# Patient Record
Sex: MALE | Race: WHITE | NOT HISPANIC OR LATINO | Employment: OTHER | URBAN - METROPOLITAN AREA
[De-identification: names, ages, dates, MRNs, and addresses within clinical notes are randomized per-mention and may not be internally consistent; named-entity substitution may affect disease eponyms.]

---

## 2017-01-02 DIAGNOSIS — E78.5 HYPERLIPIDEMIA: ICD-10-CM

## 2017-01-17 ENCOUNTER — ALLSCRIPTS OFFICE VISIT (OUTPATIENT)
Dept: OTHER | Facility: OTHER | Age: 57
End: 2017-01-17

## 2017-01-27 ENCOUNTER — TRANSCRIBE ORDERS (OUTPATIENT)
Dept: LAB | Age: 57
End: 2017-01-27

## 2017-01-27 ENCOUNTER — APPOINTMENT (OUTPATIENT)
Dept: LAB | Age: 57
End: 2017-01-27
Payer: MEDICARE

## 2017-01-27 DIAGNOSIS — I42.9 SECONDARY CARDIOMYOPATHY, UNSPECIFIED: ICD-10-CM

## 2017-01-27 DIAGNOSIS — E78.5 HYPERLIPIDEMIA, UNSPECIFIED HYPERLIPIDEMIA TYPE: ICD-10-CM

## 2017-01-27 DIAGNOSIS — E78.5 HYPERLIPIDEMIA: ICD-10-CM

## 2017-01-27 DIAGNOSIS — I48.91 ATRIAL FIBRILLATION, UNSPECIFIED TYPE (HCC): Primary | ICD-10-CM

## 2017-01-27 DIAGNOSIS — D86.9 SARCOIDOSIS: ICD-10-CM

## 2017-01-27 DIAGNOSIS — E11.9 DIABETES MELLITUS WITH NO COMPLICATION (HCC): ICD-10-CM

## 2017-01-27 DIAGNOSIS — I10 ESSENTIAL HYPERTENSION, MALIGNANT: ICD-10-CM

## 2017-01-27 LAB
ALBUMIN SERPL BCP-MCNC: 4.1 G/DL (ref 3.5–5)
ALP SERPL-CCNC: 98 U/L (ref 46–116)
ALT SERPL W P-5'-P-CCNC: 30 U/L (ref 12–78)
ANION GAP SERPL CALCULATED.3IONS-SCNC: 7 MMOL/L (ref 4–13)
AST SERPL W P-5'-P-CCNC: 11 U/L (ref 5–45)
BASOPHILS # BLD AUTO: 0.02 THOUSANDS/ΜL (ref 0–0.1)
BASOPHILS NFR BLD AUTO: 0 % (ref 0–1)
BILIRUB DIRECT SERPL-MCNC: 0.21 MG/DL (ref 0–0.2)
BILIRUB SERPL-MCNC: 0.9 MG/DL (ref 0.2–1)
BUN SERPL-MCNC: 17 MG/DL (ref 5–25)
CALCIUM SERPL-MCNC: 9 MG/DL (ref 8.3–10.1)
CHLORIDE SERPL-SCNC: 102 MMOL/L (ref 100–108)
CHOLEST SERPL-MCNC: 179 MG/DL (ref 50–200)
CK SERPL-CCNC: 65 U/L (ref 39–308)
CO2 SERPL-SCNC: 31 MMOL/L (ref 21–32)
CREAT SERPL-MCNC: 1.24 MG/DL (ref 0.6–1.3)
CREAT UR-MCNC: 251 MG/DL
EOSINOPHIL # BLD AUTO: 0.1 THOUSAND/ΜL (ref 0–0.61)
EOSINOPHIL NFR BLD AUTO: 2 % (ref 0–6)
ERYTHROCYTE [DISTWIDTH] IN BLOOD BY AUTOMATED COUNT: 13.3 % (ref 11.6–15.1)
ERYTHROCYTE [SEDIMENTATION RATE] IN BLOOD: 7 MM/HOUR (ref 0–10)
EST. AVERAGE GLUCOSE BLD GHB EST-MCNC: 163 MG/DL
GFR SERPL CREATININE-BSD FRML MDRD: 59.7 ML/MIN/1.73SQ M
GLUCOSE SERPL-MCNC: 200 MG/DL (ref 65–140)
HBA1C MFR BLD: 7.3 % (ref 4.2–6.3)
HCT VFR BLD AUTO: 45.9 % (ref 36.5–49.3)
HDLC SERPL-MCNC: 54 MG/DL (ref 40–60)
HGB BLD-MCNC: 16.5 G/DL (ref 12–17)
LDLC SERPL CALC-MCNC: 88 MG/DL (ref 0–100)
LYMPHOCYTES # BLD AUTO: 1.08 THOUSANDS/ΜL (ref 0.6–4.47)
LYMPHOCYTES NFR BLD AUTO: 21 % (ref 14–44)
MCH RBC QN AUTO: 31.8 PG (ref 26.8–34.3)
MCHC RBC AUTO-ENTMCNC: 35.9 G/DL (ref 31.4–37.4)
MCV RBC AUTO: 88 FL (ref 82–98)
MICROALBUMIN UR-MCNC: 10.9 MG/L (ref 0–20)
MICROALBUMIN/CREAT 24H UR: 4 MG/G CREATININE (ref 0–30)
MONOCYTES # BLD AUTO: 0.49 THOUSAND/ΜL (ref 0.17–1.22)
MONOCYTES NFR BLD AUTO: 10 % (ref 4–12)
NEUTROPHILS # BLD AUTO: 3.44 THOUSANDS/ΜL (ref 1.85–7.62)
NEUTS SEG NFR BLD AUTO: 67 % (ref 43–75)
NRBC BLD AUTO-RTO: 0 /100 WBCS
PLATELET # BLD AUTO: 151 THOUSANDS/UL (ref 149–390)
PMV BLD AUTO: 10.6 FL (ref 8.9–12.7)
POTASSIUM SERPL-SCNC: 4.2 MMOL/L (ref 3.5–5.3)
PROT SERPL-MCNC: 7.4 G/DL (ref 6.4–8.2)
RBC # BLD AUTO: 5.19 MILLION/UL (ref 3.88–5.62)
SODIUM SERPL-SCNC: 140 MMOL/L (ref 136–145)
TRIGL SERPL-MCNC: 187 MG/DL
TSH SERPL DL<=0.05 MIU/L-ACNC: 0.92 UIU/ML (ref 0.36–3.74)
WBC # BLD AUTO: 5.14 THOUSAND/UL (ref 4.31–10.16)

## 2017-01-27 PROCEDURE — 82248 BILIRUBIN DIRECT: CPT

## 2017-01-27 PROCEDURE — 80061 LIPID PANEL: CPT

## 2017-01-27 PROCEDURE — 85025 COMPLETE CBC W/AUTO DIFF WBC: CPT

## 2017-01-27 PROCEDURE — 80053 COMPREHEN METABOLIC PANEL: CPT

## 2017-01-27 PROCEDURE — 85652 RBC SED RATE AUTOMATED: CPT

## 2017-01-27 PROCEDURE — 82570 ASSAY OF URINE CREATININE: CPT

## 2017-01-27 PROCEDURE — 82043 UR ALBUMIN QUANTITATIVE: CPT

## 2017-01-27 PROCEDURE — 84443 ASSAY THYROID STIM HORMONE: CPT

## 2017-01-27 PROCEDURE — 83036 HEMOGLOBIN GLYCOSYLATED A1C: CPT

## 2017-01-27 PROCEDURE — 36415 COLL VENOUS BLD VENIPUNCTURE: CPT

## 2017-01-27 PROCEDURE — 82550 ASSAY OF CK (CPK): CPT

## 2017-01-28 ENCOUNTER — GENERIC CONVERSION - ENCOUNTER (OUTPATIENT)
Dept: OTHER | Facility: OTHER | Age: 57
End: 2017-01-28

## 2017-02-24 ENCOUNTER — ALLSCRIPTS OFFICE VISIT (OUTPATIENT)
Dept: OTHER | Facility: OTHER | Age: 57
End: 2017-02-24

## 2017-02-24 DIAGNOSIS — E11.9 TYPE 2 DIABETES MELLITUS WITHOUT COMPLICATIONS (HCC): ICD-10-CM

## 2017-03-16 ENCOUNTER — ALLSCRIPTS OFFICE VISIT (OUTPATIENT)
Dept: OTHER | Facility: OTHER | Age: 57
End: 2017-03-16

## 2017-05-02 ENCOUNTER — ALLSCRIPTS OFFICE VISIT (OUTPATIENT)
Dept: OTHER | Facility: OTHER | Age: 57
End: 2017-05-02

## 2017-05-02 DIAGNOSIS — D86.9 SARCOIDOSIS: ICD-10-CM

## 2017-05-04 ENCOUNTER — TRANSCRIBE ORDERS (OUTPATIENT)
Dept: ADMINISTRATIVE | Age: 57
End: 2017-05-04

## 2017-05-04 ENCOUNTER — HOSPITAL ENCOUNTER (OUTPATIENT)
Dept: RADIOLOGY | Age: 57
Discharge: HOME | End: 2017-05-04
Payer: MEDICARE

## 2017-05-04 DIAGNOSIS — D86.9 SARCOIDOSIS: ICD-10-CM

## 2017-05-04 PROCEDURE — 71020 HB CHEST X-RAY 2VW FRONTAL&LATL: CPT

## 2017-05-05 ENCOUNTER — ALLSCRIPTS OFFICE VISIT (OUTPATIENT)
Dept: OTHER | Facility: OTHER | Age: 57
End: 2017-05-05

## 2017-05-09 ENCOUNTER — GENERIC CONVERSION - ENCOUNTER (OUTPATIENT)
Dept: OTHER | Facility: OTHER | Age: 57
End: 2017-05-09

## 2017-05-16 ENCOUNTER — GENERIC CONVERSION - ENCOUNTER (OUTPATIENT)
Dept: OTHER | Facility: OTHER | Age: 57
End: 2017-05-16

## 2017-06-20 ENCOUNTER — ALLSCRIPTS OFFICE VISIT (OUTPATIENT)
Dept: OTHER | Facility: OTHER | Age: 57
End: 2017-06-20

## 2017-07-12 ENCOUNTER — APPOINTMENT (OUTPATIENT)
Dept: RADIOLOGY | Age: 57
End: 2017-07-12
Payer: MEDICARE

## 2017-07-12 ENCOUNTER — TRANSCRIBE ORDERS (OUTPATIENT)
Dept: ADMINISTRATIVE | Age: 57
End: 2017-07-12

## 2017-07-12 ENCOUNTER — GENERIC CONVERSION - ENCOUNTER (OUTPATIENT)
Dept: OTHER | Facility: OTHER | Age: 57
End: 2017-07-12

## 2017-07-12 ENCOUNTER — APPOINTMENT (OUTPATIENT)
Dept: LAB | Age: 57
End: 2017-07-12
Payer: MEDICARE

## 2017-07-12 DIAGNOSIS — R07.81 PLEURODYNIA: ICD-10-CM

## 2017-07-12 DIAGNOSIS — R07.81 PLEURITIC CHEST PAIN: ICD-10-CM

## 2017-07-12 DIAGNOSIS — R07.81 PLEURITIC CHEST PAIN: Primary | ICD-10-CM

## 2017-07-12 DIAGNOSIS — E11.9 TYPE 2 DIABETES MELLITUS WITHOUT COMPLICATIONS (HCC): ICD-10-CM

## 2017-07-12 DIAGNOSIS — R52 PAIN: ICD-10-CM

## 2017-07-12 DIAGNOSIS — I48.91 ATRIAL FIBRILLATION (HCC): ICD-10-CM

## 2017-07-12 LAB
ANION GAP SERPL CALCULATED.3IONS-SCNC: 4 MMOL/L (ref 4–13)
BUN SERPL-MCNC: 16 MG/DL (ref 5–25)
CALCIUM SERPL-MCNC: 8.7 MG/DL (ref 8.3–10.1)
CHLORIDE SERPL-SCNC: 102 MMOL/L (ref 100–108)
CO2 SERPL-SCNC: 32 MMOL/L (ref 21–32)
CREAT SERPL-MCNC: 1.24 MG/DL (ref 0.6–1.3)
EST. AVERAGE GLUCOSE BLD GHB EST-MCNC: 171 MG/DL
GFR SERPL CREATININE-BSD FRML MDRD: 59.7 ML/MIN/1.73SQ M
GLUCOSE SERPL-MCNC: 294 MG/DL (ref 65–140)
HBA1C MFR BLD: 7.6 % (ref 4.2–6.3)
POTASSIUM SERPL-SCNC: 4 MMOL/L (ref 3.5–5.3)
SODIUM SERPL-SCNC: 138 MMOL/L (ref 136–145)

## 2017-07-12 PROCEDURE — 83036 HEMOGLOBIN GLYCOSYLATED A1C: CPT

## 2017-07-12 PROCEDURE — 36415 COLL VENOUS BLD VENIPUNCTURE: CPT

## 2017-07-12 PROCEDURE — 80048 BASIC METABOLIC PNL TOTAL CA: CPT

## 2017-07-12 PROCEDURE — 71020 HB CHEST X-RAY 2VW FRONTAL&LATL: CPT

## 2017-07-20 ENCOUNTER — ALLSCRIPTS OFFICE VISIT (OUTPATIENT)
Dept: OTHER | Facility: OTHER | Age: 57
End: 2017-07-20

## 2017-07-20 ENCOUNTER — APPOINTMENT (OUTPATIENT)
Dept: LAB | Facility: CLINIC | Age: 57
End: 2017-07-20
Payer: MEDICARE

## 2017-07-20 DIAGNOSIS — I48.91 ATRIAL FIBRILLATION (HCC): ICD-10-CM

## 2017-07-20 LAB
BASOPHILS # BLD AUTO: 0.05 THOUSANDS/ΜL (ref 0–0.1)
BASOPHILS NFR BLD AUTO: 1 % (ref 0–1)
EOSINOPHIL # BLD AUTO: 0.18 THOUSAND/ΜL (ref 0–0.61)
EOSINOPHIL NFR BLD AUTO: 3 % (ref 0–6)
ERYTHROCYTE [DISTWIDTH] IN BLOOD BY AUTOMATED COUNT: 13.4 % (ref 11.6–15.1)
HCT VFR BLD AUTO: 42.9 % (ref 36.5–49.3)
HGB BLD-MCNC: 15.4 G/DL (ref 12–17)
LYMPHOCYTES # BLD AUTO: 1.19 THOUSANDS/ΜL (ref 0.6–4.47)
LYMPHOCYTES NFR BLD AUTO: 19 % (ref 14–44)
MCH RBC QN AUTO: 31.8 PG (ref 26.8–34.3)
MCHC RBC AUTO-ENTMCNC: 35.9 G/DL (ref 31.4–37.4)
MCV RBC AUTO: 89 FL (ref 82–98)
MONOCYTES # BLD AUTO: 0.58 THOUSAND/ΜL (ref 0.17–1.22)
MONOCYTES NFR BLD AUTO: 9 % (ref 4–12)
NEUTROPHILS # BLD AUTO: 4.29 THOUSANDS/ΜL (ref 1.85–7.62)
NEUTS SEG NFR BLD AUTO: 68 % (ref 43–75)
NRBC BLD AUTO-RTO: 0 /100 WBCS
PLATELET # BLD AUTO: 167 THOUSANDS/UL (ref 149–390)
PMV BLD AUTO: 10.6 FL (ref 8.9–12.7)
RBC # BLD AUTO: 4.84 MILLION/UL (ref 3.88–5.62)
WBC # BLD AUTO: 6.3 THOUSAND/UL (ref 4.31–10.16)

## 2017-07-20 PROCEDURE — 85025 COMPLETE CBC W/AUTO DIFF WBC: CPT

## 2017-07-20 PROCEDURE — 36415 COLL VENOUS BLD VENIPUNCTURE: CPT

## 2017-10-12 ENCOUNTER — ALLSCRIPTS OFFICE VISIT (OUTPATIENT)
Dept: OTHER | Facility: OTHER | Age: 57
End: 2017-10-12

## 2017-10-19 ENCOUNTER — ALLSCRIPTS OFFICE VISIT (OUTPATIENT)
Dept: OTHER | Facility: OTHER | Age: 57
End: 2017-10-19

## 2017-10-20 NOTE — PROGRESS NOTES
Assessment  Assessed    1  Atrial fibrillation (427 31) (I48 91)   2  Cardiomyopathy (425 4) (I42 9)   3  Hyperlipidemia (272 4) (E78 5)   4  Hypertension (401 9) (I10)   5  Obstructive sleep apnea (327 23) (G47 33)   6  Pacemaker (V45 01) (Z95 0)   7  Sinus bradycardia (427 89) (R00 1)   8  Type 2 diabetes mellitus (250 00) (E11 9)    Plan  Atrial fibrillation    · DilTIAZem HCl ER Coated Beads 360 MG Oral Capsule Extended Release 24  Hour   Rx By: Bart Awad; Dispense: 0 Days ; #:30 Capsule; Refill: 0;For: Atrial fibrillation; BISI = N; Sent To: [address] [place] [address]; Msg to Pharmacy: Patient needs follow up appointmenrt to get further medications; Last Updated By: Chuy Sepulveda; 9/11/2017 10:12:35 AM   · DilTIAZem HCl ER Beads 360 MG Oral Capsule Extended Release 24 Hour; TAKE  1 CAPSULE DAILY IN THE MORNING   Rx By: Chuy Sepulveda; Dispense: 90 Days ; #:90 Capsule Extended Release 24 Hour; Refill: 3;For: Atrial fibrillation; BISI = N; Sent To: [facility]   · Matzim  MG Oral Tablet Extended Release 24 Hour; TAKE 1 TABLET DAILY   Rx By: Chuy Sepulveda; Dispense: 90 Days ; #:90 Tablet Extended Release 24 Hour; Refill: 3;For: Atrial fibrillation; BISI = N; Print Rx   · Xarelto 20 MG Oral Tablet; Take 1 tablet daily   Rx By: Chuy Sepulveda; Dispense: 90 Days ; #:90 Tablet; Refill: 3;For: Atrial fibrillation; BISI = N; Sent To: [facility]   · EKG/ECG- POC; Status:Complete;   Done: 73KGS3358   Perform: In Office; Due:19Oct2018; Last Updated By:Bigg Webb; 10/19/2017 8:52:32 AM;Ordered; For:Atrial fibrillation; Ordered By:Sarah Levy;  Atrial fibrillation, Hypertension    · Metoprolol Tartrate 50 MG Oral Tablet; TAKE 1 TABLET TWICE DAILY   Rx By: Chuy Sepulveda; Dispense: 90 Days ; #:180 Tablet; Refill: 3;For: Atrial fibrillation, Hypertension; BISI = N; Sent To: [facility]  Cardiomyopathy    · Furosemide 40 MG Oral Tablet; Take 1 tablet daily   Rx By: Chuy Sepulveda; Dispense: 90 Days ; #:90 Tablet;  Refill: 3;For: Cardiomyopathy; BISI = N; Sent To: [address]; Msg to Pharmacy: needs follow up appointment  Hyperlipidemia    · Atorvastatin Calcium 40 MG Oral Tablet; take 1 tablet by mouth once daily at  bedtime   Rx By: Frank Rehman; Dispense: 90 Days ; #:90 Tablet; Refill: 3;For: Hyperlipidemia; BISI = N; Sent To: [facility]   · (1) CK (CPK); Status:Active; Requested SKE:[id number];    Perform:[facility]; MKC:[id number]; Ordered;For:Hyperlipidemia; Ordered By:Sarah Levy;   · (1) HEPATIC FUNCTION PANEL; Status:Active; Requested POT:[id number];    Perform:[facility]; RPF:60ABR7474; Ordered;For:Hyperlipidemia; Ordered By:Sarah Levy;   · (1) LIPID PANEL FASTING W DIRECT LDL REFLEX; Status:Active; Requested  QHU:[id number];    Perform:[facility]; RFN:[id number]; Ordered;For:Hyperlipidemia; Ordered By:Sarah Levy;  Unlinked    · Follow-up visit in 6 months Evaluation and Treatment  Follow-up  Status: Hold For -  Scheduling  Requested for: 97XSJ1248   Ordered;Ordered By: Frank Rehman Performed:  Due: 11SGU1112    Discussion/Summary  Discussion Summary:   1  Permanent atrial fibrillation  He is on Metoprolol and Diltiazem as well as Xarelto for stroke prophylaxis  He is now 100% in afib based on PM interrogation  Creatinine 1 24 7/17  His fatigue improved after decreasing Metoprolol from 100 bid to 50 bid  Hypertension  Blood pressure is controlled on Metoprolol and Diltiazem and Lasix 40  Lisinopril was discontinued when he had orthostatic hypotension  Dyslipidemia  Lipid panel in 11/14 showed total cholesterol 183, HDL 50, LDL 93, and   Repeat lipid panel in 2/15 showed total cholesterol 166, , HDL 48, and LDL 95  He is on Atorvastatin 40  Lipid panel 1/17 showed total cholesterol 179, LDL 88, , HDL 54  Nonischemic cardiomyopathy  He is on beta blocker  He is not having any symptoms currently  Most recent echo showed normalization of LV function   Lisinopril was stopped to avoid hypotension  He still requires the Lasix to avoid lower extremity edema  I asked him to cut down Lasix to 20 mg to see if this would be enough to treat the edema, but he tried this and did not help control his LE edema, currently on 40 mg daily  s/p PM  Device interrogation 6/17 showed battery voltage adequate 3 5 years, V paced 40%  Sleep apnea  He is not using CPAP at night, and is not using oxygen at night either  DM  Hgb A1c 1/17 was 7 3% Hgb A1c 7 6% 7/17  [facility] Chief Complaint  Chief Complaint Free Text Note Form: Patient here for office visit  History of Present Illness  HPI: 59year old man with a history of previously paroxysmal, now permanent atrial fibrillation, TOVA intolerant to CPAP, history of NICM which resolved with rate control, 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 in 4/13 morbid obesity, HTN, HL, and DM, who is here for routine follow up    was admitted to the hospital for several episodes of syncope at home in 3/14  He was found to be orthostatic, and repeat echo in 3/14 showed normalization of LV function with EF of 60%, so Lisinopril was discontinued  RV size and function were normal, with PASP of 25-30 mm Hg  Pacemaker interrogation in the hospital did show episodes of atrial fibrillation with rapid ventricular response, so Metoprolol and Diltiazem were titrated up for rate control  denies any dizziness or lightheadedness or further syncope  He denies any worsening of his baseline dyspnea, reports it is stable, uses Asmanex so does not need to use albuterol as much as previously  He states he currently does not feel palpitations from the atrial fibrillation  No chest pain, no lower extremity edema  He did try taking 20 mg of Lasix instead of 40, but his ankle edema was not controlled on lower dose, now on 40  Edema got worse when he was at [employer] in heat and from standing all day, but it resolved on its own   He does not use his CPAP machine for his sleep apnea, was previously using oxygen at night, but stopped using it because he had to pay for it, and it also dried out his nose  He has reduced vision due to prior episode of ocular shingles with reduced vision in left eye  He is not doing formal exercise  Review of Systems  Cardiology Male ROS:     Cardiac: rhythm problems, but-- no chest pain,-- no fainting/blackouts,-- no heart murmur present,-- no signs of swelling-- and-- no palpitations present  Skin: No complaints of nonhealing sores or skin rash  Genitourinary: no recurrent urinary tract infections,-- no frequent urination at night,-- no difficult urination,-- no blood in urine-- and-- no kidney stones   Psychological: No complaints of feeling depressed, anxiety, panic attacks, or difficulty concentrating  General: No complaints of trouble sleeping, lack of energy, fatigue, appetite changes, weight changes, fever, frequent infections, or night sweats  Respiratory: shortness of breath, but-- No complaints of shortness of breath, cough with sputum, or wheezing  HEENT: No complaints of serious problems, hearing problems, nose problems, throat problems, or snoring  Gastrointestinal: No complaints of liver problems, nausea, vomiting, heartburn, constipation, bloody stools, diarrhea, problems swallowing, adbominal pain, or rectal bleeding  Hematologic: excessive bruising, but-- no bleeding disorders,-- no anemia-- and-- no blood clots   Neurological: No complaints of numbness, tingling, dizziness, weakness, seizures, headaches, syncope or fainting, AM fatigue, daytime sleepiness, no witnessed apnea episodes  Musculoskeletal: No complaints of arthritis, back pain, or painfull swelling  ROS Reviewed:   ROS reviewed  Active Problems  Problems    1  Anxiety (300 00) (F41 9)   2  Arthralgia of right shoulder region (719 41) (M25 511)   3  Atrial fibrillation (427 31) (I48 91)   4  Cardiomyopathy (425 4) (I42 9)   5   Colonoscopy (Fiberoptic) Screening   6  Cough (786 2) (R05)   7  Dry eye syndrome (375 15) (H04 129)   8  Encounter for screening for malignant neoplasm of prostate (V76 44) (Z12 5)   9  Hyperlipidemia (272 4) (E78 5)   10  Hypertension (401 9) (I10)   11  Morbid obesity (278 01) (E66 01)   12  Obstructive sleep apnea (327 23) (G47 33)   13  Pacemaker (V45 01) (Z95 0)   14  Pain (780 96) (R52)   15  Recurrent major depressive disorder, in partial remission (296 35) (F33 41)   16  Rib pain on left side (786 50) (R07 81)   17  Sarcoidosis (135) (D86 9)   18  Sinus bradycardia (427 89) (R00 1)   19  Type 2 diabetes mellitus (250 00) (E11 9)    Past Medical History  Problems    1  History of Cardiac Cath Procedures Performed   2  History of Chronic systolic congestive heart failure (428 22,428 0) (I50 22)   3  Pacemaker (V45 01) (Z95 0)  Active Problems And Past Medical History Reviewed: The active problems and past medical history were reviewed and updated today  Surgical History  Problems    1  History of Elective Cardioversion   2  History of Rectal Surgery Repair Of Perirectal Fistula   3  History of Tonsillectomy  Surgical History Reviewed: The surgical history was reviewed and updated today  Family History  Mother    1  Family history of Atrial Fibrillation   2  Family history of Cancer  Father    3  Family history of Heart Disease (V17 49)   4  Family history of Hypertension (V17 49)   5  Family history of Skin Cancer (V16 8)  Family History    6  Family history of Cancer   7  Family history of Transient Ischemic Attack Without Residual Deficits  Family History Reviewed: The family history was reviewed and updated today         Social History  Problems    · Always uses seat belt   · Being A Social Drinker   · Daily Coffee Consumption (___ Cups/Day)   · Denied: History of Daily Cola Consumption (___ Cans/Day)   · Denied: History of Daily Tea Consumption (___ Cups/Day)   · Dental care, regularly   · Denied: History of Drug use   · Denied: History of Exercise habits   · Former smoker (V15 82) (L21 203)   · Pets / animals   · Retired   · Denied: History of Tobacco Use   · Uses Safety Equipment - Seatbelts  Social History Reviewed: The social history was reviewed and updated today  Current Meds   1  Asmanex 120 Metered Doses AEPB; INHALE 1 PUFF TWICE DAILY; Therapy: (Recorded:19Oct2017) to Recorded   2  Atorvastatin Calcium 40 MG Oral Tablet; take 1 tablet by mouth once daily at bedtime; Therapy: 03WZX3806 to (Last Rx:54Afr0907)  Requested for: 62Dzd3998 Ordered   3  Citalopram Hydrobromide 20 MG Oral Tablet; Take 1 tablet daily; Therapy: 09HIW5615 to (Last Rx:08Sep2017)  Requested for: 32Cnp7821 Ordered   4  DilTIAZem HCl ER Coated Beads 360 MG Oral Capsule Extended Release 24 Hour;   take 1 capsule daily; Therapy: 17MRA3595 to (Last Rx:08Sep2017)  Requested for: 89Zxv9198 Ordered   5  Erythromycin 5 MG/GM Ophthalmic Ointment; APPLY 1 STRIP Bedtime; Therapy: (Recorded:75Tlu4787) to Recorded   6  Furosemide 40 MG Oral Tablet; Take 1 tablet daily; Therapy: 80WPU3201 to (Last Rx:08Sep2017)  Requested for: 11Sep2017 Ordered   7  Metoprolol Tartrate 50 MG Oral Tablet; TAKE 1 TABLET TWICE DAILY  Requested for:   71MUM8026; Last Rx:16Mar2017 Ordered   8  Restasis 0 05 % Ophthalmic Emulsion; INSTILL 1 DROP IN EACH EYE TWICE DAILY   Recorded   9  Ventolin  (90 Base) MCG/ACT Inhalation Aerosol Solution; INHALE 2 PUFFS   EVERY 4-6 HOURS AS NEEDED; Therapy: 16EON3203 to (Evaluate:31Ypv5847); Last Rx:13Syn4328 Ordered   10  Xarelto 20 MG Oral Tablet; Take 1 tablet daily; Therapy: 74ZYS8387 to (Last Rx:49Bxf2644)  Requested for: 22Igt8838 Ordered  Medication List Reviewed: The medication list was reviewed and updated today  Allergies  Medication    1  No Known Drug Allergies  Non-Medication    2  No Known Environmental Allergies   3   No Known Food Allergies    Vitals  Vital Signs    Recorded: 96VML3402 08:52AM   Heart Rate 91, Apical   Pulse Quality Regular, Apical   Systolic 983, RUE, Sitting   Diastolic 72, RUE, Sitting   Height 5 ft 10 in   Weight 291 lb    BMI Calculated 41 75   BSA Calculated 2 45     Physical Exam    Constitutional   General appearance: No acute distress, well appearing and well nourished  -- Morbidly obese  Eyes   Conjunctiva and Sclera examination: Conjunctiva pink, sclera anicteric  Ears, Nose, Mouth, and Throat - Oropharynx: Clear, nares are clear, mucous membranes are moist    Neck   Neck and thyroid: Normal, supple, trachea midline, no thyromegaly  Pulmonary   Respiratory effort: No increased work of breathing or signs of respiratory distress  Cardiovascular   Palpation of heart: Normal PMI, no thrills  Auscultation of heart: Normal rate and rhythm, normal S1 and S2, no murmurs  Carotid pulses: Normal, 2+ bilaterally  Examination of extremities for edema and/or varicosities: Normal     Chest - Chest: Normal    Abdomen   Abdomen: Abnormal   The abdomen was obese  Bowel sounds were normal  The abdomen was soft and nontender  Musculoskeletal Gait and station: Normal gait  Skin - Skin and subcutaneous tissue: Normal without rashes or lesions  Skin is warm and well perfused, normal turgor  Psychiatric - Orientation to person, place, and time: Normal -- Mood and affect: Normal       Results/Data  ECG Report:   Rhythm and rate: atrial fibrillation-- and-- PVCs  Axis: left axis deviation  Future Appointments    Date/Time Provider Specialty Site   03/21/2018 10:00 AM Cardiology, [address] Anitha [facility]   11/29/2017 01:00 PM Cardiology, Device Remote  [address]   03/07/2018 08:45 AM QUENTIN Boyd  [facility]     Signatures   Electronically signed by :  Lauren Jones MD; Oct 19 2017  9:22AM EST                       (Author)

## 2017-10-23 DIAGNOSIS — I42.9 CARDIOMYOPATHY (HCC): ICD-10-CM

## 2017-10-23 DIAGNOSIS — E66.01 MORBID (SEVERE) OBESITY DUE TO EXCESS CALORIES (HCC): ICD-10-CM

## 2017-10-23 DIAGNOSIS — E78.5 HYPERLIPIDEMIA: ICD-10-CM

## 2017-10-23 DIAGNOSIS — F32.9 MAJOR DEPRESSIVE DISORDER, SINGLE EPISODE: ICD-10-CM

## 2017-10-23 DIAGNOSIS — I48.91 ATRIAL FIBRILLATION (HCC): ICD-10-CM

## 2017-10-23 DIAGNOSIS — I10 ESSENTIAL (PRIMARY) HYPERTENSION: ICD-10-CM

## 2017-10-23 DIAGNOSIS — E11.9 TYPE 2 DIABETES MELLITUS WITHOUT COMPLICATIONS (HCC): ICD-10-CM

## 2017-10-29 NOTE — PROGRESS NOTES
Assessment    1  Sarcoidosis (135) (D86 9)   2  Cough (786 2) (R05)    Plan  Cough, Sarcoidosis    · Doxycycline Hyclate 100 MG Oral Capsule; 1 tab PO BID    Discussion/Summary    #1 cough - ? bronchitissarcoidosisI reviewed with patient  Lungs are slightly increased expiratory phase but not appreciably rales  Probable viral etiology, however, patient does have reduced lung function due to his sarcoidosis  RECOMMEND: Increase Asmanex one inhalation twice a day  Increase rescue and he'll use 2 every 4-6 hours until cough improves  Continue promethazine  Start doxycycline In 2-3 days if not improving, earlier if worse  (Printed prescription given) follow-up Monday if not improved?go to ER or urgent care if worse over the weekend  Patient to call for problems or concerns in the interim  The patient, patient's family was counseled regarding diagnostic results,-- instructions for management,-- risk factor reductions,-- prognosis,-- patient and family education,-- impressions,-- risks and benefits of treatment options,-- importance of compliance with treatment  Possible side effects of new medications were reviewed with the patient/guardian today  The treatment plan was reviewed with the patient/guardian  The patient/guardian understands and agrees with the treatment plan      Chief Complaint    1  Cold Symptoms    History of Present Illness  HPI: as above2-3 day hx of wheezing tightness in chest with fatigue but no fever/chills  No increased edema but has some nasal congestion  No sinus pain  Cough is a little better today  Compliant with inhalers but only uses ProAir once or twice a day  Pt with hx of sarcoidosis and has some restriction on last spirometry      Review of Systems   Constitutional: as noted in HPI   ENT: as noted in HPI  Cardiovascular: no complaints of slow or fast heart rate, no chest pain, no palpitations, no leg claudication or lower extremity edema  Respiratory: as noted in HPI    Musculoskeletal: no complaints of arthralgia, no myalgia, no joint swelling or stiffness, no limb pain or swelling  Integumentary: no complaints of skin rash or lesion, no itching or dry skin, no skin wounds  Active Problems    1  Anxiety (300 00) (F41 9)   2  Arthralgia of right shoulder region (719 41) (M25 511)   3  Atrial fibrillation (427 31) (I48 91)   4  Cardiomyopathy (425 4) (I42 9)   5  Colonoscopy (Fiberoptic) Screening   6  Dry eye syndrome (375 15) (H04 129)   7  Encounter for screening for malignant neoplasm of prostate (V76 44) (Z12 5)   8  Hyperglycemia (790 29) (R73 9)   9  Hyperlipidemia (272 4) (E78 5)   10  Hypertension (401 9) (I10)   11  Morbid obesity (278 01) (E66 01)   12  Obstructive sleep apnea (327 23) (G47 33)   13  Pacemaker (V45 01) (Z95 0)   14  Pain (780 96) (R52)   15  Recurrent major depressive disorder, in partial remission (296 35) (F33 41)   16  Rib pain on left side (786 50) (R07 81)   17  Sarcoidosis (135) (D86 9)   18  Sinus bradycardia (427 89) (R00 1)   19  Type 2 diabetes mellitus (250 00) (E11 9)    Past Medical History  1  History of Cardiac Cath Procedures Performed   2  History of Chronic systolic congestive heart failure (428 22,428 0) (I50 22)   3  Pacemaker (V45 01) (Z95 0)  Active Problems And Past Medical History Reviewed: The active problems and past medical history were reviewed and updated today  Family History  Mother    1  Family history of Atrial Fibrillation   2  Family history of Cancer  Father    3  Family history of Heart Disease (V17 49)   4  Family history of Hypertension (V17 49)   5  Family history of Skin Cancer (V16 8)  Family History    6  Family history of Cancer   7   Family history of Transient Ischemic Attack Without Residual Deficits    Social History   · Always uses seat belt   · Being A Social Drinker   · Daily Coffee Consumption (___ Cups/Day)   · Denied: History of Daily Cola Consumption (___ Cans/Day)   · Denied: History of Daily Tea Consumption (___ Cups/Day)   · Dental care, regularly   · Denied: History of Drug use   · Denied: History of Exercise habits   · Former smoker (V15 82) (I92 169)   · [address], 2ppd x 5 years   · Pets / animals   · Retired   · Denied: History of Tobacco Use   · Uses Safety Equipment - Seatbelts  The social history was reviewed and updated today  Surgical History    1  History of Elective Cardioversion   2  History of Rectal Surgery Repair Of Perirectal Fistula   3  History of Tonsillectomy    Current Meds   1  Asmanex 120 Metered Doses AEPB; Therapy: (Recorded:66Fxj5368) to Recorded   2  Atorvastatin Calcium 40 MG Oral Tablet; take 1 tablet by mouth once daily at bedtime; Therapy: 11PCW1067 to (Last Rx:63Npr3926)  Requested for: 49Uvk5222 Ordered   3  Citalopram Hydrobromide 20 MG Oral Tablet; Take 1 tablet daily; Therapy: 21INO9208 to (Last Rx:00Gth2296)  Requested for: 70Wmt8900 Ordered   4  DilTIAZem HCl ER Beads 360 MG Oral Capsule Extended Release 24 Hour; TAKE 1 CAPSULE DAILY; Therapy: 80QHR0773 to (Evaluate:16Kjy2250)  Requested for: 22MYG6108; Last Rx:69Uiv5184 Ordered   5  DilTIAZem HCl ER Coated Beads 360 MG Oral Capsule Extended Release 24 Hour; take 1 capsule daily; Therapy: 68KNV1861 to (Last Rx:51Pda3968)  Requested for: 74Bcs6538 Ordered   6  Erythromycin 5 MG/GM Ophthalmic Ointment; APPLY 1 STRIP Bedtime; Therapy: (Recorded:29Lvh6401) to Recorded   7  Furosemide 40 MG Oral Tablet; Take 1 tablet daily; Therapy: 23NJF9057 to (Last Rx:27Bbw0176)  Requested for: 74Neo6827 Ordered   8  Metoprolol Tartrate 100 MG Oral Tablet; TAKE 1 TABLET TWICE DAILY; Therapy: 48HZP0323 to [phone] [phone])  Requested for: 66Wcp0996; Last Rx:29Ouk1340 Ordered   9  Metoprolol Tartrate 50 MG Oral Tablet; TAKE 1 TABLET TWICE DAILY  Requested for: 11KMO9912; Last Rx:16Mar2017 Ordered   10  Restasis 0 05 % Ophthalmic Emulsion; INSTILL 1 DROP IN EACH EYE TWICE DAILY  Recorded   11   Ventolin  (90 Base) MCG/ACT Inhalation Aerosol Solution; INHALE 2 PUFFS  EVERY 4-6 HOURS AS NEEDED; Therapy: 02DBR1906 to (Evaluate:74Jli6048); Last Rx:33Dvz0859 Ordered   12  Xarelto 20 MG Oral Tablet; Take 1 tablet daily; Therapy: 86POQ4056 to (Last Rx:70Oza4027)  Requested for: 12Opn5333 Ordered    The medication list was reviewed and updated today  Allergies  1  No Known Drug Allergies  2  No Known Environmental Allergies   3  No Known Food Allergies    Vitals   Recorded: 67CKF3429 01:47PM Recorded: 73SOH8762 01:43PM   Temperature  97 8 F   Heart Rate  72   Systolic  632   Diastolic  72   Height  5 ft 10 5 in   Weight  288 lb    BMI Calculated  40 74   BSA Calculated  2 45   O2 Saturation 97        Physical Exam   Constitutional  General appearance: Abnormal  -- mildly ill appearing male with sl deep cough in NAD  Eyes  Conjunctiva and lids: No swelling, erythema, or discharge  Ears, Nose, Mouth, and Throat  External inspection of ears and nose: Normal    Otoscopic examination: Tympanic membrance translucent with normal light reflex  Canals patent without erythema  Nasal mucosa, septum, and turbinates: Abnormal  -- sl congestion  Oropharynx: Normal with no erythema, edema, exudate or lesions  Pulmonary  Respiratory effort: No increased work of breathing or signs of respiratory distress  Auscultation of lungs: Abnormal  -- deep cough with sl increased exp phase but no rales  Cardiovascular  Auscultation of heart: Abnormal  -- sl irreg S1, S2   Lymphatic  Palpation of lymph nodes in neck: No lymphadenopathy  Future Appointments    Date/Time Provider Specialty Site   03/21/2018 10:00 AM Cardiology, [address] Ida Welsh [facility]   10/19/2017 08:40 AM Tanesha Perea MD Cardiology [facility]   03/07/2018 08:45 AM QUENTIN Solis   [address]       Signatures   Electronically signed by : QUENTIN Iniguez ; Oct 13 2017 7:10AM EST                       (Author)

## 2017-11-07 ENCOUNTER — ALLSCRIPTS OFFICE VISIT (OUTPATIENT)
Dept: OTHER | Facility: OTHER | Age: 57
End: 2017-11-07

## 2018-01-12 NOTE — RESULT NOTES
Message  The patient came to the office today for spirometry  FEV1 to FVC ratio was 72  FEV1 2 89 L (74% predicted)  FVC 4 01 L (78% predicted)    This consistent with mild restrictive lung disease      Plan  Sarcoidosis    · SPIROMETRY W/O BRONCHO- POC; Status:Complete - Retrospective By Protocol  Authorization;   Done: 20EXB6129    Signatures   Electronically signed by : Claudio Paredes DO; May  5 2017 11:34AM EST                       (Author)

## 2018-01-13 VITALS
HEIGHT: 70 IN | SYSTOLIC BLOOD PRESSURE: 102 MMHG | DIASTOLIC BLOOD PRESSURE: 78 MMHG | BODY MASS INDEX: 43.31 KG/M2 | WEIGHT: 302.5 LBS | HEART RATE: 72 BPM | TEMPERATURE: 98.4 F

## 2018-01-13 VITALS
HEART RATE: 72 BPM | HEIGHT: 71 IN | WEIGHT: 288 LBS | BODY MASS INDEX: 40.32 KG/M2 | TEMPERATURE: 97.8 F | SYSTOLIC BLOOD PRESSURE: 102 MMHG | OXYGEN SATURATION: 97 % | DIASTOLIC BLOOD PRESSURE: 72 MMHG

## 2018-01-13 VITALS
SYSTOLIC BLOOD PRESSURE: 108 MMHG | WEIGHT: 295.5 LBS | DIASTOLIC BLOOD PRESSURE: 60 MMHG | BODY MASS INDEX: 42.31 KG/M2 | HEIGHT: 70 IN | HEART RATE: 62 BPM

## 2018-01-13 NOTE — RESULT NOTES
Verified Results  (1) BASIC METABOLIC PROFILE 23RHT3409 11:53AM [employer] Order Number: [id number]     Test Name Result Flag Reference   GLUCOSE,RANDM 294 mg/dL H    If the patient is fasting, the ADA then defines impaired fasting glucose as > 100 mg/dL and diabetes as > or equal to 123 mg/dL  SODIUM 138 mmol/L  136-145   POTASSIUM 4 0 mmol/L  3 5-5 3   CHLORIDE 102 mmol/L  100-108   CARBON DIOXIDE 32 mmol/L  21-32   ANION GAP (CALC) 4 mmol/L  4-13   BLOOD UREA NITROGEN 16 mg/dL  5-25   CREATININE 1 24 mg/dL  0 60-1 30   Standardized to IDMS reference method   CALCIUM 8 7 mg/dL  8 3-10 1   eGFR Non-African American 59 7 ml/min/1 73sq [facility] Disease Education Program recommendations are as follows:  GFR calculation is accurate only with a steady state creatinine  Chronic Kidney disease less than 60 ml/min/1 73 sq  meters  Kidney failure less than 15 ml/min/1 73 sq  meters  (1) HEMOGLOBIN A1C 81Oqj3365 11:53AM [employer] Order Number: [id number]     Test Name Result Flag Reference   HEMOGLOBIN A1C 7 6 % H 4 2-6 3   EST  AVG  GLUCOSE 171 mg/dl       * XR CHEST PA & LATERAL 80IKJ0001 11:51AM Keerthi Palacios     Test Name Result Flag Reference   XR CHEST PA & LATERAL (Report)     CHEST - DUAL ENERGY     INDICATION: Status post fall injury to left side  COMPARISON: May 4, 2017     VIEWS: PA (including soft tissue/bone algorithms) and lateral projections     IMAGES: 4     FINDINGS:        The heart mediastinum are stable  Dual-lead pacing device unchanged in position  Leads appear intact  Linear scarring again seen within the right apex  Lungs are otherwise clear  No evidence of lung contusion pneumothorax or pleural fluid  No evidence of rib fracture on this examination  IMPRESSION:     No active pulmonary disease         Workstation performed: [id number]     Signed by:   Danita Bower MD   7/12/17

## 2018-01-14 VITALS
OXYGEN SATURATION: 97 % | SYSTOLIC BLOOD PRESSURE: 118 MMHG | WEIGHT: 294 LBS | DIASTOLIC BLOOD PRESSURE: 80 MMHG | HEIGHT: 70 IN | RESPIRATION RATE: 16 BRPM | HEART RATE: 72 BPM | BODY MASS INDEX: 42.09 KG/M2 | TEMPERATURE: 98 F

## 2018-01-14 VITALS
HEIGHT: 70 IN | DIASTOLIC BLOOD PRESSURE: 72 MMHG | BODY MASS INDEX: 41.66 KG/M2 | WEIGHT: 291 LBS | HEART RATE: 91 BPM | SYSTOLIC BLOOD PRESSURE: 112 MMHG

## 2018-01-14 VITALS
WEIGHT: 302 LBS | BODY MASS INDEX: 43.23 KG/M2 | TEMPERATURE: 97.7 F | SYSTOLIC BLOOD PRESSURE: 124 MMHG | DIASTOLIC BLOOD PRESSURE: 82 MMHG | HEART RATE: 80 BPM | HEIGHT: 70 IN | RESPIRATION RATE: 16 BRPM

## 2018-01-17 NOTE — RESULT NOTES
Verified Results  (1) CBC/PLT/DIFF 27Jan2017 09:27AM Emmanuel Palacios     Test Name Result Flag Reference   WBC COUNT 5 14 Thousand/uL  4 31-10 16   RBC COUNT 5 19 Million/uL  3 88-5 62   HEMOGLOBIN 16 5 g/dL  12 0-17 0   HEMATOCRIT 45 9 %  36 5-49 3   MCV 88 fL  82-98   MCH 31 8 pg  26 8-34 3   MCHC 35 9 g/dL  31 4-37 4   RDW 13 3 %  11 6-15 1   MPV 10 6 fL  8 9-12 7   PLATELET COUNT 917 Thousands/uL  149-390   nRBC AUTOMATED 0 /100 WBCs     NEUTROPHILS RELATIVE PERCENT 67 %  43-75   LYMPHOCYTES RELATIVE PERCENT 21 %  14-44   MONOCYTES RELATIVE PERCENT 10 %  4-12   EOSINOPHILS RELATIVE PERCENT 2 %  0-6   BASOPHILS RELATIVE PERCENT 0 %  0-1   NEUTROPHILS ABSOLUTE COUNT 3 44 Thousands/?L  1 85-7 62   LYMPHOCYTES ABSOLUTE COUNT 1 08 Thousands/?L  0 60-4 47   MONOCYTES ABSOLUTE COUNT 0 49 Thousand/?L  0 17-1 22   EOSINOPHILS ABSOLUTE COUNT 0 10 Thousand/?L  0 00-0 61   BASOPHILS ABSOLUTE COUNT 0 02 Thousands/?L  0 00-0 10     (1) HEMOGLOBIN A1C 27Jan2017 09:27AM Emmanuel Palacios     Test Name Result Flag Reference   HEMOGLOBIN A1C 7 3 % H 4 2-6 3   EST  AVG  GLUCOSE 163 mg/dl       (1) COMPREHENSIVE METABOLIC PANEL [id number] 36:15HC Emmanuel Palacios     Test Name Result Flag Reference   GLUCOSE,RANDM 200 mg/dL H    If the patient is fasting, the ADA then defines impaired fasting glucose as > 100 mg/dL and diabetes as > or equal to 123 mg/dL     SODIUM 140 mmol/L  136-145   POTASSIUM 4 2 mmol/L  3 5-5 3   CHLORIDE 102 mmol/L  100-108   CARBON DIOXIDE 31 mmol/L  21-32   ANION GAP (CALC) 7 mmol/L  4-13   BLOOD UREA NITROGEN 17 mg/dL  5-25   CREATININE 1 24 mg/dL  0 60-1 30   Standardized to IDMS reference method   CALCIUM 9 0 mg/dL  8 3-10 1   BILI, TOTAL 0 90 mg/dL  0 20-1 00   ALK PHOSPHATAS 98 U/L     ALT (SGPT) 30 U/L  12-78   AST(SGOT) 11 U/L  5-45   ALBUMIN 4 1 g/dL  3 5-5 0   TOTAL PROTEIN 7 4 g/dL  6 4-8 2   eGFR Non-African American 59 7 ml/min/1 73sq m     [employer] Disease Education Program recommendations are as follows:  GFR calculation is accurate only with a steady state creatinine  Chronic Kidney disease less than 60 ml/min/1 73 sq  meters  Kidney failure less than 15 ml/min/1 73 sq  meters  (1) TSH 27Jan2017 09:27AM Kishore Palacios     Test Name Result Flag Reference   TSH 0 923 uIU/mL  0 358-3 740   Patients undergoing fluorescein dye angiography may retain small amounts of fluorescein in the body for 48-72 hours post procedure  Samples containing fluorescein can produce falsely depressed TSH values  If the patient had this procedure,a specimen should be resubmitted post fluorescein clearance       (1) SED RATE [id number] 09:27AM Kishore Palacios     Test Name Result Flag Reference   SED RATE 7 mm/hour  0-10     (1) MICROALBUMIN CREATININE RATIO, RANDOM URINE [id number] 09:27AM Kishore Palacios     Test Name Result Flag Reference   MICROALBUMIN/ CREAT R 4 mg/g creatinine  0-30   MICROALBUMIN,URINE 10 9 mg/L  0 0-20 0   CREATININE URINE 251 0 mg/dL

## 2018-03-01 ENCOUNTER — APPOINTMENT (OUTPATIENT)
Dept: LAB | Age: 58
End: 2018-03-01
Payer: MEDICARE

## 2018-03-01 ENCOUNTER — TRANSCRIBE ORDERS (OUTPATIENT)
Dept: LAB | Age: 58
End: 2018-03-01

## 2018-03-01 DIAGNOSIS — E11.9 TYPE 2 DIABETES MELLITUS WITHOUT COMPLICATIONS (HCC): ICD-10-CM

## 2018-03-01 DIAGNOSIS — F32.9 MAJOR DEPRESSIVE DISORDER, SINGLE EPISODE: ICD-10-CM

## 2018-03-01 DIAGNOSIS — E66.01 MORBID (SEVERE) OBESITY DUE TO EXCESS CALORIES (HCC): ICD-10-CM

## 2018-03-01 DIAGNOSIS — E78.2 MIXED HYPERLIPIDEMIA: Primary | ICD-10-CM

## 2018-03-01 DIAGNOSIS — I48.91 ATRIAL FIBRILLATION (HCC): ICD-10-CM

## 2018-03-01 DIAGNOSIS — I10 ESSENTIAL (PRIMARY) HYPERTENSION: ICD-10-CM

## 2018-03-01 DIAGNOSIS — I42.9 CARDIOMYOPATHY (HCC): ICD-10-CM

## 2018-03-01 DIAGNOSIS — E78.5 HYPERLIPIDEMIA: ICD-10-CM

## 2018-03-01 LAB
ALBUMIN SERPL BCP-MCNC: 4 G/DL (ref 3.5–5)
ALP SERPL-CCNC: 114 U/L (ref 46–116)
ALT SERPL W P-5'-P-CCNC: 30 U/L (ref 12–78)
ANION GAP SERPL CALCULATED.3IONS-SCNC: 8 MMOL/L (ref 4–13)
AST SERPL W P-5'-P-CCNC: 14 U/L (ref 5–45)
BILIRUB DIRECT SERPL-MCNC: 0.2 MG/DL (ref 0–0.2)
BILIRUB SERPL-MCNC: 0.96 MG/DL (ref 0.2–1)
BUN SERPL-MCNC: 19 MG/DL (ref 5–25)
CALCIUM SERPL-MCNC: 9.1 MG/DL (ref 8.3–10.1)
CHLORIDE SERPL-SCNC: 103 MMOL/L (ref 100–108)
CHOLEST SERPL-MCNC: 149 MG/DL (ref 50–200)
CK SERPL-CCNC: 74 U/L (ref 39–308)
CO2 SERPL-SCNC: 27 MMOL/L (ref 21–32)
CREAT SERPL-MCNC: 1.1 MG/DL (ref 0.6–1.3)
CREAT UR-MCNC: 64.7 MG/DL
EST. AVERAGE GLUCOSE BLD GHB EST-MCNC: 171 MG/DL
GFR SERPL CREATININE-BSD FRML MDRD: 73 ML/MIN/1.73SQ M
GLUCOSE P FAST SERPL-MCNC: 191 MG/DL (ref 65–99)
HBA1C MFR BLD: 7.6 % (ref 4.2–6.3)
HDLC SERPL-MCNC: 49 MG/DL (ref 40–60)
LDLC SERPL CALC-MCNC: 73 MG/DL (ref 0–100)
MICROALBUMIN UR-MCNC: 5.2 MG/L (ref 0–20)
MICROALBUMIN/CREAT 24H UR: 8 MG/G CREATININE (ref 0–30)
POTASSIUM SERPL-SCNC: 3.8 MMOL/L (ref 3.5–5.3)
PROT SERPL-MCNC: 7.4 G/DL (ref 6.4–8.2)
SODIUM SERPL-SCNC: 138 MMOL/L (ref 136–145)
TRIGL SERPL-MCNC: 133 MG/DL

## 2018-03-01 PROCEDURE — 82043 UR ALBUMIN QUANTITATIVE: CPT

## 2018-03-01 PROCEDURE — 82570 ASSAY OF URINE CREATININE: CPT

## 2018-03-01 PROCEDURE — 82550 ASSAY OF CK (CPK): CPT

## 2018-03-01 PROCEDURE — 83036 HEMOGLOBIN GLYCOSYLATED A1C: CPT

## 2018-03-01 PROCEDURE — 80061 LIPID PANEL: CPT

## 2018-03-01 PROCEDURE — 82248 BILIRUBIN DIRECT: CPT

## 2018-03-01 PROCEDURE — 80053 COMPREHEN METABOLIC PANEL: CPT

## 2018-03-01 PROCEDURE — 36415 COLL VENOUS BLD VENIPUNCTURE: CPT

## 2018-03-07 NOTE — PROGRESS NOTES
"  Discussion/Summary  Normal device function      Results/Data  Cardiac Device In Clinic [id number] 05:04PM Inga Martínez     Test Name Result Flag Reference   MISCELLANEOUS COMMENT (Report)     DEVICE INTERROGATED IN THE [facility] OFFICE  BATTERY VOLTAGE ADEQUATE (4 YRS)  AP<0 1%, -45% (>40% VVIR @ 60 PPM)  NO NEW SIGNIFICANT HIGH RATE EPISODES  PT IN % OF TIME >1 YR  PROGRAMMED MODE TO VVIR FROM DDDR  PT ON XARELTO, DILTIAZEM & METOPROLOL  ALL LEAD PARAMETERS WITHIN NORMAL LIMITS  ALL OTHER TESTING WITHIN NORMAL LIMITS  INCREASE MADE TO RV AMPLITUDE TO PROVIDE ADEQUATE SAFETY MARGIN  NORMAL DEVICE FUNCTION  PT TO SEE DR SANCHEZ TODAY  GV   Cardiac Electrophysiology Report      [id number] [id number] [id number]  pdf   DEVICE TYPE Pacemaker       Cardiac Electrophysiology Report [id number] 05:04PM Inga Martínez     Test Name Result Flag Reference   Cardiac Electrophysiology Report      [id number]  pdf     Signatures   Electronically signed by : Osito Villa RN; Mar 16 2017  1:34PM EST                       (Author)    Electronically signed by : Ron Connor DO;  Apr 1 2017  1:37PM EST                       (Author)    "

## 2018-03-07 NOTE — PROGRESS NOTES
"  Discussion/Summary  Normal device function      Results/Data  Cardiac Device Remote 20Sep2016 09:26PM Florence Maguire     Test Name Result Flag Reference   MISCELLANEOUS COMMENT (Report)     CARELINK TRANSMISSION: BATTERY VOLTAGE ADEQUATE (4 5 YRS)  AP<0 1%, -45%  1 FAST A&V EPISODE OF RVR LASTING 5 SEC @ 167 BPM  PT IN AF SINCE JAN 2016 & ON XARELTO, DILTIAZEM & METOPROLOL  ALL AVAILABLE LEAD PARAMETERS WITHIN NORMAL LIMITS  NORMAL DEVICE FUNCTION  GV   Cardiac Electrophysiology Report      [id number]  pdf   DEVICE TYPE Pacemaker       Cardiac Electrophysiology Report [id number] 09:26PM Florence Maguire     Test Name Result Flag Reference   Cardiac Electrophysiology Report      [id number]  pdf     Signatures   Electronically signed by : Amina Hargrove RN; Sep 21 2016  3:32PM EST                       (Author)    Electronically signed by : Dayan Campuzano DO; Sep 21 2016  7:05PM EST                       (Author)    "

## 2018-03-07 NOTE — PROGRESS NOTES
"  Discussion/Summary  Normal device function     100% afib burden  Results/Data  Cardiac Device Remote [id number] 12:29AM Eri Arevalo     Test Name Result Flag Reference   MISCELLANEOUS COMMENT      CARELINK TRANSMISSION: BATTERY STATUS "OK"  AP 0%  45 7%  NO SIGNIFICANT HIGH RATE EPISODES  ALL AVAILABLE LEAD PARAMETERS WITHIN NORMAL LIMITS  NORMAL DEVICE FUNCTION  NC   Cardiac Electrophysiology Report      [id number]  pdf   DEVICE TYPE Pacemaker       Cardiac Electrophysiology Report [id number] 12:29AM Eri Arevalo     Test Name Result Flag Reference   Cardiac Electrophysiology Report      [id number]  pdf     Signatures   Electronically signed by : Dinora Obando, ; Jan 25 2017  1:31PM EST                       (Author)    Electronically signed by : Bouchra Quiles DO; Jan 25 2017  2:50PM EST                       (Author)    "

## 2018-03-07 NOTE — PROGRESS NOTES
"  Plan  Atrial fibrillation, Cardiomyopathy, Hypertension    · 1 Marah Henry MD, Alexandra Kim  (Cardiology) Physician Referral  Consult  Status: Active  Requested  for: [id number]   Ordered; For: Atrial fibrillation, Cardiomyopathy, Hypertension; Ordered By: Gia Aldrich Performed:  Due: [id number]; Last Updated By: Adalberto Link; 6/15/2016 12:12:06 PM  Care Summary provided  : Yes    Discussion/Summary  Normal device function      Results/Data  Results   Cardiac Device Remote [id number] 08:15PM Darrol Frances     Test Name Result Flag Reference   MISCELLANEOUS COMMENT      CARELINK TRANSMISSION: BATTERY VOLTAGE ADEQUATE (4 5 YRS)  AP<0 1%, -45%  PT IN AF SINCE JAN 2016 & ON Dietra Albanian, DILTIAZEM & METOPROLOL  0 VHR EPISODES  ALL AVAILABLE LEAD PARAMETERS WITHIN NORMAL LIMITS  NORMAL DEVICE FUNCTION  GV   Cardiac Electrophysiology Report      [id number]}  pdf   DEVICE TYPE Pacemaker       Cardiac Electrophysiology Report [id number] 08:15PM Darrol Frances     Test Name Result Flag Reference   Cardiac Electrophysiology Report      [id number]  pdf     Signatures   Electronically signed by : Osito Villa RN; Jun 17 2016  4:14PM EST                       (Author)    Electronically signed by : QUENTIN Jernigan ; Jun 17 2016 11:29PM EST                       (Author)    "

## 2018-03-07 NOTE — PROGRESS NOTES
"  Discussion/Summary  Normal device function     Afib burden 100%  Results/Data  Cardiac Device Remote [id number] 12:34PM Nader Rands     Test Name Result Flag Reference   MISCELLANEOUS COMMENT (Report)     CARELINK TRANSMISSION: BATTERY VOLTAGE ADEQUATE (3 YRS)  -39%  ALL AVAILABLE LEAD PARAMETERS WITHIN NORMAL LIMITS  8 FAST A&V EPISODES CLUSTERED ON 2 SEPARATE DAYS LONGEST 6 MINS @ 167 BPM- AF W/ RVR ON EGM'S  PT IN CHRONIC AF & ON XARELTO, DILTIAZEM, & METOPROLOL  NORMAL DEVICE FUNCTION  GV   Cardiac Electrophysiology Report      [id number]  pdf   DEVICE TYPE Pacemaker       Cardiac Electrophysiology Report [id number] 12:34PM Naderfermín Matthew     Test Name Result Flag Reference   Cardiac Electrophysiology Report      [id number]  pdf     Signatures   Electronically signed by : Octavio Lee RN; Nov 7 2017  9:19AM EST                       (Author)    Electronically signed by : QUENTIN Rosado ; Nov 7 2017  5:17PM EST                       (Author)    "

## 2018-03-07 NOTE — PROGRESS NOTES
"  Discussion/Summary  Normal device function      Results/Data  Cardiac Device Remote 20Jun2017 11:50AM Brie Qoture     Test Name Result Flag Reference   MISCELLANEOUS COMMENT (Report)     CARELINK TRANSMISSION: BATTERY VOLTAGE ADEQUATE (3 5 YRS)   40% (VVIR/60)  ALL AVAILABLE LEAD PARAMETERS WITHIN NORMAL LIMITS  NO SIGNIFICANT HIGH RATE EPISODES  PT IN AFIB AND TAKES XARELTO/METOPROLOL TART  PACEMAKER FUNCTIONING APPROPRIATELY  CP   Cardiac Electrophysiology Report      [id number]  pdf   DEVICE TYPE Pacemaker       Cardiac Electrophysiology Report 20Jun2017 11:50AM [facility]     Test Name Result Flag Reference   Cardiac Electrophysiology Report      [id number] pdf     Signatures   Electronically signed by : Afshan Santos, ; Jun 20 2017 10:40AM EST                       (Author)    Electronically signed by : QUENTIN Meléndez ; Jun 20 2017  5:33PM EST                       (Author)    "

## 2018-03-07 NOTE — PROGRESS NOTES
"  Discussion/Summary  Normal device function      Results/Data  Results   Cardiac Device In Clinic [id number] 12:46PM Darcie Nicole     Test Name Result Flag Reference   MISCELLANEOUS COMMENT (Report)     DEVICE INTERROGATED IN THE [facility] OFFICE  V3DFRQMOLC VOLTAGE ADEQUATE  (5 YRS)  PATIENT HAS BEEN IN AFIB SINCE 01/2016 AND FEELS TIRED  23 ATP ATTEMPTS SINCE 01/2016  PATIENT IS ON Loran Riding  MADE DR Elise Carrera AWARE OF SYMPTOMS  ALL LEAD PARAMETERS WITHIN NORMAL LIMITS  AP 45%  18%  NORMAL DEVICE FUNCTION  ---ESTRADA   Cardiac Electrophysiology Report      [id number] [id number] [id number]  pdf   DEVICE TYPE Pacemaker       Cardiac Electrophysiology Report [id number] 12:46PM Darcie Nicole     Test Name Result Flag Reference   Cardiac Electrophysiology Report      [id number]  pdf     Signatures   Electronically signed by : Elier Mesa, ; Mar 17 2016  9:29AM EST                       (Author)    Electronically signed by : QUENTIN Hernandez ; Mar 17 2016  2:09PM EST                       (Author)    "

## 2018-04-03 ENCOUNTER — OFFICE VISIT (OUTPATIENT)
Dept: FAMILY MEDICINE CLINIC | Facility: CLINIC | Age: 58
End: 2018-04-03
Payer: MEDICARE

## 2018-04-03 ENCOUNTER — TELEPHONE (OUTPATIENT)
Dept: FAMILY MEDICINE CLINIC | Facility: CLINIC | Age: 58
End: 2018-04-03

## 2018-04-03 VITALS
HEART RATE: 74 BPM | WEIGHT: 297.2 LBS | DIASTOLIC BLOOD PRESSURE: 80 MMHG | TEMPERATURE: 97.4 F | HEIGHT: 70 IN | BODY MASS INDEX: 42.55 KG/M2 | SYSTOLIC BLOOD PRESSURE: 122 MMHG

## 2018-04-03 DIAGNOSIS — E78.2 MIXED HYPERLIPIDEMIA: ICD-10-CM

## 2018-04-03 DIAGNOSIS — I48.20 CHRONIC ATRIAL FIBRILLATION (HCC): ICD-10-CM

## 2018-04-03 DIAGNOSIS — Z12.5 PROSTATE CANCER SCREENING: ICD-10-CM

## 2018-04-03 DIAGNOSIS — E11.9 TYPE 2 DIABETES MELLITUS WITHOUT COMPLICATION, WITHOUT LONG-TERM CURRENT USE OF INSULIN (HCC): Primary | ICD-10-CM

## 2018-04-03 DIAGNOSIS — F41.9 ANXIETY: ICD-10-CM

## 2018-04-03 DIAGNOSIS — G47.33 OBSTRUCTIVE SLEEP APNEA: ICD-10-CM

## 2018-04-03 DIAGNOSIS — Z00.00 HEALTH CARE MAINTENANCE: ICD-10-CM

## 2018-04-03 DIAGNOSIS — I10 ESSENTIAL HYPERTENSION: ICD-10-CM

## 2018-04-03 DIAGNOSIS — I42.9 CARDIOMYOPATHY, UNSPECIFIED TYPE (HCC): ICD-10-CM

## 2018-04-03 PROCEDURE — 99214 OFFICE O/P EST MOD 30 MIN: CPT | Performed by: FAMILY MEDICINE

## 2018-04-03 RX ORDER — METOPROLOL TARTRATE 50 MG/1
1 TABLET, FILM COATED ORAL 2 TIMES DAILY
COMMUNITY
End: 2018-10-11 | Stop reason: SDUPTHER

## 2018-04-03 RX ORDER — ATORVASTATIN CALCIUM 40 MG/1
1 TABLET, FILM COATED ORAL
COMMUNITY
Start: 2013-11-18 | End: 2019-01-08 | Stop reason: SDUPTHER

## 2018-04-03 RX ORDER — ERYTHROMYCIN 5 MG/G
OINTMENT OPHTHALMIC
COMMUNITY

## 2018-04-03 RX ORDER — FUROSEMIDE 40 MG/1
1 TABLET ORAL DAILY
COMMUNITY
Start: 2017-09-08 | End: 2019-02-04 | Stop reason: SDUPTHER

## 2018-04-03 RX ORDER — ALBUTEROL SULFATE 90 UG/1
2 AEROSOL, METERED RESPIRATORY (INHALATION)
COMMUNITY
Start: 2012-12-14 | End: 2020-03-12 | Stop reason: SDUPTHER

## 2018-04-03 RX ORDER — DILTIAZEM HYDROCHLORIDE 360 MG/1
1 CAPSULE, EXTENDED RELEASE ORAL DAILY
COMMUNITY
Start: 2017-10-19 | End: 2019-01-08 | Stop reason: SDUPTHER

## 2018-04-03 RX ORDER — CITALOPRAM 20 MG/1
1 TABLET ORAL DAILY
COMMUNITY
Start: 2014-02-26 | End: 2018-07-12 | Stop reason: SDUPTHER

## 2018-04-03 RX ORDER — CYCLOSPORINE 0.5 MG/ML
1 EMULSION OPHTHALMIC 2 TIMES DAILY
COMMUNITY

## 2018-04-03 NOTE — PROGRESS NOTES
Assessment/Plan:    Type 2 diabetes mellitus ([place] [address] )  Remains poorly controlled due to noncompliance  Counselled pt  Start Metformin 500mg bid  I reviewed side effects  Recheck labs in 3m    Obstructive sleep apnea  Cont present care    Atrial fibrillation (HCC)  Chronic  Cont present meds  Recheck 6m  Cont f/u with Cardio    Cardiomyopathy ([place] [address] )  No CHF on exam  Cont f/u with Cardio    Hypertension  Cont present meds  Recheck 6m    Anxiety  Stable  Cont present care    Hyperlipidemia  Stable  Cont present meds  Recheck 6m    Health care maintenance  Refer for repeat colonoscopy  Recheck 6m or prn  Pt to call for problems or concerns in the interim       Diagnoses and all orders for this visit:    Type 2 diabetes mellitus without complication, without long-term current use of insulin (HCC)  -     metFORMIN (GLUCOPHAGE) 500 mg tablet; Take 1 tablet (500 mg total) by mouth 2 (two) times a day with meals  -     Basic metabolic panel; Future  -     HEMOGLOBIN A1C W/ EAG ESTIMATION; Future    Obstructive sleep apnea    Chronic atrial fibrillation (HCC)    Cardiomyopathy, unspecified type ([place] [address] )    Essential hypertension    Mixed hyperlipidemia    Anxiety    Prostate cancer screening  -     PSA, Total Screen; Future    Health care maintenance    Other orders  -     mometasone (ASMANEX 120 METERED DOSES) 220 MCG/INH inhaler; Inhale 1 puff 2 (two) times a day  -     atorvastatin (LIPITOR) 40 mg tablet; Take 1 tablet by mouth  -     citalopram (CeleXA) 20 mg tablet; Take 1 tablet by mouth daily  -     diltiazem (TIAZAC) 360 MG 24 hr capsule; Take 1 capsule by mouth Daily  -     erythromycin (ILOTYCIN) ophthalmic ointment; Apply to eye  -     furosemide (LASIX) 40 mg tablet; Take 1 tablet by mouth daily  -     metoprolol tartrate (LOPRESSOR) 50 mg tablet; Take 1 tablet by mouth 2 (two) times a day  -     cycloSPORINE (RESTASIS) 0 05 % ophthalmic emulsion;  Apply 1 drop to eye 2 (two) times a day  -     rivaroxaban (XARELTO) 20 mg tablet; Take 1 tablet by mouth daily  -     albuterol (VENTOLIN HFA) 90 mcg/act inhaler; Inhale 2 puffs          Subjective:      Patient ID: Polly Barron is a 59 y o  male  F/u multiple med issues  - pt has not been watching diet  Recent A1C remained 7 6  Pt does check his bgs but not regularly  Up to date with ophth  - up to date with Cardio  No cp, palp, lightheadedness, or other CV symptoms  - breathing stable  Uses Ventolin 1-2x a day  Asmanex has helped  Due for pulm f/u  - no GI issues  Up to date with colonoscopy - due this year  - mood stable  PHQ-2 done        The following portions of the patient's history were reviewed and updated as appropriate:   He  has a past medical history of Chronic systolic congestive heart failure ([place] [address] ); History of cardiac catheterization (10/2012); and Pacemaker  He   Patient Active Problem List    Diagnosis Date Noted    Health care maintenance 04/03/2018    Type 2 diabetes mellitus ([place] [address] ) 11/06/2015    Morbid obesity ([place] [address] ) 02/20/2015    Dry eye syndrome 07/17/2014    Cardiomyopathy ([place] [address] ) 08/30/2013    Anxiety 11/05/2012    Obstructive sleep apnea 09/20/2012    Hyperlipidemia 09/12/2012    Atrial fibrillation ([place] [address] ) 09/05/2012    Hypertension 09/05/2012     He  has a past surgical history that includes Cardioversion; FISTULA REPAIR; and Tonsillectomy  He  reports that he quit smoking about 38 years ago  He smoked 2 00 packs per day  He does not have any smokeless tobacco history on file  His alcohol and drug histories are not on file    Current Outpatient Prescriptions   Medication Sig Dispense Refill    albuterol (VENTOLIN HFA) 90 mcg/act inhaler Inhale 2 puffs      atorvastatin (LIPITOR) 40 mg tablet Take 1 tablet by mouth      citalopram (CeleXA) 20 mg tablet Take 1 tablet by mouth daily      cycloSPORINE (RESTASIS) 0 05 % ophthalmic emulsion Apply 1 drop to eye 2 (two) times a day      diltiazem (TIAZAC) 360 MG 24 hr capsule Take 1 capsule by mouth Daily      erythromycin (ILOTYCIN) ophthalmic ointment Apply to eye      furosemide (LASIX) 40 mg tablet Take 1 tablet by mouth daily      metoprolol tartrate (LOPRESSOR) 50 mg tablet Take 1 tablet by mouth 2 (two) times a day      mometasone (ASMANEX 120 METERED DOSES) 220 MCG/INH inhaler Inhale 1 puff 2 (two) times a day      rivaroxaban (XARELTO) 20 mg tablet Take 1 tablet by mouth daily      metFORMIN (GLUCOPHAGE) 500 mg tablet Take 1 tablet (500 mg total) by mouth 2 (two) times a day with meals 60 tablet 5     No current facility-administered medications for this visit  He has No Known Allergies       Review of Systems   Constitutional: Negative  HENT: Negative  Eyes: Negative  Respiratory: Negative  Cardiovascular: Negative  Gastrointestinal: Negative  Endocrine: Negative  Genitourinary: Negative  Musculoskeletal: Negative  Skin: Negative  Allergic/Immunologic: Negative  Neurological: Negative  Hematological: Negative  Psychiatric/Behavioral: Negative  Objective:      /80   Pulse 74   Temp (!) 97 4 °F (36 3 °C)   Ht 5' 10" (1 778 m)   Wt 135 kg (297 lb 3 2 oz)   BMI 42 64 kg/m²          Physical Exam   Constitutional: He is oriented to person, place, and time  He appears well-developed and well-nourished  HENT:   Head: Normocephalic and atraumatic  Right Ear: External ear normal    Left Ear: External ear normal    Nose: Nose normal    Mouth/Throat: Oropharynx is clear and moist    Eyes: Conjunctivae and EOM are normal  Pupils are equal, round, and reactive to light  Neck: Normal range of motion  Neck supple  No thyromegaly present  Cardiovascular: Normal heart sounds and intact distal pulses  An irregularly irregular rhythm present  Exam reveals no S3, no S4 and no distant heart sounds  Pulses are no weak pulses  No murmur heard  Pulses:       Posterior tibial pulses are 1+ on the right side, and 1+ on the left side  Pulmonary/Chest: Effort normal and breath sounds normal    Abdominal: Soft  Bowel sounds are normal  He exhibits no distension and no mass  There is no tenderness  Musculoskeletal: Normal range of motion  He exhibits no edema or tenderness  Feet:   Right Foot:   Skin Integrity: Negative for ulcer, skin breakdown, erythema, warmth, callus or dry skin  Left Foot:   Skin Integrity: Negative for ulcer, skin breakdown, erythema, warmth, callus or dry skin  Lymphadenopathy:     He has no cervical adenopathy  Neurological: He is alert and oriented to person, place, and time  No cranial nerve deficit  Coordination normal    Skin: Skin is warm and dry  Psychiatric: He has a normal mood and affect  His behavior is normal  Judgment and thought content normal    PHQ-2 = 0   Vitals reviewed  Patient's shoes and socks removed  Right Foot/Ankle   Right Foot Inspection  Skin Exam: skin normal and skin intact no dry skin, no warmth, no callus, no erythema, no maceration, no abnormal color, no pre-ulcer, no ulcer and no callus                          Toe Exam: ROM and strength within normal limits  Sensory     Proprioception: intact   Monofilament testing: intact  Vascular  Capillary refills: < 3 seconds  The right PT pulse is 1+  Left Foot/Ankle  Left Foot Inspection  Skin Exam: skin normal and skin intactno dry skin, no warmth, no erythema, no maceration, normal color, no pre-ulcer, no ulcer and no callus                         Toe Exam: ROM and strength within normal limits                   Sensory     Proprioception: intact  Monofilament: intact  Vascular  Capillary refills: < 3 seconds  The left PT pulse is 1+  Assign Risk Category:  No deformity present; No loss of protective sensation;  No weak pulses       Risk: 0

## 2018-04-03 NOTE — ASSESSMENT & PLAN NOTE
Refer for repeat colonoscopy  Recheck 6m or prn   Pt to call for problems or concerns in the interim

## 2018-04-05 ENCOUNTER — TELEPHONE (OUTPATIENT)
Dept: FAMILY MEDICINE CLINIC | Facility: CLINIC | Age: 58
End: 2018-04-05

## 2018-04-05 NOTE — TELEPHONE ENCOUNTER
Went to  [facility] yesterday, they told her he can get an approved Glucose meter, please send a Medicare approved Glucose Meter Script to [facility] [employer] and testing strips

## 2018-04-08 PROBLEM — Z95.0 PRESENCE OF CARDIAC PACEMAKER: Status: ACTIVE | Noted: 2018-04-08

## 2018-04-09 ENCOUNTER — CLINICAL SUPPORT (OUTPATIENT)
Dept: CARDIOLOGY CLINIC | Facility: CLINIC | Age: 58
End: 2018-04-09
Payer: MEDICARE

## 2018-04-09 ENCOUNTER — OFFICE VISIT (OUTPATIENT)
Dept: CARDIOLOGY CLINIC | Facility: CLINIC | Age: 58
End: 2018-04-09
Payer: MEDICARE

## 2018-04-09 VITALS
HEART RATE: 80 BPM | DIASTOLIC BLOOD PRESSURE: 62 MMHG | SYSTOLIC BLOOD PRESSURE: 100 MMHG | HEIGHT: 70 IN | WEIGHT: 288.4 LBS | BODY MASS INDEX: 41.29 KG/M2

## 2018-04-09 DIAGNOSIS — E78.5 DYSLIPIDEMIA: ICD-10-CM

## 2018-04-09 DIAGNOSIS — I10 ESSENTIAL HYPERTENSION: ICD-10-CM

## 2018-04-09 DIAGNOSIS — E11.9 TYPE 2 DIABETES MELLITUS WITHOUT COMPLICATION, WITHOUT LONG-TERM CURRENT USE OF INSULIN (HCC): Primary | ICD-10-CM

## 2018-04-09 DIAGNOSIS — I48.21 PERMANENT ATRIAL FIBRILLATION (HCC): ICD-10-CM

## 2018-04-09 DIAGNOSIS — G47.33 OBSTRUCTIVE SLEEP APNEA: ICD-10-CM

## 2018-04-09 DIAGNOSIS — I49.5 SICK SINUS SYNDROME (HCC): Primary | ICD-10-CM

## 2018-04-09 DIAGNOSIS — Z45.018 ADJUSTMENT AND MANAGEMENT OF CARDIAC PACEMAKER: ICD-10-CM

## 2018-04-09 DIAGNOSIS — Z95.0 CARDIAC PACEMAKER IN SITU: ICD-10-CM

## 2018-04-09 DIAGNOSIS — I42.0 DILATED CARDIOMYOPATHY (HCC): ICD-10-CM

## 2018-04-09 DIAGNOSIS — Z95.0 PRESENCE OF CARDIAC PACEMAKER: ICD-10-CM

## 2018-04-09 DIAGNOSIS — I48.20 CHRONIC ATRIAL FIBRILLATION (HCC): ICD-10-CM

## 2018-04-09 PROCEDURE — 93279 PRGRMG DEV EVAL PM/LDLS PM: CPT

## 2018-04-09 PROCEDURE — 99214 OFFICE O/P EST MOD 30 MIN: CPT | Performed by: INTERNAL MEDICINE

## 2018-04-09 RX ORDER — BLOOD-GLUCOSE CONTROL, LOW
EACH MISCELLANEOUS
Qty: 50 EACH | Refills: 2 | Status: SHIPPED | OUTPATIENT
Start: 2018-04-09 | End: 2019-01-18

## 2018-04-09 RX ORDER — BLOOD-GLUCOSE METER
EACH MISCELLANEOUS
Qty: 1 KIT | Refills: 0 | Status: SHIPPED | OUTPATIENT
Start: 2018-04-09 | End: 2019-01-18

## 2018-04-09 NOTE — PROGRESS NOTES
PM DEVICE INTERROGATED IN THE [place] OFFICE  BATTERY VOLTAGE ADEQUATE (2 5 YRS)  -37%  ALL LEAD PARAMETERS WITHIN NORMAL LIMITS  ALL OTHER TESTING WITHIN NORMAL LIMITS  NO SIGNIFICANT HIGH RATE EPISODES  PT IN CHRONIC AF & ON XARELTO & METOPROLOL  NORMAL DEVICE FUNCTION  APPT W/ DR SANCHEZ TODAY   GV

## 2018-04-09 NOTE — PROGRESS NOTES
Cardiology Outpatient Follow up Note    Marcello Cummins 59 y o  male MRN: [id number]    04/09/18          Assessment/Plan:    1  Permanent atrial fibrillation  He is on Metoprolol and Diltiazem as well as Xarelto for stroke prophylaxis  He is now 100% in afib based on PM interrogation  Creatinine 1 1 3/18  His fatigue improved after decreasing Metoprolol from 100 bid to 50 bid       2  Hypertension  Blood pressure is controlled on Metoprolol and Diltiazem and Lasix 40  Lisinopril was discontinued when he had orthostatic hypotension       3  Dyslipidemia  He is on Atorvastatin 40   Lipid panel 3/18 showed total cholesterol 149, HDL 49, LDL 73,       4  Nonischemic cardiomyopathy  He is on beta blocker  He is not having any symptoms currently  Most recent echo showed normalization of LV function  Lisinopril was stopped to avoid hypotension  He still requires the Lasix to avoid lower extremity edema  I asked him to cut down Lasix to 20 mg to see if this would be enough to treat the edema, but he tried this and did not help control his LE edema, currently on 40 mg daily      4  SSS s/p Medtronic dual chamber PM  Device interrogation 11/17 showed battery voltage 3 years, V paced 39%       5  Sleep apnea  He is not using CPAP at night, and is not using oxygen at night either      6  DM  Hgb A1c 7 6% 3/18  Started on Metformin 500 bid 3/18  1  Type 2 diabetes mellitus without complication, without long-term current use of insulin ([address] )     2  Obstructive sleep apnea     3  Permanent atrial fibrillation ([address] )     4  Essential hypertension     5  Dyslipidemia     6  Dilated cardiomyopathy ([address] )     7   Presence of cardiac pacemaker          HPI: 59year old man with a history of previously paroxysmal, now permanent atrial fibrillation, TOVA intolerant to CPAP, history of NICM which resolved with rate control, 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 in 4/13,  morbid obesity, HTN, HL, and DM, who is here for routine follow up       He denies any dizziness or lightheadedness or further syncope  He denies any worsening of his baseline dyspnea, reports it is stable  He states he currently does not feel palpitations from the atrial fibrillation  No chest pain, no lower extremity edema  He does not use his CPAP machine for his sleep apnea, was previously using oxygen at night, but stopped using it because he had to pay for it, and it also dried out his nose  He is using a Sleep Number bed to keep his head elevated  He has reduced vision due to prior episode of ocular shingles with reduced vision in left eye  He is not doing formal exercise  He has issues with maintaining his weight, goes up and down  He was started on metformin 3/18         Patient Active Problem List   Diagnosis    Anxiety    Permanent atrial fibrillation ([facility] )    Dilated cardiomyopathy (HCC)    Dry eye syndrome    Dyslipidemia    Essential hypertension    Obstructive sleep apnea    Morbid obesity ([facility] )    Type 2 diabetes mellitus without complication, without long-term current use of insulin ([place] [address] )    Health care maintenance    Presence of cardiac pacemaker       No Known Allergies      Current Outpatient Prescriptions:     albuterol (VENTOLIN HFA) 90 mcg/act inhaler, Inhale 2 puffs, Disp: , Rfl:     atorvastatin (LIPITOR) 40 mg tablet, Take 1 tablet by mouth, Disp: , Rfl:     citalopram (CeleXA) 20 mg tablet, Take 1 tablet by mouth daily, Disp: , Rfl:     cycloSPORINE (RESTASIS) 0 05 % ophthalmic emulsion, Apply 1 drop to eye 2 (two) times a day, Disp: , Rfl:     diltiazem (TIAZAC) 360 MG 24 hr capsule, Take 1 capsule by mouth Daily, Disp: , Rfl:     erythromycin (ILOTYCIN) ophthalmic ointment, Apply to eye, Disp: , Rfl:     furosemide (LASIX) 40 mg tablet, Take 1 tablet by mouth daily, Disp: , Rfl:     glucose blood (VITO CONTOUR TEST) test strip, Use as instructed, Disp: 100 each, Rfl: 5    metFORMIN (GLUCOPHAGE) 500 mg tablet, Take 1 tablet (500 mg total) by mouth 2 (two) times a day with meals, Disp: 60 tablet, Rfl: 5    metoprolol tartrate (LOPRESSOR) 50 mg tablet, Take 1 tablet by mouth 2 (two) times a day, Disp: , Rfl:     mometasone (ASMANEX 120 METERED DOSES) 220 MCG/INH inhaler, Inhale 1 puff 2 (two) times a day, Disp: , Rfl:     rivaroxaban (XARELTO) 20 mg tablet, Take 1 tablet by mouth daily, Disp: , Rfl:     Past Medical History:   Diagnosis Date    Chronic systolic congestive heart failure (HCC)     last assessed - [id number]    History of cardiac catheterization 10/2012    normal coronaries w/o significant obstructive coronary disease    Pacemaker     last assessed - 19Oct2017       Family History   Problem Relation Age of Onset    Atrial fibrillation Mother     Cancer Mother     Heart disease Father     Hypertension Father     Skin cancer Father     Cancer Family     Transient ischemic attack Family      without residual deficits       Past Surgical History:   Procedure Laterality Date    CARDIOVERSION      Elective    FISTULA REPAIR      perirectal    TONSILLECTOMY      1960's       Social History     Social History    Marital status: /Civil Union     Spouse name: N/A    Number of children: N/A    Years of education: N/A     Occupational History    Retired      Social History Main Topics    Smoking status: Former Smoker     Packs/day: 2 00     Quit date: 1980    Smokeless tobacco: Never Used      Comment: 2 ppd x 5 years    Alcohol use Not on file    Drug use: Unknown    Sexual activity: Not on file     Other Topics Concern    Not on file     Social History Narrative    Always uses seat belt    Daily coffee consumption (___Cups/Day)    Dental care regularly    Pets/Animals    Uses safety Equipment - seatbelts           Review of Systems   Constitution: Negative for chills, decreased appetite, diaphoresis, fever, weakness, malaise/fatigue, night sweats, weight gain and weight loss  HENT: Negative for ear pain, hearing loss, hoarse voice, nosebleeds, sore throat and tinnitus  Eyes: Positive for vision loss in left eye  Negative for blurred vision and pain  Cardiovascular: Negative  Negative for chest pain, claudication, cyanosis, dyspnea on exertion, irregular heartbeat, leg swelling, near-syncope, orthopnea, palpitations, paroxysmal nocturnal dyspnea and syncope  Respiratory: Positive for cough, snoring and wheezing  Negative for hemoptysis, shortness of breath, sleep disturbances due to breathing and sputum production  Hematologic/Lymphatic: Negative for adenopathy and bleeding problem  Does not bruise/bleed easily  Skin: Negative for color change, dry skin, flushing, itching, poor wound healing and rash  Musculoskeletal: Positive for arthritis  Negative for back pain, falls, joint pain, muscle cramps, muscle weakness, myalgias and neck pain  Gastrointestinal: Positive for diarrhea  Negative for abdominal pain, constipation, dysphagia, heartburn, hematemesis, hematochezia, melena, nausea and vomiting  Genitourinary: Negative for dysuria, frequency, hematuria, hesitancy, non-menstrual bleeding and urgency  Neurological: Negative for excessive daytime sleepiness, dizziness, focal weakness, headaches, light-headedness, loss of balance, numbness, paresthesias, tremors and vertigo  Psychiatric/Behavioral: Negative for altered mental status, depression and memory loss  The patient does not have insomnia and is not nervous/anxious  Allergic/Immunologic: Negative for environmental allergies and persistent infections  Vitals: /62 (BP Location: Right arm, Patient Position: Sitting, Cuff Size: Standard)   Pulse 80   Ht 5' 10" (1 778 m)   Wt 131 kg (288 lb 6 4 oz)   BMI 41 38 kg/m²       Physical Exam:     GEN: Alert and oriented x 3, in no acute distress  Well appearing and well nourished     HEENT: Sclera anicteric, conjunctivae pink, mucous membranes moist  Oropharynx clear  NECK: Supple, no carotid bruits, no significant JVD  Trachea midline, no thyromegaly  HEART: Regular rhythm, normal S1 and S2, no murmurs, clicks, gallops or rubs  PMI nondisplaced, no thrills  LUNGS: Clear to auscultation bilaterally; no wheezes, rales, or rhonchi  No increased work of breathing or signs of respiratory distress  ABDOMEN: Obese, soft, nontender, nondistended, normoactive bowel sounds  EXTREMITIES: Skin warm and well perfused, no clubbing, cyanosis, or edema  NEURO: No focal findings  Normal gait  Normal speech  Mood and affect normal    SKIN: Normal without suspicious lesions on exposed skin        Lab Results:       Lab Results   Component Value Date    HGBA1C 7 6 (H) 03/01/2018    HGBA1C 7 6 (H) 07/12/2017    HGBA1C 7 3 (H) 01/27/2017     Lab Results   Component Value Date    CHOL 149 03/01/2018    CHOL 179 01/27/2017    CHOL 156 09/30/2015     Lab Results   Component Value Date    HDL 49 03/01/2018    HDL 54 01/27/2017    HDL 43 09/30/2015     Lab Results   Component Value Date    LDLCALC 73 03/01/2018    LDLCALC 88 01/27/2017    LDLCALC 64 09/30/2015     Lab Results   Component Value Date    TRIG 133 03/01/2018    TRIG 187 (H) 01/27/2017    TRIG 244 09/30/2015     No components found for: CHOLHDL

## 2018-05-14 LAB
LEFT EYE DIABETIC RETINOPATHY: NORMAL
RIGHT EYE DIABETIC RETINOPATHY: NORMAL

## 2018-05-22 ENCOUNTER — OFFICE VISIT (OUTPATIENT)
Dept: PULMONOLOGY | Facility: CLINIC | Age: 58
End: 2018-05-22
Payer: MEDICARE

## 2018-05-22 VITALS
BODY MASS INDEX: 40.46 KG/M2 | SYSTOLIC BLOOD PRESSURE: 110 MMHG | DIASTOLIC BLOOD PRESSURE: 64 MMHG | HEART RATE: 72 BPM | TEMPERATURE: 96.7 F | OXYGEN SATURATION: 94 % | WEIGHT: 282 LBS

## 2018-05-22 DIAGNOSIS — G47.33 OBSTRUCTIVE SLEEP APNEA: ICD-10-CM

## 2018-05-22 DIAGNOSIS — D86.9 SARCOIDOSIS: Primary | ICD-10-CM

## 2018-05-22 PROCEDURE — 99214 OFFICE O/P EST MOD 30 MIN: CPT | Performed by: INTERNAL MEDICINE

## 2018-05-22 NOTE — ASSESSMENT & PLAN NOTE
· Declines CPAP and not using supplemental oxygen nocturnally  · Items improved more recently with weight loss  · He does elevate the head of his bed on a adjustable mattress which has also improved snoring and apnea per his wife

## 2018-05-22 NOTE — PROGRESS NOTES
Progress note - Pulmonary Medicine   Hillary Magallanes 59 y o  male MRN: [id number]       Impression & Plan:     Sarcoidosis  · Stable shortness of breath symptoms, actually slightly improved with recent intentional weight loss  · Continue Asmanex and as needed albuterol  · Recommended increasing activity and continued efforts at weight loss  · Will have CT scan of the chest performed to follow sarcoidosis  It will have been 4 years since the last study, recent chest x-ray in 2017 shows the continued mild apical changes but no new or worsening opacifications    Obstructive sleep apnea  · Declines CPAP and not using supplemental oxygen nocturnally  · Items improved more recently with weight loss  · He does elevate the head of his bed on a adjustable mattress which has also improved snoring and apnea per his wife    I will see Shane Beltran back in 1 year and we will obtain a CT scan of the chest prior to the next visit  He will call me if he has any new or worsening symptoms in the interim    ______________________________________________________________________    HPI:    Hillary Magallanes presents today for follow-up of sarcoidosis and obstructive sleep apnea  He is not on sleep apnea treatment due to noncompliance and intolerance of CPA P  He has had significant weight concerns  More recently he was started on metformin for diabetes and has made a conscious effort at weight loss  He reports approximately a 20 lb weight loss due to intentional dieting  He has not increased his activity but he does feel less short of breath with activities and overall feels considerably less fatigued with the weight loss  He occasionally has wheezing and chest tightness  This has also improved with the weight loss  He rarely requires use of rescue albuterol and has been using the Asmanex once daily in the morning  He denies chronic cough or phlegm production  He does not have chest pain    His atrial fibrillation is currently controlled on medication  He is followed by Dr Lian Mast    He does not report any new arthralgias or myalgias  He has not had skin rash  He denies any headache or neurologic symptoms  He has had significant ophthalmological concerns  He has previously had shingles in the I and has been followed closely for glaucoma  Review of Systems:  Review of Systems   Constitutional:        As per HPI   HENT: Negative for congestion, postnasal drip and sore throat  Eyes: Negative  As per HPI   Respiratory:        As per HPI   Cardiovascular: Negative for palpitations and leg swelling  AS PER HPI   Gastrointestinal: Negative for abdominal pain  No GERD symptoms   Endocrine: Negative  Genitourinary: Negative  Musculoskeletal: Negative  Skin: Negative  Allergic/Immunologic: Negative for environmental allergies  Neurological: Negative  Hematological: Negative  Psychiatric/Behavioral: Negative  All other systems reviewed and are negative  Past medical history, surgical history, and family history were reviewed and updated as appropriate    Social history updates:  History   Smoking Status    Former Smoker    Packs/day: 2 00    Years: 5 00    Quit date: [facility] [address] Never Used     Comment: 2 ppd x 5 years       PhysicalExamination:  Vitals:   /64   Pulse 72   Temp (!) 96 7 °F (35 9 °C)   Wt 128 kg (282 lb)   SpO2 94%   BMI 40 46 kg/m²     Gen:  Obese, comfortable on room air, has noticeably lost some weight  HEENT: PERRL  Oropharynx is crowded with a low-lying palate  Neck: Stout  I do not appreciate any JVD or lymphadenopathy  Trachea is midline  No thyromegaly  Chest: Breath sounds are clear to auscultation  There are no wheezes, rales or rhonchi  Cardiac: Regular rate and rhythm  There are no murmurs, rubs or gallops  Abdomen: Obese  Soft and nontender  Extremities: No clubbing, cyanosis or edema    Neurologic: No focal deficits  Skin: No appreciable rashes  Diagnostic Data:  Labs: I personally reviewed the most recent laboratory data pertinent to today's visit    Lab Results   Component Value Date    WBC 6 30 07/20/2017    HGB 15 4 07/20/2017    HCT 42 9 07/20/2017    MCV 89 07/20/2017     07/20/2017     Lab Results   Component Value Date    GLUCOSE 294 (H) 07/12/2017    CALCIUM 9 1 03/01/2018     03/01/2018    K 3 8 03/01/2018    CO2 27 03/01/2018     03/01/2018    BUN 19 03/01/2018    CREATININE 1 10 03/01/2018         Spirometry:  Last spirometry was performed in May 2017  At that time demonstrated mild restriction with a forced vital capacity 78% predicted and FEV1 74% predicted  Ratio 72%    Imaging:  I personally reviewed the images on the [facility] system pertinent to today's visit  Chest x-ray was last performed in July of 2017  At that time had mild apical scarring  This was done for a traumatic fall but did not demonstrate any rib injury    Lung fields were otherwise clear      Cristiane Enriquez MD

## 2018-05-22 NOTE — LETTER
May 22, 2018     Shaheed Mcdowell MD  [address]    Patient: Tomás Pacheco   YOB: 1960   Date of Visit: 5/22/2018       Dear Dr Jackelyn Hernandez:    Thank you for referring Kemar Anguiano to me for evaluation  Below are my notes for this consultation  If you have questions, please do not hesitate to call me  I look forward to following your patient along with you  Sincerely,        Tere Montemayor MD        CC: No Recipients  Tere Montemayor MD  5/22/2018 10:23 AM  Sign at close encounter    Progress note - Pulmonary Medicine   Tomás Pacheco 59 y o  male MRN: [id number]       Impression & Plan:     Sarcoidosis  · Stable shortness of breath symptoms, actually slightly improved with recent intentional weight loss  · Continue Asmanex and as needed albuterol  · Recommended increasing activity and continued efforts at weight loss  · Will have CT scan of the chest performed to follow sarcoidosis  It will have been 4 years since the last study, recent chest x-ray in 2017 shows the continued mild apical changes but no new or worsening opacifications    Obstructive sleep apnea  · Declines CPAP and not using supplemental oxygen nocturnally  · Items improved more recently with weight loss  · He does elevate the head of his bed on a adjustable mattress which has also improved snoring and apnea per his wife    I will see Jose Prater back in 1 year and we will obtain a CT scan of the chest prior to the next visit  He will call me if he has any new or worsening symptoms in the interim    ______________________________________________________________________    HPI:    Tomás Pacheco presents today for follow-up of sarcoidosis and obstructive sleep apnea  He is not on sleep apnea treatment due to noncompliance and intolerance of CPA P  He has had significant weight concerns    More recently he was started on metformin for diabetes and has made a conscious effort at weight loss  He reports approximately a 20 lb weight loss due to intentional dieting  He has not increased his activity but he does feel less short of breath with activities and overall feels considerably less fatigued with the weight loss  He occasionally has wheezing and chest tightness  This has also improved with the weight loss  He rarely requires use of rescue albuterol and has been using the Asmanex once daily in the morning  He denies chronic cough or phlegm production  He does not have chest pain  His atrial fibrillation is currently controlled on medication  He is followed by Dr Soriano Honor    He does not report any new arthralgias or myalgias  He has not had skin rash  He denies any headache or neurologic symptoms  He has had significant ophthalmological concerns  He has previously had shingles in the I and has been followed closely for glaucoma  Review of Systems:  Review of Systems   Constitutional:        As per HPI   HENT: Negative for congestion, postnasal drip and sore throat  Eyes: Negative  As per HPI   Respiratory:        As per HPI   Cardiovascular: Negative for palpitations and leg swelling  AS PER HPI   Gastrointestinal: Negative for abdominal pain  No GERD symptoms   Endocrine: Negative  Genitourinary: Negative  Musculoskeletal: Negative  Skin: Negative  Allergic/Immunologic: Negative for environmental allergies  Neurological: Negative  Hematological: Negative  Psychiatric/Behavioral: Negative  All other systems reviewed and are negative          Past medical history, surgical history, and family history were reviewed and updated as appropriate    Social history updates:  History   Smoking Status    Former Smoker    Packs/day: 2 00    Years: 5 00    Quit date: 1980   Smokeless Tobacco    Never Used     Comment: 2 ppd x 5 years       PhysicalExamination:  Vitals:   /64   Pulse 72   Temp (!) 96 7 °F (35 9 °C)   Wt 128 kg (282 lb)   SpO2 94%   BMI 40 46 kg/m²      Gen:  Obese, comfortable on room air, has noticeably lost some weight  HEENT: PERRL  Oropharynx is crowded with a low-lying palate  Neck: Stout  I do not appreciate any JVD or lymphadenopathy  Trachea is midline  No thyromegaly  Chest: Breath sounds are clear to auscultation  There are no wheezes, rales or rhonchi  Cardiac: Regular rate and rhythm  There are no murmurs, rubs or gallops  Abdomen: Obese  Soft and nontender  Extremities: No clubbing, cyanosis or edema  Neurologic: No focal deficits  Skin: No appreciable rashes  Diagnostic Data:  Labs: I personally reviewed the most recent laboratory data pertinent to today's visit    Lab Results   Component Value Date    WBC 6 30 07/20/2017    HGB 15 4 07/20/2017    HCT 42 9 07/20/2017    MCV 89 07/20/2017     07/20/2017     Lab Results   Component Value Date    GLUCOSE 294 (H) 07/12/2017    CALCIUM 9 1 03/01/2018     03/01/2018    K 3 8 03/01/2018    CO2 27 03/01/2018     03/01/2018    BUN 19 03/01/2018    CREATININE 1 10 03/01/2018         Spirometry:  Last spirometry was performed in May 2017  At that time demonstrated mild restriction with a forced vital capacity 78% predicted and FEV1 74% predicted  Ratio 72%    Imaging:  I personally reviewed the images on the [facility] system pertinent to today's visit  Chest x-ray was last performed in July of 2017  At that time had mild apical scarring  This was done for a traumatic fall but did not demonstrate any rib injury    Lung fields were otherwise clear      Jada Oliver MD

## 2018-05-22 NOTE — ASSESSMENT & PLAN NOTE
· Stable shortness of breath symptoms, actually slightly improved with recent intentional weight loss  · Continue Asmanex and as needed albuterol  · Recommended increasing activity and continued efforts at weight loss  · Will have CT scan of the chest performed to follow sarcoidosis    It will have been 4 years since the last study, recent chest x-ray in 2017 shows the continued mild apical changes but no new or worsening opacifications

## 2018-06-21 ENCOUNTER — TRANSCRIBE ORDERS (OUTPATIENT)
Dept: LAB | Facility: OTHER | Age: 58
End: 2018-06-21

## 2018-06-21 ENCOUNTER — APPOINTMENT (OUTPATIENT)
Dept: LAB | Facility: OTHER | Age: 58
End: 2018-06-21
Payer: MEDICARE

## 2018-06-21 DIAGNOSIS — E11.9 TYPE 2 DIABETES MELLITUS WITHOUT COMPLICATION, WITHOUT LONG-TERM CURRENT USE OF INSULIN (HCC): ICD-10-CM

## 2018-06-21 DIAGNOSIS — Z12.5 PROSTATE CANCER SCREENING: ICD-10-CM

## 2018-06-21 LAB
ANION GAP SERPL CALCULATED.3IONS-SCNC: 5 MMOL/L (ref 4–13)
BUN SERPL-MCNC: 14 MG/DL (ref 5–25)
CALCIUM SERPL-MCNC: 8.9 MG/DL (ref 8.3–10.1)
CHLORIDE SERPL-SCNC: 106 MMOL/L (ref 100–108)
CO2 SERPL-SCNC: 30 MMOL/L (ref 21–32)
CREAT SERPL-MCNC: 1.03 MG/DL (ref 0.6–1.3)
EST. AVERAGE GLUCOSE BLD GHB EST-MCNC: 131 MG/DL
GFR SERPL CREATININE-BSD FRML MDRD: 79 ML/MIN/1.73SQ M
GLUCOSE P FAST SERPL-MCNC: 127 MG/DL (ref 65–99)
HBA1C MFR BLD: 6.2 % (ref 4.2–6.3)
POTASSIUM SERPL-SCNC: 4.7 MMOL/L (ref 3.5–5.3)
PSA SERPL-MCNC: 0.2 NG/ML (ref 0–4)
SODIUM SERPL-SCNC: 141 MMOL/L (ref 136–145)

## 2018-06-21 PROCEDURE — G0103 PSA SCREENING: HCPCS

## 2018-06-21 PROCEDURE — 36415 COLL VENOUS BLD VENIPUNCTURE: CPT

## 2018-06-21 PROCEDURE — 80048 BASIC METABOLIC PNL TOTAL CA: CPT

## 2018-06-21 PROCEDURE — 83036 HEMOGLOBIN GLYCOSYLATED A1C: CPT

## 2018-06-25 ENCOUNTER — OFFICE VISIT (OUTPATIENT)
Dept: FAMILY MEDICINE CLINIC | Facility: CLINIC | Age: 58
End: 2018-06-25
Payer: MEDICARE

## 2018-06-25 VITALS
TEMPERATURE: 98.1 F | HEIGHT: 70 IN | DIASTOLIC BLOOD PRESSURE: 80 MMHG | BODY MASS INDEX: 40.94 KG/M2 | WEIGHT: 286 LBS | HEART RATE: 60 BPM | SYSTOLIC BLOOD PRESSURE: 124 MMHG

## 2018-06-25 DIAGNOSIS — E11.9 TYPE 2 DIABETES MELLITUS WITHOUT COMPLICATION, WITHOUT LONG-TERM CURRENT USE OF INSULIN (HCC): Primary | ICD-10-CM

## 2018-06-25 DIAGNOSIS — I48.21 PERMANENT ATRIAL FIBRILLATION (HCC): ICD-10-CM

## 2018-06-25 DIAGNOSIS — E66.01 MORBID OBESITY (HCC): ICD-10-CM

## 2018-06-25 DIAGNOSIS — G47.33 OBSTRUCTIVE SLEEP APNEA: ICD-10-CM

## 2018-06-25 DIAGNOSIS — I10 ESSENTIAL HYPERTENSION: ICD-10-CM

## 2018-06-25 DIAGNOSIS — D86.9 SARCOIDOSIS: ICD-10-CM

## 2018-06-25 PROCEDURE — 99214 OFFICE O/P EST MOD 30 MIN: CPT | Performed by: FAMILY MEDICINE

## 2018-06-25 NOTE — PROGRESS NOTES
Assessment/Plan:    Type 2 diabetes mellitus without complication, without long-term current use of insulin (HCC)  Lab Results   Component Value Date    HGBA1C 6 2 06/21/2018       Much better control since starting metformin  Cont present care  Urged weight loss  Recheck 6m    Obstructive sleep apnea  Cannot tolerate CPAP or BiPAP  Breathing better with elevating head of bed  Cont to monitor  Encourage weight loss    Essential hypertension  At goal  Cont present meds    Permanent atrial fibrillation ([address] )  Rate controlled  Cont present meds    Morbid obesity ([address] )  Cont weight loss efforts  Sarcoidosis  For repeat CT later this year  Cont to monitor  Recheck 6m  Cont f/u with pulm in the interim       Diagnoses and all orders for this visit:    Type 2 diabetes mellitus without complication, without long-term current use of insulin (HCC)    Essential hypertension    Permanent atrial fibrillation ([address] )    Sarcoidosis    Morbid obesity ([address] )    Obstructive sleep apnea          Subjective:      Patient ID: Chana Samuel is a 59 y o  male  f/u multiple med issues  - sl increased stress  Lost father 2 days ago  Mood stable  - pt doing much better with diet  Keeping away from carbs  Recent A1C improved 7 6 -> 6 2  Not exercising regularly but is doing more since he lost some weight  Still gets fatigued with exertion but no worsening SOB  - pt up to date Pulm and Cardio  Cannot tolerate CPAP for apnea but breathing may be better with raising head of bed  - up to date with colonoscopy  Due for eye exam          The following portions of the patient's history were reviewed and updated as appropriate:   He  has a past medical history of Chronic systolic congestive heart failure ([address] ); History of cardiac catheterization (10/2012); and Pacemaker    He   Patient Active Problem List    Diagnosis Date Noted    Sarcoidosis 05/22/2018    Presence of cardiac pacemaker 04/08/2018   [facility] 04/03/2018    Type 2 diabetes mellitus without complication, without long-term current use of insulin ([place] [address] ) 11/06/2015    Morbid obesity ([place] [address] ) 02/20/2015    Dry eye syndrome 07/17/2014    Dilated cardiomyopathy ([place] [address] ) 08/30/2013    Anxiety 11/05/2012    Obstructive sleep apnea 09/20/2012    Dyslipidemia 09/12/2012    Permanent atrial fibrillation ([place] [address] ) 09/05/2012    Essential hypertension 09/05/2012     He  has a past surgical history that includes Cardioversion; FISTULA REPAIR; and Tonsillectomy  He  reports that he quit smoking about 38 years ago  He has a 10 00 pack-year smoking history  He has never used smokeless tobacco  His alcohol and drug histories are not on file  Current Outpatient Prescriptions   Medication Sig Dispense Refill    albuterol (VENTOLIN HFA) 90 mcg/act inhaler Inhale 2 puffs      atorvastatin (LIPITOR) 40 mg tablet Take 1 tablet by mouth      Blood Glucose Monitoring Suppl (PRODIGY POCKET BLOOD GLUCOSE) w/Device KIT Test once a day 1 kit 0    citalopram (CeleXA) 20 mg tablet Take 1 tablet by mouth daily      cycloSPORINE (RESTASIS) 0 05 % ophthalmic emulsion Apply 1 drop to eye 2 (two) times a day      diltiazem (TIAZAC) 360 MG 24 hr capsule Take 1 capsule by mouth Daily      erythromycin (ILOTYCIN) ophthalmic ointment Apply to eye      furosemide (LASIX) 40 mg tablet Take 1 tablet by mouth daily      glucose blood (PRODIGY NO CODING BLOOD GLUC) test strip Test qd 100 each 2    metFORMIN (GLUCOPHAGE) 500 mg tablet Take 1 tablet (500 mg total) by mouth 2 (two) times a day with meals 60 tablet 5    metoprolol tartrate (LOPRESSOR) 50 mg tablet Take 1 tablet by mouth 2 (two) times a day      mometasone (ASMANEX 120 METERED DOSES) 220 MCG/INH inhaler Inhale 1 puff 2 (two) times a day      PRODIGY LANCETS 28G MISC Testing once a day 50 each 2    rivaroxaban (XARELTO) 20 mg tablet Take 1 tablet by mouth daily       No current facility-administered medications for this visit  He has No Known Allergies       Review of Systems   Constitutional: Positive for fatigue (with exertion  )  HENT: Negative  Eyes: Negative  Respiratory: Positive for shortness of breath (mild with exertion)  Cardiovascular: Negative  Gastrointestinal: Negative  Endocrine: Negative  Genitourinary: Negative  Musculoskeletal: Negative  Skin: Negative  Allergic/Immunologic: Negative  Neurological: Negative  Hematological: Negative  Psychiatric/Behavioral: Negative  Objective:      /80   Pulse 60   Temp 98 1 °F (36 7 °C)   Ht 5' 10" (1 778 m)   Wt 130 kg (286 lb)   BMI 41 04 kg/m²          Physical Exam   Constitutional: He is oriented to person, place, and time  He appears well-developed and well-nourished  HENT:   Head: Normocephalic and atraumatic  Right Ear: External ear normal    Left Ear: External ear normal    Nose: Nose normal    Mouth/Throat: Oropharynx is clear and moist    Eyes: Conjunctivae and EOM are normal  Pupils are equal, round, and reactive to light  Neck: Normal range of motion  Neck supple  No JVD present  Cardiovascular: Normal rate, regular rhythm, normal heart sounds and intact distal pulses  Pulses are no weak pulses  No murmur heard  Pulses:       Dorsalis pedis pulses are 1+ on the right side, and 1+ on the left side  Pulmonary/Chest: Effort normal and breath sounds normal    Abdominal: Soft  Bowel sounds are normal  He exhibits no distension and no mass  There is no tenderness  Musculoskeletal: Normal range of motion  He exhibits no edema or tenderness  Feet:   Right Foot:   Skin Integrity: Negative for ulcer, skin breakdown, erythema, warmth, callus or dry skin  Left Foot:   Skin Integrity: Negative for ulcer, skin breakdown, erythema, warmth, callus or dry skin  Lymphadenopathy:     He has no cervical adenopathy  Neurological: He is alert and oriented to person, place, and time  No cranial nerve deficit  Coordination normal    Skin: Skin is warm  Psychiatric: He has a normal mood and affect  Vitals reviewed  Patient's shoes and socks removed  Right Foot/Ankle   Right Foot Inspection  Skin Exam: skin normal and skin intact no dry skin, no warmth, no callus, no erythema, no maceration, no abnormal color, no pre-ulcer, no ulcer and no callus                          Toe Exam: ROM and strength within normal limits  Sensory     Proprioception: intact   Monofilament testing: intact  Vascular  Capillary refills: < 3 seconds  The right DP pulse is 1+  Left Foot/Ankle  Left Foot Inspection  Skin Exam: skin normal and skin intactno dry skin, no warmth, no erythema, no maceration, normal color, no pre-ulcer, no ulcer and no callus                         Toe Exam: ROM and strength within normal limits                   Sensory     Proprioception: intact  Monofilament: intact  Vascular  Capillary refills: < 3 seconds  The left DP pulse is 1+  Assign Risk Category:  No deformity present; No loss of protective sensation;  No weak pulses       Risk: 0

## 2018-06-25 NOTE — LETTER
Please contact Dr Rjaeev Sosa office (ophth in [place])  Pt had eye exam within the last few months    Thanks

## 2018-06-26 NOTE — ASSESSMENT & PLAN NOTE
Cannot tolerate CPAP or BiPAP  Breathing better with elevating head of bed  Cont to monitor   Encourage weight loss

## 2018-06-26 NOTE — ASSESSMENT & PLAN NOTE
Lab Results   Component Value Date    HGBA1C 6 2 06/21/2018       Much better control since starting metformin  Cont present care  Urged weight loss   Recheck 6m

## 2018-07-11 ENCOUNTER — REMOTE DEVICE CLINIC VISIT (OUTPATIENT)
Dept: CARDIOLOGY CLINIC | Facility: CLINIC | Age: 58
End: 2018-07-11
Payer: MEDICARE

## 2018-07-11 DIAGNOSIS — I48.20 CHRONIC ATRIAL FIBRILLATION (HCC): ICD-10-CM

## 2018-07-11 DIAGNOSIS — Z95.0 PRESENCE OF PERMANENT CARDIAC PACEMAKER: ICD-10-CM

## 2018-07-11 DIAGNOSIS — I49.5 SICK SINUS SYNDROME (HCC): Primary | ICD-10-CM

## 2018-07-11 PROCEDURE — 93294 REM INTERROG EVL PM/LDLS PM: CPT | Performed by: INTERNAL MEDICINE

## 2018-07-11 PROCEDURE — 93296 REM INTERROG EVL PM/IDS: CPT | Performed by: INTERNAL MEDICINE

## 2018-07-11 NOTE — PROGRESS NOTES
CARELINK TRANSMISSION: BATTERY VOLTAGE ADEQUATE  (2 5 YRS)   40%  ALL AVAILABLE LEAD PARAMETERS WITHIN NORMAL LIMITS  NO SIGNIFICANT HIGH RATE EPISODES  PATIENT IS % AF AND ON XARELTO  NORMAL DEVICE FUNCTION  ---ESTRADA

## 2018-07-12 DIAGNOSIS — F32.A DEPRESSION, UNSPECIFIED DEPRESSION TYPE: Primary | ICD-10-CM

## 2018-07-12 RX ORDER — CITALOPRAM 20 MG/1
TABLET ORAL
Qty: 90 TABLET | Refills: 3 | Status: SHIPPED | OUTPATIENT
Start: 2018-07-12 | End: 2018-10-11 | Stop reason: SDUPTHER

## 2018-08-27 DIAGNOSIS — I48.91 ATRIAL FIBRILLATION, UNSPECIFIED TYPE (HCC): Primary | ICD-10-CM

## 2018-10-08 DIAGNOSIS — E11.9 TYPE 2 DIABETES MELLITUS WITHOUT COMPLICATION, WITHOUT LONG-TERM CURRENT USE OF INSULIN (HCC): ICD-10-CM

## 2018-10-08 RX ORDER — DILTIAZEM HYDROCHLORIDE 360 MG/1
CAPSULE, EXTENDED RELEASE ORAL
COMMUNITY
Start: 2018-07-12 | End: 2018-10-11 | Stop reason: SDUPTHER

## 2018-10-10 NOTE — PROGRESS NOTES
Cardiology Outpatient Follow up Note    Shreyas Gil 59 y o  male MRN: [id number]    10/11/18          Assessment/Plan:    1  Permanent atrial fibrillation  He is on Metoprolol and Diltiazem as well as Xarelto for stroke prophylaxis  He is now 100% in afib based on PM interrogation  Creatinine 1 1 3/18  His fatigue improved after decreasing Metoprolol from 100 bid to 50 bid, although still present       2  Hypertension  Blood pressure is controlled on Metoprolol and Diltiazem and Lasix 40  Lisinopril was discontinued when he had orthostatic hypotension       3  Dyslipidemia  He is on Atorvastatin 40   Lipid panel 3/18 showed total cholesterol 149, HDL 49, LDL 73,       4  Nonischemic cardiomyopathy  He is on beta blocker  He is not having any symptoms currently  Most recent echo showed normalization of LV function  Lisinopril was stopped to avoid hypotension  He still requires the Lasix to avoid lower extremity edema  I asked him to cut down Lasix to 20 mg to see if this would be enough to treat the edema, but he tried this and did not help control his LE edema, currently on 40 mg daily      4  SSS s/p [employer] dual chamber PM  Device interrogation 7/18 showed adequate battery voltage, V paced 40%, no significant high rate episodes  100% AF       5  Sleep apnea  He is not using CPAP at night, and is not using oxygen at night either      6  DM  Hgb A1c 7 6% 3/18  Started on Metformin 500 bid 3/18  Hgb A1c 6 2% in 6/18  1  Type 2 diabetes mellitus without complication, without long-term current use of insulin ([address] )     2  Obstructive sleep apnea     3  Permanent atrial fibrillation (HCC)  POCT ECG   4  Essential hypertension     5  Dilated cardiomyopathy ([address] )     6  Presence of cardiac pacemaker     7   Dyslipidemia          HPI: 59 y o  man with a history of previously paroxysmal, now permanent atrial fibrillation, TOVA intolerant to CPAP, history of NICM which resolved with rate control, 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 in 4/13,  morbid obesity, HTN, HL, and DM, who is here for routine follow up       He is having some wheezing, may be related to cutting grass, has not yet tried inhalers  He denies any dizziness or lightheadedness or further syncope  He denies any worsening of his baseline dyspnea, reports it is stable  He states he currently does not feel palpitations from the atrial fibrillation, only rarely when he concentrates on it  No chest pain, no lower extremity edema  He does not use his CPAP machine for his sleep apnea, was previously using oxygen at night, but stopped using it because he had to pay for it, and it also dried out his nose, no longer has any of it  He is using a Sleep Number bed to keep his head elevated, wife reports he doesn't snore with his head up  He has reduced vision due to prior episode of ocular shingles with reduced vision in left eye  He is not doing formal exercise  He has issues with maintaining his weight, recently has been gaining  He was started on metformin 3/18       Patient Active Problem List   Diagnosis    Anxiety    Permanent atrial fibrillation (HCC)    Dilated cardiomyopathy (HCC)    Dry eye syndrome    Dyslipidemia    Essential hypertension    Obstructive sleep apnea    Morbid obesity ([facility] )    Type 2 diabetes mellitus without complication, without long-term current use of insulin ([facility] )    Health care maintenance    Medtronic dual chamber PM    Sarcoidosis       No Known Allergies      Current Outpatient Prescriptions:     albuterol (VENTOLIN HFA) 90 mcg/act inhaler, Inhale 2 puffs, Disp: , Rfl:     atorvastatin (LIPITOR) 40 mg tablet, Take 1 tablet by mouth, Disp: , Rfl:     Blood Glucose Monitoring Suppl (PRODIGY POCKET BLOOD GLUCOSE) w/Device KIT, Test once a day, Disp: 1 kit, Rfl: 0    citalopram (CeleXA) 20 mg tablet, Take 1 tablet daily, Disp: 90 tablet, Rfl: 3    cycloSPORINE (RESTASIS) 0 05 % ophthalmic emulsion, Apply 1 drop to eye 2 (two) times a day, Disp: , Rfl:     diltiazem (TIAZAC) 360 MG 24 hr capsule, Take 1 capsule by mouth Daily, Disp: , Rfl:     erythromycin (ILOTYCIN) ophthalmic ointment, Apply to eye, Disp: , Rfl:     furosemide (LASIX) 40 mg tablet, Take 1 tablet by mouth daily, Disp: , Rfl:     glucose blood (PRODIGY NO CODING BLOOD GLUC) test strip, Test qd, Disp: 100 each, Rfl: 2    metFORMIN (GLUCOPHAGE) 500 mg tablet, Take 1 tablet (500 mg total) by mouth 2 (two) times a day with meals, Disp: 180 tablet, Rfl: 1    metoprolol tartrate (LOPRESSOR) 50 mg tablet, Take 1 tablet by mouth 2 (two) times a day, Disp: , Rfl:     mometasone (ASMANEX 120 METERED DOSES) 220 MCG/INH inhaler, Inhale 1 puff 2 (two) times a day, Disp: , Rfl:     PRODIGY LANCETS 28G MISC, Testing once a day, Disp: 50 each, Rfl: 2    rivaroxaban (XARELTO) 20 mg tablet, Take 1 tablet (20 mg total) by mouth daily, Disp: 28 tablet, Rfl: 0    Past Medical History:   Diagnosis Date    Chronic systolic congestive heart failure (HCC)     last assessed - [id number]    History of cardiac catheterization 10/2012    normal coronaries w/o significant obstructive coronary disease    Pacemaker     last assessed - 19Oct2017       Family History   Problem Relation Age of Onset    Atrial fibrillation Mother     Cancer Mother     Heart disease Father     Hypertension Father     Skin cancer Father     Cancer Family     Transient ischemic attack Family         without residual deficits       Past Surgical History:   Procedure Laterality Date    CARDIOVERSION      Elective    FISTULA REPAIR      perirectal    TONSILLECTOMY      1960's       Social History     Social History    Marital status: /Civil Union     Spouse name: N/A    Number of children: N/A    Years of education: N/A     Occupational History    Retired      Social History Main Topics    Smoking status: Former Smoker     Packs/day: 2 00     Years: 5 00     Quit date: 1980    Smokeless tobacco: Never Used      Comment: 2 ppd x 5 years    Alcohol use 1 2 - 1 8 oz/week     2 - 3 Cans of beer per week      Comment: a week    Drug use: No    Sexual activity: Not on file     Other Topics Concern    Not on file     Social History Narrative    Always uses seat belt    Daily coffee consumption (___Cups/Day)    Dental care regularly    Pets/Animals    Uses safety Equipment - seatbelts           Review of Systems   Constitution: Negative for chills, decreased appetite, diaphoresis, fever, weakness, malaise/fatigue, night sweats, weight gain and weight loss  HENT: Negative for ear pain, hearing loss, hoarse voice, nosebleeds, sore throat and tinnitus  Eyes: Positive for vision loss in left eye  Negative for blurred vision and pain  Cardiovascular: Negative  Negative for chest pain, claudication, cyanosis, dyspnea on exertion, irregular heartbeat, leg swelling, near-syncope, orthopnea, palpitations, paroxysmal nocturnal dyspnea and syncope  Respiratory: Positive for cough, snoring and wheezing  Negative for hemoptysis, shortness of breath, sleep disturbances due to breathing and sputum production  Hematologic/Lymphatic: Negative for adenopathy and bleeding problem  Does not bruise/bleed easily  Skin: Negative for color change, dry skin, flushing, itching, poor wound healing and rash  Musculoskeletal: Positive for arthritis  Negative for back pain, falls, joint pain, muscle cramps, muscle weakness, myalgias and neck pain  Gastrointestinal: Positive for diarrhea  Negative for abdominal pain, constipation, dysphagia, heartburn, hematemesis, hematochezia, melena, nausea and vomiting  Genitourinary: Negative for dysuria, frequency, hematuria, hesitancy, non-menstrual bleeding and urgency  Neurological: Negative for excessive daytime sleepiness, dizziness, focal weakness, headaches, light-headedness, loss of balance, numbness, paresthesias, tremors and vertigo  Psychiatric/Behavioral: Negative for altered mental status, depression and memory loss  The patient does not have insomnia and is not nervous/anxious  Allergic/Immunologic: Positive for environmental allergies  Negative for persistent infections  Vitals: /78 (BP Location: Right arm, Patient Position: Sitting, Cuff Size: Large)   Pulse 66   Ht 5' 11" (1 803 m)   Wt 132 kg (292 lb)   BMI 40 73 kg/m²       Physical Exam:     GEN: Alert and oriented x 3, in no acute distress  Well appearing and well nourished  HEENT: Sclera anicteric, conjunctivae pink, mucous membranes moist  Oropharynx clear  NECK: Supple, no carotid bruits, no significant JVD  Trachea midline, no thyromegaly  HEART: Regular rhythm, normal S1 and S2, no murmurs, clicks, gallops or rubs  PMI nondisplaced, no thrills  LUNGS: Clear to auscultation bilaterally; no wheezes, rales, or rhonchi  No increased work of breathing or signs of respiratory distress  ABDOMEN: Obese, soft, nontender, nondistended, normoactive bowel sounds  EXTREMITIES: Skin warm and well perfused, no clubbing, cyanosis, or edema  NEURO: No focal findings  Normal gait  Normal speech  Mood and affect normal    SKIN: Normal without suspicious lesions on exposed skin  Multiple circular areas of discoloration over both legs         Lab Results:       Lab Results   Component Value Date    HGBA1C 6 2 06/21/2018    HGBA1C 7 6 (H) 03/01/2018    HGBA1C 7 6 (H) 07/12/2017     Lab Results   Component Value Date    CHOL 156 09/30/2015    CHOL 166 02/11/2015    CHOL 183 11/25/2014     Lab Results   Component Value Date    HDL 49 03/01/2018    HDL 54 01/27/2017    HDL 43 09/30/2015     Lab Results   Component Value Date    LDLCALC 73 03/01/2018    LDLCALC 88 01/27/2017    LDLCALC 64 09/30/2015     Lab Results   Component Value Date    TRIG 133 03/01/2018    TRIG 187 (H) 01/27/2017    TRIG 244 09/30/2015     No components found for: CHOLHDL

## 2018-10-11 ENCOUNTER — OFFICE VISIT (OUTPATIENT)
Dept: CARDIOLOGY CLINIC | Facility: CLINIC | Age: 58
End: 2018-10-11
Payer: MEDICARE

## 2018-10-11 VITALS
SYSTOLIC BLOOD PRESSURE: 118 MMHG | BODY MASS INDEX: 40.88 KG/M2 | HEIGHT: 71 IN | HEART RATE: 66 BPM | WEIGHT: 292 LBS | DIASTOLIC BLOOD PRESSURE: 78 MMHG

## 2018-10-11 DIAGNOSIS — Z95.0 PRESENCE OF CARDIAC PACEMAKER: ICD-10-CM

## 2018-10-11 DIAGNOSIS — I42.0 DILATED CARDIOMYOPATHY (HCC): ICD-10-CM

## 2018-10-11 DIAGNOSIS — G47.33 OBSTRUCTIVE SLEEP APNEA: ICD-10-CM

## 2018-10-11 DIAGNOSIS — I48.21 PERMANENT ATRIAL FIBRILLATION (HCC): ICD-10-CM

## 2018-10-11 DIAGNOSIS — F32.A DEPRESSION, UNSPECIFIED DEPRESSION TYPE: ICD-10-CM

## 2018-10-11 DIAGNOSIS — E78.5 DYSLIPIDEMIA: ICD-10-CM

## 2018-10-11 DIAGNOSIS — I10 ESSENTIAL HYPERTENSION: ICD-10-CM

## 2018-10-11 DIAGNOSIS — E11.9 TYPE 2 DIABETES MELLITUS WITHOUT COMPLICATION, WITHOUT LONG-TERM CURRENT USE OF INSULIN (HCC): Primary | ICD-10-CM

## 2018-10-11 PROCEDURE — 93000 ELECTROCARDIOGRAM COMPLETE: CPT | Performed by: INTERNAL MEDICINE

## 2018-10-11 PROCEDURE — 99214 OFFICE O/P EST MOD 30 MIN: CPT | Performed by: INTERNAL MEDICINE

## 2018-10-11 RX ORDER — METOPROLOL TARTRATE 50 MG/1
50 TABLET, FILM COATED ORAL 2 TIMES DAILY
Qty: 180 TABLET | Refills: 3 | Status: SHIPPED | OUTPATIENT
Start: 2018-10-11 | End: 2020-02-10 | Stop reason: SDUPTHER

## 2018-10-11 RX ORDER — CITALOPRAM 20 MG/1
20 TABLET ORAL DAILY
Qty: 90 TABLET | Refills: 3 | Status: SHIPPED | OUTPATIENT
Start: 2018-10-11 | End: 2019-01-25 | Stop reason: ALTCHOICE

## 2018-10-15 DIAGNOSIS — I48.91 ATRIAL FIBRILLATION, UNSPECIFIED TYPE (HCC): ICD-10-CM

## 2018-10-16 ENCOUNTER — REMOTE DEVICE CLINIC VISIT (OUTPATIENT)
Dept: CARDIOLOGY CLINIC | Facility: CLINIC | Age: 58
End: 2018-10-16
Payer: MEDICARE

## 2018-10-16 DIAGNOSIS — Z95.0 PRESENCE OF PERMANENT CARDIAC PACEMAKER: ICD-10-CM

## 2018-10-16 DIAGNOSIS — I48.21 PERMANENT ATRIAL FIBRILLATION (HCC): Primary | ICD-10-CM

## 2018-10-16 DIAGNOSIS — I49.5 SSS (SICK SINUS SYNDROME) (HCC): ICD-10-CM

## 2018-10-16 PROCEDURE — 93296 REM INTERROG EVL PM/IDS: CPT | Performed by: INTERNAL MEDICINE

## 2018-10-16 PROCEDURE — 93294 REM INTERROG EVL PM/LDLS PM: CPT | Performed by: INTERNAL MEDICINE

## 2018-10-16 NOTE — PROGRESS NOTES
MDT DUAL PM ([address])  CARELINK TRANSMISSION:  BATTERY VOLTAGE ADEQUATE (2 YR)    41 2% ([address] PPM)    ALL AVAILABLE LEAD PARAMETERS WITHIN NORMAL LIMITS   NO NEW HIGH RATE EPISODES   IN % OF THE TIME AND ON [employer]   NORMAL DEVICE FUNCTION  Kacie Ross

## 2018-12-03 ENCOUNTER — TELEPHONE (OUTPATIENT)
Dept: FAMILY MEDICINE CLINIC | Facility: CLINIC | Age: 58
End: 2018-12-03

## 2018-12-04 DIAGNOSIS — E11.9 TYPE 2 DIABETES MELLITUS WITHOUT COMPLICATION, WITHOUT LONG-TERM CURRENT USE OF INSULIN (HCC): Primary | ICD-10-CM

## 2018-12-04 DIAGNOSIS — I10 ESSENTIAL HYPERTENSION: ICD-10-CM

## 2018-12-27 ENCOUNTER — OFFICE VISIT (OUTPATIENT)
Dept: FAMILY MEDICINE CLINIC | Facility: CLINIC | Age: 58
End: 2018-12-27
Payer: MEDICARE

## 2018-12-27 ENCOUNTER — APPOINTMENT (OUTPATIENT)
Dept: RADIOLOGY | Age: 58
End: 2018-12-27
Payer: MEDICARE

## 2018-12-27 ENCOUNTER — TRANSCRIBE ORDERS (OUTPATIENT)
Dept: ADMINISTRATIVE | Age: 58
End: 2018-12-27

## 2018-12-27 VITALS
BODY MASS INDEX: 41.3 KG/M2 | OXYGEN SATURATION: 91 % | HEART RATE: 80 BPM | TEMPERATURE: 102.5 F | HEIGHT: 71 IN | DIASTOLIC BLOOD PRESSURE: 70 MMHG | WEIGHT: 295 LBS | SYSTOLIC BLOOD PRESSURE: 112 MMHG

## 2018-12-27 DIAGNOSIS — J11.1 URI DUE TO INFLUENZA: ICD-10-CM

## 2018-12-27 DIAGNOSIS — J11.1 URI DUE TO INFLUENZA: Primary | ICD-10-CM

## 2018-12-27 PROCEDURE — 99213 OFFICE O/P EST LOW 20 MIN: CPT | Performed by: NURSE PRACTITIONER

## 2018-12-27 PROCEDURE — 71046 X-RAY EXAM CHEST 2 VIEWS: CPT

## 2018-12-27 RX ORDER — OSELTAMIVIR PHOSPHATE 75 MG/1
75 CAPSULE ORAL 2 TIMES DAILY
Qty: 10 CAPSULE | Refills: 0 | Status: SHIPPED | OUTPATIENT
Start: 2018-12-27 | End: 2019-01-01

## 2018-12-27 RX ORDER — OSELTAMIVIR PHOSPHATE 75 MG/1
75 CAPSULE ORAL 2 TIMES DAILY
Qty: 10 CAPSULE | Refills: 0 | Status: SHIPPED | OUTPATIENT
Start: 2018-12-27 | End: 2018-12-27 | Stop reason: SDUPTHER

## 2018-12-27 NOTE — PROGRESS NOTES
Assessment/Plan:     Diagnoses and all orders for this visit:    Influenza  -     oseltamivir (TAMIFLU) 75 mg capsule; Take 1 capsule (75 mg total) by mouth 2 (two) times a day for 5 days    URI due to influenza  -     XR chest pa & lateral; Future    Discussed with patient and his wife plan to shorten viral course with oseltamir 75 mg twice a day for 5 days  Discussed with patient and his wife use of conservative measures: rest, stay hydrated and use NSAIDs for fever and pain control  Discussed with patient and his wife due to his history of sarcoidosis plan to obtain chest xray to evaluate lung status  Patient given 2 samples of Asmanex for long acting bronchodilation  Patient to call if not improving in 72 hours or symptoms worsen  Patient and his wife instructed to go to the emergency room if shortness of breath worsens    Subjective:      Patient ID: Samara Ho is a 59 y o  male  59year old male presenting with headache, cough, fever and increasing SOB for the past 24 hours  The fever and body aches started last night and he took some ibuprofen with the fever improving until this morning  He complains of increased shortness of breath but denies chest pains or dyspnea with exertion  His wife reports that over night she could hear him wheezing and his chest rattling  His cough is slightly productive for thick white phlegm         Family History   Problem Relation Age of Onset    Atrial fibrillation Mother     Cancer Mother     Heart disease Father     Hypertension Father     Skin cancer Father     Cancer Family     Transient ischemic attack Family         without residual deficits     Social History     Social History    Marital status: /Civil Union     Spouse name: N/A    Number of children: N/A    Years of education: N/A     Occupational History    Retired      Social History Main Topics    Smoking status: Former Smoker     Packs/day: 2 00     Years: 5 00     Quit date: 1980    Smokeless tobacco: Never Used      Comment: 2 ppd x 5 years    Alcohol use 1 2 - 1 8 oz/week     2 - 3 Cans of beer per week      Comment: a week    Drug use: No    Sexual activity: Not on file     Other Topics Concern    Not on file     Social History Narrative    Always uses seat belt    Daily coffee consumption (___Cups/Day)    Dental care regularly    Pets/Animals    Uses safety Equipment - seatbelts         Past Medical History:   Diagnosis Date    Chronic systolic congestive heart failure (HCC)     last assessed - [id number]    History of cardiac catheterization 10/2012    normal coronaries w/o significant obstructive coronary disease    Pacemaker     last assessed - 19Oct2017     Past Surgical History:   Procedure Laterality Date    CARDIOVERSION      Elective    FISTULA REPAIR      perirectal    TONSILLECTOMY      1960's     No Known Allergies    Current Outpatient Prescriptions:     albuterol (VENTOLIN HFA) 90 mcg/act inhaler, Inhale 2 puffs, Disp: , Rfl:     atorvastatin (LIPITOR) 40 mg tablet, Take 1 tablet by mouth, Disp: , Rfl:     Blood Glucose Monitoring Suppl (PRODIGY POCKET BLOOD GLUCOSE) w/Device KIT, Test once a day, Disp: 1 kit, Rfl: 0    citalopram (CeleXA) 20 mg tablet, Take 1 tablet (20 mg total) by mouth daily, Disp: 90 tablet, Rfl: 3    cycloSPORINE (RESTASIS) 0 05 % ophthalmic emulsion, Apply 1 drop to eye 2 (two) times a day, Disp: , Rfl:     diltiazem (TIAZAC) 360 MG 24 hr capsule, Take 1 capsule by mouth Daily, Disp: , Rfl:     erythromycin (ILOTYCIN) ophthalmic ointment, Apply to eye, Disp: , Rfl:     furosemide (LASIX) 40 mg tablet, Take 1 tablet by mouth daily, Disp: , Rfl:     glucose blood (PRODIGY NO CODING BLOOD GLUC) test strip, Test qd, Disp: 100 each, Rfl: 2    metFORMIN (GLUCOPHAGE) 500 mg tablet, Take 1 tablet (500 mg total) by mouth 2 (two) times a day with meals, Disp: 180 tablet, Rfl: 1    metoprolol tartrate (LOPRESSOR) 50 mg tablet, Take 1 tablet (50 mg total) by mouth 2 (two) times a day, Disp: 180 tablet, Rfl: 3    mometasone (ASMANEX 120 METERED DOSES) 220 MCG/INH inhaler, Inhale 1 puff 2 (two) times a day, Disp: , Rfl:     PRODIGY LANCETS 28G MISC, Testing once a day, Disp: 50 each, Rfl: 2    rivaroxaban (XARELTO) 20 mg tablet, Take 1 tablet (20 mg total) by mouth daily, Disp: 21 tablet, Rfl: 0    oseltamivir (TAMIFLU) 75 mg capsule, Take 1 capsule (75 mg total) by mouth 2 (two) times a day for 5 days, Disp: 10 capsule, Rfl: 0      Review of Systems   Constitutional: Positive for chills, fatigue and fever  HENT: Positive for congestion, postnasal drip, rhinorrhea, sinus pressure and sore throat  Eyes: Negative  Respiratory: Positive for shortness of breath and wheezing  Cardiovascular: Negative  Gastrointestinal: Negative  Genitourinary: Negative  Musculoskeletal: Positive for myalgias  Neurological: Negative  Hematological: Negative  Psychiatric/Behavioral: Negative  Objective:    /70   Pulse 80   Temp (!) 102 5 °F (39 2 °C)   Ht 5' 11" (1 803 m)   Wt 134 kg (295 lb)   SpO2 91%   BMI 41 14 kg/m² (Reviewed)     Physical Exam   Constitutional: He is oriented to person, place, and time  He appears well-developed and well-nourished  HENT:   Head: Normocephalic and atraumatic  Right Ear: Tympanic membrane, external ear and ear canal normal    Left Ear: Tympanic membrane, external ear and ear canal normal    Nose: Mucosal edema and rhinorrhea present  Mouth/Throat: Uvula is midline and mucous membranes are normal  Oropharyngeal exudate and posterior oropharyngeal erythema present  Eyes: Pupils are equal, round, and reactive to light  Conjunctivae, EOM and lids are normal    Neck: Trachea normal and normal range of motion  Neck supple  Cardiovascular: Normal rate, regular rhythm and normal heart sounds      Pulmonary/Chest: Effort normal  He has decreased breath sounds in the right lower field and the left lower field  He has wheezes in the right upper field, the right middle field, the left upper field and the left middle field  He has rhonchi in the right upper field and the left upper field  He has no rales  Lymphadenopathy:     He has no cervical adenopathy  Neurological: He is alert and oriented to person, place, and time  Skin: Skin is warm and dry  Psychiatric: He has a normal mood and affect   His behavior is normal

## 2018-12-28 ENCOUNTER — TELEPHONE (OUTPATIENT)
Dept: FAMILY MEDICINE CLINIC | Facility: CLINIC | Age: 58
End: 2018-12-28

## 2018-12-28 DIAGNOSIS — J11.00 PNEUMONIA OF BOTH LOWER LOBES DUE TO INFLUENZA A VIRUS: Primary | ICD-10-CM

## 2018-12-28 RX ORDER — LEVOFLOXACIN 750 MG/1
750 TABLET ORAL EVERY 24 HOURS
Qty: 7 TABLET | Refills: 0 | Status: SHIPPED | OUTPATIENT
Start: 2018-12-28 | End: 2019-01-04

## 2018-12-31 ENCOUNTER — TELEPHONE (OUTPATIENT)
Dept: PULMONOLOGY | Facility: CLINIC | Age: 58
End: 2018-12-31

## 2018-12-31 DIAGNOSIS — J18.9 COMMUNITY ACQUIRED PNEUMONIA, UNSPECIFIED LATERALITY: Primary | ICD-10-CM

## 2018-12-31 NOTE — TELEPHONE ENCOUNTER
Physical Therapy Daily Treatment    Visit Count: 9     Plan of Care: 10/11/2018 Through: 12/20/2018  Insurance Information: physical and speech therapy combined cap of $2010 per calendar year  Referred by: Chuyita Hernandez MD; Next provider visit (if known/scheduled): 10/24/2018  Medical Diagnosis (from order):  729.89 (ICD-9-CM) - R29.898 (ICD-10-CM) - Weakness of both lower extremities  Treatment Diagnosis: Balance and LE weakness symptoms with impaired strength, impaired gait, impaired mobility, impaired activity tolerance, impaired balance, increased risk for falls, impaired motor function/performance/coordination     Date of onset/injury: around 2.5 years ago after he had an episode of sepsis and was in a coma for 4 weeks  Diagnosis Precautions: none  Chart reviewed at time of initial evaluation (relevant co-morbidities, allergies, tests and medications listed): history of sepsis 2.5 years ago- spend 2 weeks in a rehab facility to build strength up, hyperlipidemia, diabetes, mellitus, anemia, history of back surgery, recent back MRI:     IMPRESSION:       1.  Postoperative changes L3-L5.  Mild degrees of epidural fibrosis but no  evidence of arachnoiditis or significant recurrent spinal stenosis at the  postoperative levels.  2.  Moderately severe degenerative adjacent level disease at this L2-3  level with retrolisthesis and disc bulge contributing to severe bilateral  neuroforaminal compromise with exiting L2 nerve root impingement.   3.   L1-2 symmetric disc bulge contributing to mild central canal stenosis  and moderate bilateral foraminal stenosis.     SUBJECTIVE   The patient reports he's feeling better walking for short distances, but longer distances he still gets very fatigued.     Current Pain (0-10 scale): NA   Functional Change: Improved endurance for walking in home and out to mail box, going up and down stairs     OBJECTIVE     Timed Up and Go test- 11 Seconds , improved from 14 seconds at  Was dx with pneum  He has been on antib  X 4 days,  He wants to know if he is contagious  Pl adv    Call Cecilio William [phone] initial evaluation    Treatment   Therapeutic Exercise:  Recumbent bike x 5 minutes, 1.3 level resistance- subjective completed while on bike   TRX squat with chair behind x 10   TRX lunge with right in front of left x 10, left in front of right x 10 with chair behind, complains of fatigue in quadricep  Side steps with yellow band around knees with full upper extremity support x 5 in parallel bars   Monster walk with yellow band around knees - patient reports too easy, monster walk with yellow band around ankles forward x 5  High marches with 3 lb weight with 1 finger support to increase challenge x 10 each side -fatigued towards last couple repetitions  Hamstring curls on red stability ball 10 x 2  Straight leg raise x 5 each side with cueing for quadriceps activation  Sit to stand x 10 with 4 lbs -cueing for slow eccentric lowering of quads    Neuromuscular re-education:  Feet together on airex with vertical and horizontal head turns with no upper extremity support - mild instability  Feet together on airex with eyes closed 30 seconds x 2 -mild -moderate instability  Tandem stance on airex x 30 seconds - mild instability  Tandem stance off airex with eyes closed x 15 seconds each side- moderate to severe instability    Skilled input: form, position, tempo, visual demonstration     Home Program:   Sit to stand   Modified tandem stance  Heel raises   Standing hip abduction with yellow band around ankle   Standing hip extension with yellow band around ankle  Leg press with yellow band   standing march   step ups on 6 inch step with full UE support  New- SLR x 5 each side     Writer verbally educated the patient and received verbal consent from the patient on hand placement, positioning of patient, and techniques to be performed today and how they are pertinent to the patient's plan of care.      Suggestions for next session as indicated: progress per plan of care, balance and LE strength, 1x a week for 4 weeks      ASSESSMENT   Patient slowly but surely building endurance and strength translating into improved function at home and in the community. Able to now tolerate a full 40-45 minute session.  Progress note at 10th visit next session -extend plan of care if appropriate based on progress.    Pain after treatment (patient reported, 0-10 scale): NA  Result of above outlined education: Verbalizes understanding and Demonstrates understanding    THERAPY DAILY BILLING   Insurance: MEDICARE 2. [employer]    Evaluation Procedures:  No evaluation codes were used on this date of service    Timed Procedures:  Neuromuscular Re-Education, 15 minutes  Therapeutic Exercise, 25 minutes     Untimed Procedures:  No untimed codes were used on this date of service    Total Treatment Time: 40 minutes

## 2018-12-31 NOTE — TELEPHONE ENCOUNTER
Pt's wife called because chest xray shows positive for pneumonia  He is on antibiotics as of 12/28  She wanted to know if he is contagious at this point but Jimi Casas stated he is not since he has been on antibiotics for over 24 hrs  Pt's wife Avi Justin is aware

## 2018-12-31 NOTE — TELEPHONE ENCOUNTER
Patient wife called to discuss patient's Chest xray done at [facility] on 12/27/18   Breanna Reddy can be reached at [phone]

## 2018-12-31 NOTE — TELEPHONE ENCOUNTER
Telephone note- Pulmonary Medicine   Ronal aMr 59 y o  male MRN: [id number]    Reason for call:  CXR review      Pertinent details:  Discussed sandra Montano's wife  Slowly improving and had symptoms c/w pneumonia  Fever, shortness of breatjh and productive cough  Seems to be responding to antibiotics  Instructed to call if symptoms are not improving and to obtain followup CXR in earlyy to mid February to ensure improvement/resolution      He has a CT scan and office followup in May    Levi Johnson MD

## 2019-01-04 ENCOUNTER — APPOINTMENT (OUTPATIENT)
Dept: LAB | Age: 59
End: 2019-01-04
Payer: MEDICARE

## 2019-01-04 DIAGNOSIS — I10 ESSENTIAL HYPERTENSION: ICD-10-CM

## 2019-01-04 DIAGNOSIS — E11.9 TYPE 2 DIABETES MELLITUS WITHOUT COMPLICATION, WITHOUT LONG-TERM CURRENT USE OF INSULIN (HCC): ICD-10-CM

## 2019-01-04 LAB
ALBUMIN SERPL BCP-MCNC: 3.6 G/DL (ref 3.5–5)
ALP SERPL-CCNC: 85 U/L (ref 46–116)
ALT SERPL W P-5'-P-CCNC: 35 U/L (ref 12–78)
ANION GAP SERPL CALCULATED.3IONS-SCNC: 3 MMOL/L (ref 4–13)
AST SERPL W P-5'-P-CCNC: 17 U/L (ref 5–45)
BASOPHILS # BLD AUTO: 0.04 THOUSANDS/ΜL (ref 0–0.1)
BASOPHILS NFR BLD AUTO: 1 % (ref 0–1)
BILIRUB SERPL-MCNC: 0.54 MG/DL (ref 0.2–1)
BUN SERPL-MCNC: 16 MG/DL (ref 5–25)
CALCIUM SERPL-MCNC: 9 MG/DL (ref 8.3–10.1)
CHLORIDE SERPL-SCNC: 103 MMOL/L (ref 100–108)
CHOLEST SERPL-MCNC: 136 MG/DL (ref 50–200)
CO2 SERPL-SCNC: 29 MMOL/L (ref 21–32)
CREAT SERPL-MCNC: 1.12 MG/DL (ref 0.6–1.3)
EOSINOPHIL # BLD AUTO: 0.14 THOUSAND/ΜL (ref 0–0.61)
EOSINOPHIL NFR BLD AUTO: 2 % (ref 0–6)
ERYTHROCYTE [DISTWIDTH] IN BLOOD BY AUTOMATED COUNT: 13.1 % (ref 11.6–15.1)
EST. AVERAGE GLUCOSE BLD GHB EST-MCNC: 151 MG/DL
GFR SERPL CREATININE-BSD FRML MDRD: 71 ML/MIN/1.73SQ M
GLUCOSE P FAST SERPL-MCNC: 118 MG/DL (ref 65–99)
HBA1C MFR BLD: 6.9 % (ref 4.2–6.3)
HCT VFR BLD AUTO: 47.1 % (ref 36.5–49.3)
HDLC SERPL-MCNC: 40 MG/DL (ref 40–60)
HGB BLD-MCNC: 15.9 G/DL (ref 12–17)
IMM GRANULOCYTES # BLD AUTO: 0.03 THOUSAND/UL (ref 0–0.2)
IMM GRANULOCYTES NFR BLD AUTO: 1 % (ref 0–2)
LDLC SERPL CALC-MCNC: 67 MG/DL (ref 0–100)
LYMPHOCYTES # BLD AUTO: 1.05 THOUSANDS/ΜL (ref 0.6–4.47)
LYMPHOCYTES NFR BLD AUTO: 17 % (ref 14–44)
MCH RBC QN AUTO: 31.2 PG (ref 26.8–34.3)
MCHC RBC AUTO-ENTMCNC: 33.8 G/DL (ref 31.4–37.4)
MCV RBC AUTO: 93 FL (ref 82–98)
MONOCYTES # BLD AUTO: 0.55 THOUSAND/ΜL (ref 0.17–1.22)
MONOCYTES NFR BLD AUTO: 9 % (ref 4–12)
NEUTROPHILS # BLD AUTO: 4.5 THOUSANDS/ΜL (ref 1.85–7.62)
NEUTS SEG NFR BLD AUTO: 70 % (ref 43–75)
NONHDLC SERPL-MCNC: 96 MG/DL
NRBC BLD AUTO-RTO: 0 /100 WBCS
PLATELET # BLD AUTO: 213 THOUSANDS/UL (ref 149–390)
PMV BLD AUTO: 9.5 FL (ref 8.9–12.7)
POTASSIUM SERPL-SCNC: 3.8 MMOL/L (ref 3.5–5.3)
PROT SERPL-MCNC: 7.2 G/DL (ref 6.4–8.2)
RBC # BLD AUTO: 5.09 MILLION/UL (ref 3.88–5.62)
SODIUM SERPL-SCNC: 135 MMOL/L (ref 136–145)
TRIGL SERPL-MCNC: 146 MG/DL
WBC # BLD AUTO: 6.31 THOUSAND/UL (ref 4.31–10.16)

## 2019-01-04 PROCEDURE — 80061 LIPID PANEL: CPT

## 2019-01-04 PROCEDURE — 80053 COMPREHEN METABOLIC PANEL: CPT

## 2019-01-04 PROCEDURE — 36415 COLL VENOUS BLD VENIPUNCTURE: CPT

## 2019-01-04 PROCEDURE — 83036 HEMOGLOBIN GLYCOSYLATED A1C: CPT

## 2019-01-04 PROCEDURE — 85025 COMPLETE CBC W/AUTO DIFF WBC: CPT

## 2019-01-08 DIAGNOSIS — E78.2 MIXED HYPERLIPIDEMIA: Primary | ICD-10-CM

## 2019-01-08 DIAGNOSIS — I10 ESSENTIAL HYPERTENSION: ICD-10-CM

## 2019-01-08 DIAGNOSIS — I48.91 ATRIAL FIBRILLATION, UNSPECIFIED TYPE (HCC): ICD-10-CM

## 2019-01-08 DIAGNOSIS — E78.5 DYSLIPIDEMIA: Primary | ICD-10-CM

## 2019-01-08 RX ORDER — ATORVASTATIN CALCIUM 40 MG/1
40 TABLET, FILM COATED ORAL
Qty: 90 TABLET | Refills: 3 | Status: SHIPPED | OUTPATIENT
Start: 2019-01-08 | End: 2019-01-18

## 2019-01-08 RX ORDER — DILTIAZEM HYDROCHLORIDE 360 MG/1
360 CAPSULE, EXTENDED RELEASE ORAL DAILY
Qty: 90 CAPSULE | Refills: 3 | Status: SHIPPED | OUTPATIENT
Start: 2019-01-08 | End: 2020-02-10 | Stop reason: SDUPTHER

## 2019-01-08 RX ORDER — ATORVASTATIN CALCIUM 40 MG/1
TABLET, FILM COATED ORAL
Qty: 90 TABLET | Refills: 0 | Status: SHIPPED | OUTPATIENT
Start: 2019-01-08 | End: 2019-04-11 | Stop reason: SDUPTHER

## 2019-01-09 ENCOUNTER — OFFICE VISIT (OUTPATIENT)
Dept: FAMILY MEDICINE CLINIC | Facility: CLINIC | Age: 59
End: 2019-01-09
Payer: MEDICARE

## 2019-01-09 VITALS
DIASTOLIC BLOOD PRESSURE: 72 MMHG | HEART RATE: 55 BPM | SYSTOLIC BLOOD PRESSURE: 102 MMHG | HEIGHT: 71 IN | BODY MASS INDEX: 39.73 KG/M2 | WEIGHT: 283.8 LBS | TEMPERATURE: 96.3 F | OXYGEN SATURATION: 95 %

## 2019-01-09 DIAGNOSIS — I42.0 DILATED CARDIOMYOPATHY (HCC): ICD-10-CM

## 2019-01-09 DIAGNOSIS — E78.5 DYSLIPIDEMIA: ICD-10-CM

## 2019-01-09 DIAGNOSIS — J18.9 COMMUNITY ACQUIRED PNEUMONIA, UNSPECIFIED LATERALITY: ICD-10-CM

## 2019-01-09 DIAGNOSIS — I48.21 PERMANENT ATRIAL FIBRILLATION (HCC): ICD-10-CM

## 2019-01-09 DIAGNOSIS — E11.9 TYPE 2 DIABETES MELLITUS WITHOUT COMPLICATION, WITHOUT LONG-TERM CURRENT USE OF INSULIN (HCC): Primary | ICD-10-CM

## 2019-01-09 DIAGNOSIS — I10 ESSENTIAL HYPERTENSION: ICD-10-CM

## 2019-01-09 PROCEDURE — 99214 OFFICE O/P EST MOD 30 MIN: CPT | Performed by: FAMILY MEDICINE

## 2019-01-09 NOTE — PROGRESS NOTES
Assessment/Plan:    Type 2 diabetes mellitus without complication, without long-term current use of insulin (HCC)  Lab Results   Component Value Date    HGBA1C 6 9 (H) 01/04/2019       A1C sl higher (6 9) but still well controlled  Cont present care  Urged diet control  Recheck 6m    CAP (community acquired pneumonia)  Resolving  Finished abx  Cont to monitor  Check CXR in 3 weeks  F/u 6m or prn    Dilated cardiomyopathy (HCC)  Appears euvolemic  Cont present care  F/u with Cardio  Recheck 6m    Essential hypertension  Well controlled  Cont present treatment  Monitor labs  Recheck 6m    Permanent atrial fibrillation (HCC)  Rate controlled  Cont present meds  F/u with Cardio  Recheck 6m    Dyslipidemia  LDL at goal (73)  Cont atorvastatin  Recheck 6m       Diagnoses and all orders for this visit:    Type 2 diabetes mellitus without complication, without long-term current use of insulin (HCC)  -     Comprehensive metabolic panel; Future  -     Hemoglobin A1C; Future  -     Lipid panel; Future  -     Microalbumin / creatinine urine ratio; Future    Dilated cardiomyopathy (HCC)  -     TSH, 3rd generation; Future  -     CBC and differential; Future    Essential hypertension  -     CBC and differential; Future    Permanent atrial fibrillation (HCC)  -     TSH, 3rd generation; Future    Dyslipidemia    Community acquired pneumonia, unspecified laterality          Subjective:      Patient ID: Sasha Hart is a 59 y o  male  f/u multiple med issues  - doing well  Has almost fully recovered from influenza and pneumonia and feels well  Pt is off abx  Still easily fatigued with exertion, but this is improving    - pt had slipped on diet for a period during the Holidays but is doing better now  Recent A1C = 6 9  Up to date with ophth  - no CP, palp, lightheadedness or other CV symptoms  Held Citalopram while on Levaquin and will restart soon   Up to date with Cardio  - up to date with Pulm who orderd another CXR for early Feb  - no Gi or  issues  Due for colonoscopy in 2023      Chest Pain    Associated symptoms include a cough (improving) and shortness of breath (mild with exertion)  The following portions of the patient's history were reviewed and updated as appropriate:   He  has a past medical history of Chronic systolic congestive heart failure ([place] [address] ); History of cardiac catheterization (10/2012); and Pacemaker  He   Patient Active Problem List    Diagnosis Date Noted    CAP (community acquired pneumonia) 12/31/2018    Sarcoidosis 05/22/2018    Medtronic dual chamber PM 04/08/2018    Health care maintenance 04/03/2018    Type 2 diabetes mellitus without complication, without long-term current use of insulin ([place] [address] ) 11/06/2015    Morbid obesity with body mass index (BMI) of 40 0 to 44 9 in adult [facility]) 02/20/2015    Dry eye syndrome 07/17/2014    Dilated cardiomyopathy ([place] [address] ) 08/30/2013    Anxiety 11/05/2012    Obstructive sleep apnea 09/20/2012    Dyslipidemia 09/12/2012    Permanent atrial fibrillation ([place] [address] ) 09/05/2012    Essential hypertension 09/05/2012     He  has a past surgical history that includes Cardioversion; FISTULA REPAIR; and Tonsillectomy  He  reports that he quit smoking about 39 years ago  He has a 10 00 pack-year smoking history  He has never used smokeless tobacco  He reports that he drinks about 1 2 - 1 8 oz of alcohol per week   He reports that he does not use drugs    Current Outpatient Prescriptions   Medication Sig Dispense Refill    albuterol (VENTOLIN HFA) 90 mcg/act inhaler Inhale 2 puffs      atorvastatin (LIPITOR) 40 mg tablet TAKE 1 TABLET BY MOUTH ONCE DAILY AT BEDTIME 90 tablet 0    atorvastatin (LIPITOR) 40 mg tablet Take 1 tablet (40 mg total) by mouth daily at bedtime 90 tablet 3    Blood Glucose Monitoring Suppl (PRODIGY POCKET BLOOD GLUCOSE) w/Device KIT Test once a day 1 kit 0    citalopram (CeleXA) 20 mg tablet Take 1 tablet (20 mg total) by mouth daily 90 tablet 3    cycloSPORINE (RESTASIS) 0 05 % ophthalmic emulsion Apply 1 drop to eye 2 (two) times a day      diltiazem (TIAZAC) 360 MG 24 hr capsule Take 1 capsule (360 mg total) by mouth daily 90 capsule 3    erythromycin (ILOTYCIN) ophthalmic ointment Apply to eye      furosemide (LASIX) 40 mg tablet Take 1 tablet by mouth daily      glucose blood (PRODIGY NO CODING BLOOD GLUC) test strip Test qd 100 each 2    metFORMIN (GLUCOPHAGE) 500 mg tablet Take 1 tablet (500 mg total) by mouth 2 (two) times a day with meals 180 tablet 1    metoprolol tartrate (LOPRESSOR) 50 mg tablet Take 1 tablet (50 mg total) by mouth 2 (two) times a day 180 tablet 3    mometasone (ASMANEX 120 METERED DOSES) 220 MCG/INH inhaler Inhale 1 puff 2 (two) times a day      PRODIGY LANCETS 28G MISC Testing once a day 50 each 2    rivaroxaban (XARELTO) 20 mg tablet Take 1 tablet (20 mg total) by mouth daily with breakfast 90 tablet 3     No current facility-administered medications for this visit  He has No Known Allergies       Review of Systems   Constitutional: Positive for fatigue (improving)  HENT: Negative  Eyes: Negative  Respiratory: Positive for cough (improving) and shortness of breath (mild with exertion)  Cardiovascular: Negative  Gastrointestinal: Negative  Endocrine: Negative  Genitourinary: Negative  Musculoskeletal: Negative  Skin: Negative  Allergic/Immunologic: Negative  Neurological: Negative  Hematological: Negative  Psychiatric/Behavioral: Negative  Objective:      /72   Pulse 55   Temp (!) 96 3 °F (35 7 °C)   Ht 5' 11" (1 803 m)   Wt 129 kg (283 lb 12 8 oz)   SpO2 95%   BMI 39 58 kg/m²          Physical Exam   Constitutional: He is oriented to person, place, and time  He appears well-developed and well-nourished  HENT:   Head: Normocephalic and atraumatic     Right Ear: External ear normal    Left Ear: External ear normal    Nose: Nose normal    Mouth/Throat: Oropharynx is clear and moist    Eyes: Pupils are equal, round, and reactive to light  Conjunctivae and EOM are normal    Neck: Normal range of motion  Neck supple  No JVD present  Cardiovascular: Normal rate, regular rhythm, normal heart sounds and intact distal pulses  No murmur heard  Pulses:       Dorsalis pedis pulses are 1+ on the right side, and 1+ on the left side  Pulmonary/Chest: Effort normal  He has rales (rare crackle in the L base  Rest CTA  No wheeze or increased exp phase)  Abdominal: Soft  Bowel sounds are normal  He exhibits no distension and no mass  There is no tenderness  Musculoskeletal: Normal range of motion  He exhibits no edema or tenderness  Lymphadenopathy:     He has no cervical adenopathy  Neurological: He is alert and oriented to person, place, and time  No cranial nerve deficit  He exhibits normal muscle tone  Coordination normal    Skin: Skin is warm  Psychiatric: He has a normal mood and affect  Vitals reviewed

## 2019-01-10 NOTE — ASSESSMENT & PLAN NOTE
Lab Results   Component Value Date    HGBA1C 6 9 (H) 01/04/2019       A1C sl higher (6 9) but still well controlled  Cont present care  Urged diet control   Recheck 6m

## 2019-01-18 ENCOUNTER — TELEPHONE (OUTPATIENT)
Dept: PULMONOLOGY | Facility: CLINIC | Age: 59
End: 2019-01-18

## 2019-01-18 ENCOUNTER — HOSPITAL ENCOUNTER (INPATIENT)
Facility: HOSPITAL | Age: 59
LOS: 2 days | Discharge: HOME | DRG: 194 | End: 2019-01-20
Attending: EMERGENCY MEDICINE | Admitting: INTERNAL MEDICINE
Payer: MEDICARE

## 2019-01-18 ENCOUNTER — APPOINTMENT (EMERGENCY)
Dept: RADIOLOGY | Facility: HOSPITAL | Age: 59
DRG: 194 | End: 2019-01-18
Payer: MEDICARE

## 2019-01-18 ENCOUNTER — TELEPHONE (OUTPATIENT)
Dept: OTHER | Facility: HOSPITAL | Age: 59
End: 2019-01-18

## 2019-01-18 DIAGNOSIS — Z78.9 FAILURE OF OUTPATIENT TREATMENT: ICD-10-CM

## 2019-01-18 DIAGNOSIS — R09.02 HYPOXIA: ICD-10-CM

## 2019-01-18 DIAGNOSIS — R65.10 SIRS (SYSTEMIC INFLAMMATORY RESPONSE SYNDROME) (HCC): ICD-10-CM

## 2019-01-18 DIAGNOSIS — R04.2 HEMOPTYSIS: ICD-10-CM

## 2019-01-18 DIAGNOSIS — J18.9 MULTIFOCAL PNEUMONIA: Primary | ICD-10-CM

## 2019-01-18 LAB
ALBUMIN SERPL BCP-MCNC: 3.6 G/DL (ref 3.5–5)
ALP SERPL-CCNC: 71 U/L (ref 46–116)
ALT SERPL W P-5'-P-CCNC: 20 U/L (ref 12–78)
ANION GAP SERPL CALCULATED.3IONS-SCNC: 7 MMOL/L (ref 4–13)
APTT PPP: 38 SECONDS (ref 26–38)
AST SERPL W P-5'-P-CCNC: 8 U/L (ref 5–45)
ATRIAL RATE: 277 BPM
BASOPHILS # BLD AUTO: 0.03 THOUSANDS/ΜL (ref 0–0.1)
BASOPHILS NFR BLD AUTO: 0 % (ref 0–1)
BILIRUB SERPL-MCNC: 0.97 MG/DL (ref 0.2–1)
BILIRUB UR QL STRIP: NEGATIVE
BUN SERPL-MCNC: 14 MG/DL (ref 5–25)
CALCIUM SERPL-MCNC: 8.6 MG/DL (ref 8.3–10.1)
CHLORIDE SERPL-SCNC: 99 MMOL/L (ref 100–108)
CLARITY UR: CLEAR
CO2 SERPL-SCNC: 28 MMOL/L (ref 21–32)
COLOR UR: YELLOW
CREAT SERPL-MCNC: 1.22 MG/DL (ref 0.6–1.3)
EOSINOPHIL # BLD AUTO: 0.01 THOUSAND/ΜL (ref 0–0.61)
EOSINOPHIL NFR BLD AUTO: 0 % (ref 0–6)
ERYTHROCYTE [DISTWIDTH] IN BLOOD BY AUTOMATED COUNT: 13.2 % (ref 11.6–15.1)
FLUAV AG SPEC QL: NORMAL
FLUBV AG SPEC QL: NORMAL
GFR SERPL CREATININE-BSD FRML MDRD: 64 ML/MIN/1.73SQ M
GLUCOSE SERPL-MCNC: 161 MG/DL (ref 65–140)
GLUCOSE UR STRIP-MCNC: NEGATIVE MG/DL
HCT VFR BLD AUTO: 41.8 % (ref 36.5–49.3)
HGB BLD-MCNC: 14.2 G/DL (ref 12–17)
HGB UR QL STRIP.AUTO: NEGATIVE
HOLD SPECIMEN: NORMAL
HOLD SPECIMEN: NORMAL
IMM GRANULOCYTES # BLD AUTO: 0.04 THOUSAND/UL (ref 0–0.2)
IMM GRANULOCYTES NFR BLD AUTO: 0 % (ref 0–2)
INR PPP: 2.4 (ref 0.86–1.17)
KETONES UR STRIP-MCNC: NEGATIVE MG/DL
L PNEUMO1 AG UR QL IA.RAPID: NEGATIVE
LACTATE SERPL-SCNC: 2 MMOL/L (ref 0.5–2)
LEUKOCYTE ESTERASE UR QL STRIP: NEGATIVE
LYMPHOCYTES # BLD AUTO: 0.6 THOUSANDS/ΜL (ref 0.6–4.47)
LYMPHOCYTES NFR BLD AUTO: 5 % (ref 14–44)
MCH RBC QN AUTO: 31 PG (ref 26.8–34.3)
MCHC RBC AUTO-ENTMCNC: 34 G/DL (ref 31.4–37.4)
MCV RBC AUTO: 91 FL (ref 82–98)
MONOCYTES # BLD AUTO: 0.62 THOUSAND/ΜL (ref 0.17–1.22)
MONOCYTES NFR BLD AUTO: 6 % (ref 4–12)
NEUTROPHILS # BLD AUTO: 9.93 THOUSANDS/ΜL (ref 1.85–7.62)
NEUTS SEG NFR BLD AUTO: 89 % (ref 43–75)
NITRITE UR QL STRIP: NEGATIVE
NRBC BLD AUTO-RTO: 0 /100 WBCS
PH UR STRIP.AUTO: 7 [PH] (ref 4.5–8)
PLATELET # BLD AUTO: 155 THOUSANDS/UL (ref 149–390)
PMV BLD AUTO: 10.1 FL (ref 8.9–12.7)
POTASSIUM SERPL-SCNC: 4.1 MMOL/L (ref 3.5–5.3)
PROCALCITONIN SERPL-MCNC: 0.21 NG/ML
PROCALCITONIN SERPL-MCNC: 0.28 NG/ML
PROT SERPL-MCNC: 6.4 G/DL (ref 6.4–8.2)
PROT UR STRIP-MCNC: NEGATIVE MG/DL
PROTHROMBIN TIME: 26.2 SECONDS (ref 11.8–14.2)
QRS AXIS: -15 DEGREES
QRSD INTERVAL: 90 MS
QT INTERVAL: 322 MS
QTC INTERVAL: 406 MS
RBC # BLD AUTO: 4.58 MILLION/UL (ref 3.88–5.62)
RSV B RNA SPEC QL NAA+PROBE: NORMAL
S PNEUM AG UR QL: NEGATIVE
SODIUM SERPL-SCNC: 134 MMOL/L (ref 136–145)
SP GR UR STRIP.AUTO: 1.01 (ref 1–1.03)
T WAVE AXIS: 68 DEGREES
UROBILINOGEN UR QL STRIP.AUTO: 1 E.U./DL
VENTRICULAR RATE: 96 BPM
WBC # BLD AUTO: 11.23 THOUSAND/UL (ref 4.31–10.16)

## 2019-01-18 PROCEDURE — 87070 CULTURE OTHR SPECIMN AEROBIC: CPT | Performed by: INTERNAL MEDICINE

## 2019-01-18 PROCEDURE — 36415 COLL VENOUS BLD VENIPUNCTURE: CPT

## 2019-01-18 PROCEDURE — 71046 X-RAY EXAM CHEST 2 VIEWS: CPT

## 2019-01-18 PROCEDURE — 81003 URINALYSIS AUTO W/O SCOPE: CPT | Performed by: INTERNAL MEDICINE

## 2019-01-18 PROCEDURE — 85730 THROMBOPLASTIN TIME PARTIAL: CPT | Performed by: EMERGENCY MEDICINE

## 2019-01-18 PROCEDURE — 99285 EMERGENCY DEPT VISIT HI MDM: CPT

## 2019-01-18 PROCEDURE — 93010 ELECTROCARDIOGRAM REPORT: CPT | Performed by: INTERNAL MEDICINE

## 2019-01-18 PROCEDURE — 87631 RESP VIRUS 3-5 TARGETS: CPT | Performed by: INTERNAL MEDICINE

## 2019-01-18 PROCEDURE — 93005 ELECTROCARDIOGRAM TRACING: CPT

## 2019-01-18 PROCEDURE — 87205 SMEAR GRAM STAIN: CPT | Performed by: INTERNAL MEDICINE

## 2019-01-18 PROCEDURE — 85025 COMPLETE CBC W/AUTO DIFF WBC: CPT | Performed by: EMERGENCY MEDICINE

## 2019-01-18 PROCEDURE — 80053 COMPREHEN METABOLIC PANEL: CPT | Performed by: EMERGENCY MEDICINE

## 2019-01-18 PROCEDURE — 85610 PROTHROMBIN TIME: CPT | Performed by: EMERGENCY MEDICINE

## 2019-01-18 PROCEDURE — 84145 PROCALCITONIN (PCT): CPT | Performed by: EMERGENCY MEDICINE

## 2019-01-18 PROCEDURE — 99223 1ST HOSP IP/OBS HIGH 75: CPT | Performed by: INTERNAL MEDICINE

## 2019-01-18 PROCEDURE — 87449 NOS EACH ORGANISM AG IA: CPT | Performed by: INTERNAL MEDICINE

## 2019-01-18 PROCEDURE — 96374 THER/PROPH/DIAG INJ IV PUSH: CPT

## 2019-01-18 PROCEDURE — 96361 HYDRATE IV INFUSION ADD-ON: CPT

## 2019-01-18 PROCEDURE — 83605 ASSAY OF LACTIC ACID: CPT | Performed by: EMERGENCY MEDICINE

## 2019-01-18 PROCEDURE — 87040 BLOOD CULTURE FOR BACTERIA: CPT | Performed by: EMERGENCY MEDICINE

## 2019-01-18 PROCEDURE — 84145 PROCALCITONIN (PCT): CPT | Performed by: INTERNAL MEDICINE

## 2019-01-18 RX ORDER — ACETAMINOPHEN 325 MG/1
325 TABLET ORAL ONCE
Status: COMPLETED | OUTPATIENT
Start: 2019-01-18 | End: 2019-01-18

## 2019-01-18 RX ORDER — METOPROLOL TARTRATE 50 MG/1
50 TABLET, FILM COATED ORAL EVERY 12 HOURS SCHEDULED
Status: DISCONTINUED | OUTPATIENT
Start: 2019-01-18 | End: 2019-01-20 | Stop reason: HOSPADM

## 2019-01-18 RX ORDER — ACETAMINOPHEN 325 MG/1
650 TABLET ORAL EVERY 6 HOURS PRN
Status: DISCONTINUED | OUTPATIENT
Start: 2019-01-18 | End: 2019-01-20 | Stop reason: HOSPADM

## 2019-01-18 RX ORDER — ALBUTEROL SULFATE 90 UG/1
2 AEROSOL, METERED RESPIRATORY (INHALATION) EVERY 4 HOURS PRN
Status: DISCONTINUED | OUTPATIENT
Start: 2019-01-18 | End: 2019-01-20 | Stop reason: HOSPADM

## 2019-01-18 RX ORDER — DILTIAZEM HYDROCHLORIDE 180 MG/1
360 CAPSULE, COATED, EXTENDED RELEASE ORAL DAILY
Status: DISCONTINUED | OUTPATIENT
Start: 2019-01-19 | End: 2019-01-20 | Stop reason: HOSPADM

## 2019-01-18 RX ORDER — SENNOSIDES 8.6 MG
1 TABLET ORAL DAILY
Status: DISCONTINUED | OUTPATIENT
Start: 2019-01-19 | End: 2019-01-20 | Stop reason: HOSPADM

## 2019-01-18 RX ORDER — ACETAMINOPHEN 325 MG/1
650 TABLET ORAL ONCE
Status: COMPLETED | OUTPATIENT
Start: 2019-01-18 | End: 2019-01-18

## 2019-01-18 RX ORDER — CITALOPRAM 20 MG/1
20 TABLET ORAL DAILY
Status: DISCONTINUED | OUTPATIENT
Start: 2019-01-19 | End: 2019-01-20 | Stop reason: HOSPADM

## 2019-01-18 RX ORDER — SODIUM CHLORIDE 9 MG/ML
50 INJECTION, SOLUTION INTRAVENOUS CONTINUOUS
Status: DISPENSED | OUTPATIENT
Start: 2019-01-18 | End: 2019-01-19

## 2019-01-18 RX ORDER — KETOROLAC TROMETHAMINE 30 MG/ML
15 INJECTION, SOLUTION INTRAMUSCULAR; INTRAVENOUS ONCE
Status: COMPLETED | OUTPATIENT
Start: 2019-01-18 | End: 2019-01-18

## 2019-01-18 RX ORDER — DOCUSATE SODIUM 100 MG/1
100 CAPSULE, LIQUID FILLED ORAL 2 TIMES DAILY
Status: DISCONTINUED | OUTPATIENT
Start: 2019-01-18 | End: 2019-01-20 | Stop reason: HOSPADM

## 2019-01-18 RX ORDER — MAGNESIUM HYDROXIDE/ALUMINUM HYDROXICE/SIMETHICONE 120; 1200; 1200 MG/30ML; MG/30ML; MG/30ML
30 SUSPENSION ORAL EVERY 6 HOURS PRN
Status: DISCONTINUED | OUTPATIENT
Start: 2019-01-18 | End: 2019-01-20 | Stop reason: HOSPADM

## 2019-01-18 RX ORDER — ONDANSETRON 2 MG/ML
4 INJECTION INTRAMUSCULAR; INTRAVENOUS EVERY 6 HOURS PRN
Status: DISCONTINUED | OUTPATIENT
Start: 2019-01-18 | End: 2019-01-20 | Stop reason: HOSPADM

## 2019-01-18 RX ORDER — ATORVASTATIN CALCIUM 40 MG/1
40 TABLET, FILM COATED ORAL
Status: DISCONTINUED | OUTPATIENT
Start: 2019-01-18 | End: 2019-01-20 | Stop reason: HOSPADM

## 2019-01-18 RX ORDER — POLYVINYL ALCOHOL 14 MG/ML
1 SOLUTION/ DROPS OPHTHALMIC EVERY 12 HOURS SCHEDULED
Status: DISCONTINUED | OUTPATIENT
Start: 2019-01-18 | End: 2019-01-20 | Stop reason: HOSPADM

## 2019-01-18 RX ORDER — FLUTICASONE PROPIONATE 220 UG/1
1 AEROSOL, METERED RESPIRATORY (INHALATION)
Status: DISCONTINUED | OUTPATIENT
Start: 2019-01-18 | End: 2019-01-20 | Stop reason: HOSPADM

## 2019-01-18 RX ADMIN — ACETAMINOPHEN 325 MG: 325 TABLET ORAL at 12:58

## 2019-01-18 RX ADMIN — POLYVINYL ALCOHOL 1 DROP: 14 SOLUTION/ DROPS OPHTHALMIC at 21:08

## 2019-01-18 RX ADMIN — CEFEPIME HYDROCHLORIDE 2000 MG: 1 INJECTION, POWDER, FOR SOLUTION INTRAMUSCULAR; INTRAVENOUS at 13:39

## 2019-01-18 RX ADMIN — ACETAMINOPHEN 650 MG: 325 TABLET ORAL at 11:46

## 2019-01-18 RX ADMIN — ATORVASTATIN CALCIUM 40 MG: 40 TABLET, FILM COATED ORAL at 21:08

## 2019-01-18 RX ADMIN — METOPROLOL TARTRATE 50 MG: 50 TABLET, FILM COATED ORAL at 21:08

## 2019-01-18 RX ADMIN — FLUTICASONE PROPIONATE 1 PUFF: 220 AEROSOL, METERED RESPIRATORY (INHALATION) at 21:08

## 2019-01-18 RX ADMIN — VANCOMYCIN HYDROCHLORIDE 2000 MG: 10 INJECTION, POWDER, LYOPHILIZED, FOR SOLUTION INTRAVENOUS at 14:19

## 2019-01-18 RX ADMIN — KETOROLAC TROMETHAMINE 15 MG: 30 INJECTION, SOLUTION INTRAMUSCULAR at 12:56

## 2019-01-18 RX ADMIN — SODIUM CHLORIDE 1000 ML: 0.9 INJECTION, SOLUTION INTRAVENOUS at 12:54

## 2019-01-18 RX ADMIN — SODIUM CHLORIDE 50 ML/HR: 0.9 INJECTION, SOLUTION INTRAVENOUS at 16:36

## 2019-01-18 NOTE — ASSESSMENT & PLAN NOTE
Lab Results   Component Value Date    HGBA1C 6 9 (H) 01/04/2019       No results for input(s): POCGLU in the last 72 hours      Blood Sugar Average: Last 72 hrs:   hold home oral hyperglycemics  Will start SSI

## 2019-01-18 NOTE — H&P
H&P- Ree Blancas 1960, 59 y o  male MRN: [id number]    Unit/Bed#: ED 23 Encounter: [id number]    Primary Care Provider: Denys Javier MD   Date and time admitted to hospital: 1/18/2019 11:12 AM        Multifocal pneumonia   Assessment & Plan    He was recently treated for pneumonia, now presented with sepsis  Chest xray suggestive of multilobar pneumonia  Monitor temp and wbc curves  Continue with cefepime and vancomycin  Duo nebs  Oxygen supply  asmanex   Follows with Dr Isidra Cid  Repeat flu swab, keep isolation       Sarcoidosis   Assessment & Plan    Has a history, symptoms might be related to sarcoidosis  Would continue with antibiotics for now  Consult pulmonary  Duo nebs  Consider steroids       Type 2 diabetes mellitus without complication, without long-term current use of insulin ([facility] )   Assessment & Plan    Lab Results   Component Value Date    HGBA1C 6 9 (H) 01/04/2019       No results for input(s): POCGLU in the last 72 hours  Blood Sugar Average: Last 72 hrs:   hold home oral hyperglycemics  Will start SSI     Morbid obesity with body mass index (BMI) of 40 0 to 44 9 in adult [facility])   Assessment & Plan    Educated patient regarding weight loss and work up as an outpatient        Obstructive sleep apnea   Assessment & Plan    Declines CPAP and does not use oxygen  Improved with weight loss  Continue supportive care  Respiratory protocol if patient agrees to placed it       Essential hypertension   Assessment & Plan    Continue with metoprolol and lasix for now       Dilated cardiomyopathy [facility])   Assessment & Plan    Continue monitoring  Continue with bb, cardizem  Cards consult if there is rate issues     Permanent atrial fibrillation (HCC)   Assessment & Plan    Continue monitoring on telemetry  Rate control with lopressor and diltiazem  A/c xeralto po for now               VTE Prophylaxis: Enoxaparin (Lovenox)  / sequential compression device   Code Status: full code per wife POLST: POLST is not applicable to this patient  Discussion with family: wife at bedside informed me what is going on with patient    Anticipated Length of Stay:  Patient will be admitted on an Inpatient basis with an anticipated length of stay of  More than 2 midnights  Justification for Hospital Stay: multifocal pneumonia, sarcoidosis    Total Time for Visit, including Counseling / Coordination of Care: 45 minutes  Greater than 50% of this total time spent on direct patient counseling and coordination of care  Chief Complaint:   cough    History of Present Illness:    Dorie Daniels is a 59 y o  male who presents with cough that is persistent, he is bringing up sputum  He has blood in his sputum as well  He was recently treated with antibiotics and improved and now again having fevers and sweats  He has     Review of Systems:    Review of Systems   Constitutional: Negative  Negative for activity change, appetite change, chills, diaphoresis, fatigue, fever and unexpected weight change  HENT: Negative  Eyes: Negative  Respiratory: Positive for cough and shortness of breath  Negative for apnea, choking, chest tightness, wheezing and stridor  Cardiovascular: Negative  Gastrointestinal: Negative  Endocrine: Negative  Genitourinary: Negative  Musculoskeletal: Negative  Skin: Negative  Allergic/Immunologic: Negative  Neurological: Negative  Hematological: Negative  Psychiatric/Behavioral: Negative          Past Medical and Surgical History:     Past Medical History:   Diagnosis Date    Chronic systolic congestive heart failure ([facility] )     last assessed - 26Feb2014    Diabetes mellitus ([facility] )     History of cardiac catheterization 10/2012    normal coronaries w/o significant obstructive coronary disease    Pacemaker     last assessed - 19Oct2017       Past Surgical History:   Procedure Laterality Date    CARDIOVERSION      Elective    FISTULA REPAIR      perirectal    TONSILLECTOMY      1960's       Meds/Allergies:    Prior to Admission medications    Medication Sig Start Date End Date Taking?  Authorizing Provider   albuterol (VENTOLIN HFA) 90 mcg/act inhaler Inhale 2 puffs 12/14/12  Yes Historical Provider, MD   atorvastatin (LIPITOR) 40 mg tablet TAKE 1 TABLET BY MOUTH ONCE DAILY AT BEDTIME 1/8/19  Yes Doreen Benitez MD   citalopram (CeleXA) 20 mg tablet Take 1 tablet (20 mg total) by mouth daily 10/11/18  Yes Samul Sacks, MD   cycloSPORINE (RESTASIS) 0 05 % ophthalmic emulsion Apply 1 drop to eye 2 (two) times a day   Yes Historical Provider, MD   diltiazem (TIAZAC) 360 MG 24 hr capsule Take 1 capsule (360 mg total) by mouth daily 1/8/19  Yes Samul Sacks, MD   erythromycin (ILOTYCIN) ophthalmic ointment Apply to eye   Yes Historical Provider, MD   furosemide (LASIX) 40 mg tablet Take 1 tablet by mouth daily 9/8/17  Yes Historical Provider, MD   metFORMIN (GLUCOPHAGE) 500 mg tablet Take 1 tablet (500 mg total) by mouth 2 (two) times a day with meals 10/8/18  Yes Cherylene Spear, MD   metoprolol tartrate (LOPRESSOR) 50 mg tablet Take 1 tablet (50 mg total) by mouth 2 (two) times a day 10/11/18  Yes Samul Sacks, MD   mometasone (ASMANEX 120 METERED DOSES) 220 MCG/INH inhaler Inhale 1 puff 2 (two) times a day   Yes Historical Provider, MD   rivaroxaban (XARELTO) 20 mg tablet Take 1 tablet (20 mg total) by mouth daily with breakfast 1/8/19  Yes Samul Sacks, MD   atorvastatin (LIPITOR) 40 mg tablet Take 1 tablet (40 mg total) by mouth daily at bedtime 1/8/19 1/18/19  Samul Sacks, MD   Blood Glucose Monitoring Suppl (PRODIGY POCKET BLOOD GLUCOSE) w/Device KIT Test once a day 4/9/18 1/18/19  Cherylene Spear, MD   glucose blood (PRODIGY NO CODING BLOOD GLUC) test strip Test qd 4/9/18 1/18/19  Cherylene Spear, MD PRODIGY LANCETS 28G MISC Testing once a day 4/9/18 1/18/19  Cherylene Spear, MD     I have reviewed home medications with patient personally  Allergies: No Known Allergies    Social History:     Marital Status: /Civil Union   Occupation:    Patient Pre-hospital Living Situation: lives with wiffe  Patient Pre-hospital Level of Mobility: able to ambulate at baseline  Patient Pre-hospital Diet Restrictions: none  Substance Use History:   History   Alcohol Use    1 2 - 1 8 oz/week    2 - 3 Cans of beer per week     Comment: a week     History   Smoking Status    Former Smoker    Packs/day: 2 00    Years: 5 00    Quit date: 1980   Smokeless Tobacco    Never Used     Comment: 2 ppd x 5 years     History   Drug Use No       Family History:    Family History   Problem Relation Age of Onset    Atrial fibrillation Mother     Cancer Mother     Heart disease Father     Hypertension Father     Skin cancer Father     Cancer Family     Transient ischemic attack Family         without residual deficits       Physical Exam:     Vitals:   Blood Pressure: 146/71 (01/18/19 1400)  Pulse: 90 (01/18/19 1400)  Temperature: 98 1 °F (36 7 °C) (01/18/19 1418)  Temp Source: Oral (01/18/19 1418)  Respirations: 20 (01/18/19 1400)  SpO2: 93 % (01/18/19 1400)    Physical Exam   Constitutional: He is oriented to person, place, and time  He appears well-developed and well-nourished  No distress  HENT:   Head: Normocephalic and atraumatic  Right Ear: External ear normal    Left Ear: External ear normal    Nose: Nose normal    Mouth/Throat: Oropharynx is clear and moist  No oropharyngeal exudate  Eyes: Pupils are equal, round, and reactive to light  Conjunctivae and EOM are normal  Right eye exhibits no discharge  Left eye exhibits no discharge  No scleral icterus  Neck: Normal range of motion  Neck supple  No JVD present  No tracheal deviation present  No thyromegaly present  Cardiovascular: Normal rate, regular rhythm, normal heart sounds and intact distal pulses  Exam reveals no gallop and no friction rub      No murmur heard   Pulmonary/Chest: No stridor  He is in respiratory distress  He has wheezes  He has no rales  He exhibits no tenderness  Abdominal: Soft  Bowel sounds are normal  He exhibits no distension and no mass  There is no tenderness  There is no rebound and no guarding  Musculoskeletal: Normal range of motion  He exhibits no edema, tenderness or deformity  Lymphadenopathy:     He has no cervical adenopathy  Neurological: He is alert and oriented to person, place, and time  He has normal reflexes  He displays normal reflexes  No cranial nerve deficit  He exhibits normal muscle tone  Coordination normal    Skin: Skin is warm and dry  No rash noted  He is not diaphoretic  No erythema  No pallor  Psychiatric: He has a normal mood and affect  His behavior is normal  Judgment and thought content normal    Nursing note and vitals reviewed  Additional Data:     Lab Results: I have personally reviewed pertinent reports  Results from last 7 days  Lab Units 01/18/19  1148   WBC Thousand/uL 11 23*   HEMOGLOBIN g/dL 14 2   HEMATOCRIT % 41 8   PLATELETS Thousands/uL 155   NEUTROS PCT % 89*   LYMPHS PCT % 5*   MONOS PCT % 6   EOS PCT % 0       Results from last 7 days  Lab Units 01/18/19  1148   SODIUM mmol/L 134*   POTASSIUM mmol/L 4 1   CHLORIDE mmol/L 99*   CO2 mmol/L 28   BUN mg/dL 14   CREATININE mg/dL 1 22   ANION GAP mmol/L 7   CALCIUM mg/dL 8 6   ALBUMIN g/dL 3 6   TOTAL BILIRUBIN mg/dL 0 97   ALK PHOS U/L 71   ALT U/L 20   AST U/L 8   GLUCOSE RANDOM mg/dL 161*       Results from last 7 days  Lab Units 01/18/19  1148   INR  2 40*               Results from last 7 days  Lab Units 01/18/19  1258 01/18/19  1148   LACTIC ACID mmol/L  --  2 0   PROCALCITONIN ng/ml 0 21  --        Imaging: I have personally reviewed pertinent reports        X-ray chest 2 views   ED Interpretation by Sangeeta Bender MD (01/18 1777)   Abnormal   Right lower lobe infiltrate, appears improved from chest x-ray on 12/27/2018 Final Result by Rosalva Luna MD (01/18 6622)      Persistent or recurrent bilateral multifocal airspace opacities remain suspicious for pneumonia  Findings concur with the referring clinician's preliminary interpretation already in the patient's electronic health record  Workstation performed: [id number]             EKG, Pathology, and Other Studies Reviewed on Admission:   · EKG: tachycardia, atrial fibrillation    Allscripts / Epic Records Reviewed: Yes     ** Please Note: This note has been constructed using a voice recognition system   **

## 2019-01-18 NOTE — ASSESSMENT & PLAN NOTE
Declines CPAP and does not use oxygen  Improved with weight loss  Continue supportive care  Respiratory protocol if patient agrees to placed it

## 2019-01-18 NOTE — ED PROVIDER NOTES
History  Chief Complaint   Patient presents with    Shortness of Breath     Pt was dx with flu and pneumonia 2 weeks ago  Pt states it feels like it is back  Pt has c/o SOB and cough  Pt states that there is blood in his spit  HPI    76-year-old male pmhx sarcoidosis, atrial fibrillation on Xarelto, CHF, DM2, CAD, HTN pacemaker presents for evaluation of fever and cough  Patient says he was diagnosed about 3 weeks ago with influenza treated with Tamiflu and pneumonia treated with levofloxacin  Patient says he improved but a few days ago began feeling ill again  Patient says he has had a fever and chills last couple of days along with chest congestion and shortness of breath on exertion  Wife notes his oxygen saturation was in the 80s this morning  Patient notes small amount of blood in sputum  Denies sick contacts at home  Denies lightheadedness, chest pain, nausea, vomiting, abdominal pain, diarrhea, or dysuria  Prior to Admission Medications   Prescriptions Last Dose Informant Patient Reported? Taking?    albuterol (VENTOLIN HFA) 90 mcg/act inhaler  Self Yes Yes   Sig: Inhale 2 puffs   atorvastatin (LIPITOR) 40 mg tablet  Self No Yes   Sig: TAKE 1 TABLET BY MOUTH ONCE DAILY AT BEDTIME   citalopram (CeleXA) 20 mg tablet  Self No Yes   Sig: Take 1 tablet (20 mg total) by mouth daily   cycloSPORINE (RESTASIS) 0 05 % ophthalmic emulsion  Self Yes Yes   Sig: Apply 1 drop to eye 2 (two) times a day   diltiazem (TIAZAC) 360 MG 24 hr capsule  Self No Yes   Sig: Take 1 capsule (360 mg total) by mouth daily   erythromycin (ILOTYCIN) ophthalmic ointment  Self Yes Yes   Sig: Apply to eye   furosemide (LASIX) 40 mg tablet  Self Yes Yes   Sig: Take 1 tablet by mouth daily   metFORMIN (GLUCOPHAGE) 500 mg tablet  Self No Yes   Sig: Take 1 tablet (500 mg total) by mouth 2 (two) times a day with meals   metoprolol tartrate (LOPRESSOR) 50 mg tablet  Self No Yes   Sig: Take 1 tablet (50 mg total) by mouth 2 (two) times a day   mometasone (ASMANEX 120 METERED DOSES) 220 MCG/INH inhaler  Self Yes Yes   Sig: Inhale 1 puff 2 (two) times a day   rivaroxaban (XARELTO) 20 mg tablet  Self No Yes   Sig: Take 1 tablet (20 mg total) by mouth daily with breakfast      Facility-Administered Medications: None       Past Medical History:   Diagnosis Date    Chronic systolic congestive heart failure (HCC)     last assessed - [id number]    Diabetes mellitus ([address] )     History of cardiac catheterization 10/2012    normal coronaries w/o significant obstructive coronary disease    Pacemaker     last assessed - 19Oct2017       Past Surgical History:   Procedure Laterality Date    CARDIOVERSION      Elective    FISTULA REPAIR      perirectal    TONSILLECTOMY      26's       Family History   Problem Relation Age of Onset    Atrial fibrillation Mother     Cancer Mother     Heart disease Father     Hypertension Father     Skin cancer Father     Cancer Family     Transient ischemic attack Family         without residual deficits     I have reviewed and agree with the history as documented  Social History   Substance Use Topics    Smoking status: Former Smoker     Packs/day: 2 00     Years: 5 00     Quit date: 1980    Smokeless tobacco: Never Used      Comment: 2 ppd x 5 years    Alcohol use 1 2 - 1 8 oz/week     2 - 3 Cans of beer per week      Comment: a week        Review of Systems   Constitutional: Positive for chills, diaphoresis and fever  Negative for fatigue  HENT: Positive for congestion  Negative for rhinorrhea, sore throat and trouble swallowing  Eyes: Negative for photophobia and visual disturbance  Respiratory: Positive for cough and shortness of breath  Negative for chest tightness  Cardiovascular: Negative for chest pain and palpitations  Gastrointestinal: Negative for abdominal pain, blood in stool, constipation, diarrhea, nausea and vomiting     Genitourinary: Negative for decreased urine volume, dysuria, frequency and hematuria  Musculoskeletal: Negative for back pain, gait problem, myalgias, neck pain and neck stiffness  Skin: Negative for pallor and rash  Neurological: Negative for weakness, light-headedness, numbness and headaches  Hematological: Negative for adenopathy  Does not bruise/bleed easily  All other systems reviewed and are negative  Physical Exam  ED Triage Vitals   Temperature Pulse Respirations Blood Pressure SpO2   01/18/19 1110 01/18/19 1110 01/18/19 1110 01/18/19 1110 01/18/19 1110   100 °F (37 8 °C) 92 (!) 24 152/77 91 %      Temp Source Heart Rate Source Patient Position - Orthostatic VS BP Location FiO2 (%)   01/18/19 1110 01/18/19 1110 01/18/19 1110 01/18/19 1110 --   Oral Monitor Sitting Left arm       Pain Score       01/18/19 1130       No Pain           Orthostatic Vital Signs  Vitals:    01/18/19 1500 01/18/19 1519 01/18/19 1530 01/18/19 1614   BP: 129/58 129/58 107/60 113/53   Pulse: 86 85 80 68   Patient Position - Orthostatic VS:  Sitting         Physical Exam   Constitutional: He is oriented to person, place, and time  No distress  Patient alert and oriented, appears ill but in no acute distress    HENT:   Head: Normocephalic and atraumatic  Mouth/Throat: Oropharynx is clear and moist    Eyes: Pupils are equal, round, and reactive to light  Conjunctivae and EOM are normal    Neck: Normal range of motion  Neck supple  Cardiovascular: Normal rate, regular rhythm, normal heart sounds and intact distal pulses  Pulmonary/Chest: Effort normal  No respiratory distress  He has rhonchi in the right lower field and the left lower field  Coarse breath sounds diffusely   Abdominal: Soft  Bowel sounds are normal  He exhibits no distension  There is no tenderness  Musculoskeletal: Normal range of motion  Lymphadenopathy:     He has no cervical adenopathy  Neurological: He is alert and oriented to person, place, and time     No facial asymmetry noted, CN 2-12 intact, full ROM of upper and lower extremities, muscle strength 5/5 throughout, DTRs normal, sensation intact throughout   Skin: Skin is warm  Capillary refill takes less than 2 seconds  No rash noted  He is diaphoretic  No erythema  No pallor  Psychiatric: He has a normal mood and affect  His behavior is normal  Judgment and thought content normal    Nursing note and vitals reviewed        ED Medications  Medications   vancomycin (VANCOCIN) 2,000 mg in sodium chloride 0 9 % 500 mL IVPB (2,000 mg Intravenous New Bag 1/18/19 1419)   albuterol (PROVENTIL HFA,VENTOLIN HFA) inhaler 2 puff (not administered)   atorvastatin (LIPITOR) tablet 40 mg (not administered)   citalopram (CeleXA) tablet 20 mg (not administered)   polyvinyl alcohol (LIQUIFILM TEARS) 1 4 % ophthalmic solution 1 drop (not administered)   diltiazem (CARDIZEM CD) 24 hr capsule 360 mg (not administered)   metoprolol tartrate (LOPRESSOR) tablet 50 mg (not administered)   fluticasone (FLOVENT HFA) 220 mcg/act inhaler 2 puff (not administered)   rivaroxaban (XARELTO) tablet 20 mg (not administered)   sodium chloride 0 9 % infusion (not administered)   acetaminophen (TYLENOL) tablet 650 mg (not administered)   docusate sodium (COLACE) capsule 100 mg (not administered)   senna (SENOKOT) tablet 8 6 mg (not administered)   ondansetron (ZOFRAN) injection 4 mg (not administered)   aluminum-magnesium hydroxide-simethicone (MYLANTA) 200-200-20 mg/5 mL oral suspension 30 mL (not administered)   cefepime (MAXIPIME) 2,000 mg in dextrose 5 % 50 mL IVPB (not administered)   acetaminophen (TYLENOL) tablet 650 mg (650 mg Oral Given 1/18/19 1146)   sodium chloride 0 9 % bolus 1,000 mL (0 mL Intravenous Stopped 1/18/19 1415)   ketorolac (TORADOL) injection 15 mg (15 mg Intravenous Given 1/18/19 1256)   acetaminophen (TYLENOL) tablet 325 mg (325 mg Oral Given 1/18/19 1258)   cefepime (MAXIPIME) 2 g/50 mL dextrose IVPB (0 mg Intravenous Stopped 1/18/19 1415)       Diagnostic Studies  Results Reviewed     Procedure Component Value Units Date/Time    UA (URINE) with reflex to Microscopic [[id number]] Collected:  01/18/19 1419    Lab Status:  Final result Specimen:  Urine from Urine, Clean Catch Updated:  01/18/19 1444     Color, UA Yellow     Clarity, UA Clear     Specific Gravity, UA 1 011     pH, UA 7 0     Leukocytes, UA Negative     Nitrite, UA Negative     Protein, UA Negative mg/dl      Glucose, UA Negative mg/dl      Ketones, UA Negative mg/dl      Urobilinogen, UA 1 0 E U /dl      Bilirubin, UA Negative     Blood, UA Negative    Procalcitonin [[id number]]  (Normal) Collected:  01/18/19 1258    Lab Status:  Final result Specimen:  Blood from Arm, Left Updated:  01/18/19 1347     Procalcitonin 0 21 ng/ml     Blood culture #2 [[id number]] Collected:  01/18/19 1237    Lab Status: In process Specimen:  Blood from Hand, Left Updated:  01/18/19 1245    Lactic Acid x2 [[id number]]  (Normal) Collected:  01/18/19 1148    Lab Status:  Final result Specimen:  Blood from Arm, Left Updated:  01/18/19 1229     LACTIC ACID 2 0 mmol/L     Narrative:         Result may be elevated if tourniquet was used during collection      Comprehensive metabolic panel [[id number]]  (Abnormal) Collected:  01/18/19 1148    Lab Status:  Final result Specimen:  Blood from Arm, Left Updated:  01/18/19 1225     Sodium 134 (L) mmol/L      Potassium 4 1 mmol/L      Chloride 99 (L) mmol/L      CO2 28 mmol/L      ANION GAP 7 mmol/L      BUN 14 mg/dL      Creatinine 1 22 mg/dL      Glucose 161 (H) mg/dL      Calcium 8 6 mg/dL      AST 8 U/L      ALT 20 U/L      Alkaline Phosphatase 71 U/L      Total Protein 6 4 g/dL      Albumin 3 6 g/dL      Total Bilirubin 0 97 mg/dL      eGFR 64 ml/min/1 73sq m     Narrative:         National Kidney Disease Education Program recommendations are as follows:  GFR calculation is accurate only with a steady state creatinine  Chronic Kidney disease less than 60 ml/min/1 73 sq  meters  Kidney failure less than 15 ml/min/1 73 sq  meters  APTT [[id number]]  (Normal) Collected:  01/18/19 1148    Lab Status:  Final result Specimen:  Blood from Arm, Left Updated:  01/18/19 1217     PTT 38 seconds     Protime-INR [[id number]]  (Abnormal) Collected:  01/18/19 1148    Lab Status:  Final result Specimen:  Blood from Arm, Left Updated:  01/18/19 1217     Protime 26 2 (H) seconds      INR 2 40 (H)    CBC and differential [[id number]]  (Abnormal) Collected:  01/18/19 1148    Lab Status:  Final result Specimen:  Blood from Arm, Left Updated:  01/18/19 1209     WBC 11 23 (H) Thousand/uL      RBC 4 58 Million/uL      Hemoglobin 14 2 g/dL      Hematocrit 41 8 %      MCV 91 fL      MCH 31 0 pg      MCHC 34 0 g/dL      RDW 13 2 %      MPV 10 1 fL      Platelets 686 Thousands/uL      nRBC 0 /100 WBCs      Neutrophils Relative 89 (H) %      Immat GRANS % 0 %      Lymphocytes Relative 5 (L) %      Monocytes Relative 6 %      Eosinophils Relative 0 %      Basophils Relative 0 %      Neutrophils Absolute 9 93 (H) Thousands/µL      Immature Grans Absolute 0 04 Thousand/uL      Lymphocytes Absolute 0 60 Thousands/µL      Monocytes Absolute 0 62 Thousand/µL      Eosinophils Absolute 0 01 Thousand/µL      Basophils Absolute 0 03 Thousands/µL     Blood culture #1 [[id number]] Collected:  01/18/19 1148    Lab Status: In process Specimen:  Blood from Arm, Left Updated:  01/18/19 1200                 X-ray chest 2 views   ED Interpretation by Freya Interiano MD (01/18 1249)   Abnormal   Right lower lobe infiltrate, appears improved from chest x-ray on 12/27/2018      Final Result by Peter Joseph MD (01/18 0650)      Persistent or recurrent bilateral multifocal airspace opacities remain suspicious for pneumonia  Findings concur with the referring clinician's preliminary interpretation already in the patient's electronic health record                Workstation performed: [id number]               Procedures  ECG 12 Lead Documentation  Date/Time: 1/18/2019 2:18 PM  Performed by: Bryn Lundborg by: Daina Melchor     Indications / Diagnosis:  Sepsis workup  Patient location:  ED and bedside  Previous ECG:     Previous ECG:  Compared to current    Similarity:  Changes noted    Comparison to cardiac monitor: Yes    Interpretation:     Interpretation: non-specific    Rate:     ECG rate:  96    ECG rate assessment: tachycardic    Rhythm:     Rhythm: atrial fibrillation    Ectopy:     Ectopy: none    QRS:     QRS axis:  Left    QRS intervals:  Normal  ST segments:     ST segments:  Normal  T waves:     T waves: inverted      Inverted:  AVL          Phone Consults  ED Phone Contact    ED Course                         Initial Sepsis Screening     [address] [facility] [address] Name 01/18/19 1232                Is the patient's history suggestive of a new or worsening infection? (!)  Yes (Proceed)  -CL        Suspected source of infection pneumonia  -CL        Are two or more of the following signs & symptoms of infection both present and new to the patient? (!)  Yes (Proceed)  -CL        Indicate SIRS criteria Hyperthemia > 38 3C (100 9F); Tachycardia > 90 bpm;Tachypnea > 20 resp per min  -CL        If the answer is yes to both questions, suspicion of sepsis is present          If severe sepsis is present AND tissue hypoperfusion perists in the hour after fluid resuscitation or lactate > 4, the patient meets criteria for SEPTIC SHOCK          Are any of the following organ dysfunction criteria present within 6 hours of suspected infection and SIRS criteria that are NOT considered to be chronic conditions?  (!)  Yes  -CL        Organ dysfunction          Date of presentation of severe sepsis          Time of presentation of severe sepsis          Tissue hypoperfusion persists in the hour after crystalloid fluid administration, evidenced, by either:          Was hypotension present within one hour of the conclusion of crystalloid fluid administration?         Date of presentation of septic shock          Time of presentation of septic shock            User Key  (r) = Recorded By, (t) = Taken By, (c) = Cosigned By    Initials Name Provider Type    GIOVANNI Graves MD Resident           Default Flowsheet Data (last 720 hours)      Sepsis Reassess     Row Name 01/18/19 1400                   Repeat Volume Status and Tissue Perfusion Assessment Performed    Repeat Volume Status and Tissue Perfusion Assessment Performed Yes  -CL           Volume Status and Tissue Perfusion Post Fluid Resuscitation * Must Document All *    Vital Signs Reviewed (HR, RR, BP, T) Yes  -CL        Shock Index Reviewed Yes  -CL        Arterial Oxygen Saturation Reviewed (POx, SaO2 or SpO2) Yes (comment %)   94% on 2LNC O2  -CL        Cardio (!)  Normal S1/S2; Irregular rhythm  -CL        Pulmonary (!)  Normal effort;Rhonchi  -CL        Capillary Refill Brisk  -CL        Peripheral Pulses Radial;Dorsalis Pedis  -CL        Peripheral Pulse +3  -CL        Dorsalis Pedis +3  -CL        Posterior Tibialis          Skin Warm  -CL        Urine output assessed             *OR*   Intensive Monitoring- Must Document One of the Following Four *:    Vital Signs Reviewed          * Central Venous Pressure (CVP or RAP)          * Central Venous Oxygen (SVO2, ScvO2 or Oxygen saturation via central catheter)          * Bedside Cardiovascular US in IVC diameter and % collapse          * Passive Leg Raise OR Crystalloid Challenge            User Key  (r) = Recorded By, (t) = Taken By, (c) = Cosigned By    Initials Name Provider Type    GIOVANNI Graves MD Resident                MDM  Number of Diagnoses or Management Options  Failure of outpatient treatment:   Hemoptysis:   Hypoxia:   Multifocal pneumonia:   SIRS (systemic inflammatory response syndrome) [facility]):   Diagnosis management comments: Impression:  66-year-old male presents for evaluation of fever, congestion and hypoxia  Ddx: pna  Plan: sepsis workup, CXR    Patient has a lactic acid of 2 and is normotensive  Does not appear to be septic at this time  Will give 1 L of normal saline and initiate antibiotics      CritCare Time    Disposition  Final diagnoses:   Failure of outpatient treatment   Multifocal pneumonia   SIRS (systemic inflammatory response syndrome) ([facility] [address] )   Hypoxia   Hemoptysis     Time reflects when diagnosis was documented in both MDM as applicable and the Disposition within this note     Time User Action Codes Description Comment    1/18/2019  1:36 PM Dieudonnecaren Dong Add [Z78 9] Failure of outpatient treatment     1/18/2019  1:36 PM Thomasena Dong Add [J18 9] Multifocal pneumonia     1/18/2019  1:36 PM Jose [place] Add [R65 10] SIRS (systemic inflammatory response syndrome) ([facility] )     1/18/2019  1:36 PM Olita Blare [Z78 9] Failure of outpatient treatment     1/18/2019  1:36 PM Jose Dong Modify [J18 9] Multifocal pneumonia     1/18/2019  1:36 PM Jose Umana Add [R09 02] Hypoxia     1/18/2019  1:37 PM Jose Dong Add [R04 2] Hemoptysis       ED Disposition     ED Disposition Condition Comment    Admit  Case was discussed with Dr Abraham Mar and the patient's admission status was agreed to be Admission Status: inpatient status to the service of Dr Abraham Mar           Follow-up Information    None         Current Discharge Medication List      CONTINUE these medications which have NOT CHANGED    Details   albuterol (VENTOLIN HFA) 90 mcg/act inhaler Inhale 2 puffs      atorvastatin (LIPITOR) 40 mg tablet TAKE 1 TABLET BY MOUTH ONCE DAILY AT BEDTIME  Qty: 90 tablet, Refills: 0    Associated Diagnoses: Dyslipidemia      citalopram (CeleXA) 20 mg tablet Take 1 tablet (20 mg total) by mouth daily  Qty: 90 tablet, Refills: 3    Associated Diagnoses: Depression, unspecified depression type      cycloSPORINE (RESTASIS) 0 05 % ophthalmic emulsion Apply 1 drop to eye 2 (two) times a day diltiazem (TIAZAC) 360 MG 24 hr capsule Take 1 capsule (360 mg total) by mouth daily  Qty: 90 capsule, Refills: 3    Associated Diagnoses: Essential hypertension      erythromycin (ILOTYCIN) ophthalmic ointment Apply to eye      furosemide (LASIX) 40 mg tablet Take 1 tablet by mouth daily      metFORMIN (GLUCOPHAGE) 500 mg tablet Take 1 tablet (500 mg total) by mouth 2 (two) times a day with meals  Qty: 180 tablet, Refills: 1    Associated Diagnoses: Type 2 diabetes mellitus without complication, without long-term current use of insulin (HCC)      metoprolol tartrate (LOPRESSOR) 50 mg tablet Take 1 tablet (50 mg total) by mouth 2 (two) times a day  Qty: 180 tablet, Refills: 3    Associated Diagnoses: Permanent atrial fibrillation (HCC)      mometasone (ASMANEX 120 METERED DOSES) 220 MCG/INH inhaler Inhale 1 puff 2 (two) times a day      rivaroxaban (XARELTO) 20 mg tablet Take 1 tablet (20 mg total) by mouth daily with breakfast  Qty: 90 tablet, Refills: 3    Associated Diagnoses: Atrial fibrillation, unspecified type (HCC)           No discharge procedures on file  ED Provider  Attending physically available and evaluated Beverlytrinity George PINEDA managed the patient along with the ED Attending      Electronically Signed by         Ermias Mendiola MD  01/18/19 [address] [place] [address] Patrick Hamilton MD  01/18/19 0983

## 2019-01-18 NOTE — TELEPHONE ENCOUNTER
Telephone note- Pulmonary Medicine   Richelle Ledezma 59 y o  male MRN: [id number]    Reason for call:    Fevers, hypoxia, not feeling well      Pertinent details:    Amarilys Shah wife called the office concerned as jillian is coughing up yellow sputum, fever of 102 and noted hypoxia with SpO2 of 88% on RA-does not wear oxygen at baseline    I spoke with Lou Orourke after discussion with Dr Wei Longoria and Arvind Conception will be evaluated in the ED at [facility], [address] [place] [address]

## 2019-01-18 NOTE — TELEPHONE ENCOUNTER
Wife calling saying Stanton Alas had a bad night  He has been feeling sick for a couple days now  He is coughing up yellow sputum  He is wheezing and she hears crackling when he breathes  He has chest tightness  He is SOB  She checked his pulse ox and it is staying around 88%  He is normally around 95%  He did have chills last night  He does not wear oxygen  He does not have a nebulizer  Denies fevers or night sweats  Please advise

## 2019-01-18 NOTE — ED ATTENDING ATTESTATION
Radha Crockett DO, saw and evaluated the patient  I have discussed the patient with the resident/non-physician practitioner and agree with the resident's/non-physician practitioner's findings, Plan of Care, and MDM as documented in the resident's/non-physician practitioner's note, except where noted  All available labs and Radiology studies were reviewed  At this point I agree with the current assessment done in the Emergency Department  I have conducted an independent evaluation of this patient a history and physical is as follows:    58 yo male presents for evaluation of fever, cough, dyspnea  Was dx with flu and PNA 2 weeks ago, completed tamiflu and levaquin course  Seemed to improve but symptoms returned today  Generalized in nature, constant since onset  No a/e factors  Moderate severity  Denies chest pain, pain or swelling in legs, hx of DVT or PE  Pt was not hospitalized recently, has no HAP/HCAP risk factors  However, he now has recurrence of post flu PNA after completing levaquin  Generally post flu PNA is due to s  Aureus, which is concerning in that if has recurrent symptoms he may have MRSA PNA(?)  Given failure of levaquin, will start on cefepime+vancomycin  Septic eval, admit for further eval and tx        Critical Care Time  CritCare Time    Procedures

## 2019-01-18 NOTE — ASSESSMENT & PLAN NOTE
He was recently treated for pneumonia, now presented with sepsis  Chest xray suggestive of multilobar pneumonia  Monitor temp and wbc curves  Continue with cefepime and vancomycin  Duo nebs  Oxygen supply  asmanex   Follows with Dr Chucky Sanchez  Repeat flu swab, keep isolation

## 2019-01-18 NOTE — ASSESSMENT & PLAN NOTE
Has a history, symptoms might be related to sarcoidosis  Would continue with antibiotics for now  Consult pulmonary  Duo nebs  Consider steroids

## 2019-01-19 LAB
ALBUMIN SERPL BCP-MCNC: 3.1 G/DL (ref 3.5–5)
ALP SERPL-CCNC: 62 U/L (ref 46–116)
ALT SERPL W P-5'-P-CCNC: 18 U/L (ref 12–78)
ANION GAP SERPL CALCULATED.3IONS-SCNC: 4 MMOL/L (ref 4–13)
AST SERPL W P-5'-P-CCNC: 7 U/L (ref 5–45)
BASOPHILS # BLD AUTO: 0.04 THOUSANDS/ΜL (ref 0–0.1)
BASOPHILS NFR BLD AUTO: 1 % (ref 0–1)
BILIRUB SERPL-MCNC: 1.27 MG/DL (ref 0.2–1)
BUN SERPL-MCNC: 15 MG/DL (ref 5–25)
CALCIUM SERPL-MCNC: 8.3 MG/DL (ref 8.3–10.1)
CHLORIDE SERPL-SCNC: 106 MMOL/L (ref 100–108)
CO2 SERPL-SCNC: 28 MMOL/L (ref 21–32)
CREAT SERPL-MCNC: 1.06 MG/DL (ref 0.6–1.3)
EOSINOPHIL # BLD AUTO: 0.11 THOUSAND/ΜL (ref 0–0.61)
EOSINOPHIL NFR BLD AUTO: 1 % (ref 0–6)
ERYTHROCYTE [DISTWIDTH] IN BLOOD BY AUTOMATED COUNT: 13.3 % (ref 11.6–15.1)
GFR SERPL CREATININE-BSD FRML MDRD: 76 ML/MIN/1.73SQ M
GLUCOSE SERPL-MCNC: 118 MG/DL (ref 65–140)
GLUCOSE SERPL-MCNC: 129 MG/DL (ref 65–140)
GLUCOSE SERPL-MCNC: 130 MG/DL (ref 65–140)
GLUCOSE SERPL-MCNC: 134 MG/DL (ref 65–140)
GLUCOSE SERPL-MCNC: 142 MG/DL (ref 65–140)
HCT VFR BLD AUTO: 39.3 % (ref 36.5–49.3)
HGB BLD-MCNC: 13.1 G/DL (ref 12–17)
IMM GRANULOCYTES # BLD AUTO: 0.02 THOUSAND/UL (ref 0–0.2)
IMM GRANULOCYTES NFR BLD AUTO: 0 % (ref 0–2)
LYMPHOCYTES # BLD AUTO: 0.81 THOUSANDS/ΜL (ref 0.6–4.47)
LYMPHOCYTES NFR BLD AUTO: 10 % (ref 14–44)
MAGNESIUM SERPL-MCNC: 2.1 MG/DL (ref 1.6–2.6)
MCH RBC QN AUTO: 30.6 PG (ref 26.8–34.3)
MCHC RBC AUTO-ENTMCNC: 33.3 G/DL (ref 31.4–37.4)
MCV RBC AUTO: 92 FL (ref 82–98)
MONOCYTES # BLD AUTO: 0.63 THOUSAND/ΜL (ref 0.17–1.22)
MONOCYTES NFR BLD AUTO: 8 % (ref 4–12)
NEUTROPHILS # BLD AUTO: 6.39 THOUSANDS/ΜL (ref 1.85–7.62)
NEUTS SEG NFR BLD AUTO: 80 % (ref 43–75)
NRBC BLD AUTO-RTO: 0 /100 WBCS
PHOSPHATE SERPL-MCNC: 3.1 MG/DL (ref 2.3–4.1)
PLATELET # BLD AUTO: 144 THOUSANDS/UL (ref 149–390)
PMV BLD AUTO: 9.7 FL (ref 8.9–12.7)
POTASSIUM SERPL-SCNC: 3.7 MMOL/L (ref 3.5–5.3)
PROCALCITONIN SERPL-MCNC: 0.27 NG/ML
PROT SERPL-MCNC: 6.1 G/DL (ref 6.4–8.2)
RBC # BLD AUTO: 4.28 MILLION/UL (ref 3.88–5.62)
SODIUM SERPL-SCNC: 138 MMOL/L (ref 136–145)
WBC # BLD AUTO: 8 THOUSAND/UL (ref 4.31–10.16)

## 2019-01-19 PROCEDURE — 82172 ASSAY OF APOLIPOPROTEIN: CPT | Performed by: INTERNAL MEDICINE

## 2019-01-19 PROCEDURE — 99232 SBSQ HOSP IP/OBS MODERATE 35: CPT | Performed by: GENERAL PRACTICE

## 2019-01-19 PROCEDURE — 83010 ASSAY OF HAPTOGLOBIN QUANT: CPT | Performed by: INTERNAL MEDICINE

## 2019-01-19 PROCEDURE — G8989 SELF CARE D/C STATUS: HCPCS

## 2019-01-19 PROCEDURE — 97161 PT EVAL LOW COMPLEX 20 MIN: CPT

## 2019-01-19 PROCEDURE — 82948 REAGENT STRIP/BLOOD GLUCOSE: CPT

## 2019-01-19 PROCEDURE — 80053 COMPREHEN METABOLIC PANEL: CPT | Performed by: INTERNAL MEDICINE

## 2019-01-19 PROCEDURE — 85025 COMPLETE CBC W/AUTO DIFF WBC: CPT | Performed by: INTERNAL MEDICINE

## 2019-01-19 PROCEDURE — 84145 PROCALCITONIN (PCT): CPT | Performed by: INTERNAL MEDICINE

## 2019-01-19 PROCEDURE — 97165 OT EVAL LOW COMPLEX 30 MIN: CPT

## 2019-01-19 PROCEDURE — 82247 BILIRUBIN TOTAL: CPT | Performed by: INTERNAL MEDICINE

## 2019-01-19 PROCEDURE — G8979 MOBILITY GOAL STATUS: HCPCS

## 2019-01-19 PROCEDURE — G8988 SELF CARE GOAL STATUS: HCPCS

## 2019-01-19 PROCEDURE — 84460 ALANINE AMINO (ALT) (SGPT): CPT | Performed by: INTERNAL MEDICINE

## 2019-01-19 PROCEDURE — 84100 ASSAY OF PHOSPHORUS: CPT | Performed by: INTERNAL MEDICINE

## 2019-01-19 PROCEDURE — G8987 SELF CARE CURRENT STATUS: HCPCS

## 2019-01-19 PROCEDURE — 82977 ASSAY OF GGT: CPT | Performed by: INTERNAL MEDICINE

## 2019-01-19 PROCEDURE — G8980 MOBILITY D/C STATUS: HCPCS

## 2019-01-19 PROCEDURE — 83883 ASSAY NEPHELOMETRY NOT SPEC: CPT | Performed by: INTERNAL MEDICINE

## 2019-01-19 PROCEDURE — 83735 ASSAY OF MAGNESIUM: CPT | Performed by: INTERNAL MEDICINE

## 2019-01-19 PROCEDURE — G8978 MOBILITY CURRENT STATUS: HCPCS

## 2019-01-19 RX ADMIN — CEFEPIME HYDROCHLORIDE 2000 MG: 1 INJECTION, POWDER, FOR SOLUTION INTRAMUSCULAR; INTRAVENOUS at 02:51

## 2019-01-19 RX ADMIN — ATORVASTATIN CALCIUM 40 MG: 40 TABLET, FILM COATED ORAL at 21:13

## 2019-01-19 RX ADMIN — FLUTICASONE PROPIONATE 1 PUFF: 220 AEROSOL, METERED RESPIRATORY (INHALATION) at 21:13

## 2019-01-19 RX ADMIN — RIVAROXABAN 20 MG: 20 TABLET, FILM COATED ORAL at 08:24

## 2019-01-19 RX ADMIN — SODIUM CHLORIDE 50 ML/HR: 0.9 INJECTION, SOLUTION INTRAVENOUS at 13:10

## 2019-01-19 RX ADMIN — POLYVINYL ALCOHOL 1 DROP: 14 SOLUTION/ DROPS OPHTHALMIC at 21:13

## 2019-01-19 RX ADMIN — METOPROLOL TARTRATE 50 MG: 50 TABLET, FILM COATED ORAL at 09:21

## 2019-01-19 RX ADMIN — DILTIAZEM HYDROCHLORIDE 360 MG: 180 CAPSULE, COATED, EXTENDED RELEASE ORAL at 09:21

## 2019-01-19 RX ADMIN — CEFEPIME HYDROCHLORIDE 2000 MG: 1 INJECTION, POWDER, FOR SOLUTION INTRAMUSCULAR; INTRAVENOUS at 14:51

## 2019-01-19 RX ADMIN — FLUTICASONE PROPIONATE 1 PUFF: 220 AEROSOL, METERED RESPIRATORY (INHALATION) at 08:24

## 2019-01-19 RX ADMIN — POLYVINYL ALCOHOL 1 DROP: 14 SOLUTION/ DROPS OPHTHALMIC at 09:26

## 2019-01-19 RX ADMIN — METOPROLOL TARTRATE 50 MG: 50 TABLET, FILM COATED ORAL at 21:13

## 2019-01-19 RX ADMIN — CITALOPRAM HYDROBROMIDE 20 MG: 20 TABLET ORAL at 09:21

## 2019-01-19 NOTE — ASSESSMENT & PLAN NOTE
Lab Results   Component Value Date    HGBA1C 6 9 (H) 01/04/2019       Recent Labs      01/19/19   0822  01/19/19   1152   POCGLU  118  130       Blood Sugar Average: Last 72 hrs:  (P) 124 hold home oral hyperglycemics  Will start SSI

## 2019-01-19 NOTE — OCCUPATIONAL THERAPY NOTE
[address] Evaluation     Patient Name: Sasha Hart  QVXLT'E Date: 1/19/2019  Problem List  Patient Active Problem List   Diagnosis    Anxiety    Permanent atrial fibrillation ([facility] )    Dilated cardiomyopathy ([facility] )    Dry eye syndrome    Dyslipidemia    Essential hypertension    Obstructive sleep apnea    Morbid obesity with body mass index (BMI) of 40 0 to 44 9 in adult [facility])    Type 2 diabetes mellitus without complication, without long-term current use of insulin ([facility] )    Health care maintenance    Medtronic dual chamber PM    Sarcoidosis    Multifocal pneumonia     Past Medical History  Past Medical History:   Diagnosis Date    Chronic systolic congestive heart failure ([facility] )     last assessed - [id number]    Diabetes mellitus ([facility] )     History of cardiac catheterization 10/2012    normal coronaries w/o significant obstructive coronary disease    Pacemaker     last assessed - 19Oct2017     Past Surgical History  Past Surgical History:   Procedure Laterality Date    CARDIOVERSION      Elective    FISTULA REPAIR      perirectal    TONSILLECTOMY      1960's           01/19/19 1445   Note Type   Note type Eval only   Restrictions/Precautions   Weight Bearing Precautions Per Order No   Other Precautions Telemetry   Pain Assessment   Pain Assessment No/denies pain   Pain Score No Pain   Home Living   Type of [facility] One level   Bathroom Shower/Tub Walk-in shower   Bathroom Toilet Standard   Bathroom Equipment (none)   [address] (no AD)   Prior Function   Level of [place] Independent with ADLs and functional mobility   Lives With Spouse   Receives Help From Family   ADL Assistance Independent   IADLs Independent   Falls in the last 6 months 0   Vocational Retired   Lifestyle   Autonomy I ADL/IADL's, no AD with functional ambulation, +drives, retired   Reciprocal Relationships family   Service to Others retired Intr[employer] Gratification working around the house   Psychosocial   Psychosocial (WDL) WDL   Subjective   Subjective pt received ambulating in hallway with wife present, agreeable to therapy   ADL   Where Assessed Chair   Eating Assistance 7  Independent   Grooming Assistance 7  Independent   UB Bathing Assistance 6  Modified Independent   LB Bathing Assistance 6  [address] [facility] 6  Modified independent   [address] 6  Modified independent   [address]  6  Modified independent   Bed Mobility   Additional Comments pt received standing -bed mobility NT   Transfers   Sit to Stand 7  Independent   Stand to Sit 7  Independent   Toilet transfer 6  Modified independent   Additional items Standard toilet  (no use of grab bar)   Functional Mobility   Functional Mobility 6  Modified independent   Additional Comments mod I with out AD, no unsteadiness, LOB noted, SAO2 90-92% no 02   Balance   Static Sitting Normal   Dynamic Sitting Normal   Static Standing Good   Dynamic Standing Good   Ambulatory Fair +   Activity Tolerance   Activity Tolerance Patient tolerated treatment well   Nurse Made Aware RN cleared for therapy   RUE Assessment   RUE Assessment WFL   LUE Assessment   LUE Assessment WFL   Hand Function   Gross Motor Coordination Functional   Fine Motor Coordination Functional   Vision-Basic Assessment   Current Vision No visual deficits   Vision - Complex Assessment   Acuity (WFL for evaluation)   Cognition   Overall Cognitive Status WFL   Arousal/Participation Alert; Cooperative   Attention Within functional limits   Orientation Level Oriented X4   Memory Within functional limits   Following Commands Follows all commands and directions without difficulty   Comments good safety awarness, cognition with evalaution   Assessment   Limitation Decreased high-level ADLs   Prognosis Good   Assessment Pt is a 59 y o  male who was [facility] to [facility]  on 1/18/2019 with Multifocal pneumonia   Pt's problem list also includes PMH of   At baseline pt was completing IADL's/ADL's  Pt lives wife in a [facility] with 1STE  Currently pt requires mod I/I for overall ADLS and mod I with out AD  for functional mobility/transfers  Pt currently presents with impairments in the following categories -difficulty performing IADLS  endurance  These impairments, as well as pt's fatigue  limit pt's ability to safely engage in all baseline areas of occupation, includingcommunity mobility, laundry , driving, house maintenance, cleaning and leisure activities  From OT standpoint, recommend home with OT upon D/C  OT will continue to follow to address the below stated goals      Goals   Patient Goals go home   Recommendation   OT Discharge Recommendation Home with family support   OT - OK to Discharge Yes   Barthel Index   Feeding 10   Bathing 5   Grooming Score 5   Dressing Score 10   Bladder Score 10   Bowels Score 10   Toilet Use Score 10   Transfers (Bed/Chair) Score 15   Mobility (Level Surface) Score 15   Stairs Score 10   Barthel Index Score 100   Modified [place] Scale   Modified [place] Scale 1      Dell Valiente, OT

## 2019-01-19 NOTE — PLAN OF CARE
DISCHARGE PLANNING     Discharge to home or other facility with appropriate resources Progressing        Knowledge Deficit     Patient/family/caregiver demonstrates understanding of disease process, treatment plan, medications, and discharge instructions Progressing        PAIN - ADULT     Verbalizes/displays adequate comfort level or baseline comfort level Progressing        SAFETY ADULT     Patient will remain free of falls Progressing

## 2019-01-19 NOTE — ASSESSMENT & PLAN NOTE
Has a history, symptoms might be related to sarcoidosis  Would continue with antibiotics for now  Hold off on pulm consult as pt doing much better  Duo nebs  Consider steroids

## 2019-01-19 NOTE — PHYSICAL THERAPY NOTE
Physical Therapy Evaluation     Patient's Name: Dorie Daniels    Admitting Diagnosis  SOB (shortness of breath) [R06 02]  Hypoxia [R09 02]  SIRS (systemic inflammatory response syndrome) (HCC) [R65 10]  Hemoptysis [R04 2]  Failure of outpatient treatment [Z78 9]  Multifocal pneumonia [J18 9]    Problem List  Patient Active Problem List   Diagnosis    Anxiety    Permanent atrial fibrillation ([address] )    D[facility] cardiomyopathy ([facility] )    Dry eye syndrome    Dyslipidemia    Essential hypertension    Obstructive sleep apnea    Morbid obesity with body mass index (BMI) of 40 0 to 44 9 in [facility])    Type 2 diabetes mellitus without complication, without long-term current use of insulin ([facility] )   [address] [facility]    Medtronic dual chamber PM    Sarcoidosis    Multifocal pneumonia       Past Medical History  Past Medical History:   Diagnosis Date    Chronic systolic congestive heart failure ([facility] )     last assessed - 26Feb2014    Diabetes mellitus ([facility] )     History of cardiac catheterization 10/2012    normal coronaries w/o significant obstructive coronary disease    Pacemaker     last assessed - 19Oct2017       Past Surgical History  Past Surgical History:   Procedure Laterality Date    CARDIOVERSION      Elective    FISTULA REPAIR      perirectal    TONSILLECTOMY      1960's        01/19/19 1443   Note Type   Note type Eval only   Pain Assessment   Pain Assessment No/denies pain   Pain Score No Pain   Home Living   Type of [address] [place] [address] One level  (2 TAO)   Bathroom Shower/Tub Walk-in shower   Bathroom Toilet Standard   Bathroom Equipment (None)   Home Equipment (None)   Prior Function   Level of [place] Independent with ADLs and functional mobility   Lives With [place] Help From Family  (daughter, siblings)   ADL Assistance Independent   IADLs Independent   Falls in the last 6 months 0   Vocational Retired   Comments PTA, pt was I with ADLs, IADLs, functional mobility, driving, and retired  He worked as a  and has sarcoidosis as a result  His wife recently had hand surgery and 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  He enjoys working around the house  Restrictions/Precautions   Weight Bearing Precautions Per Order No   Other Precautions Telemetry   General   Family/Caregiver Present Yes   Cognition   Overall Cognitive Status WFL   Arousal/Participation Alert   Orientation Level Oriented X4   Memory Within functional limits   Following Commands Follows all commands and directions without difficulty   RLE Assessment   RLE Assessment WFL   LLE Assessment   LLE Assessment WFL   Bed Mobility   Additional Comments Received standing   Transfers   Toilet transfer 6  Modified independent   Ambulation/Elevation   Gait pattern Wide JULIENNE; Short stride;Decreased foot clearance   Gait Assistance 7  Independent   Assistive Device None   Distance 480'   Stair Management Assistance 7  Independent   Stair Management Technique One rail R;Reciprocal   Number of Stairs 7   Balance   Static Sitting Normal   Dynamic Sitting Normal   Static Standing Good   Dynamic Standing Good   Ambulatory Good   Endurance Deficit   Endurance Deficit Yes   Endurance Deficit Description SOB with ambulation, SpO2 91%   Activity Tolerance   Activity Tolerance Patient tolerated treatment well   Nurse Made Aware Yes, RN cleared pt for PT eval   Assessment   Prognosis Excellent   Problem List Decreased endurance   Assessment Pt is 59 y o  male seen for PT evaluation s/p admit [facility] on 1/18/2019 w/ Multifocal pneumonia  Pt presented to the ED with persistent cough with sputum  He was recently treated with antibiotics and is now having fevers and sweats  PT consulted to assess pt's functional mobility and d/c needs  Order placed for PT eval and tx, w/ up and OOB as tolerated order   Comorbidities affecting pt's physical performance at time of assessment include: sarcoidosis, DM II, anxiety, TOVA, HTN  PTA, pt was ambulates unrestricted distances and all terrain and retired  Personal factors affecting pt at time of IE include: unable to perform caregiver support/tasks  Please find objective findings from PT assessment regarding body systems outlined above with impairments and limitations including weakness and decreased endurance  The following objective measures performed on IE also reveal limitations: Barthel Index: 100/100  Pt's clinical presentation is currently stable  seen in pt's presentation of abnormal labs, telemetry  Pt demonstrated slight SOB with ambulation with SpO2 at 91%  He is performing close to his baseline level of function and would not benefit from skilled PT services at this time  From PT/mobility standpoint, recommendation at time of d/c would be home independently w/ no skilled acute PT needs pending progress in order to facilitate return to PLOF  D/C PT     Barriers to Discharge None   Goals   Patient Goals To get home tomorrow   Recommendation   Recommendation Home independently   PT - OK to Discharge Yes   Additional Comments When medically stable   Barthel Index   Feeding 10   Bathing 5   Grooming Score 5   Dressing Score 10   Bladder Score 10   Bowels Score 10   Toilet Use Score 10   Transfers (Bed/Chair) Score 15   Mobility (Level Surface) Score 15   Stairs Score 10   Barthel Index Score 100         Armen Whyte, PT, DPT

## 2019-01-19 NOTE — RESPIRATORY THERAPY NOTE
RT Protocol Note  Kathy Prader 59 y o  male MRN: [id number]  Unit/Bed#: [facility] 919-01 Encounter: [id number]    Assessment    Principal Problem:    Multifocal pneumonia  Active Problems:    Permanent atrial fibrillation (HCC)    Dilated cardiomyopathy ([facility] )    Essential hypertension    Obstructive sleep apnea    Morbid obesity with body mass index (BMI) of 40 0 to 44 9 in adult [facility])    Type 2 diabetes mellitus without complication, without long-term current use of insulin (HCC)    Sarcoidosis      Home Pulmonary Medications:  Asmanex, albuterol mdi         Past Medical History:   Diagnosis Date    Chronic systolic congestive heart failure ([facility] )     last assessed - 26Feb2014    Diabetes mellitus ([place] [address] )     History of cardiac catheterization 10/2012    normal coronaries w/o significant obstructive coronary disease    Pacemaker     last assessed - 19Oct2017     Social History     Social History    Marital status: /Civil Union     Spouse name: N/A    Number of children: N/A    Years of education: N/A     Occupational History    Retired      Social History Main Topics    Smoking status: Former Smoker     Packs/day: 2 00     Years: 5 00     Quit date: 1980    Smokeless tobacco: Never Used      Comment: 2 ppd x 5 years    Alcohol use 1 2 - 1 8 oz/week     2 - 3 Cans of beer per week      Comment: a week    Drug use: No    Sexual activity: Not Asked     Other Topics Concern    None     Social History Narrative    Always uses seat belt    Daily coffee consumption (___Cups/Day)    Dental care regularly    Pets/Animals    Uses safety Equipment - seatbelts           Subjective         Objective    Physical Exam:   Assessment Type: Assess only  General Appearance: Awake, Alert  Respiratory Pattern: Normal  Chest Assessment: Chest expansion symmetrical  Bilateral Breath Sounds: Coarse    Vitals:  Blood pressure 113/53, pulse 68, temperature 98 8 °F (37 1 °C), resp   rate 20, height 5' 10" (1 778 m), weight 132 kg (290 lb 5 5 oz), SpO2 94 %  Imaging and other studies: I have personally reviewed pertinent reports  Plan    Respiratory Plan: Home Bronchodilator Patient pathway        Resp Comments: (P) pt assessed at this time for respiratory protocol  pt  states he takes asmanex daily and albuterolmdi prn at home  pt has multifocal pneumonia, TOVA  will continue with flovent and albuterol mdi prn  at this time

## 2019-01-19 NOTE — PROGRESS NOTES
Progress Note Cathi Boudreaux 1960, 59 y o  male MRN: [id number]    Unit/Bed#: [facility] 919-01 Encounter: [id number]    Primary Care Provider: Naya Vizcarra MD   Date and time admitted to hospital: 1/18/2019 11:12 AM        * Multifocal pneumonia   Assessment & Plan    He was recently treated for pneumonia, now presented with sepsis  Chest xray suggestive of multilobar pneumonia  Monitor temp and wbc curves  Continue with cefepime - see no need for Vanco as CAP and pt improving w/ Cefepime  Duo nebs  Oxygen supply  asmanex   Follows with Dr Jamie Zhao  Flu neg  B Cx neg  U leg and U Strep neg  Sp Cx pending       Sarcoidosis   Assessment & Plan    Has a history, symptoms might be related to sarcoidosis  Would continue with antibiotics for now  Hold off on pulm consult as pt doing much better  Duo nebs  Consider steroids       Type 2 diabetes mellitus without complication, without long-term current use of insulin ([facility] )   Assessment & Plan    Lab Results   Component Value Date    HGBA1C 6 9 (H) 01/04/2019       Recent Labs      01/19/19   0822  01/19/19   1152   POCGLU  118  130       Blood Sugar Average: Last 72 hrs:  (P) 124 hold home oral hyperglycemics  Will start SSI     Morbid obesity with body mass index (BMI) of 40 0 to 44 9 in adult [facility])   Assessment & Plan    Educated patient regarding weight loss and work up as an outpatient        Obstructive sleep apnea   Assessment & Plan    Declines CPAP and does not use oxygen  Improved with weight loss  Continue supportive care  Respiratory protocol if patient agrees to placed it       Essential hypertension   Assessment & Plan    Continue with metoprolol and lasix for now       Dilated cardiomyopathy [facility])   Assessment & Plan    Continue monitoring  Continue with bb, cardizem  Cards consult if there is rate issues     Permanent atrial fibrillation (HCC)   Assessment & Plan    Rate control with lopressor and diltiazem  A/c xarelto po         VTE Pharmacologic Prophylaxis:   Pharmacologic: Rivaroxaban (Xarelto)  Mechanical VTE Prophylaxis in Place: Yes    Patient Centered Rounds: I have performed bedside rounds with nursing staff today  Discussions with Specialists or Other Care Team Provider: no    Education and Discussions with Family / Patient: pt and wife    Time Spent for Care: 30 minutes  More than 50% of total time spent on counseling and coordination of care as described above  Current Length of Stay: 1 day(s)    Current Patient Status: Inpatient   Certification Statement: The patient will continue to require additional inpatient hospital stay due to need to monitor off O2    Discharge Plan: tomorrow if O2 sats stable    Code Status: Level 1 - Full Code      Subjective:   Feeling better    Objective:     Vitals:   Temp (24hrs), Av 4 °F (36 9 °C), Min:98 1 °F (36 7 °C), Max:98 8 °F (37 1 °C)    Temp:  [98 1 °F (36 7 °C)-98 8 °F (37 1 °C)] 98 8 °F (37 1 °C)  HR:  [68-98] 75  Resp:  [18-20] 20  BP: (112-123)/(53-79) 112/74  SpO2:  [91 %-95 %] 92 %  Body mass index is 41 91 kg/m²  Input and Output Summary (last 24 hours): Intake/Output Summary (Last 24 hours) at 19 1611  Last data filed at 19 1601   Gross per 24 hour   Intake          3131 66 ml   Output             1250 ml   Net          1881 66 ml       Physical Exam:     Physical Exam   Constitutional: He is oriented to person, place, and time  No distress  HENT:   Head: Normocephalic and atraumatic  Eyes: Conjunctivae and EOM are normal    Neck: Normal range of motion  Neck supple  Cardiovascular: Normal rate and regular rhythm  Pulmonary/Chest: Effort normal and breath sounds normal  He has no wheezes  He has no rales  Abdominal: Soft  Bowel sounds are normal  He exhibits no distension  There is no tenderness  Musculoskeletal: Normal range of motion  He exhibits no edema  Neurological: He is alert and oriented to person, place, and time     Skin: Skin is warm and dry  He is not diaphoretic  Additional Data:     Labs:      Results from last 7 days  Lab Units 01/19/19  0415   WBC Thousand/uL 8 00   HEMOGLOBIN g/dL 13 1   HEMATOCRIT % 39 3   PLATELETS Thousands/uL 144*   NEUTROS PCT % 80*   LYMPHS PCT % 10*   MONOS PCT % 8   EOS PCT % 1       Results from last 7 days  Lab Units 01/19/19  0415   POTASSIUM mmol/L 3 7   CHLORIDE mmol/L 106   CO2 mmol/L 28   BUN mg/dL 15   CREATININE mg/dL 1 06   CALCIUM mg/dL 8 3   ALK PHOS U/L 62   ALT U/L 18   AST U/L 7       Results from last 7 days  Lab Units 01/18/19  1148   INR  2 40*       * I Have Reviewed All Lab Data Listed Above  * Additional Pertinent Lab Tests Reviewed: Jos [address] Admission Reviewed        Recent Cultures (last 7 days):       Results from last 7 days  Lab Units 01/18/19  1730 01/18/19  1723 01/18/19  1625 01/18/19  1237 01/18/19  1148   BLOOD CULTURE   --   --   --  No Growth at 24 hrs  No Growth at 24 hrs     SPUTUM CULTURE   --  Culture too young- will reincubate  --   --   --    GRAM STAIN RESULT   --  1+ Epithelial cells per low power field  2+ Polys  1+ Gram positive cocci in pairs  1+ Gram negative rods  --   --   --    INFLUENZA B PCR   --   --  None Detected  --   --    RSV PCR   --   --  None Detected  --   --    LEGIONELLA URINARY ANTIGEN  Negative  --   --   --   --        Last 24 Hours Medication List:     Current Facility-Administered Medications:  acetaminophen 650 mg Oral Q6H PRN Shantal Churchill MD    albuterol 2 puff Inhalation Q4H PRN Shantal Churchill MD    aluminum-magnesium hydroxide-simethicone 30 mL Oral Q6H PRN Shantal Churchill MD    atorvastatin 40 mg Oral HS Shantal Churchill MD    cefepime 2,000 mg Intravenous Q12H Shantal Churchill MD Last Rate: Stopped (01/19/19 1540)   citalopram 20 mg Oral Daily Shantal Churchill MD    diltiazem 360 mg Oral Daily Shantal Churchill MD    docusate sodium 100 mg Oral BID Shantal Churchill MD    fluticasone 1 puff Inhalation BID Yash Chen MD    insulin lispro 1-5 Units Subcutaneous 4x Daily (AC & HS) Yolanda Valente PA-C    metoprolol tartrate 50 mg Oral Q12H [address] Yash Chen MD    ondansetron 4 mg Intravenous Q6H PRN Yash Chen MD    polyvinyl alcohol 1 drop Both Eyes Q12H [address] Yash Chen MD    rivaroxaban 20 mg Oral Daily With Breakfast Yash Chen MD    senna 1 tablet Oral Daily Yash Chen MD    sodium chloride 50 mL/hr Intravenous Continuous Yash Chen MD Last Rate: 50 mL/hr (01/19/19 1310)        Today, Patient Was Seen By: Neto Maguire DO    ** Please Note: Dictation voice to text software may have been used in the creation of this document   **

## 2019-01-19 NOTE — ASSESSMENT & PLAN NOTE
He was recently treated for pneumonia, now presented with sepsis  Chest xray suggestive of multilobar pneumonia  Monitor temp and wbc curves  Continue with cefepime - see no need for Vanco as CAP and pt improving w/ Cefepime  Duo nebs  Oxygen supply  asmanex   Follows with Dr Ortiz [place]  Flu neg  B Cx neg  U leg and U Strep neg  Sp Cx pending

## 2019-01-20 VITALS
OXYGEN SATURATION: 93 % | DIASTOLIC BLOOD PRESSURE: 74 MMHG | BODY MASS INDEX: 41.66 KG/M2 | HEART RATE: 95 BPM | TEMPERATURE: 98.2 F | HEIGHT: 70 IN | WEIGHT: 291.01 LBS | RESPIRATION RATE: 20 BRPM | SYSTOLIC BLOOD PRESSURE: 117 MMHG

## 2019-01-20 PROBLEM — J18.9 MULTIFOCAL PNEUMONIA: Status: RESOLVED | Noted: 2018-12-31 | Resolved: 2019-01-20

## 2019-01-20 LAB
ALBUMIN SERPL BCP-MCNC: 2.9 G/DL (ref 3.5–5)
ALP SERPL-CCNC: 62 U/L (ref 46–116)
ALT SERPL W P-5'-P-CCNC: 21 U/L (ref 12–78)
ANION GAP SERPL CALCULATED.3IONS-SCNC: 6 MMOL/L (ref 4–13)
AST SERPL W P-5'-P-CCNC: 11 U/L (ref 5–45)
BACTERIA SPT RESP CULT: NORMAL
BACTERIA SPT RESP CULT: NORMAL
BILIRUB SERPL-MCNC: 0.87 MG/DL (ref 0.2–1)
BUN SERPL-MCNC: 11 MG/DL (ref 5–25)
CALCIUM SERPL-MCNC: 8.2 MG/DL (ref 8.3–10.1)
CHLORIDE SERPL-SCNC: 106 MMOL/L (ref 100–108)
CO2 SERPL-SCNC: 24 MMOL/L (ref 21–32)
CREAT SERPL-MCNC: 0.98 MG/DL (ref 0.6–1.3)
ERYTHROCYTE [DISTWIDTH] IN BLOOD BY AUTOMATED COUNT: 13.1 % (ref 11.6–15.1)
GFR SERPL CREATININE-BSD FRML MDRD: 83 ML/MIN/1.73SQ M
GLUCOSE SERPL-MCNC: 157 MG/DL (ref 65–140)
GLUCOSE SERPL-MCNC: 165 MG/DL (ref 65–140)
GLUCOSE SERPL-MCNC: 190 MG/DL (ref 65–140)
GRAM STN SPEC: NORMAL
HCT VFR BLD AUTO: 39.7 % (ref 36.5–49.3)
HGB BLD-MCNC: 13.3 G/DL (ref 12–17)
MCH RBC QN AUTO: 30.8 PG (ref 26.8–34.3)
MCHC RBC AUTO-ENTMCNC: 33.5 G/DL (ref 31.4–37.4)
MCV RBC AUTO: 92 FL (ref 82–98)
PLATELET # BLD AUTO: 138 THOUSANDS/UL (ref 149–390)
PMV BLD AUTO: 9.8 FL (ref 8.9–12.7)
POTASSIUM SERPL-SCNC: 3.7 MMOL/L (ref 3.5–5.3)
PROCALCITONIN SERPL-MCNC: 0.13 NG/ML
PROT SERPL-MCNC: 6.1 G/DL (ref 6.4–8.2)
RBC # BLD AUTO: 4.32 MILLION/UL (ref 3.88–5.62)
SODIUM SERPL-SCNC: 136 MMOL/L (ref 136–145)
WBC # BLD AUTO: 6.33 THOUSAND/UL (ref 4.31–10.16)

## 2019-01-20 PROCEDURE — 84145 PROCALCITONIN (PCT): CPT | Performed by: GENERAL PRACTICE

## 2019-01-20 PROCEDURE — 99239 HOSP IP/OBS DSCHRG MGMT >30: CPT | Performed by: GENERAL PRACTICE

## 2019-01-20 PROCEDURE — 80053 COMPREHEN METABOLIC PANEL: CPT | Performed by: GENERAL PRACTICE

## 2019-01-20 PROCEDURE — 85027 COMPLETE CBC AUTOMATED: CPT | Performed by: GENERAL PRACTICE

## 2019-01-20 PROCEDURE — 82948 REAGENT STRIP/BLOOD GLUCOSE: CPT

## 2019-01-20 RX ADMIN — CITALOPRAM HYDROBROMIDE 20 MG: 20 TABLET ORAL at 08:45

## 2019-01-20 RX ADMIN — DILTIAZEM HYDROCHLORIDE 360 MG: 180 CAPSULE, COATED, EXTENDED RELEASE ORAL at 08:45

## 2019-01-20 RX ADMIN — CEFEPIME HYDROCHLORIDE 2000 MG: 1 INJECTION, POWDER, FOR SOLUTION INTRAMUSCULAR; INTRAVENOUS at 02:41

## 2019-01-20 RX ADMIN — CEFEPIME 2000 MG: 2 INJECTION, POWDER, FOR SOLUTION INTRAMUSCULAR; INTRAVENOUS at 11:40

## 2019-01-20 RX ADMIN — RIVAROXABAN 20 MG: 20 TABLET, FILM COATED ORAL at 07:30

## 2019-01-20 RX ADMIN — FLUTICASONE PROPIONATE 1 PUFF: 220 AEROSOL, METERED RESPIRATORY (INHALATION) at 07:30

## 2019-01-20 RX ADMIN — METOPROLOL TARTRATE 50 MG: 50 TABLET, FILM COATED ORAL at 08:45

## 2019-01-20 NOTE — DISCHARGE INSTRUCTIONS
PCP should check CXR in 4-6 weeks    Pneumonia   WHAT YOU NEED TO KNOW:   Pneumonia is an infection in your lungs caused by bacteria, viruses, fungi, or parasites  You can become infected if you come in contact with someone who is sick  You can get pneumonia if you recently had surgery or needed a ventilator to help you breathe  Pneumonia can also be caused by accidentally inhaling saliva or small pieces of food  Pneumonia may cause mild symptoms, or it can be severe and life-threatening  DISCHARGE INSTRUCTIONS:   Seek care immediately if:   · You cough up blood  · Your heart beats more than 100 beats in 1 minute  · You are very tired, confused, and cannot think clearly  · You have chest pain or trouble breathing  · Your lips or fingernails turn gray or blue  Contact your healthcare provider if:   · Your symptoms are the same or get worse 48 hours after you start antibiotics  · Your fever is not below 99°F (37 2°C) 48 hours after you start antibiotics  · You have a fever higher than 101°F (38 3°C)  · You cannot eat, or you have loss of appetite, nausea, or are vomiting  · You have questions or concerns about your condition or care  Medicines:   · Antibiotics  treat pneumonia caused by bacteria  · Acetaminophen  decreases pain and fever  It is available without a doctor's order  Ask how much to take and how often to take it  Follow directions  Read the labels of all other medicines you are using to see if they also contain acetaminophen, or ask your doctor or pharmacist  Acetaminophen can cause liver damage if not taken correctly  Do not use more than 4 grams (4,000 milligrams) total of acetaminophen in one day  · NSAIDs , such as ibuprofen, help decrease swelling, pain, and fever  This medicine is available with or without a doctor's order  NSAIDs can cause stomach bleeding or kidney problems in certain people   If you take blood thinner medicine, always ask your healthcare provider if NSAIDs are safe for you  Always read the medicine label and follow directions  · Take your medicine as directed  Contact your healthcare provider if you think your medicine is not helping or if you have side effects  Tell him or her if you are allergic to any medicine  Keep a list of the medicines, vitamins, and herbs you take  Include the amounts, and when and why you take them  Bring the list or the pill bottles to follow-up visits  Carry your medicine list with you in case of an emergency  Follow up with your healthcare provider as directed: You will need to return for more tests  Write down your questions so you remember to ask them during your visits  Manage your symptoms:   · Rest as needed  Rest often throughout the day  Alternate times of activity with times of rest     · Drink liquids as directed  Ask how much liquid to drink each day and which liquids are best for you  Liquids help thin your mucus, which may make it easier for you to cough it up  · Do not smoke  Avoid secondhand smoke  Smoking increases your risk for pneumonia  Smoking also makes it harder for you to get better after you have had pneumonia  Ask your healthcare provider for information if you need help to quit smoking  · Use a cool mist humidifier  A humidifier will help increase air moisture in your home  This may make it easier for you to breathe and help decrease your cough  · Keep your head elevated  You may be able to breathe better if you lie down with the head of your bed up  Prevent pneumonia:   · Prevent the spread of germs  Wash your hands often with soap and water  Use gel hand cleanser when there is no soap and water available  Do not touch your eyes, nose, or mouth unless you have washed your hands first  Cover your mouth when you cough  Cough into a tissue or your shirtsleeve so you do not spread germs from your hands   If you are sick, stay away from others as much as possible  · Limit alcohol  Women should limit alcohol to 1 drink a day  Men should limit alcohol to 2 drinks a day  A drink of alcohol is 12 ounces of beer, 5 ounces of wine, or 1½ ounces of liquor  · Ask about vaccines  You may need a vaccine to help prevent pneumonia  Get an influenza (flu) vaccine every year as soon as it becomes available  © 2017 [address] [employer] Information is for End User's use only and may not be sold, redistributed or otherwise used for commercial purposes  All illustrations and images included in CareNotes® are the copyrighted property of [facility] [employer]  or Ranjit Haskins  The above information is an  only  It is not intended as medical advice for individual conditions or treatments  Talk to your doctor, nurse or pharmacist before following any medical regimen to see if it is safe and effective for you

## 2019-01-20 NOTE — UTILIZATION REVIEW
Initial Clinical Review    Admission: Date/Time/Statement: 1/18/19 @ 1338   Orders Placed This Encounter   Procedures    Inpatient Admission (expected length of stay for this patient is greater than two midnights)     Standing Status:   Standing     Number of Occurrences:   1     Order Specific Question:   Admitting Physician     Answer:   Rae Nunez [[id number]]     Order Specific Question:   Level of Care     Answer:   Med Surg [[id number]]     Order Specific Question:   Estimated length of stay     Answer:   More than 2 Midnights     Order Specific Question:   Certification     Answer:   I certify that inpatient services are medically necessary for this patient for a duration of greater than two midnights  See H&P and MD Progress Notes for additional information about the patient's course of treatment  ED: Date/Time/Mode of Arrival:   ED Arrival Information     Expected Arrival Acuity Means of Arrival Escorted By Service Admission Type    - 1/18/2019 10:55 Emergent Walk-In Spouse Hospitalist Emergency    Arrival Complaint    SOB; Fever        Chief Complaint:   Chief Complaint   Patient presents with    Shortness of Breath     Pt was dx with flu and pneumonia 2 weeks ago  Pt states it feels like it is back  Pt has c/o SOB and cough  Pt states that there is blood in his spit  History of Illness:   70-year-old male pmhx sarcoidosis, atrial fibrillation on Xarelto, CHF, DM2, CAD, HTN pacemaker presents for evaluation of fever and cough  Patient says he was diagnosed about 3 weeks ago with influenza treated with Tamiflu and pneumonia treated with levofloxacin  Patient says he improved but a few days ago began feeling ill again  Patient says he has had a fever and chills last couple of days along with chest congestion and shortness of breath on exertion  Wife notes his oxygen saturation was in the 80s this morning  Patient notes small amount of blood in sputum  Denies sick contacts at home   Denies lightheadedness, chest pain, nausea, vomiting, abdominal pain, diarrhea, or dysuria  ED Vital Signs:   ED Triage Vitals   Temperature Pulse Respirations Blood Pressure SpO2   01/18/19 1110 01/18/19 1110 01/18/19 1110 01/18/19 1110 01/18/19 1110   100 °F (37 8 °C) 92 (!) 24 152/77 91 %      Temp Source Heart Rate Source Patient Position - Orthostatic VS BP Location FiO2 (%)   01/18/19 1110 01/18/19 1110 01/18/19 1110 01/18/19 1110 --   Oral Monitor Sitting Left arm       Pain Score       01/18/19 1130       No Pain        Wt Readings from Last 1 Encounters:   01/20/19 132 kg (291 lb 0 1 oz)     Vital Signs (abnormal):   T 101 1  RR 27, 27, 30, 24  Pertinent Labs/Diagnostic Test Results:   PT 26 2 INR 2 40 WBC 11 23  CL 99 GLUC 161   Procalcitonin <=0 25 ng/ml 0 28     CXR=Persistent or recurrent bilateral multifocal airspace opacities remain suspicious for pneumonia    EKG=Atrial fibrillation  ED Treatment:   Medication Administration from 01/18/2019 1054 to 01/18/2019 1609       Date/Time Order Dose Route Action Action by Comments     01/18/2019 1146 acetaminophen (TYLENOL) tablet 650 mg 650 mg Oral Given Geneva Cervantes RN      01/18/2019 1415 sodium chloride 0 9 % bolus 1,000 mL 0 mL Intravenous Stopped Geneva Cervantes RN      01/18/2019 1254 sodium chloride 0 9 % bolus 1,000 mL 1,000 mL Intravenous [address] Geneva Cervantes RN      01/18/2019 1256 ketorolac (TORADOL) injection 15 mg 15 mg Intravenous Given Geneva Cervantes RN      01/18/2019 1258 acetaminophen (TYLENOL) tablet 325 mg 325 mg Oral Given Geneva Cervantes RN      01/18/2019 1419 vancomycin (VANCOCIN) 2,000 mg in sodium chloride 0 9 % 500 mL IVPB 2,000 mg Intravenous [address] Geneva Cervantes RN      01/18/2019 1415 cefepime (MAXIPIME) 2 g/50 mL dextrose IVPB 0 mg Intravenous Stopped Geneva Cervantes RN      01/18/2019 1339 cefepime (MAXIPIME) 2 g/50 mL dextrose IVPB 2,000 mg Intravenous New Bag Geneva Cervantes, RN         Past Medical/Surgical History: Active Ambulatory Problems     Diagnosis Date Noted    Anxiety 11/05/2012    [facility] ) 09/05/2012    Dilated cardiomyopathy ([facility] ) 08/30/2013    Dry eye syndrome 07/17/2014    Dyslipidemia 09/12/2012    Essential hypertension 09/05/2012    Obstructive sleep apnea 09/20/2012    Morbid obesity with body mass index (BMI) of 40 0 to 44 9 in adult [facility]) 02/20/2015    Type 2 diabetes mellitus without complication, without long-term current use of insulin ([facility] ) 11/06/2015    Health care maintenance 04/03/2018    Medtronic dual chamber PM 04/08/2018    Sarcoidosis 05/22/2018    Multifocal pneumonia 12/31/2018     Resolved Ambulatory Problems     Diagnosis Date Noted    No Resolved Ambulatory Problems     Past Medical History:   Diagnosis Date    Chronic systolic congestive heart failure (HCC)     Diabetes mellitus ([facility] )     History of cardiac catheterization 10/2012    Pacemaker      Admitting Diagnosis: SOB (shortness of breath) [R06 02]  Hypoxia [R09 02]  SIRS (systemic inflammatory response syndrome) (HCC) [R65 10]  Hemoptysis [R04 2]  Failure of outpatient treatment [Z78 9]  Multifocal pneumonia [J18 9]  Age/Sex: 59 y o  male  Assessment/Plan:   Multifocal pneumonia   Assessment & Plan     He was recently treated for pneumonia, now presented with sepsis  Chest xray suggestive of multilobar pneumonia  Monitor temp and wbc curves  Continue with cefepime and vancomycin  Duo nebs  Oxygen supply  asmanex   Follows with Dr Jarad Sr  Repeat flu swab, keep isolation   Admission Orders:  MED SURG  O2 TO KEEP SATS>92%  DROPLET ISOLATION  PT/OT EVAL & TX  VENODYNES  ASPIRATION PRECAUTIONS  ELEVTE HOB 30 DEGREES  ACCUCHECKS WITH COVERAGE SCALE  Scheduled Meds:   Current Facility-Administered Medications:  acetaminophen 650 mg Oral Q6H PRN Jean-Paul Davis MD    albuterol 2 puff Inhalation Q4H PRN Jean-Paul Davis MD    aluminum-magnesium hydroxide-simethicone 30 mL Oral Q6H PRN Arnold RENTERIA Caryl Rosenberg MD    atorvastatin 40 mg Oral HS Margaret Falcon MD    cefepime 2,000 mg Intravenous Q12H Margaret Falcon MD Last Rate: Stopped (01/20/19 0311)   citalopram 20 mg Oral Daily Margaret Falcon MD    diltiazem 360 mg Oral Daily Margaret Falcon MD    docusate sodium 100 mg Oral BID Margaret Falcon MD    fluticasone 1 puff Inhalation BID Margaret Falcon MD    insulin lispro 1-5 Units Subcutaneous 4x Daily (AC & HS) Matt Cabello PA-C    metoprolol tartrate 50 mg Oral Q12H [address] Margaret Falcon MD    ondansetron 4 mg Intravenous Q6H PRN Margaret Falcon MD    polyvinyl alcohol 1 drop Both Eyes Q12H [address] Margaret Falcon MD    rivaroxaban 20 mg Oral Daily With Breakfast Margaret Falcon MD    senna 1 tablet Oral Daily Margaret Falcon MD      Continuous Infusions:    PRN Meds:   acetaminophen    albuterol    aluminum-magnesium hydroxide-simethicone    ondansetron

## 2019-01-20 NOTE — SOCIAL WORK
Met with pt and discussed role of CM  Pt lives with his wife in a 1-story home with 2 steps at entrance  Pt is independent in ADLs  Pt denies having any DME or prior HHC  Preference for pharmacy is [facility]  No MH/D&A tx hx  No POA  Main contact: Wife- Dipika Motta ([phone])  CM reviewed d/c planning process including the following: identifying help at home, patient preference for d/c planning needs, Discharge Lounge, [facility] Meds to Bed program, availability of treatment team to discuss questions or concerns patient and/or family may have regarding understanding medications and recognizing signs and symptoms once discharged  CM also encouraged patient to follow up with all recommended appointments after discharge  Patient advised of importance for patient and family to participate in managing patients medical well being  Patient/caregiver received discharge checklist  Content reviewed  Patient/caregiver encouraged to participate in discharge plan of care prior to discharge home

## 2019-01-20 NOTE — PLAN OF CARE
DISCHARGE PLANNING     Discharge to home or other facility with appropriate resources Progressing        Knowledge Deficit     Patient/family/caregiver demonstrates understanding of disease process, treatment plan, medications, and discharge instructions Progressing        PAIN - ADULT     Verbalizes/displays adequate comfort level or baseline comfort level Progressing        RESPIRATORY - ADULT     Achieves optimal ventilation and oxygenation Progressing        SAFETY ADULT     Patient will remain free of falls Progressing

## 2019-01-21 ENCOUNTER — TRANSITIONAL CARE MANAGEMENT (OUTPATIENT)
Dept: FAMILY MEDICINE CLINIC | Facility: CLINIC | Age: 59
End: 2019-01-21

## 2019-01-21 NOTE — ASSESSMENT & PLAN NOTE
Has a history, symptoms might be related to sarcoidosis  Would continue with antibiotics for now  Hold off on pulm consult as pt doing much better  Duo nebs

## 2019-01-21 NOTE — DISCHARGE SUMMARY
Discharge- Leticia Carl 1960, 59 y o  male MRN: [id number]    Unit/Bed#: [facility] 919-01 Encounter: [id number]    Primary Care Provider: Alia Lara MD   Date and time admitted to hospital: 1/18/2019 11:12 AM        * Multifocal pneumoniaresolved as of 1/20/2019   Assessment & Plan    He was recently treated for pneumonia, now presented with sepsis  Chest xray suggestive of multilobar pneumonia  Monitor temp and wbc curves  Continue with cefepime - see no need for Vanco as CAP and pt improving w/ Cefepime  Duo nebs  Oxygen supply  asmanex   Follows with Dr Nguyen Noss  Flu neg  B Cx neg  U leg and U Strep neg  Sp Cx mixed resp muna  Procal neg at d/c so no need to d/c on abx as pt has received 3 days of IV cefepime       Sarcoidosis   Assessment & Plan    Has a history, symptoms might be related to sarcoidosis  Would continue with antibiotics for now  Hold off on pulm consult as pt doing much better  Duo nebs       Dilated cardiomyopathy ([facility] )   Assessment & Plan    Continue monitoring  Continue with bb, cardizem  Cards consult if there is rate issues     Permanent atrial fibrillation ([address] )   Assessment & Plan    Rate control with lopressor and diltiazem  A/c xarelto po         Discharging Physician / Practitioner: Johana Greenberg DO  PCP: Alia Lara MD  Admission Date:   Admission Orders     Ordered        01/18/19 1338  Inpatient Admission (expected length of stay for this patient is greater than two midnights)  Once             Discharge Date: 01/20/19    Resolved Problems  Date Reviewed: 1/20/2019          Resolved    * (Principal)Multifocal pneumonia 1/20/2019     Resolved by  Johana Greenberg DO          Consultations During Hospital Stay:  · none    Procedures Performed:   · none    Significant Findings / Test Results:   · See above    Incidental Findings:   · none     Test Results Pending at Discharge (will require follow up):   · none     Outpatient Tests Requested:  · CXR 4-6 weeks    Complications:  none    Reason for Admission: [place] [facility] Course:     Rossi Alvarez is a 59 y o  male patient who originally presented to the hospital on 1/18/2019 due to PNA  Tx with 3 days abx after which her had no complaints and procal WNL    Please see above list of diagnoses and related plan for additional information  Condition at Discharge: stable     Discharge Day Visit / Exam:     Subjective:  No acute complaints  Vitals: Blood Pressure: 117/74 (01/20/19 0721)  Pulse: 95 (01/20/19 0845)  Temperature: 98 2 °F (36 8 °C) (01/20/19 0721)  Temp Source: Oral (01/19/19 1500)  Respirations: 20 (01/20/19 0734)  Height: 5' 10" (177 8 cm) (01/18/19 1614)  Weight - Scale: 132 kg (291 lb 0 1 oz) (01/20/19 0600)  SpO2: 93 % (01/20/19 0845)  Exam:   Physical Exam   Constitutional: He is oriented to person, place, and time  No distress  HENT:   Head: Normocephalic and atraumatic  Eyes: Conjunctivae and EOM are normal    Neck: Normal range of motion  Neck supple  Cardiovascular: Normal rate and regular rhythm  Pulmonary/Chest: Effort normal and breath sounds normal  He has no wheezes  He has no rales  Abdominal: Soft  Bowel sounds are normal  He exhibits no distension  There is no tenderness  Musculoskeletal: Normal range of motion  He exhibits no edema  Neurological: He is alert and oriented to person, place, and time  Skin: Skin is warm and dry  He is not diaphoretic  Discussion with Family: yesterday    Discharge instructions/Information to patient and family:   See after visit summary for information provided to patient and family  Provisions for Follow-Up Care:  See after visit summary for information related to follow-up care and any pertinent home health orders        Disposition:     Home    For Discharges to [facility] SNF:   · Not Applicable to this Patient - Not Applicable to this Patient    Planned Readmission: no     Discharge Statement:  I spent 35 minutes discharging the patient  This time was spent on the day of discharge  I had direct contact with the patient on the day of discharge  Greater than 50% of the total time was spent examining patient, answering all patient questions, arranging and discussing plan of care with patient as well as directly providing post-discharge instructions  Additional time then spent on discharge activities  Discharge Medications:  See after visit summary for reconciled discharge medications provided to patient and family        ** Please Note: This note has been constructed using a voice recognition system **

## 2019-01-21 NOTE — ASSESSMENT & PLAN NOTE
He was recently treated for pneumonia, now presented with sepsis  Chest xray suggestive of multilobar pneumonia  Monitor temp and wbc curves  Continue with cefepime - see no need for Vanco as CAP and pt improving w/ Cefepime  Duo nebs  Oxygen supply  asmanex   Follows with Dr Pablo Lisa  Flu neg  B Cx neg  U leg and U Strep neg  Sp Cx mixed resp muna  Procal neg at d/c so no need to d/c on abx as pt has received 3 days of IV cefepime

## 2019-01-23 LAB
A2 MACROGLOB SERPL-MCNC: 139 MG/DL (ref 110–276)
ALT SERPL W P-5'-P-CCNC: 13 IU/L (ref 0–55)
APO A-I SERPL-MCNC: 118 MG/DL (ref 101–178)
BACTERIA BLD CULT: NORMAL
BACTERIA BLD CULT: NORMAL
BILIRUB SERPL-MCNC: 0.9 MG/DL (ref 0–1.2)
COMMENT: ABNORMAL
FIBROSIS SCORING:: ABNORMAL
FIBROSIS STAGE SERPL QL: ABNORMAL
GGT SERPL-CCNC: 20 IU/L (ref 0–65)
HAPTOGLOB SERPL-MCNC: 146 MG/DL (ref 34–200)
INTERPRETATIONS: ABNORMAL
LIVER FIBR SCORE SERPL CALC.FIBROSURE: 0.25 (ref 0–0.21)
NECROINFLAMM ACTIVITY SCORING:: ABNORMAL
NECROINFLAMMATORY ACT GRADE SERPL QL: ABNORMAL
NECROINFLAMMATORY ACT SCORE SERPL: 0.04 (ref 0–0.17)
SERVICE CMNT-IMP: ABNORMAL

## 2019-01-25 ENCOUNTER — OFFICE VISIT (OUTPATIENT)
Dept: FAMILY MEDICINE CLINIC | Facility: CLINIC | Age: 59
End: 2019-01-25
Payer: MEDICARE

## 2019-01-25 VITALS
SYSTOLIC BLOOD PRESSURE: 122 MMHG | TEMPERATURE: 97.8 F | RESPIRATION RATE: 16 BRPM | BODY MASS INDEX: 40.94 KG/M2 | HEIGHT: 70 IN | HEART RATE: 84 BPM | DIASTOLIC BLOOD PRESSURE: 80 MMHG | WEIGHT: 286 LBS

## 2019-01-25 DIAGNOSIS — J18.9 COMMUNITY ACQUIRED PNEUMONIA, UNSPECIFIED LATERALITY: ICD-10-CM

## 2019-01-25 DIAGNOSIS — D86.9 SARCOIDOSIS: ICD-10-CM

## 2019-01-25 DIAGNOSIS — IMO0001 TRANSITION OF CARE PERFORMED WITH SHARING OF CLINICAL SUMMARY: Primary | ICD-10-CM

## 2019-01-25 DIAGNOSIS — I48.21 PERMANENT ATRIAL FIBRILLATION (HCC): ICD-10-CM

## 2019-01-25 DIAGNOSIS — R06.2 WHEEZE: ICD-10-CM

## 2019-01-25 DIAGNOSIS — I42.0 DILATED CARDIOMYOPATHY (HCC): ICD-10-CM

## 2019-01-25 PROCEDURE — 1111F DSCHRG MED/CURRENT MED MERGE: CPT | Performed by: FAMILY MEDICINE

## 2019-01-25 PROCEDURE — 99495 TRANSJ CARE MGMT MOD F2F 14D: CPT | Performed by: FAMILY MEDICINE

## 2019-01-25 NOTE — PROGRESS NOTES
Assessment/Plan:     Diagnoses and all orders for this visit:    Transition of care performed with sharing of clinical summary    Community acquired pneumonia, unspecified laterality    Sarcoidosis    Permanent atrial fibrillation ([facility] )    Dilated cardiomyopathy ([facility] )    Wheeze     Discussion: I reviewed with pt and wife  Presentation is consistent with CAP though pt had similar symptoms at the end of December and the CXR appears to be identical to the one at the end of December  Pt improved with 3 days of IV abx but was not discharged on anything and pt has been improving despite this  Pt has hx of sarcoidosis that has been relatively quiescent recently  Unclear if these episodes are infectious or are they inflammatory  Pt is still wheezing and pt is a former smoker  REC; hold asmanex and start Trelegy 1 inhalation qd for 2 weeks  Restart Asmanex after finishing sample  Cont albuterol prn  I will reach out to patient's pulmonologist re: my concerns  Will get pt a f/u with Pulm  Recheck CXR at the end of Feb  Recheck 3m - earlier if worse  Subjective:      Patient ID: Shreyas Gil is a 62 y o  male  61 yo male with hx of sarcoidosis and cardiomyopathy was in normal state of health until the evening of 1/17 when he developed cough  By 1/18, pt developed fever, productive cough and SOB  Pt went to [facility] ED where he was diagnosed with pneumonia  Pt started on cefepime but ?no neb treatments or prednisone  Urine neg for pneumococcal or legionella antigens  Flu and RSV testing was negative  Pt improved and was discharged on 1/20 without abx  Pt here for TCM visit  - since discharge, pt still has episodes of cough with production though this is improving  Still has some SOB with exertion  Pt has not had any more episodes of fever  Pt denies worsening swelling, CP, palp, increased edema or other symptoms  Appetite is stable though energy is a little labile     - pt denies any worsening musculoskeletal, GI or  complaints  - I reviewed available hospital records, lab results and study reports with pt and wife  We reviewed CXRs from late December and this past admission        TCM Call (since 12/25/2018)     Date and time call was made  1/18/2019  9:19 AM    Patient was hospitialized at  [facility]    Date of Admission  01/18/19    Date of discharge  01/20/19    Diagnosis  Multifocal Pneumonia    Disposition  Home    Were the patients medications reviewed and updated  No    Current Symptoms  Cough      TCM Call (since 12/25/2018)     Scheduled for follow up? Yes    Did you obtain your prescribed medications  Yes    Do you need help managing your prescriptions or medications  No    Is transportation to your appointment needed  No    I have advised the patient to call PCP with any new or worsening symptoms  QUENTIN Xavier 1626 LPN    Living Arrangements  Spouse or Significiant other    Are you recieving any outpatient services  No    Are you recieving home care services  No    Are you using any community resources  No    Counseling  Family          The following portions of the patient's history were reviewed and updated as appropriate: He  has a past medical history of Chronic systolic congestive heart failure ([facility] ); Diabetes mellitus ([facility] ); History of cardiac catheterization (10/2012); and Pacemaker    He   Patient Active Problem List    Diagnosis Date Noted    Community acquired pneumonia 12/31/2018    Sarcoidosis 05/22/2018    Medtronic dual chamber PM 04/08/2018    Health care maintenance 04/03/2018    Type 2 diabetes mellitus without complication, without long-term current use of insulin ([facility] ) 11/06/2015    Morbid obesity with body mass index (BMI) of 40 0 to 44 9 in adult [facility]) 02/20/2015    Dry eye syndrome 07/17/2014    Dilated cardiomyopathy ([facility] [address] ) 08/30/2013    Anxiety 11/05/2012    Obstructive sleep apnea 09/20/2012    Dyslipidemia 09/12/2012    Permanent atrial fibrillation ([facility] ) 09/05/2012    Essential hypertension 09/05/2012     He  has a past surgical history that includes Cardioversion; FISTULA REPAIR; and Tonsillectomy  He  reports that he quit smoking about 39 years ago  He has a 10 00 pack-year smoking history  He has never used smokeless tobacco  He reports that he drinks about 1 2 - 1 8 oz of alcohol per week   He reports that he does not use drugs  Current Outpatient Prescriptions   Medication Sig Dispense Refill    albuterol (VENTOLIN HFA) 90 mcg/act inhaler Inhale 2 puffs      atorvastatin (LIPITOR) 40 mg tablet TAKE 1 TABLET BY MOUTH ONCE DAILY AT BEDTIME 90 tablet 0    cycloSPORINE (RESTASIS) 0 05 % ophthalmic emulsion Apply 1 drop to eye 2 (two) times a day      diltiazem (TIAZAC) 360 MG 24 hr capsule Take 1 capsule (360 mg total) by mouth daily 90 capsule 3    erythromycin (ILOTYCIN) ophthalmic ointment Apply to eye      furosemide (LASIX) 40 mg tablet Take 1 tablet by mouth daily      metFORMIN (GLUCOPHAGE) 500 mg tablet Take 1 tablet (500 mg total) by mouth 2 (two) times a day with meals 180 tablet 1    metoprolol tartrate (LOPRESSOR) 50 mg tablet Take 1 tablet (50 mg total) by mouth 2 (two) times a day 180 tablet 3    mometasone (ASMANEX 120 METERED DOSES) 220 MCG/INH inhaler Inhale 1 puff 2 (two) times a day      rivaroxaban (XARELTO) 20 mg tablet Take 1 tablet (20 mg total) by mouth daily with breakfast 90 tablet 3     No current facility-administered medications for this visit  He has No Known Allergies       Review of Systems   Constitutional: Positive for fatigue  Negative for appetite change, chills and fever  HENT: Negative  Eyes: Negative  Respiratory: Positive for cough, shortness of breath and wheezing  Cardiovascular: Negative  Gastrointestinal: Negative  Endocrine: Negative  Genitourinary: Negative  Musculoskeletal: Negative  Allergic/Immunologic: Negative  Neurological: Negative  Hematological: Negative  Objective:      /80   Pulse 84   Temp 97 8 °F (36 6 °C)   Resp 16   Ht 5' 10" (1 778 m)   Wt 130 kg (286 lb)   BMI 41 04 kg/m²          Physical Exam   Constitutional: He is oriented to person, place, and time  He appears well-developed and well-nourished  HENT:   Head: Normocephalic and atraumatic  Right Ear: External ear normal    Left Ear: External ear normal    Nose: Nose normal    Eyes: Pupils are equal, round, and reactive to light  Conjunctivae and EOM are normal    Neck: Normal range of motion  Neck supple  No thyromegaly present  Cardiovascular: Normal rate and intact distal pulses  Irreg, irreg rhythm   Pulmonary/Chest: Effort normal  No respiratory distress  He has wheezes  He has no rales  O2 95% on RA   Abdominal: Soft  Bowel sounds are normal    Musculoskeletal: Normal range of motion  He exhibits no edema  Lymphadenopathy:     He has no cervical adenopathy  Neurological: He is alert and oriented to person, place, and time  No cranial nerve deficit

## 2019-02-01 ENCOUNTER — TELEPHONE (OUTPATIENT)
Dept: FAMILY MEDICINE CLINIC | Facility: CLINIC | Age: 59
End: 2019-02-01

## 2019-02-03 DIAGNOSIS — D86.9 SARCOIDOSIS: ICD-10-CM

## 2019-02-03 DIAGNOSIS — J18.9 COMMUNITY ACQUIRED PNEUMONIA, UNSPECIFIED LATERALITY: Primary | ICD-10-CM

## 2019-02-04 ENCOUNTER — TELEPHONE (OUTPATIENT)
Dept: PULMONOLOGY | Facility: CLINIC | Age: 59
End: 2019-02-04

## 2019-02-04 DIAGNOSIS — I50.9 CONGESTIVE HEART FAILURE, UNSPECIFIED HF CHRONICITY, UNSPECIFIED HEART FAILURE TYPE (HCC): Primary | ICD-10-CM

## 2019-02-04 RX ORDER — FUROSEMIDE 40 MG/1
40 TABLET ORAL DAILY
Qty: 90 TABLET | Refills: 3 | Status: SHIPPED | OUTPATIENT
Start: 2019-02-04 | End: 2020-02-10 | Stop reason: SDUPTHER

## 2019-02-04 NOTE — TELEPHONE ENCOUNTER
LM for patient regarding where he would like his CT scan scheduled and to move his appointment up to march  Per Dr Rosetta Gao request  Waiting to hear from patient before I schedule anything

## 2019-02-04 NOTE — TELEPHONE ENCOUNTER
Spoke with patient regarding best location and time for scheduling the CT scan  He is scheduled wed 2/13 and scheduled for a follow up with Dr Pablo Lisa 2/20  Patient confirmed both appointment times  As well I sent a copy of the order and an appointment card to him in the mail  Dr Pablo Lisa requested he be seen this month after the CT

## 2019-02-06 NOTE — TELEPHONE ENCOUNTER
Wife calling asking if CT can be moved up  She said Rajani Soto had a bad night last night with wheezing  I connected her to central scheduling to see if they have a sooner appt

## 2019-02-08 ENCOUNTER — HOSPITAL ENCOUNTER (OUTPATIENT)
Dept: RADIOLOGY | Age: 59
Discharge: HOME | End: 2019-02-08
Payer: MEDICARE

## 2019-02-08 DIAGNOSIS — J18.9 COMMUNITY ACQUIRED PNEUMONIA, UNSPECIFIED LATERALITY: ICD-10-CM

## 2019-02-08 DIAGNOSIS — D86.9 SARCOIDOSIS: ICD-10-CM

## 2019-02-08 PROCEDURE — 71250 CT THORAX DX C-: CPT

## 2019-02-11 ENCOUNTER — TELEPHONE (OUTPATIENT)
Dept: PULMONOLOGY | Facility: CLINIC | Age: 59
End: 2019-02-11

## 2019-02-11 NOTE — TELEPHONE ENCOUNTER
Reviewed with Dusty's wife after reviewing images  Mild sarcoid changes  Pneumonia resolved  Fairly similar to 2014      Formal read by radiologist is pending

## 2019-02-20 ENCOUNTER — OFFICE VISIT (OUTPATIENT)
Dept: PULMONOLOGY | Facility: CLINIC | Age: 59
End: 2019-02-20
Payer: MEDICARE

## 2019-02-20 VITALS
DIASTOLIC BLOOD PRESSURE: 78 MMHG | BODY MASS INDEX: 41.44 KG/M2 | SYSTOLIC BLOOD PRESSURE: 114 MMHG | WEIGHT: 296 LBS | OXYGEN SATURATION: 94 % | HEART RATE: 63 BPM | HEIGHT: 71 IN | TEMPERATURE: 97.6 F

## 2019-02-20 DIAGNOSIS — R05.3 PERSISTENT COUGH: ICD-10-CM

## 2019-02-20 DIAGNOSIS — D86.9 SARCOIDOSIS: Primary | ICD-10-CM

## 2019-02-20 PROBLEM — J18.9 COMMUNITY ACQUIRED PNEUMONIA: Status: RESOLVED | Noted: 2018-12-31 | Resolved: 2019-02-20

## 2019-02-20 PROCEDURE — 99214 OFFICE O/P EST MOD 30 MIN: CPT | Performed by: INTERNAL MEDICINE

## 2019-02-20 RX ORDER — GUAIFENESIN 600 MG
600 TABLET, EXTENDED RELEASE 12 HR ORAL EVERY 12 HOURS SCHEDULED
COMMUNITY

## 2019-02-20 NOTE — PROGRESS NOTES
Progress note - Pulmonary Medicine   Samara Ho 62 y o  male MRN: [id number]       Impression & Plan:     Persistent cough  · Recent pneumonia which was slow to resolve radiographically  · Recent CT of the chest does show finally interval clearing  · Has had persistent phlegm however with a cough  · On chronic Asmanex  Tried Trelegy Ellipta very briefly but did not notice much difference  Concern for tachycardia associated with this inhaler  · He will continue Asmanex  · I added Cori Read as an anticholinergic which may help dry up secretions slightly  · Additionally suggested he return to using Mucinex for a month as this did help his cough and mucus production previously  · I just suspect that this is slow to resolve from his recent respiratory infection and that a more prolonged course of the inhaler and Mucinex should resolve symptoms    Sarcoidosis  · CT scan that was just done does not show any significant progression of sarcoidosis from a pulmonary perspective  · Denies rash  · Denies new cardiac symptoms  · Denies any visual change, arthralgias, or other worrisome change for progressive sarcoid  · Overall this has been quite stable    I have asked Rui Pollack to return to see me in 6 months  He will call to let me know if his symptoms are    ______________________________________________________________________    HPI:    Samara Ho presents today for follow-up of recent pneumonia with persistent respiratory symptoms and cough  He also has sarcoidosis for several years now  This has been stable however and has not changed significantly  Recently had imaging associated with the pneumonia that suggested pneumonia was slow to resolve raising the question of progression of sarcoid  He is being seen slightly sooner than anticipated  Imaging that was previously scheduled was moved up to early February from anticipated time frame of May  Currently he reports a cough    It is mostly nonproductive but occasionally productive of clear phlegm  At nighttime he does notice some rattling/gurgling  His wife even recorded it and it sounds like he has a significant amount of upper airway mucus  He is not having significant postnasal drip but does report intermittent rhinitis  He has not had GERD symptoms  He denies any chest pain, wheezing, or pleurisy  He was definitely more short of breath during the pneumonia and the more phlegm he has, the more short of breath he has been  Today he feels fairly well  No fever or chills  No headache  No sinus congestion  No sore throat  He denies palpitations or cardiac symptoms  He has not had increased abdominal bloating, leg swelling, or other signs that he has decompensated heart failure  He has been taking his furosemide and metoprolol regularly  Review of Systems:  Review of Systems   Constitutional: Negative  HENT: Negative  Respiratory:        Per HPI   Cardiovascular:        Per HPI   Gastrointestinal: Negative  Endocrine: Negative  Musculoskeletal: Negative  Skin: Negative for rash  Allergic/Immunologic: Negative for environmental allergies  Hematological: Negative  Psychiatric/Behavioral: Negative  All other systems reviewed and are negative  Past medical history, surgical history, and family history were reviewed and updated as appropriate    Social history updates:  Social History     Tobacco Use   Smoking Status Former Smoker    Packs/day: 2 00    Years: 5 00    Pack years: 10 00    Last attempt to quit:     Years since quittin 1   Smokeless Tobacco Never Used   Tobacco Comment    2 ppd x 5 years       PhysicalExamination:  Vitals:   /78 (BP Location: Left arm, Patient Position: Sitting)   Pulse 63   Temp 97 6 °F (36 4 °C) (Tympanic)   Ht 5' 11" (1 803 m)   Wt 134 kg (296 lb)   SpO2 94%   BMI 41 28 kg/m²     Gen:  Obese, comfortable on room air  HEENT: PERRL   Nares have no erythema or swelling  Oropharynx is crowded with a low-lying palate  There is no erythema or exudate  Neck: Stout  I do not appreciate any JVD or lymphadenopathy  Trachea is midline  No thyromegaly  Chest: Breath sounds are distant but clear to auscultation  There are no wheezes, rales or rhonchi  Cardiac: Regular rate and rhythm  There are no murmurs, rubs or gallops  Abdomen: Obese  Soft and nontender  Extremities: No clubbing, cyanosis or edema  Neurologic: No focal deficits  Skin: No appreciable rashes  Diagnostic Data:  Labs: I personally reviewed the most recent laboratory data pertinent to today's visit    Lab Results   Component Value Date    WBC 6 33 01/20/2019    HGB 13 3 01/20/2019    HCT 39 7 01/20/2019    MCV 92 01/20/2019     (L) 01/20/2019     Lab Results   Component Value Date    SODIUM 136 01/20/2019    K 3 7 01/20/2019    CO2 24 01/20/2019     01/20/2019    BUN 11 01/20/2019    CREATININE 0 98 01/20/2019    GLUCOSE 200 (H) 09/30/2015    CALCIUM 8 2 (L) 01/20/2019     No results found for: IGE  Lab Results   Component Value Date    ALT 21 01/20/2019    AST 11 01/20/2019    GGT 20 01/19/2019    ALKPHOS 62 01/20/2019    BILITOT 0 70 09/30/2015         Imaging:  I personally reviewed the images on the [facility] system pertinent to today's visit  CT scan of the chest from February 8th shows micro nodular changes that are consistent with sarcoid  These have not changed since 2014   The previously visualized pneumonia on x-rays recently has resolved    Sary Stone MD

## 2019-02-20 NOTE — ASSESSMENT & PLAN NOTE
· CT scan that was just done does not show any significant progression of sarcoidosis from a pulmonary perspective  · Denies rash  · Denies new cardiac symptoms  · Denies any visual change, arthralgias, or other worrisome change for progressive sarcoid  · Overall this has been quite stable

## 2019-02-20 NOTE — ASSESSMENT & PLAN NOTE
· Recent pneumonia which was slow to resolve radiographically  · Recent CT of the chest does show finally interval clearing  · Has had persistent phlegm however with a cough  · On chronic Asmanex  Tried Trelegy Ellipta very briefly but did not notice much difference  Concern for tachycardia associated with this inhaler  · He will continue Asmanex  · I added Shilpa Holter as an anticholinergic which may help dry up secretions slightly  · Additionally suggested he return to using Mucinex for a month as this did help his cough and mucus production previously    · I just suspect that this is slow to resolve from his recent respiratory infection and that a more prolonged course of the inhaler and Mucinex should resolve symptoms

## 2019-02-25 ENCOUNTER — TELEPHONE (OUTPATIENT)
Dept: PULMONOLOGY | Facility: CLINIC | Age: 59
End: 2019-02-25

## 2019-02-25 DIAGNOSIS — J20.9 ACUTE BRONCHITIS WITH BRONCHOSPASM: Primary | ICD-10-CM

## 2019-02-25 RX ORDER — PREDNISONE 10 MG/1
10 TABLET ORAL SEE ADMIN INSTRUCTIONS
Qty: 21 TABLET | Refills: 0 | Status: SHIPPED | OUTPATIENT
Start: 2019-02-25 | End: 2019-04-12 | Stop reason: ALTCHOICE

## 2019-02-25 RX ORDER — DOXYCYCLINE 100 MG/1
100 CAPSULE ORAL 2 TIMES DAILY
Qty: 20 CAPSULE | Refills: 0 | Status: SHIPPED | OUTPATIENT
Start: 2019-02-25 | End: 2019-03-07

## 2019-02-25 NOTE — PROGRESS NOTES
Telephone note- Pulmonary Medicine   [facility] 62 y o  male MRN: [id number]    Reason for call:  Acute bronchitis, fever, shortness of breath with wheezing  Recently he was at a family members wake and  with multiple sick contacts  He developed increased wheezing, increasing productivity of his cough, fever with significant chills, and general malaise  He is feeling a little bit better today but continues to struggle with his breathing  Recommendations: At this point Kathryn Nguyen most likely has developed a viral bronchitis but due to his recent pneumonia, underlying sarcoidosis, and recent respiratory difficulties, I recommended that we treat him for bacterial infection as well  I gave him a a 10 day course of doxycycline and a low-dose but protracted course of steroids for 14 days  I spoke directly with his wife  She agrees with the plan    If he has worsening symptoms however, they are going to notify me    Ivory Smith MD

## 2019-02-25 NOTE — TELEPHONE ENCOUNTER
Wife calling saying Manuela Peraza is still sick  He had a fever yesterday of 101 2  He is coughing up yellow/brown mucous  He is SOB all the time and worse on exertion  Today his temp was 100 2  She is giving him Tylenol  He is wheezing and has chest tightness  He is using his inhalers  He does not have a nebulizer or wear oxygen  She says they are getting frustrated and asking if there are any other tests he should be getting  Please advise

## 2019-02-27 ENCOUNTER — REMOTE DEVICE CLINIC VISIT (OUTPATIENT)
Dept: CARDIOLOGY CLINIC | Facility: CLINIC | Age: 59
End: 2019-02-27
Payer: MEDICARE

## 2019-02-27 DIAGNOSIS — Z95.0 PACEMAKER: Primary | ICD-10-CM

## 2019-02-27 DIAGNOSIS — I49.5 SSS (SICK SINUS SYNDROME) (HCC): ICD-10-CM

## 2019-02-27 PROCEDURE — 93296 REM INTERROG EVL PM/IDS: CPT | Performed by: INTERNAL MEDICINE

## 2019-02-27 PROCEDURE — 93294 REM INTERROG EVL PM/LDLS PM: CPT | Performed by: INTERNAL MEDICINE

## 2019-02-28 NOTE — PROGRESS NOTES
Results for orders placed or performed in visit on 02/27/19   Cardiac EP device report    Narrative    MDT DUAL PM (VVIR 60)  CARELINK TRANSMISSION: BATTERY ADEQUATE (1 5 YRS)   37%  ALL LEAD PARAMETERS WITHIN NORMAL LIMITS  NO SIGNIFICANT HIGH RATE EPISODES  NORMAL DEVICE FUNCTION   PL

## 2019-04-01 ENCOUNTER — TELEPHONE (OUTPATIENT)
Dept: FAMILY MEDICINE CLINIC | Facility: CLINIC | Age: 59
End: 2019-04-01

## 2019-04-01 DIAGNOSIS — E11.9 TYPE 2 DIABETES MELLITUS WITHOUT COMPLICATION, WITHOUT LONG-TERM CURRENT USE OF INSULIN (HCC): Primary | ICD-10-CM

## 2019-04-11 DIAGNOSIS — E78.5 DYSLIPIDEMIA: ICD-10-CM

## 2019-04-11 RX ORDER — ATORVASTATIN CALCIUM 40 MG/1
TABLET, FILM COATED ORAL
Qty: 90 TABLET | Refills: 4 | Status: SHIPPED | OUTPATIENT
Start: 2019-04-11 | End: 2019-04-12 | Stop reason: SDUPTHER

## 2019-04-12 ENCOUNTER — OFFICE VISIT (OUTPATIENT)
Dept: CARDIOLOGY CLINIC | Facility: CLINIC | Age: 59
End: 2019-04-12
Payer: MEDICARE

## 2019-04-12 ENCOUNTER — IN-CLINIC DEVICE VISIT (OUTPATIENT)
Dept: CARDIOLOGY CLINIC | Facility: CLINIC | Age: 59
End: 2019-04-12
Payer: MEDICARE

## 2019-04-12 VITALS
BODY MASS INDEX: 41.28 KG/M2 | SYSTOLIC BLOOD PRESSURE: 120 MMHG | WEIGHT: 294.9 LBS | HEIGHT: 71 IN | HEART RATE: 72 BPM | DIASTOLIC BLOOD PRESSURE: 82 MMHG

## 2019-04-12 DIAGNOSIS — I48.20 CHRONIC ATRIAL FIBRILLATION (HCC): ICD-10-CM

## 2019-04-12 DIAGNOSIS — G47.33 OBSTRUCTIVE SLEEP APNEA: Primary | ICD-10-CM

## 2019-04-12 DIAGNOSIS — Z95.0 CARDIAC PACEMAKER IN SITU: ICD-10-CM

## 2019-04-12 DIAGNOSIS — E78.5 DYSLIPIDEMIA: ICD-10-CM

## 2019-04-12 DIAGNOSIS — E11.9 TYPE 2 DIABETES MELLITUS WITHOUT COMPLICATION, WITHOUT LONG-TERM CURRENT USE OF INSULIN (HCC): ICD-10-CM

## 2019-04-12 DIAGNOSIS — Z95.0 PRESENCE OF CARDIAC PACEMAKER: ICD-10-CM

## 2019-04-12 DIAGNOSIS — I49.5 SICK SINUS SYNDROME (HCC): Primary | ICD-10-CM

## 2019-04-12 DIAGNOSIS — J40 BRONCHITIS: ICD-10-CM

## 2019-04-12 DIAGNOSIS — I42.0 DILATED CARDIOMYOPATHY (HCC): ICD-10-CM

## 2019-04-12 DIAGNOSIS — Z45.018 ADJUSTMENT AND MANAGEMENT OF CARDIAC PACEMAKER: ICD-10-CM

## 2019-04-12 DIAGNOSIS — I48.21 PERMANENT ATRIAL FIBRILLATION (HCC): ICD-10-CM

## 2019-04-12 DIAGNOSIS — I10 ESSENTIAL HYPERTENSION: ICD-10-CM

## 2019-04-12 PROCEDURE — 99214 OFFICE O/P EST MOD 30 MIN: CPT | Performed by: INTERNAL MEDICINE

## 2019-04-12 PROCEDURE — 93279 PRGRMG DEV EVAL PM/LDLS PM: CPT | Performed by: INTERNAL MEDICINE

## 2019-04-12 RX ORDER — CITALOPRAM 20 MG/1
20 TABLET ORAL DAILY
COMMUNITY
End: 2019-06-17 | Stop reason: SDUPTHER

## 2019-04-12 RX ORDER — ALBUTEROL SULFATE 0.63 MG/3ML
1 SOLUTION RESPIRATORY (INHALATION) EVERY 6 HOURS PRN
Qty: 90 VIAL | Refills: 11 | Status: SHIPPED | OUTPATIENT
Start: 2019-04-12

## 2019-04-12 RX ORDER — BLOOD-GLUCOSE METER
1 KIT MISCELLANEOUS DAILY PRN
Qty: 1 EACH | Refills: 0 | Status: SHIPPED | OUTPATIENT
Start: 2019-04-12 | End: 2021-04-01 | Stop reason: SDUPTHER

## 2019-04-12 RX ORDER — ATORVASTATIN CALCIUM 40 MG/1
40 TABLET, FILM COATED ORAL
Qty: 90 TABLET | Refills: 3 | Status: SHIPPED | OUTPATIENT
Start: 2019-04-12 | End: 2020-02-10 | Stop reason: SDUPTHER

## 2019-04-12 RX ORDER — BLOOD-GLUCOSE METER
1 KIT MISCELLANEOUS
Qty: 1 EACH | Refills: 0 | Status: SHIPPED | OUTPATIENT
Start: 2019-04-12

## 2019-04-12 RX ORDER — NEBULIZER ACCESSORIES
KIT MISCELLANEOUS AS NEEDED
Qty: 1 EACH | Refills: 0 | Status: SHIPPED | OUTPATIENT
Start: 2019-04-12

## 2019-05-13 ENCOUNTER — TELEPHONE (OUTPATIENT)
Dept: PULMONOLOGY | Facility: CLINIC | Age: 59
End: 2019-05-13

## 2019-06-10 ENCOUNTER — TELEPHONE (OUTPATIENT)
Dept: FAMILY MEDICINE CLINIC | Facility: CLINIC | Age: 59
End: 2019-06-10

## 2019-06-17 DIAGNOSIS — F41.9 ANXIETY: Primary | ICD-10-CM

## 2019-06-17 RX ORDER — CITALOPRAM 20 MG/1
20 TABLET ORAL DAILY
Qty: 90 TABLET | Refills: 3 | Status: SHIPPED | OUTPATIENT
Start: 2019-06-17 | End: 2020-07-14 | Stop reason: SDUPTHER

## 2019-07-31 ENCOUNTER — REMOTE DEVICE CLINIC VISIT (OUTPATIENT)
Dept: CARDIOLOGY CLINIC | Facility: CLINIC | Age: 59
End: 2019-07-31
Payer: MEDICARE

## 2019-07-31 DIAGNOSIS — Z95.0 PRESENCE OF PERMANENT CARDIAC PACEMAKER: Primary | ICD-10-CM

## 2019-07-31 PROCEDURE — 93296 REM INTERROG EVL PM/IDS: CPT | Performed by: INTERNAL MEDICINE

## 2019-07-31 PROCEDURE — 93294 REM INTERROG EVL PM/LDLS PM: CPT | Performed by: INTERNAL MEDICINE

## 2019-07-31 NOTE — PROGRESS NOTES
Results for orders placed or performed in visit on 07/31/19   Cardiac EP device report    Narrative    MDT-DUAL CHAMBER PPM (VVIR MODE)  CARELINK TRANSMISSION: BATTERY VOLTAGE ADEQUATE  (1 5 YRS)  36%  ALL AVAILABLE LEAD PARAMETERS WITHIN NORMAL LIMITS  100% AF AND ON XARELTO  NORMAL DEVICE FUNCTION  ---ESTRADA

## 2019-08-02 ENCOUNTER — APPOINTMENT (OUTPATIENT)
Dept: LAB | Age: 59
End: 2019-08-02
Payer: MEDICARE

## 2019-08-02 DIAGNOSIS — E11.9 TYPE 2 DIABETES MELLITUS WITHOUT COMPLICATION, WITHOUT LONG-TERM CURRENT USE OF INSULIN (HCC): ICD-10-CM

## 2019-08-02 DIAGNOSIS — I48.21 PERMANENT ATRIAL FIBRILLATION (HCC): ICD-10-CM

## 2019-08-02 DIAGNOSIS — I42.0 DILATED CARDIOMYOPATHY (HCC): ICD-10-CM

## 2019-08-02 DIAGNOSIS — I10 ESSENTIAL HYPERTENSION: ICD-10-CM

## 2019-08-02 LAB
ALBUMIN SERPL BCP-MCNC: 4.1 G/DL (ref 3.5–5)
ALP SERPL-CCNC: 94 U/L (ref 46–116)
ALT SERPL W P-5'-P-CCNC: 34 U/L (ref 12–78)
ANION GAP SERPL CALCULATED.3IONS-SCNC: 10 MMOL/L (ref 4–13)
AST SERPL W P-5'-P-CCNC: 19 U/L (ref 5–45)
BASOPHILS # BLD AUTO: 0.04 THOUSANDS/ΜL (ref 0–0.1)
BASOPHILS NFR BLD AUTO: 1 % (ref 0–1)
BILIRUB SERPL-MCNC: 1.29 MG/DL (ref 0.2–1)
BUN SERPL-MCNC: 11 MG/DL (ref 5–25)
CALCIUM SERPL-MCNC: 8.9 MG/DL (ref 8.3–10.1)
CHLORIDE SERPL-SCNC: 104 MMOL/L (ref 100–108)
CHOLEST SERPL-MCNC: 157 MG/DL (ref 50–200)
CO2 SERPL-SCNC: 27 MMOL/L (ref 21–32)
CREAT SERPL-MCNC: 1.08 MG/DL (ref 0.6–1.3)
EOSINOPHIL # BLD AUTO: 0.14 THOUSAND/ΜL (ref 0–0.61)
EOSINOPHIL NFR BLD AUTO: 3 % (ref 0–6)
ERYTHROCYTE [DISTWIDTH] IN BLOOD BY AUTOMATED COUNT: 12.7 % (ref 11.6–15.1)
EST. AVERAGE GLUCOSE BLD GHB EST-MCNC: 163 MG/DL
GFR SERPL CREATININE-BSD FRML MDRD: 74 ML/MIN/1.73SQ M
GLUCOSE P FAST SERPL-MCNC: 183 MG/DL (ref 65–99)
HBA1C MFR BLD: 7.3 % (ref 4.2–6.3)
HCT VFR BLD AUTO: 45.8 % (ref 36.5–49.3)
HDLC SERPL-MCNC: 43 MG/DL (ref 40–60)
HGB BLD-MCNC: 16 G/DL (ref 12–17)
IMM GRANULOCYTES # BLD AUTO: 0.02 THOUSAND/UL (ref 0–0.2)
IMM GRANULOCYTES NFR BLD AUTO: 0 % (ref 0–2)
LDLC SERPL CALC-MCNC: 66 MG/DL (ref 0–100)
LYMPHOCYTES # BLD AUTO: 0.69 THOUSANDS/ΜL (ref 0.6–4.47)
LYMPHOCYTES NFR BLD AUTO: 12 % (ref 14–44)
MCH RBC QN AUTO: 31.5 PG (ref 26.8–34.3)
MCHC RBC AUTO-ENTMCNC: 34.9 G/DL (ref 31.4–37.4)
MCV RBC AUTO: 90 FL (ref 82–98)
MONOCYTES # BLD AUTO: 0.56 THOUSAND/ΜL (ref 0.17–1.22)
MONOCYTES NFR BLD AUTO: 10 % (ref 4–12)
NEUTROPHILS # BLD AUTO: 4.21 THOUSANDS/ΜL (ref 1.85–7.62)
NEUTS SEG NFR BLD AUTO: 74 % (ref 43–75)
NONHDLC SERPL-MCNC: 114 MG/DL
NRBC BLD AUTO-RTO: 0 /100 WBCS
PLATELET # BLD AUTO: 180 THOUSANDS/UL (ref 149–390)
PMV BLD AUTO: 10 FL (ref 8.9–12.7)
POTASSIUM SERPL-SCNC: 3.8 MMOL/L (ref 3.5–5.3)
PROT SERPL-MCNC: 7.5 G/DL (ref 6.4–8.2)
RBC # BLD AUTO: 5.08 MILLION/UL (ref 3.88–5.62)
SODIUM SERPL-SCNC: 141 MMOL/L (ref 136–145)
TRIGL SERPL-MCNC: 238 MG/DL
TSH SERPL DL<=0.05 MIU/L-ACNC: 1.63 UIU/ML (ref 0.36–3.74)
WBC # BLD AUTO: 5.66 THOUSAND/UL (ref 4.31–10.16)

## 2019-08-02 PROCEDURE — 84443 ASSAY THYROID STIM HORMONE: CPT

## 2019-08-02 PROCEDURE — 36415 COLL VENOUS BLD VENIPUNCTURE: CPT

## 2019-08-02 PROCEDURE — 80061 LIPID PANEL: CPT

## 2019-08-02 PROCEDURE — 83036 HEMOGLOBIN GLYCOSYLATED A1C: CPT

## 2019-08-02 PROCEDURE — 85025 COMPLETE CBC W/AUTO DIFF WBC: CPT

## 2019-08-02 PROCEDURE — 80053 COMPREHEN METABOLIC PANEL: CPT

## 2019-08-06 ENCOUNTER — OFFICE VISIT (OUTPATIENT)
Dept: FAMILY MEDICINE CLINIC | Facility: CLINIC | Age: 59
End: 2019-08-06
Payer: MEDICARE

## 2019-08-06 VITALS
WEIGHT: 300 LBS | BODY MASS INDEX: 42 KG/M2 | HEIGHT: 71 IN | DIASTOLIC BLOOD PRESSURE: 78 MMHG | SYSTOLIC BLOOD PRESSURE: 120 MMHG | HEART RATE: 74 BPM | TEMPERATURE: 97.8 F

## 2019-08-06 DIAGNOSIS — B35.6 TINEA CRURIS: ICD-10-CM

## 2019-08-06 DIAGNOSIS — D86.9 SARCOIDOSIS: ICD-10-CM

## 2019-08-06 DIAGNOSIS — Z12.5 SCREENING FOR PROSTATE CANCER: ICD-10-CM

## 2019-08-06 DIAGNOSIS — E66.01 MORBID OBESITY WITH BODY MASS INDEX (BMI) OF 40.0 TO 44.9 IN ADULT (HCC): ICD-10-CM

## 2019-08-06 DIAGNOSIS — E11.9 TYPE 2 DIABETES MELLITUS WITHOUT COMPLICATION, WITHOUT LONG-TERM CURRENT USE OF INSULIN (HCC): ICD-10-CM

## 2019-08-06 DIAGNOSIS — I48.21 PERMANENT ATRIAL FIBRILLATION (HCC): ICD-10-CM

## 2019-08-06 DIAGNOSIS — I10 ESSENTIAL HYPERTENSION: ICD-10-CM

## 2019-08-06 DIAGNOSIS — Z12.11 SCREENING FOR COLORECTAL CANCER: ICD-10-CM

## 2019-08-06 DIAGNOSIS — Z12.12 SCREENING FOR COLORECTAL CANCER: ICD-10-CM

## 2019-08-06 DIAGNOSIS — I42.0 DILATED CARDIOMYOPATHY (HCC): ICD-10-CM

## 2019-08-06 DIAGNOSIS — Z00.00 MEDICARE ANNUAL WELLNESS VISIT, SUBSEQUENT: Primary | ICD-10-CM

## 2019-08-06 PROCEDURE — 99214 OFFICE O/P EST MOD 30 MIN: CPT | Performed by: FAMILY MEDICINE

## 2019-08-06 PROCEDURE — G0439 PPPS, SUBSEQ VISIT: HCPCS | Performed by: FAMILY MEDICINE

## 2019-08-06 RX ORDER — CLOTRIMAZOLE AND BETAMETHASONE DIPROPIONATE 10; .64 MG/G; MG/G
CREAM TOPICAL 2 TIMES DAILY
Qty: 30 G | Refills: 2 | Status: SHIPPED | OUTPATIENT
Start: 2019-08-06 | End: 2021-04-01 | Stop reason: ALTCHOICE

## 2019-08-06 NOTE — PROGRESS NOTES
Assessment/Plan:    Tinea gavinouris  lotrisone to area bid x 2 weeks    Type 2 diabetes mellitus without complication, without long-term current use of insulin (HCC)  Lab Results   Component Value Date    HGBA1C 7 3 (H) 08/02/2019     A1C a little worse due to noncompliance  D/W pt  He will rededicate himself to weight loss, and diet  Recheck labs in 3m    Dilated cardiomyopathy ([facility] [address] )  Some SOB with exertion  No overt signs of CHF  Cont present meds  Urged weight loss  Cont f/u with Cardio    Essential hypertension  Well controlled  Cont present treatment  Monitor labs  Recheck 6m      Permanent atrial fibrillation (HCC)  Unchanged  Clinically in a-fib  Cont present meds  F/u with Cardio    Sarcoidosis  Still with some SOB with exertion  Urged weight loss  F/u with Pulm    Morbid obesity with body mass index (BMI) of 40 0 to 44 9 in [facility])  Discussed diet, weight loss and exercise strategies  BMI Counseling: Body mass index is 41 84 kg/m²  Discussed the patient's BMI with him  The BMI is above average  BMI counseling and education was provided to the patient  Nutrition recommendations include reducing portion sizes, 3-5 servings of fruits/vegetables daily, consuming healthier snacks, moderation in carbohydrate intake, increasing intake of lean protein, reducing intake of saturated fat and trans fat and reducing intake of cholesterol  Exercise recommendations include exercising 3-5 times per week  Recheck 6m           Diagnoses and all orders for this visit:    Medicare annual wellness visit, subsequent    Type 2 diabetes mellitus without complication, without long-term current use of insulin ([facility] )  -     Basic metabolic panel; Future  -     Hemoglobin A1C; Future  -     Microalbumin / creatinine urine ratio; Future    Dilated cardiomyopathy (HCC)    Essential hypertension    Permanent atrial fibrillation (HCC)    Screening for colorectal cancer  -     Ambulatory referral to Colorectal Surgery;  Future    Screening for prostate cancer  -     PSA, Total Screen; Future    Tinea cruris  -     clotrimazole-betamethasone (LOTRISONE) 1-0 05 % cream; Apply topically 2 (two) times a day    Sarcoidosis    Morbid obesity with body mass index (BMI) of 40 0 to 44 9 in [facility])          Subjective:      Patient ID: Marcello Cummins is a 62 y o  male  f/u multiple med issues  - pt had a L groin "itch" that he scratched 2m ago  Lesion would heal but then it would become pruritic and would open again  Has clear discharge  No fever/chills  Had been treated for a R lower leg cellulitis a month ago  Area did not heal with abx  - fell of of diet and is not exercising  BGs increased - recent A1C = 7 3  Up to date with ophth  - no CP, palp, lightheadedness or other CV symptoms  Up to date with Cardio  - up to date with Pulm   Pt feels that his breathing is a little better  Had been wheezing for months after his bout with pneumonia  Still SOB with exertion, but wheeze has resolved  - no Gi or  issues  Due for colonoscopy in 2023      The following portions of the patient's history were reviewed and updated as appropriate: He  has a past medical history of Chronic systolic congestive heart failure ([address] ), Diabetes mellitus ([address] ), History of cardiac catheterization (10/2012), and Pacemaker    He   Patient Active Problem List    Diagnosis Date Noted    Tinea cruris 08/06/2019    Acute bronchitis with bronchospasm 02/25/2019    Persistent cough 02/20/2019    Sarcoidosis 05/22/2018    Medtronic dual chamber PM 04/08/2018    Health care maintenance 04/03/2018    Type 2 diabetes mellitus without complication, without long-term current use of insulin ([address] ) 11/06/2015    Morbid obesity with body mass index (BMI) of 40 0 to 44 9 in [facility]) 02/20/2015    Dry eye syndrome 07/17/2014    Dilated cardiomyopathy ([address] ) 08/30/2013    Anxiety 11/05/2012    Obstructive sleep apnea 09/20/2012    Dyslipidemia 09/12/2012    Permanent atrial fibrillation ([facility] ) 09/05/2012    Essential hypertension 09/05/2012     He  has a past surgical history that includes Cardioversion; FISTULA REPAIR; and Tonsillectomy  He  reports that he quit smoking about 39 years ago  He has a 10 00 pack-year smoking history  He has never used smokeless tobacco  He reports that he drinks about 2 0 - 3 0 standard drinks of alcohol per week  He reports that he does not use drugs    Current Outpatient Medications   Medication Sig Dispense Refill    albuterol (ACCUNEB) 0 63 MG/3ML nebulizer solution Take 3 mL (0 63 mg total) by nebulization every 6 (six) hours as needed for wheezing 90 vial 11    albuterol (VENTOLIN HFA) 90 mcg/act inhaler Inhale 2 puffs      atorvastatin (LIPITOR) 40 mg tablet Take 1 tablet (40 mg total) by mouth daily at bedtime 90 tablet 3    citalopram (CeleXA) 20 mg tablet Take 1 tablet (20 mg total) by mouth daily 90 tablet 3    clotrimazole-betamethasone (LOTRISONE) 1-0 05 % cream Apply topically 2 (two) times a day 30 g 2    cycloSPORINE (RESTASIS) 0 05 % ophthalmic emulsion Apply 1 drop to eye 2 (two) times a day      diltiazem (TIAZAC) 360 MG 24 hr capsule Take 1 capsule (360 mg total) by mouth daily 90 capsule 3    erythromycin (ILOTYCIN) ophthalmic ointment Apply to eye      furosemide (LASIX) 40 mg tablet Take 1 tablet (40 mg total) by mouth daily 90 tablet 3    glucose blood test strip 1 each by Other route daily Use as instructed 50 each 5    guaiFENesin (MUCINEX) 600 mg 12 hr tablet Take 600 mg by mouth every 12 (twelve) hours      metFORMIN (GLUCOPHAGE) 500 mg tablet Take 1 tablet (500 mg total) by mouth 2 (two) times a day with meals for 360 days 180 tablet 3    metoprolol tartrate (LOPRESSOR) 50 mg tablet Take 1 tablet (50 mg total) by mouth 2 (two) times a day 180 tablet 3    mometasone (ASMANEX 120 METERED DOSES) 220 MCG/INH inhaler Inhale 1 puff 2 (two) times a day      NEBULIZER SYSTEM ALL-IN-ONE MISC 1 Device by Does not apply route every 4 (four) hours while awake 1 each 0    NEBULIZER SYSTEM ALL-IN-ONE MISC 1 Device by Does not apply route daily as needed (wheezing) 1 each 0    Respiratory Therapy Supplies (NEBULIZER/ADULT MASK) KIT by Does not apply route daily as needed (wheezing) 1 each 0    Respiratory Therapy Supplies (NEBULIZER/TUBING/MOUTHPIECE) KIT by Does not apply route as needed (wheezing) 1 each 0    rivaroxaban (XARELTO) 20 mg tablet Take 1 tablet (20 mg total) by mouth daily with breakfast 90 tablet 3     No current facility-administered medications for this visit  He has No Known Allergies       Review of Systems   Constitutional: Positive for fatigue (with exertion  )  HENT: Negative  Eyes: Negative  Respiratory: Positive for shortness of breath (mild with exertion)  Cardiovascular: Negative  Gastrointestinal: Negative  Endocrine: Negative  Genitourinary: Negative  Musculoskeletal: Negative  Skin: Positive for rash  Groin rash   Allergic/Immunologic: Negative  Neurological: Negative  Hematological: Negative  Psychiatric/Behavioral: Negative  Objective:      /78 (BP Location: Left arm, Patient Position: Sitting, Cuff Size: Standard)   Pulse 74   Temp 97 8 °F (36 6 °C)   Ht 5' 11" (1 803 m)   Wt 136 kg (300 lb)   BMI 41 84 kg/m²          Physical Exam   Constitutional: He is oriented to person, place, and time  He appears well-developed and well-nourished  HENT:   Head: Normocephalic and atraumatic  Right Ear: External ear normal    Left Ear: External ear normal    Nose: Nose normal    Mouth/Throat: Oropharynx is clear and moist    Eyes: Pupils are equal, round, and reactive to light  Conjunctivae and EOM are normal    Neck: Normal range of motion  Neck supple  No JVD present  Cardiovascular: Normal rate, normal heart sounds and intact distal pulses  Pulses are no weak pulses  No murmur heard    Pulses:       Dorsalis pedis pulses are 1+ on the right side, and 1+ on the left side  Irreg, irreg rhytm  Pulmonary/Chest: Effort normal    Abdominal: Soft  Bowel sounds are normal  He exhibits no distension and no mass  There is no tenderness  Musculoskeletal: Normal range of motion  He exhibits no edema or tenderness  Feet:   Right Foot:   Skin Integrity: Negative for ulcer, skin breakdown, erythema, warmth, callus or dry skin  Left Foot:   Skin Integrity: Negative for ulcer, skin breakdown, erythema, warmth, callus or dry skin  Lymphadenopathy:     He has no cervical adenopathy  Neurological: He is alert and oriented to person, place, and time  No cranial nerve deficit  He exhibits normal muscle tone  Coordination normal    Skin: Skin is warm  Tinea cruris like rash bilat with few excoriations  No coral red illumination with wood's lamp eval   Psychiatric: He has a normal mood and affect  Vitals reviewed  Patient's shoes and socks removed  Right Foot/Ankle   Right Foot Inspection  Skin Exam: skin normal and skin intact no dry skin, no warmth, no callus, no erythema, no maceration, no abnormal color, no pre-ulcer, no ulcer and no callus                          Toe Exam: ROM and strength within normal limits  Sensory   Vibration: intact    Monofilament testing: intact  Vascular  Capillary refills: < 3 seconds  The right DP pulse is 1+  Left Foot/Ankle  Left Foot Inspection  Skin Exam: skin normal and skin intactno dry skin, no warmth, no erythema, no maceration, normal color, no pre-ulcer, no ulcer and no callus                         Toe Exam: ROM and strength within normal limits                   Sensory   Vibration: intact    Monofilament: intact  Vascular  Capillary refills: < 3 seconds  The left DP pulse is 1+  Assign Risk Category:  No deformity present; No loss of protective sensation;  No weak pulses       Risk: 0

## 2019-08-06 NOTE — PROGRESS NOTES
Assessment and Plan:     Problem List Items Addressed This Visit        Endocrine    Type 2 diabetes mellitus without complication, without long-term current use of insulin ([facility] [address] ) - Primary     Lab Results   Component Value Date    HGBA1C 7 3 (H) 08/02/2019     A1C a little worse due to noncompliance  D/W pt  He will rededicate himself to weight loss, and diet  Recheck labs in 3m         Relevant Orders    Basic metabolic panel    Hemoglobin A1C    Microalbumin / creatinine urine ratio       Cardiovascular and Mediastinum    Dilated cardiomyopathy (HCC)     Some SOB with exertion  No overt signs of CHF  Cont present meds  Urged weight loss  Cont f/u with Cardio         Essential hypertension     Well controlled  Cont present treatment  Monitor labs  Recheck 6m           Permanent atrial fibrillation (HCC)     Unchanged  Clinically in a-fib  Cont present meds  F/u with Cardio            Musculoskeletal and Integument    Tinea cruris     lotrisone to area bid x 2 weeks         Relevant Medications    clotrimazole-betamethasone (LOTRISONE) 1-0 05 % cream       Other    Morbid obesity with body mass index (BMI) of 40 0 to 44 9 in [facility])     Discussed diet, weight loss and exercise strategies  BMI Counseling: Body mass index is 41 84 kg/m²  Discussed the patient's BMI with him  The BMI is above average  BMI counseling and education was provided to the patient  Nutrition recommendations include reducing portion sizes, 3-5 servings of fruits/vegetables daily, consuming healthier snacks, moderation in carbohydrate intake, increasing intake of lean protein, reducing intake of saturated fat and trans fat and reducing intake of cholesterol  Exercise recommendations include exercising 3-5 times per week  Recheck 6m             Sarcoidosis     Still with some SOB with exertion  Urged weight loss   F/u with Pulm           Other Visit Diagnoses     Medicare annual wellness visit, subsequent        Screening for colorectal cancer        Relevant Orders    Ambulatory referral to Colorectal Surgery    Screening for prostate cancer        Relevant Orders    PSA, Total Screen         History of Present Illness:     Patient presents for Medicare Annual Wellness visit    Patient Care Team:  Shaheed Mcdowell MD as PCP - MD Shaheed Bradley MD Pecola Dach,      Problem List:     Patient Active Problem List   Diagnosis    Anxiety    Permanent atrial fibrillation ([facility] [facility] )    Dilated cardiomyopathy ([facility] )    Dry eye syndrome    Dyslipidemia    Essential hypertension    Obstructive sleep apnea    Morbid obesity with body mass index (BMI) of 40 0 to 44 9 in [facility])    Type 2 diabetes mellitus without complication, without long-term current use of insulin ([facility] [address] )   [facility]    Medtronic dual chamber PM    Sarcoidosis    Persistent cough    Acute bronchitis with bronchospasm    Tinea cruris      Past Medical and Surgical History:     Past Medical History:   Diagnosis Date    Chronic systolic congestive heart failure ([facility] )     last assessed - [id number]    Diabetes mellitus ([facility] [address] )     History of cardiac catheterization 10/2012    normal coronaries w/o significant obstructive coronary disease    Pacemaker     last assessed - 2017     Past Surgical History:   Procedure Laterality Date    CARDIOVERSION      Elective    FISTULA REPAIR      perirectal    TONSILLECTOMY            Family History:     Family History   Problem Relation Age of Onset    Atrial fibrillation Mother     Cancer Mother     Heart disease Father     Hypertension Father     Skin cancer Father     Cancer Family     Transient ischemic attack Family         without residual deficits      Social History:     Social History     Tobacco Use   Smoking Status Former Smoker    Packs/day: 2 00    Years: 5 00    Pack years: 10 00    Last attempt to quit:     Years since quittin 6 Smokeless Tobacco Never Used   Tobacco Comment    2 ppd x 5 years     Social History     Substance and Sexual Activity   Alcohol Use Yes    Alcohol/week: 2 0 - 3 0 standard drinks    Types: 2 - 3 Cans of beer per week    Comment: a week     Social History     Substance and Sexual Activity   Drug Use No      Medications and Allergies:     Current Outpatient Medications   Medication Sig Dispense Refill    albuterol (ACCUNEB) 0 63 MG/3ML nebulizer solution Take 3 mL (0 63 mg total) by nebulization every 6 (six) hours as needed for wheezing 90 vial 11    albuterol (VENTOLIN HFA) 90 mcg/act inhaler Inhale 2 puffs      atorvastatin (LIPITOR) 40 mg tablet Take 1 tablet (40 mg total) by mouth daily at bedtime 90 tablet 3    citalopram (CeleXA) 20 mg tablet Take 1 tablet (20 mg total) by mouth daily 90 tablet 3    clotrimazole-betamethasone (LOTRISONE) 1-0 05 % cream Apply topically 2 (two) times a day 30 g 2    cycloSPORINE (RESTASIS) 0 05 % ophthalmic emulsion Apply 1 drop to eye 2 (two) times a day      diltiazem (TIAZAC) 360 MG 24 hr capsule Take 1 capsule (360 mg total) by mouth daily 90 capsule 3    erythromycin (ILOTYCIN) ophthalmic ointment Apply to eye      furosemide (LASIX) 40 mg tablet Take 1 tablet (40 mg total) by mouth daily 90 tablet 3    glucose blood test strip 1 each by Other route daily Use as instructed 50 each 5    guaiFENesin (MUCINEX) 600 mg 12 hr tablet Take 600 mg by mouth every 12 (twelve) hours      metFORMIN (GLUCOPHAGE) 500 mg tablet Take 1 tablet (500 mg total) by mouth 2 (two) times a day with meals for 360 days 180 tablet 3    metoprolol tartrate (LOPRESSOR) 50 mg tablet Take 1 tablet (50 mg total) by mouth 2 (two) times a day 180 tablet 3    mometasone (ASMANEX 120 METERED DOSES) 220 MCG/INH inhaler Inhale 1 puff 2 (two) times a day      NEBULIZER SYSTEM ALL-IN-ONE MISC 1 Device by Does not apply route every 4 (four) hours while awake 1 each 0    NEBULIZER SYSTEM ALL-IN-ONE MISC 1 Device by Does not apply route daily as needed (wheezing) 1 each 0    Respiratory Therapy Supplies (NEBULIZER/ADULT MASK) KIT by Does not apply route daily as needed (wheezing) 1 each 0    Respiratory Therapy Supplies (NEBULIZER/TUBING/MOUTHPIECE) KIT by Does not apply route as needed (wheezing) 1 each 0    rivaroxaban (XARELTO) 20 mg tablet Take 1 tablet (20 mg total) by mouth daily with breakfast 90 tablet 3     No current facility-administered medications for this visit  No Known Allergies   Immunizations:     Immunization History   Administered Date(s) Administered    Influenza Quadrivalent 3 years and older 10/14/2018    Influenza TIV (IM) 10/11/2013, 09/29/2015    Pneumococcal Conjugate 13-Valent 09/29/2015    Pneumococcal Polysaccharide PPV23 10/11/2013    Tdap 11/02/2018      Medicare Screening Tests and Risk Assessments:     Ivania Richardson is here for his Subsequent Wellness visit  Last Medicare Wellness visit information reviewed, patient interviewed and updates made to the record today  Health Risk Assessment:  Patient rates overall health as good  Patient feels that their physical health rating is Slightly better  Eyesight was rated as Slightly worse  Hearing was rated as Same  Patient feels that their emotional and mental health rating is Same  Pain experienced by patient in the last 7 days has been Some  Patient's pain rating has been 5/10  Patient states that he has experienced no weight loss or gain in last 6 months  Emotional/Mental Health:  Patient has not been feeling nervous/anxious  PHQ-9 Depression Screening:    Frequency of the following problems over the past two weeks:      1  Little interest or pleasure in doing things: 0 - not at all      2  Feeling down, depressed, or hopeless: 0 - not at all  PHQ-2 Score: 0          Broken Bones/Falls:     Fall Risk Assessment:    In the past year, patient has experienced: History of falling in past year    Number of falls: 2 or more    Injured during fall: No      Patient feels steady when standing or walking  Patient is not worried about falling  Bladder/Bowel:  Patient has not leaked urine accidently in the last six months  Patient reports no loss of bowel control  Immunizations:  Patient has had a flu vaccination within the last year  Patient has received a pneumonia shot  Patient has received a shingles shot  Patient has received tetanus/diphtheria shot  Home Safety:  Patient does not have trouble with stairs inside or outside of their home  Patient currently reports that there are no safety hazards present in home, working smoke alarms, working carbon monoxide detectors  Preventative Screenings:   prostate cancer screen performed, colon cancer screen completed, cholesterol screen completed, glaucoma eye exam completed,     Nutrition:  Current diet: Diabetic and Frequent junk food with servings of the following:    Medications:  Patient is not currently taking any over-the-counter supplements  Patient is able to manage medications  Lifestyle Choices:  Patient reports no tobacco use  Patient has smoked or used tobacco in the past   Patient has stopped his tobacco use  Tobacco use quit date: 1979  Patient reports alcohol use  Alcohol use per week: 6/week  Patient drives a vehicle  Patient wears seat belt  Current level of exercise of physical activity described by patient as: limited exercise  Activities of Daily Living:  Can get out of bed by his or her self, able to dress self, able to make own meals, able to do own shopping, able to bathe self, can do own laundry/housekeeping, can manage own money, pay bills and track expenses    Previous Hospitalizations:  Hospitalization or ED visit in past 12 months  Number of hospitalizations within the last year: 1-2  Additional Comments: Pneumonia    Advanced Directives:  Patient has not decided on power of     Patient has not completed advanced directive  Preventative Screening/Counseling:      Cardiovascular:      General: Risks and Benefits Discussed and Screening Current      Counseling: Healthy Diet, Healthy Weight, Improve Blood Pressure, Improve Exercise Tolerance and Improve Cholesterol          Diabetes:      General: Screening Not Indicated      Comments: Pt is diabetic        Colorectal Cancer:      General: Risks and Benefits Discussed      Counseling: high fiber diet      Due for studies: Colonoscopy - Low Risk          Prostate Cancer:      General: Risks and Benefits Discussed and Screening Current          Osteoporosis:      General: Screening Not Indicated          AAA:      General: Screening Not Indicated          Glaucoma:      General: Risks and Benefits Discussed and Screening Current          HIV:      General: Screening Not Indicated          Hepatitis C:      General: Risks and Benefits Discussed        Advanced Directives:   Patient has no living will for healthcare, does not have durable POA for healthcare, patient does not have an advanced directive  Information on ACP and/or AD provided  5 wishes given  Immunizations:      Influenza: Risks & Benefits Discussed and Influenza UTD This Year      Pneumococcal: Risks & Benefits Discussed and Lifetime Vaccine Completed      Shingrix: Risks & Benefits Discussed and Shingrix Vaccine UTD      TD: Risks & Benefits Discussed      Other Preventative Counseling (Non-Medicare):   Fall Prevention, Increase physical activity, Nutrition Counseling and Weight reduction discussed

## 2019-08-06 NOTE — PATIENT INSTRUCTIONS
Obesity   AMBULATORY CARE:   Obesity  is when your body mass index (BMI) is greater than 30  Your healthcare provider will use your height and weight to measure your BMI  The risks of obesity include  many health problems, such as injuries or physical disability  You may need tests to check for the following:  · Diabetes     · High blood pressure or high cholesterol     · Heart disease     · Gallbladder or liver disease     · Cancer of the colon, breast, prostate, liver, or kidney     · Sleep apnea     · Arthritis or gout  Seek care immediately if:   · You have a severe headache, confusion, or difficulty speaking  · You have weakness on one side of your body  · You have chest pain, sweating, or shortness of breath  Contact your healthcare provider if:   · You have symptoms of gallbladder or liver disease, such as pain in your upper abdomen  · You have knee or hip pain and discomfort while walking  · You have symptoms of diabetes, such as intense hunger and thirst, and frequent urination  · You have symptoms of sleep apnea, such as snoring or daytime sleepiness  · You have questions or concerns about your condition or care  Treatment for obesity  focuses on helping you lose weight to improve your health  Even a small decrease in BMI can reduce the risk for many health problems  Your healthcare provider will help you set a weight-loss goal   · Lifestyle changes  are the first step in treating obesity  These include making healthy food choices and getting regular physical activity  Your healthcare provider may suggest a weight-loss program that involves coaching, education, and therapy  · Medicine  may help you lose weight when it is used with a healthy diet and physical activity  · Surgery  can help you lose weight if you are very obese and have other health problems  There are several types of weight-loss surgery  Ask your healthcare provider for more information    Be successful losing weight:   · Set small, realistic goals  An example of a small goal is to walk for 20 minutes 5 days a week  Anther goal is to lose 5% of your body weight  · Tell friends, family members, and coworkers about your goals  and ask for their support  Ask a friend to lose weight with you, or join a weight-loss support group  · Identify foods or triggers that may cause you to overeat , and find ways to avoid them  Remove tempting high-calorie foods from your home and workplace  Place a bowl of fresh fruit on your kitchen counter  If stress causes you to eat, then find other ways to cope with stress  · Keep a diary to track what you eat and drink  Also write down how many minutes of physical activity you do each day  Weigh yourself once a week and record it in your diary  Eating changes: You will need to eat 500 to 1,000 fewer calories each day than you currently eat to lose 1 to 2 pounds a week  The following changes will help you cut calories:  · Eat smaller portions  Use small plates, no larger than 9 inches in diameter  Fill your plate half full of fruits and vegetables  Measure your food using measuring cups until you know what a serving size looks like  · Eat 3 meals and 1 or 2 snacks each day  Plan your meals in advance  [facility] and eat at home most of the time  Eat slowly  · Eat fruits and vegetables at every meal   They are low in calories and high in fiber, which makes you feel full  Do not add butter, margarine, or cream sauce to vegetables  Use herbs to season steamed vegetables  · Eat less fat and fewer fried foods  Eat more baked or grilled chicken and fish  These protein sources are lower in calories and fat than red meat  Limit fast food  Dress your salads with olive oil and vinegar instead of bottled dressing  · Limit the amount of sugar you eat  Do not drink sugary beverages  Limit alcohol  Activity changes:  Physical activity is good for your body in many ways   It helps you burn calories and build strong muscles  It decreases stress and depression, and improves your mood  It can also help you sleep better  Talk to your healthcare provider before you begin an exercise program   · Exercise for at least 30 minutes 5 days a week  Start slowly  Set aside time each day for physical activity that you enjoy and that is convenient for you  It is best to do both weight training and an activity that increases your heart rate, such as walking, bicycling, or swimming  · Find ways to be more active  Do yard work and housecleaning  Walk up the stairs instead of using elevators  Spend your leisure time going to events that require walking, such as outdoor festivals or fairs  This extra physical activity can help you lose weight and keep it off  Follow up with your healthcare provider as directed: You may need to meet with a dietitian  Write down your questions so you remember to ask them during your visits  © 2017 [address] [employer] Information is for End User's use only and may not be sold, redistributed or otherwise used for commercial purposes  All illustrations and images included in CareNotes® are the copyrighted property of [employer]  or Ranjit Haskins  The above information is an  only  It is not intended as medical advice for individual conditions or treatments  Talk to your doctor, nurse or pharmacist before following any medical regimen to see if it is safe and effective for you  Urinary Incontinence   WHAT YOU NEED TO KNOW:   What is urinary incontinence? Urinary incontinence (UI) is when you lose control of your bladder  What causes UI? UI occurs because your bladder cannot store or empty urine properly  The following are the most common types of UI:  · Stress incontinence  is when you leak urine due to increased bladder pressure  This may happen when you cough, sneeze, or exercise       · Urge incontinence  is when you feel the need to urinate right away and leak urine accidentally  · Mixed incontinence  is when you have both stress and urge UI  What are the signs and symptoms of UI?   · You feel like your bladder does not empty completely when you urinate  · You urinate often and need to urinate immediately  · You leak urine when you sleep, or you wake up with the urge to urinate  · You leak urine when you cough, sneeze, exercise, or laugh  How is UI diagnosed? Your healthcare provider will ask how often you leak urine and whether you have stress or urge symptoms  Tell him which medicines you take, how often you urinate, and how much liquid you drink each day  You may need any of the following tests:  · Urine tests  may show infection or kidney function  · A pelvic exam  may be done to check for blockages  A pelvic exam will also show if your bladder, uterus, or other organs have moved out of place  · An x-ray, ultrasound, or CT  may show problems with parts of your urinary system  You may be given contrast liquid to help your organs show up better in the pictures  Tell the healthcare provider if you have ever had an allergic reaction to contrast liquid  Do not enter the MRI room with anything metal  Metal can cause serious injury  Tell the healthcare provider if you have any metal in or on your body  · A bladder scan  will show how much urine is left in your bladder after you urinate  You will be asked to urinate and then healthcare providers will use a small ultrasound machine to check the urine left in your bladder  · Cystometry  is used to check the function of your urinary system  Your healthcare provider checks the pressure in your bladder while filling it with fluid  Your bladder pressure may also be tested when your bladder is full and while you urinate  How is UI treated? · Medicines  can help strengthen your bladder control      · Electrical stimulation  is used to send a small amount of electrical energy to your pelvic floor muscles  This helps control your bladder function  Electrodes may be placed outside your body or in your rectum  For women, the electrodes may be placed in the vagina  · A bulking agent  may be injected into the wall of your urethra to make it thicker  This helps keep your urethra closed and decreases urine leakage  · Devices  such as a clamp, pessary, or tampon may help stop urine leaks  Ask your healthcare provider for more information about these and other devices  · Surgery  may be needed if other treatments do not work  Several types of surgery can help improve your bladder control  Ask your healthcare provider for more information about the surgery you may need  How can I manage my symptoms? · Do pelvic muscle exercises often  Your pelvic muscles help you stop urinating  Squeeze these muscles tight for 5 seconds, then relax for 5 seconds  Gradually work up to squeezing for 10 seconds  Do 3 sets of 15 repetitions a day, or as directed  This will help strengthen your pelvic muscles and improve bladder control  · A catheter  may be used to help empty your bladder  A catheter is a tiny, plastic tube that is put into your bladder to drain your urine  Your healthcare provider may tell you to use a catheter to prevent your bladder from getting too full and leaking urine  · Keep a UI record  Write down how often you leak urine and how much you leak  Make a note of what you were doing when you leaked urine  · Train your bladder  Go to the bathroom at set times, such as every 2 hours, even if you do not feel the urge to go  You can also try to hold your urine when you feel the urge to go  For example, hold your urine for 5 minutes when you feel the urge to go  As that becomes easier, hold your urine for 10 minutes  · Drink liquids as directed  Ask your healthcare provider how much liquid to drink each day and which liquids are best for you   You may need to limit the amount of liquid you drink to help control your urine leakage  Limit or do not have drinks that contain caffeine or alcohol  Do not drink any liquid right before you go to bed  · Prevent constipation  Eat a variety of high-fiber foods  Good examples are high-fiber cereals, beans, vegetables, and whole-grain breads  Prune juice may help make your bowel movement softer  Walking is the best way to trigger your intestines to have a bowel movement  · Exercise regularly and maintain a healthy weight  Ask your healthcare provider how much you should weigh and about the best exercise plan for you  Weight loss and exercise will decrease pressure on your bladder and help you control your leakage  Ask him to help you create a weight loss plan if you are overweight  When should I seek immediate care? · You have severe pain  · You are confused or cannot think clearly  When should I contact my healthcare provider? · You have a fever  · You see blood in your urine  · You have pain when you urinate  · You have new or worse pain, even after treatment  · Your mouth feels dry or you have vision changes  · Your urine is cloudy or smells bad  · You have questions or concerns about your condition or care  CARE AGREEMENT:   You have the right to help plan your care  Learn about your health condition and how it may be treated  Discuss treatment options with your caregivers to decide what care you want to receive  You always have the right to refuse treatment  The above information is an  only  It is not intended as medical advice for individual conditions or treatments  Talk to your doctor, nurse or pharmacist before following any medical regimen to see if it is safe and effective for you  © 2017 [address] [employer] Information is for End User's use only and may not be sold, redistributed or otherwise used for commercial purposes   All illustrations and images included in CareNotes® are the copyrighted property of [employer]  or Ranjit Haskins  Cigarette Smoking and Your Health   AMBULATORY CARE:   Risks to your health if you smoke:  Nicotine and other chemicals found in tobacco damage every cell in your body  Even if you are a light smoker, you have an increased risk for cancer, heart disease, and lung disease  If you are pregnant or have diabetes, smoking increases your risk for complications  Benefits to your health if you stop smoking:   · You decrease respiratory symptoms such as coughing, wheezing, and shortness of breath  · You reduce your risk for cancers of the lung, mouth, throat, kidney, bladder, pancreas, stomach, and cervix  If you already have cancer, you increase the benefits of chemotherapy  You also reduce your risk for cancer returning or a second cancer from developing  · You reduce your risk for heart disease, blood clots, heart attack, and stroke  · You reduce your risk for lung infections, and diseases such as pneumonia, asthma, chronic bronchitis, and emphysema  · Your circulation improves  More oxygen can be delivered to your body  If you have diabetes, you lower your risk for complications, such as kidney, artery, and eye diseases  You also lower your risk for nerve damage  Nerve damage can lead to amputations, poor vision, and blindness  · You improve your body's ability to heal and to fight infections  Benefits to the health of others if you stop smoking:  Tobacco is harmful to nonsmokers who breathe in your secondhand smoke  The following are ways the health of others around you may improve when you stop smoking:  · You lower the risks for lung cancer and heart disease in nonsmoking adults  · If you are pregnant, you lower the risk for miscarriage, early delivery, low birth weight, and stillbirth  You also lower your baby's risk for SIDS, obesity, developmental delay, and neurobehavioral problems, such as ADHD  · If you have children, you lower their risk for ear infections, colds, pneumonia, bronchitis, and asthma  For more information and support to stop smoking:   · Smokefree  gov  Phone: 8- 782 - 103-1740  Web Address: www smokefrVidyard  Follow up with your healthcare provider as directed:  Write down your questions so you remember to ask them during your visits  © 2017 [address] [employer] Information is for End User's use only and may not be sold, redistributed or otherwise used for commercial purposes  All illustrations and images included in CareNotes® are the copyrighted property of [employer]  or Ranjit Haskins  The above information is an  only  It is not intended as medical advice for individual conditions or treatments  Talk to your doctor, nurse or pharmacist before following any medical regimen to see if it is safe and effective for you  Fall Prevention   WHAT YOU NEED TO KNOW:   What is fall prevention? Fall prevention includes ways to make your home and other areas safer  It also includes ways you can move more carefully to prevent a fall  What increases my risk for falls? · Lack of vitamin D    · Not getting enough sleep each night    · Trouble walking or keeping your balance, or foot problems    · Health conditions that cause changes in your blood pressure, vision, or muscle strength and coordination    · Medicines that make you dizzy, weak, or sleepy    · Problems seeing clearly    · Shoes that have high heels or are not supportive    · Tripping hazards, such as items left on the floor, no handrails on the stairs, or broken steps  How can I help protect myself from falls? · Stand or sit up slowly  This may help you keep your balance and prevent falls  If you need to get up during the night, sit up first  Be sure you are fully awake before you stand  Turn on the light before you start walking  Go slowly in case you are still sleepy   Make sure you will not trip over any pets sleeping in the bedroom  · Use assistive devices as directed  Your healthcare provider may suggest that you use a cane or walker to help you keep your balance  You may need to have grab bars put in your bathroom near the toilet or in the shower  · Wear shoes that fit well and have soles that   Wear shoes both inside and outside  Use slippers with good   Do not wear shoes with high heels  · Wear a personal alarm  This is a device that allows you to call 911 if you fall and need help  Ask your healthcare provider for more information  · Stay active  Exercise can help strengthen your muscles and improve your balance  Your healthcare provider may recommend water aerobics or walking  He or she may also recommend physical therapy to improve your coordination  Never start an exercise program without talking to your healthcare provider first      · Manage medical conditions  Keep all appointments with your healthcare providers  Visit your eye doctor as directed  How can I make my home safer? · Add items to prevent falls in the bathroom  Put nonslip strips on your bath or shower floor to prevent you from slipping  Use a bath mat if you do not have carpet in the bathroom  This will prevent you from falling when you step out of the bath or shower  Use a shower seat so you do not need to stand while you shower  Sit on the toilet or a chair in your bathroom to dry yourself and put on clothing  This will prevent you from losing your balance from drying or dressing yourself while you are standing  · Keep paths clear  Remove books, shoes, and other objects from walkways and stairs  Place cords for telephones and lamps out of the way so that you do not need to walk over them  Tape them down if you cannot move them  Remove small rugs  If you cannot remove a rug, secure it with double-sided tape  This will prevent you from tripping  · Install bright lights in your home  Use night lights to help light paths to the bathroom or kitchen  Always turn on the light before you start walking  · Keep items you use often on shelves within reach  Do not use a step stool to help you reach an item  · Paint or place reflective tape on the edges of your stairs  This will help you see the stairs better  Call 911 or have someone else call if:   · You have fallen and are unconscious  · You have fallen and cannot move part of your body  Contact your healthcare provider if:   · You have fallen and have pain or a headache  · You have questions or concerns about your condition or care  CARE AGREEMENT:   You have the right to help plan your care  Learn about your health condition and how it may be treated  Discuss treatment options with your caregivers to decide what care you want to receive  You always have the right to refuse treatment  The above information is an  only  It is not intended as medical advice for individual conditions or treatments  Talk to your doctor, nurse or pharmacist before following any medical regimen to see if it is safe and effective for you  © 2017 [address] [facility] Information is for End User's use only and may not be sold, redistributed or otherwise used for commercial purposes  All illustrations and images included in CareNotes® are the copyrighted property of [employer]  or Ranjit Haskins  Advance Directives   WHAT YOU NEED TO KNOW:   What are advance directives? Advance directives are legal documents that state your wishes and plans for medical care  These plans are made ahead of time in case you lose your ability to make decisions for yourself  Advance directives can apply to any medical decision, such as the treatments you want, and if you want to donate organs  What are the types of advance directives? There are many types of advance directives, and each state has rules about how to use them   You may choose a combination of any of the following:  · Living will: This is a written record of the treatment you want  You can also choose which treatments you do not want, which to limit, and which to stop at a certain time  This includes surgery, medicine, IV fluid, and tube feedings  · Durable power of  for healthcare [facility]): This is a written record that states who you want to make healthcare choices for you when you are unable to make them for yourself  This person, called a proxy, is usually a family member or a friend  You may choose more than 1 proxy  · Do not resuscitate (DNR) order:  A DNR order is used in case your heart stops beating or you stop breathing  It is a request not to have certain forms of treatment, such as CPR  A DNR order may be included in other types of advance directives  · Medical directive: This covers the care that you want if you are in a coma, near death, or unable to make decisions for yourself  You can list the treatments you want for each condition  Treatment may include pain medicine, surgery, blood transfusions, dialysis, IV or tube feedings, and a ventilator (breathing machine)  · Values history: This document has questions about your views, beliefs, and how you feel and think about life  This information can help others choose the care that you would choose  Why are advance directives important? An advance directive helps you control your care  Although spoken wishes may be used, it is better to have your wishes written down  Spoken wishes can be misunderstood, or not followed  Treatments may be given even if you do not want them  An advance directive may make it easier for your family to make difficult choices about your care  How do I decide what to put in my advance directives? · Make informed decisions:  Make sure you fully understand treatments or care you may receive   Think about the benefits and problems your decisions could cause for you or your family  Talk to healthcare providers if you have concerns or questions before you write down your wishes  You may also want to talk with your Confucianist or , or a   Check your state laws to make sure that what you put in your advance directive is legal      · Sign all forms:  Sign and date your advance directive when you have finished  You may also need 2 witnesses to sign the forms  Witnesses cannot be your doctor or his staff, your spouse, heirs or beneficiaries, people you owe money to, or your chosen proxy  Talk to your family, proxy, and healthcare providers about your advance directive  Give each person a copy, and keep one for yourself in a place you can get to easily  Do not keep it hidden or locked away  · Review and revise your plans: You can revise your advance directive at any time, as long as you are able to make decisions  Review your plan every year, and when there are changes in your life, or your health  When you make changes, let your family, proxy, and healthcare providers know  Give each a new copy  Where can I find more information? · [employer]  [address] , [address]   Phone: 3- [phone] - [phone]  Phone: 7- 307 - [phone]  Web Address: http://www  aafp org  · [address] [facility])  [address] [facility], [address]  Phone: [phone] [phone]  Phone: [phone]  Web Address: Gabriela verma  CARE AGREEMENT:   You have the right to help plan your care  To help with this plan, you must learn about your health condition and treatment options  You must also learn about advance directives and how they are used  Work with your healthcare providers to decide what care will be used to treat you  You always have the right to refuse treatment  The above information is an  only   It is not intended as medical advice for individual conditions or treatments  Talk to your doctor, nurse or pharmacist before following any medical regimen to see if it is safe and effective for you  © 2017 [address] [employer] Information is for End User's use only and may not be sold, redistributed or otherwise used for commercial purposes  All illustrations and images included in CareNotes® are the copyrighted property of [employer]  or Ranjit Haskins

## 2019-08-07 NOTE — ASSESSMENT & PLAN NOTE
Lab Results   Component Value Date    HGBA1C 7 3 (H) 08/02/2019     A1C a little worse due to noncompliance  D/W pt  He will rededicate himself to weight loss, and diet   Recheck labs in

## 2019-08-07 NOTE — ASSESSMENT & PLAN NOTE
Discussed diet, weight loss and exercise strategies  BMI Counseling: Body mass index is 41 84 kg/m²  Discussed the patient's BMI with him  The BMI is above average  BMI counseling and education was provided to the patient  Nutrition recommendations include reducing portion sizes, 3-5 servings of fruits/vegetables daily, consuming healthier snacks, moderation in carbohydrate intake, increasing intake of lean protein, reducing intake of saturated fat and trans fat and reducing intake of cholesterol  Exercise recommendations include exercising 3-5 times per week     Recheck 6m

## 2019-09-13 ENCOUNTER — OFFICE VISIT (OUTPATIENT)
Dept: PULMONOLOGY | Facility: CLINIC | Age: 59
End: 2019-09-13
Payer: MEDICARE

## 2019-09-13 VITALS
RESPIRATION RATE: 14 BRPM | DIASTOLIC BLOOD PRESSURE: 70 MMHG | HEART RATE: 50 BPM | SYSTOLIC BLOOD PRESSURE: 118 MMHG | WEIGHT: 301.2 LBS | HEIGHT: 71 IN | TEMPERATURE: 97.4 F | BODY MASS INDEX: 42.17 KG/M2 | OXYGEN SATURATION: 95 %

## 2019-09-13 DIAGNOSIS — R06.00 DYSPNEA ON EXERTION: Primary | ICD-10-CM

## 2019-09-13 DIAGNOSIS — D86.9 SARCOIDOSIS: ICD-10-CM

## 2019-09-13 PROBLEM — R06.09 DYSPNEA ON EXERTION: Status: ACTIVE | Noted: 2019-09-13

## 2019-09-13 PROBLEM — J20.9 ACUTE BRONCHITIS WITH BRONCHOSPASM: Status: RESOLVED | Noted: 2019-02-25 | Resolved: 2019-09-13

## 2019-09-13 PROCEDURE — 99213 OFFICE O/P EST LOW 20 MIN: CPT | Performed by: INTERNAL MEDICINE

## 2019-09-13 NOTE — ASSESSMENT & PLAN NOTE
· Previous imaging had a pneumonia superimposed on his sarcoidosis  · Pneumonia component resolved in February 2019  Background sarcoidosis changes have been stable     · Will continue to observe symptoms, no additional imaging required currently

## 2019-09-13 NOTE — ASSESSMENT & PLAN NOTE
· Occasionally associated with wheezing  · Does seem to be associated with hot humid weather and occasional with air quality  · Has a rescue inhaler and nebulizer and is taking Asmanex 220, 2 puffs daily in the morning  · For the most part this is controlling his symptoms    He does have periods of time where he has exercise intolerance and wheezing which seem to be quite variable

## 2019-09-13 NOTE — PROGRESS NOTES
Progress note - Pulmonary Medicine   Trung Marshall 62 y o  male MRN: [id number]       Impression & Plan:     Dyspnea on exertion  · Occasionally associated with wheezing  · Does seem to be associated with hot humid weather and occasional with air quality  · Has a rescue inhaler and nebulizer and is taking Asmanex 220, 2 puffs daily in the morning  · For the most part this is controlling his symptoms  He does have periods of time where he has exercise intolerance and wheezing which seem to be quite variable    Sarcoidosis  · Previous imaging had a pneumonia superimposed on his sarcoidosis  · Pneumonia component resolved in February 2019  Background sarcoidosis changes have been stable  · Will continue to observe symptoms, no additional imaging required currently    Will continue to have Kait Seal follow-up in 6 month interval   If he has new or worsening symptoms however he was instructed to call me  ______________________________________________________________________    HPI:    Trung Marshall presents today for follow-up of sarcoidosis  He has been doing well on Asmanex and albuterol  He does have a nebulizer which she uses occasionally if his symptoms become more significant  He does have weather related changes in his breathing  It seems that hot and humid weather definitely trigger symptoms more so than the cool dry weather  He may also have some allergens sensitivity in the spring and fall  He does not typically have nasal drip  He does not have sore throat or GERD symptoms  He occasionally has issues with heart rate control with his atrial fibrillation but since having a pacemaker and rate control medication, he seems to be stable from this standpoint  His weight remains an issue  He is still overweight  He tends to the gained weight in the summer and tries to lose weight throughout the winter  He has sleep apnea but is not on CPAP  He otherwise has been stable      No significant headache or visual change  No change in level of alertness or daytime fatigue  He has no abdominal pain    Weight and appetite have been stable    Current Medications:    Current Outpatient Medications:     albuterol (ACCUNEB) 0 63 MG/3ML nebulizer solution, Take 3 mL (0 63 mg total) by nebulization every 6 (six) hours as needed for wheezing, Disp: 90 vial, Rfl: 11    albuterol (VENTOLIN HFA) 90 mcg/act inhaler, Inhale 2 puffs, Disp: , Rfl:     atorvastatin (LIPITOR) 40 mg tablet, Take 1 tablet (40 mg total) by mouth daily at bedtime, Disp: 90 tablet, Rfl: 3    citalopram (CeleXA) 20 mg tablet, Take 1 tablet (20 mg total) by mouth daily, Disp: 90 tablet, Rfl: 3    cycloSPORINE (RESTASIS) 0 05 % ophthalmic emulsion, Apply 1 drop to eye 2 (two) times a day, Disp: , Rfl:     diltiazem (TIAZAC) 360 MG 24 hr capsule, Take 1 capsule (360 mg total) by mouth daily, Disp: 90 capsule, Rfl: 3    erythromycin (ILOTYCIN) ophthalmic ointment, Apply to eye, Disp: , Rfl:     furosemide (LASIX) 40 mg tablet, Take 1 tablet (40 mg total) by mouth daily, Disp: 90 tablet, Rfl: 3    glucose blood test strip, 1 each by Other route daily Use as instructed, Disp: 50 each, Rfl: 5    metFORMIN (GLUCOPHAGE) 500 mg tablet, Take 1 tablet (500 mg total) by mouth 2 (two) times a day with meals for 360 days, Disp: 180 tablet, Rfl: 3    metoprolol tartrate (LOPRESSOR) 50 mg tablet, Take 1 tablet (50 mg total) by mouth 2 (two) times a day, Disp: 180 tablet, Rfl: 3    mometasone (ASMANEX 120 METERED DOSES) 220 MCG/INH inhaler, Inhale 1 puff 2 (two) times a day, Disp: , Rfl:     NEBULIZER SYSTEM ALL-IN-ONE MISC, 1 Device by Does not apply route every 4 (four) hours while awake, Disp: 1 each, Rfl: 0    NEBULIZER SYSTEM ALL-IN-ONE MISC, 1 Device by Does not apply route daily as needed (wheezing), Disp: 1 each, Rfl: 0    Respiratory Therapy Supplies (NEBULIZER/ADULT MASK) KIT, by Does not apply route daily as needed (wheezing), Disp: 1 each, Rfl: 0    Respiratory Therapy Supplies (NEBULIZER/TUBING/MOUTHPIECE) KIT, by Does not apply route as needed (wheezing), Disp: 1 each, Rfl: 0    rivaroxaban (XARELTO) 20 mg tablet, Take 1 tablet (20 mg total) by mouth daily with breakfast, Disp: 90 tablet, Rfl: 3    clotrimazole-betamethasone (LOTRISONE) 1-0 05 % cream, Apply topically 2 (two) times a day (Patient not taking: Reported on 2019), Disp: 30 g, Rfl: 2    guaiFENesin (MUCINEX) 600 mg 12 hr tablet, Take 600 mg by mouth every 12 (twelve) hours, Disp: , Rfl:     Review of Systems:  Aside from what is mentioned in the HPI is otherwise negative    Past medical history, surgical history, and family history were reviewed and updated as appropriate    Social history updates:  Social History     Tobacco Use   Smoking Status Former Smoker    Packs/day: 2 00    Years: 5 00    Pack years: 10 00    Last attempt to quit: 1980    Years since quittin 7   Smokeless Tobacco Never Used   Tobacco Comment    2 ppd x 5 years       PhysicalExamination:  Vitals:   /70   Pulse (!) 50   Temp (!) 97 4 °F (36 3 °C)   Resp 14   Ht 5' 11" (1 803 m)   Wt (!) 137 kg (301 lb 3 2 oz)   SpO2 95%   BMI 42 01 kg/m²     Gen:  Obese, comfortable on room air  HEENT: PERRL  Oropharynx is crowded with a low-lying palate  There is no erythema or exudate  Neck: Stout  I do not appreciate any JVD or lymphadenopathy  Trachea is midline  No thyromegaly  Chest: Breath sounds are distant but clear to auscultation  There are no wheezes, rales or rhonchi  Cardiac: Regular rate and rhythm  There are no murmurs, rubs or gallops  Abdomen: Obese  Soft and nontender  Extremities:  Trace ankle edema  Neurologic: No focal deficits  Skin: No appreciable rashes  Multiple excoriations on his legs  Diagnostic Data:  Labs:   I personally reviewed the most recent laboratory data pertinent to today's visit    Lab Results   Component Value Date    WBC 5 66 2019 HGB 16 0 08/02/2019    HCT 45 8 08/02/2019    MCV 90 08/02/2019     08/02/2019     Lab Results   Component Value Date    SODIUM 141 08/02/2019    K 3 8 08/02/2019    CO2 27 08/02/2019     08/02/2019    BUN 11 08/02/2019    CREATININE 1 08 08/02/2019    CALCIUM 8 9 08/02/2019     Imaging:  I personally reviewed the images on the [facility] system pertinent to today's visit  Most recent imaging study was February 2019  Does show multiple pulmonary nodules consistent with this history of sarcoid stable from prior    Previous pneumonia has resolved completely    Bear Paulino MD

## 2019-10-31 ENCOUNTER — REMOTE DEVICE CLINIC VISIT (OUTPATIENT)
Dept: CARDIOLOGY CLINIC | Facility: CLINIC | Age: 59
End: 2019-10-31
Payer: MEDICARE

## 2019-10-31 DIAGNOSIS — Z95.0 CARDIAC PACEMAKER IN SITU: Primary | ICD-10-CM

## 2019-10-31 PROCEDURE — 93296 REM INTERROG EVL PM/IDS: CPT | Performed by: INTERNAL MEDICINE

## 2019-10-31 PROCEDURE — 93294 REM INTERROG EVL PM/LDLS PM: CPT | Performed by: INTERNAL MEDICINE

## 2019-10-31 NOTE — PROGRESS NOTES
Results for orders placed or performed in visit on 10/31/19   Cardiac EP device report    Narrative    MDT-DUAL CHAMBER PPM (VVIR MODE)  CARELINK TRANSMISSION: BATTERY STATUS "OK"   31%  ALL AVAILABLE LEAD PARAMETERS WITHIN NORMAL LIMITS  1 FAST A/V NOTED  AVAIL EGRAM SHOWS RVR  PT ON DILTIAZEM & METO TART  NORMAL DEVICE FUNCTION   NC

## 2020-01-30 ENCOUNTER — REMOTE DEVICE CLINIC VISIT (OUTPATIENT)
Dept: CARDIOLOGY CLINIC | Facility: CLINIC | Age: 60
End: 2020-01-30
Payer: MEDICARE

## 2020-01-30 DIAGNOSIS — Z95.0 PACEMAKER: Primary | ICD-10-CM

## 2020-01-30 PROCEDURE — 93296 REM INTERROG EVL PM/IDS: CPT | Performed by: INTERNAL MEDICINE

## 2020-01-30 PROCEDURE — 93294 REM INTERROG EVL PM/LDLS PM: CPT | Performed by: INTERNAL MEDICINE

## 2020-01-30 NOTE — PROGRESS NOTES
Results for orders placed or performed in visit on 01/30/20   Cardiac EP device report    Narrative    MDT-DUAL CHAMBER PPM (VVIR MODE)  CARELINK TRANSMISSION: BATTERY ADEQUATE (1 5 YRS)   37%  ALL AVAILABLE LEAD PARAMETERS WITHIN NORMAL LIMITS  %  PT  TAKES XARELTO & METOPROLOL TART  NORMAL DEVICE FUNCTION   PL

## 2020-02-10 DIAGNOSIS — E78.5 DYSLIPIDEMIA: ICD-10-CM

## 2020-02-10 DIAGNOSIS — I48.91 ATRIAL FIBRILLATION, UNSPECIFIED TYPE (HCC): ICD-10-CM

## 2020-02-10 DIAGNOSIS — I10 ESSENTIAL HYPERTENSION: ICD-10-CM

## 2020-02-10 DIAGNOSIS — I50.9 CONGESTIVE HEART FAILURE, UNSPECIFIED HF CHRONICITY, UNSPECIFIED HEART FAILURE TYPE (HCC): ICD-10-CM

## 2020-02-10 DIAGNOSIS — I48.21 PERMANENT ATRIAL FIBRILLATION (HCC): ICD-10-CM

## 2020-02-11 RX ORDER — METOPROLOL TARTRATE 50 MG/1
50 TABLET, FILM COATED ORAL 2 TIMES DAILY
Qty: 180 TABLET | Refills: 3 | Status: SHIPPED | OUTPATIENT
Start: 2020-02-11 | End: 2021-02-24 | Stop reason: SDUPTHER

## 2020-02-11 RX ORDER — DILTIAZEM HYDROCHLORIDE 360 MG/1
360 CAPSULE, EXTENDED RELEASE ORAL DAILY
Qty: 90 CAPSULE | Refills: 3 | Status: SHIPPED | OUTPATIENT
Start: 2020-02-11 | End: 2021-02-24 | Stop reason: SDUPTHER

## 2020-02-11 RX ORDER — FUROSEMIDE 40 MG/1
40 TABLET ORAL DAILY
Qty: 90 TABLET | Refills: 3 | Status: SHIPPED | OUTPATIENT
Start: 2020-02-11 | End: 2021-02-24 | Stop reason: SDUPTHER

## 2020-02-11 RX ORDER — ATORVASTATIN CALCIUM 40 MG/1
40 TABLET, FILM COATED ORAL
Qty: 90 TABLET | Refills: 3 | Status: SHIPPED | OUTPATIENT
Start: 2020-02-11 | End: 2021-02-24 | Stop reason: SDUPTHER

## 2020-02-18 ENCOUNTER — APPOINTMENT (OUTPATIENT)
Dept: LAB | Age: 60
End: 2020-02-18
Payer: MEDICARE

## 2020-02-18 DIAGNOSIS — Z12.5 SCREENING FOR PROSTATE CANCER: ICD-10-CM

## 2020-02-18 DIAGNOSIS — E11.9 TYPE 2 DIABETES MELLITUS WITHOUT COMPLICATION, WITHOUT LONG-TERM CURRENT USE OF INSULIN (HCC): ICD-10-CM

## 2020-02-18 LAB
ANION GAP SERPL CALCULATED.3IONS-SCNC: 7 MMOL/L (ref 4–13)
BUN SERPL-MCNC: 10 MG/DL (ref 5–25)
CALCIUM SERPL-MCNC: 9.4 MG/DL (ref 8.3–10.1)
CHLORIDE SERPL-SCNC: 106 MMOL/L (ref 100–108)
CO2 SERPL-SCNC: 29 MMOL/L (ref 21–32)
CREAT SERPL-MCNC: 1.06 MG/DL (ref 0.6–1.3)
CREAT UR-MCNC: 173 MG/DL
EST. AVERAGE GLUCOSE BLD GHB EST-MCNC: 166 MG/DL
GFR SERPL CREATININE-BSD FRML MDRD: 75 ML/MIN/1.73SQ M
GLUCOSE P FAST SERPL-MCNC: 156 MG/DL (ref 65–99)
HBA1C MFR BLD: 7.4 %
MICROALBUMIN UR-MCNC: 10.9 MG/L (ref 0–20)
MICROALBUMIN/CREAT 24H UR: 6 MG/G CREATININE (ref 0–30)
POTASSIUM SERPL-SCNC: 4 MMOL/L (ref 3.5–5.3)
PSA SERPL-MCNC: 0.3 NG/ML (ref 0–4)
SODIUM SERPL-SCNC: 142 MMOL/L (ref 136–145)

## 2020-02-18 PROCEDURE — G0103 PSA SCREENING: HCPCS

## 2020-02-18 PROCEDURE — 82570 ASSAY OF URINE CREATININE: CPT

## 2020-02-18 PROCEDURE — 83036 HEMOGLOBIN GLYCOSYLATED A1C: CPT

## 2020-02-18 PROCEDURE — 82043 UR ALBUMIN QUANTITATIVE: CPT

## 2020-02-18 PROCEDURE — 80048 BASIC METABOLIC PNL TOTAL CA: CPT

## 2020-02-18 PROCEDURE — 36415 COLL VENOUS BLD VENIPUNCTURE: CPT

## 2020-02-19 ENCOUNTER — OFFICE VISIT (OUTPATIENT)
Dept: FAMILY MEDICINE CLINIC | Facility: CLINIC | Age: 60
End: 2020-02-19
Payer: MEDICARE

## 2020-02-19 VITALS
HEART RATE: 68 BPM | WEIGHT: 291.6 LBS | DIASTOLIC BLOOD PRESSURE: 78 MMHG | SYSTOLIC BLOOD PRESSURE: 120 MMHG | TEMPERATURE: 98.1 F | HEIGHT: 71 IN | BODY MASS INDEX: 40.82 KG/M2

## 2020-02-19 DIAGNOSIS — E11.9 TYPE 2 DIABETES MELLITUS WITHOUT COMPLICATION, WITHOUT LONG-TERM CURRENT USE OF INSULIN (HCC): Primary | ICD-10-CM

## 2020-02-19 DIAGNOSIS — I10 ESSENTIAL HYPERTENSION: ICD-10-CM

## 2020-02-19 DIAGNOSIS — D86.9 SARCOIDOSIS: ICD-10-CM

## 2020-02-19 DIAGNOSIS — I48.21 PERMANENT ATRIAL FIBRILLATION (HCC): ICD-10-CM

## 2020-02-19 DIAGNOSIS — E66.01 MORBID OBESITY WITH BODY MASS INDEX (BMI) OF 40.0 TO 44.9 IN ADULT (HCC): ICD-10-CM

## 2020-02-19 PROCEDURE — 99214 OFFICE O/P EST MOD 30 MIN: CPT | Performed by: FAMILY MEDICINE

## 2020-02-19 PROCEDURE — 3074F SYST BP LT 130 MM HG: CPT | Performed by: FAMILY MEDICINE

## 2020-02-19 PROCEDURE — 1036F TOBACCO NON-USER: CPT | Performed by: FAMILY MEDICINE

## 2020-02-19 PROCEDURE — 3078F DIAST BP <80 MM HG: CPT | Performed by: FAMILY MEDICINE

## 2020-02-19 PROCEDURE — 3051F HG A1C>EQUAL 7.0%<8.0%: CPT | Performed by: FAMILY MEDICINE

## 2020-02-19 NOTE — LETTER
pt had eye exam within the last year by Dr Lucrecia Gamboa   Please get report for chart    Thanks    Kiya

## 2020-02-19 NOTE — PROGRESS NOTES
Assessment/Plan:    Type 2 diabetes mellitus without complication, without long-term current use of insulin (HCC)    Lab Results   Component Value Date    HGBA1C 7 4 (H) 02/18/2020     Remains suboptimally controlled despite improved diet and weight loss  Increase metformin to 1000mg bid  Increase exercise  Cont weight loss efforts  BMI Counseling: Body mass index is 40 67 kg/m²  The BMI is above normal  Nutrition recommendations include reducing portion sizes, decreasing overall calorie intake, consuming healthier snacks, moderation in carbohydrate intake and increasing intake of lean protein  Exercise recommendations include exercising 3-5 times per week  Recheck 3m    Permanent atrial fibrillation (HCC)  Unchanged  Rate controlled  F/u with Cardio  Recheck 6m    Essential hypertension  Well controlled  Cont present treatment  Monitor labs  Recheck 6m      Sarcoidosis  Breathing stable  Continue present care  F/u with Pulm  Recheck 3m    Morbid obesity with body mass index (BMI) of 40 0 to 44 9 in [facility])  I reviewed diet and exercise strategies  Recheck 3m       Diagnoses and all orders for this visit:    Type 2 diabetes mellitus without complication, without long-term current use of insulin (HCC)  -     metFORMIN (GLUCOPHAGE) 1000 MG tablet; Take 1 tablet (1,000 mg total) by mouth 2 (two) times a day with meals  -     Basic metabolic panel; Future  -     Hemoglobin A1C; Future    Morbid obesity with body mass index (BMI) of 40 0 to 44 9 in [facility])    Sarcoidosis    Permanent atrial fibrillation    Essential hypertension          Subjective:      Patient ID: Ariella Stokes is a 56 y o  male  f/u multiple med issues  - pt has been doing better with diet and has lost 10lbs since last visit  Patient is compliant with metformin  Recent A1c was 7 4 which is essentially unchanged  Patient is up-to-date with eye exams  - pt is not exercising regularly   Pt denies CP, palp, lightheadedness or other CV symptoms  Pt  Sees Cardio once a year  - pt states that his breathing has improved with weight loss  Up to date with pulm  Still SOB with exertion but does feel like it has improved  - no new Gi or  issues  The following portions of the patient's history were reviewed and updated as appropriate:   He  has a past medical history of Chronic systolic congestive heart failure ([facility] ), Diabetes mellitus ([facility] ), History of cardiac catheterization (10/2012), and Pacemaker  He   Patient Active Problem List    Diagnosis Date Noted    Dyspnea on exertion 09/13/2019    Tinea cruris 08/06/2019    Sarcoidosis 05/22/2018    Medtronic dual chamber PM 04/08/2018    Health care maintenance 04/03/2018    Type 2 diabetes mellitus without complication, without long-term current use of insulin ([place] [address] ) 11/06/2015    Morbid obesity with body mass index (BMI) of 40 0 to 44 9 in adult [facility]) 02/20/2015    Dry eye syndrome 07/17/2014    Dilated cardiomyopathy ([facility] ) 08/30/2013    Anxiety 11/05/2012    Obstructive sleep apnea 09/20/2012    Dyslipidemia 09/12/2012    Permanent atrial fibrillation 09/05/2012    Essential hypertension 09/05/2012     He  has a past surgical history that includes Cardioversion; FISTULA REPAIR; and Tonsillectomy  He  reports that he quit smoking about 40 years ago  He has a 10 00 pack-year smoking history  He has never used smokeless tobacco  He reports that he drinks about 2 0 - 3 0 standard drinks of alcohol per week  He reports that he does not use drugs    Current Outpatient Medications   Medication Sig Dispense Refill    albuterol (ACCUNEB) 0 63 MG/3ML nebulizer solution Take 3 mL (0 63 mg total) by nebulization every 6 (six) hours as needed for wheezing 90 vial 11    albuterol (VENTOLIN HFA) 90 mcg/act inhaler Inhale 2 puffs      atorvastatin (LIPITOR) 40 mg tablet Take 1 tablet (40 mg total) by mouth daily at bedtime 90 tablet 3    citalopram (CeleXA) 20 mg tablet Take 1 tablet (20 mg total) by mouth daily 90 tablet 3    clotrimazole-betamethasone (LOTRISONE) 1-0 05 % cream Apply topically 2 (two) times a day (Patient not taking: Reported on 9/13/2019) 30 g 2    cycloSPORINE (RESTASIS) 0 05 % ophthalmic emulsion Apply 1 drop to eye 2 (two) times a day      diltiazem (TIAZAC) 360 MG 24 hr capsule Take 1 capsule (360 mg total) by mouth daily 90 capsule 3    erythromycin (ILOTYCIN) ophthalmic ointment Apply to eye      furosemide (LASIX) 40 mg tablet Take 1 tablet (40 mg total) by mouth daily 90 tablet 3    glucose blood test strip 1 each by Other route daily Use as instructed 50 each 5    guaiFENesin (MUCINEX) 600 mg 12 hr tablet Take 600 mg by mouth every 12 (twelve) hours      metFORMIN (GLUCOPHAGE) 1000 MG tablet Take 1 tablet (1,000 mg total) by mouth 2 (two) times a day with meals 180 tablet 3    metoprolol tartrate (LOPRESSOR) 50 mg tablet Take 1 tablet (50 mg total) by mouth 2 (two) times a day 180 tablet 3    mometasone (ASMANEX 120 METERED DOSES) 220 MCG/INH inhaler Inhale 1 puff 2 (two) times a day      NEBULIZER SYSTEM ALL-IN-ONE MISC 1 Device by Does not apply route every 4 (four) hours while awake 1 each 0    NEBULIZER SYSTEM ALL-IN-ONE MISC 1 Device by Does not apply route daily as needed (wheezing) 1 each 0    Respiratory Therapy Supplies (NEBULIZER/ADULT MASK) KIT by Does not apply route daily as needed (wheezing) 1 each 0    Respiratory Therapy Supplies (NEBULIZER/TUBING/MOUTHPIECE) KIT by Does not apply route as needed (wheezing) 1 each 0    rivaroxaban (XARELTO) 20 mg tablet Take 1 tablet (20 mg total) by mouth daily with breakfast 90 tablet 3     No current facility-administered medications for this visit  He has No Known Allergies       Review of Systems   Constitutional: Negative  HENT: Negative  Eyes: Negative  Respiratory: Positive for shortness of breath (with exertion)  Cardiovascular: Negative  Gastrointestinal: Negative      Endocrine: Negative  Genitourinary: Negative  Musculoskeletal: Negative  Skin: Negative  Allergic/Immunologic: Negative  Neurological: Negative  Hematological: Negative  Psychiatric/Behavioral: Negative  Objective:      /78   Pulse 68   Temp 98 1 °F (36 7 °C)   Ht 5' 11" (1 803 m)   Wt 132 kg (291 lb 9 6 oz)   BMI 40 67 kg/m²          Physical Exam   Constitutional: He is oriented to person, place, and time  He appears well-developed and well-nourished  HENT:   Head: Normocephalic and atraumatic  Right Ear: External ear normal    Left Ear: External ear normal    Nose: Nose normal    Mouth/Throat: Oropharynx is clear and moist    Eyes: Pupils are equal, round, and reactive to light  Conjunctivae and EOM are normal    Neck: Normal range of motion  Neck supple  No thyromegaly present  Cardiovascular: Normal rate and normal heart sounds  Pulses are weak pulses  No murmur heard  Pulses:       Dorsalis pedis pulses are 0 on the right side, and 0 on the left side  Posterior tibial pulses are 0 on the right side, and 0 on the left side  Irreg, irreg S1, S2  Poor pedal pulses   Pulmonary/Chest: Effort normal and breath sounds normal    Abdominal: Soft  Bowel sounds are normal  He exhibits no distension and no mass  There is no tenderness  Musculoskeletal: Normal range of motion  He exhibits no edema or tenderness  Feet:   Right Foot:   Skin Integrity: Negative for ulcer, skin breakdown, erythema, warmth, callus or dry skin  Left Foot:   Skin Integrity: Negative for ulcer, skin breakdown, erythema, warmth, callus or dry skin  Lymphadenopathy:     He has no cervical adenopathy  Neurological: He is alert and oriented to person, place, and time  No cranial nerve deficit  Coordination normal    Skin: Skin is warm and dry  Psychiatric: He has a normal mood and affect   His behavior is normal  Judgment and thought content normal    PHQ-2 = 0       Patient's shoes and socks removed  Right Foot/Ankle   Right Foot Inspection  Skin Exam: skin normal and skin intact no dry skin, no warmth, no callus, no erythema, no maceration, no abnormal color, no pre-ulcer, no ulcer and no callus                          Toe Exam: ROM and strength within normal limits  Sensory   Vibration: intact    Monofilament testing: intact  Vascular  Capillary refills: < 3 seconds  The right DP pulse is 0  The right PT pulse is 0  Left Foot/Ankle  Left Foot Inspection  Skin Exam: skin normal and skin intactno dry skin, no warmth, no erythema, no maceration, normal color, no pre-ulcer, no ulcer and no callus                         Toe Exam: ROM and strength within normal limits                   Sensory   Vibration: intact    Monofilament: intact  Vascular  Capillary refills: < 3 seconds  The left DP pulse is 0  The left PT pulse is 0  Assign Risk Category:  No deformity present;  No loss of protective sensation; Weak pulses       Risk: 1

## 2020-02-21 NOTE — ASSESSMENT & PLAN NOTE
Lab Results   Component Value Date    HGBA1C 7 4 (H) 02/18/2020     Remains suboptimally controlled despite improved diet and weight loss  Increase metformin to 1000mg bid  Increase exercise  Cont weight loss efforts  BMI Counseling: Body mass index is 40 67 kg/m²  The BMI is above normal  Nutrition recommendations include reducing portion sizes, decreasing overall calorie intake, consuming healthier snacks, moderation in carbohydrate intake and increasing intake of lean protein  Exercise recommendations include exercising 3-5 times per week    Recheck 3m

## 2020-03-12 DIAGNOSIS — D86.9 SARCOIDOSIS: Primary | ICD-10-CM

## 2020-03-12 RX ORDER — ALBUTEROL SULFATE 90 UG/1
1 AEROSOL, METERED RESPIRATORY (INHALATION) 4 TIMES DAILY
Qty: 3 INHALER | Refills: 1 | Status: SHIPPED | OUTPATIENT
Start: 2020-03-12

## 2020-04-30 ENCOUNTER — REMOTE DEVICE CLINIC VISIT (OUTPATIENT)
Dept: CARDIOLOGY CLINIC | Facility: CLINIC | Age: 60
End: 2020-04-30
Payer: MEDICARE

## 2020-04-30 DIAGNOSIS — Z95.0 PRESENCE OF PERMANENT CARDIAC PACEMAKER: Primary | ICD-10-CM

## 2020-04-30 PROCEDURE — 93294 REM INTERROG EVL PM/LDLS PM: CPT | Performed by: INTERNAL MEDICINE

## 2020-04-30 PROCEDURE — 93296 REM INTERROG EVL PM/IDS: CPT | Performed by: INTERNAL MEDICINE

## 2020-07-14 DIAGNOSIS — F41.9 ANXIETY: ICD-10-CM

## 2020-07-14 RX ORDER — CITALOPRAM 20 MG/1
20 TABLET ORAL DAILY
Qty: 90 TABLET | Refills: 3 | Status: SHIPPED | OUTPATIENT
Start: 2020-07-14 | End: 2021-09-08 | Stop reason: SDUPTHER

## 2020-08-07 ENCOUNTER — REMOTE DEVICE CLINIC VISIT (OUTPATIENT)
Dept: CARDIOLOGY CLINIC | Facility: CLINIC | Age: 60
End: 2020-08-07
Payer: MEDICARE

## 2020-08-07 DIAGNOSIS — Z95.0 CARDIAC PACEMAKER IN SITU: Primary | ICD-10-CM

## 2020-08-07 PROCEDURE — 93294 REM INTERROG EVL PM/LDLS PM: CPT | Performed by: INTERNAL MEDICINE

## 2020-08-07 PROCEDURE — 93296 REM INTERROG EVL PM/IDS: CPT | Performed by: INTERNAL MEDICINE

## 2020-08-10 NOTE — PROGRESS NOTES
Results for orders placed or performed in visit on 08/07/20   Cardiac EP device report    Narrative    MDT-DUAL CHAMBER PPM (VVIR MODE)/ ACTIVE SYSTEM IS MRI CONDITIONAL  CARELINK TRANSMISSION: BATTERY VOLTAGE ADEQUATE (13 MOS)  -33%  ALL AVAILABEL LEAD PARAMETERS WITHIN NORMAL LIMITS  3 SVT-AF EPISODES ON 8/1/20 @ 171-182 BPM MAX DURATION 4 MINS  PT IN CHRONIC AF & ON XARELTO, DILTIAZEM & METOPROLOL  NORMAL DEVICE FUNCTION   GV

## 2020-09-08 ENCOUNTER — OFFICE VISIT (OUTPATIENT)
Dept: FAMILY MEDICINE CLINIC | Facility: CLINIC | Age: 60
End: 2020-09-08
Payer: MEDICARE

## 2020-09-08 VITALS
BODY MASS INDEX: 42.28 KG/M2 | TEMPERATURE: 97.9 F | HEIGHT: 71 IN | WEIGHT: 302 LBS | SYSTOLIC BLOOD PRESSURE: 130 MMHG | DIASTOLIC BLOOD PRESSURE: 80 MMHG | HEART RATE: 70 BPM

## 2020-09-08 DIAGNOSIS — E78.5 DYSLIPIDEMIA: ICD-10-CM

## 2020-09-08 DIAGNOSIS — Z12.11 SCREENING FOR COLORECTAL CANCER: ICD-10-CM

## 2020-09-08 DIAGNOSIS — I10 ESSENTIAL HYPERTENSION: ICD-10-CM

## 2020-09-08 DIAGNOSIS — Z11.59 NEED FOR HEPATITIS C SCREENING TEST: ICD-10-CM

## 2020-09-08 DIAGNOSIS — Z12.12 SCREENING FOR COLORECTAL CANCER: ICD-10-CM

## 2020-09-08 DIAGNOSIS — Z00.00 MEDICARE ANNUAL WELLNESS VISIT, SUBSEQUENT: Primary | ICD-10-CM

## 2020-09-08 DIAGNOSIS — E11.9 TYPE 2 DIABETES MELLITUS WITHOUT COMPLICATION, WITHOUT LONG-TERM CURRENT USE OF INSULIN (HCC): ICD-10-CM

## 2020-09-08 DIAGNOSIS — I48.21 PERMANENT ATRIAL FIBRILLATION (HCC): ICD-10-CM

## 2020-09-08 DIAGNOSIS — I42.0 DILATED CARDIOMYOPATHY (HCC): ICD-10-CM

## 2020-09-08 DIAGNOSIS — G47.33 OBSTRUCTIVE SLEEP APNEA: ICD-10-CM

## 2020-09-08 DIAGNOSIS — E66.01 MORBID OBESITY WITH BODY MASS INDEX (BMI) OF 40.0 TO 44.9 IN ADULT (HCC): ICD-10-CM

## 2020-09-08 LAB — SL AMB POCT HEMOGLOBIN AIC: 6.6 (ref ?–6.5)

## 2020-09-08 PROCEDURE — G0439 PPPS, SUBSEQ VISIT: HCPCS | Performed by: FAMILY MEDICINE

## 2020-09-08 PROCEDURE — 83036 HEMOGLOBIN GLYCOSYLATED A1C: CPT | Performed by: FAMILY MEDICINE

## 2020-09-08 PROCEDURE — 99214 OFFICE O/P EST MOD 30 MIN: CPT | Performed by: FAMILY MEDICINE

## 2020-09-08 NOTE — ASSESSMENT & PLAN NOTE
Pt not using CPAP  Discussed risk for worsening symptoms associated with increased weight  Watch for now   Recheck 6m

## 2020-09-08 NOTE — PROGRESS NOTES
Assessment/Plan:    Type 2 diabetes mellitus without complication, without long-term current use of insulin (HCC)    Lab Results   Component Value Date    HGBA1C 6 6 (A) 09/08/2020     Improved control despite increased weight  Continue present care  Stressed diet compliance and weight loss  BMI Counseling: Body mass index is 42 12 kg/m²  The BMI is above normal  Nutrition recommendations include reducing portion sizes, decreasing overall calorie intake, consuming healthier snacks, moderation in carbohydrate intake, increasing intake of lean protein, reducing intake of saturated fat and trans fat and reducing intake of cholesterol  Exercise recommendations include exercising 3-5 times per week  Recheck 6m      Obstructive sleep apnea  Pt not using CPAP  Discussed risk for worsening symptoms associated with increased weight  Watch for now  Recheck 6m    Permanent atrial fibrillation (HCC)  Rate controlled  Pt asymptomatic  F/u with Cardio  Continue present care    Essential hypertension  Well controlled  Cont present treatment  Monitor labs  Recheck 6m      Dilated cardiomyopathy (HCC)  Stable  No worsening symptoms  Continue present care  F/u with cardio  Recheck 6m    Morbid obesity with body mass index (BMI) of 40 0 to 44 9 in adult ([facility] [address] )  Urged weight loss  BMI Counseling: Body mass index is 42 12 kg/m²  The BMI is above normal  Nutrition recommendations include reducing portion sizes, decreasing overall calorie intake, consuming healthier snacks, moderation in carbohydrate intake, increasing intake of lean protein, reducing intake of saturated fat and trans fat and reducing intake of cholesterol  Exercise recommendations include exercising 3-5 times per week  Recheck 6m      Dyslipidemia  Check labs  Continue atorvastatin   Recheck 6m       Diagnoses and all orders for this visit:    Medicare annual wellness visit, subsequent    Type 2 diabetes mellitus without complication, without long-term current use of insulin (HCC)  -     POCT hemoglobin A1c  -     Comprehensive metabolic panel; Future  -     Lipid panel; Future    Obstructive sleep apnea    Permanent atrial fibrillation (HCC)  -     CBC and differential; Future    Essential hypertension  -     CBC and differential; Future    Dilated cardiomyopathy (HCC)    Morbid obesity with body mass index (BMI) of 40 0 to 44 9 in adult [facility])    Dyslipidemia    Need for hepatitis C screening test  -     Cancel: Hepatitis C antibody; Future    Screening for colorectal cancer  -     Ambulatory referral to Colorectal Surgery; Future          Subjective:      Patient ID: Bisi Dumont is a 56 y o  male  f/u multiple med issues and AWV  - pt has been doing well  - was doing well with diet until the quarantine took effect  Gained some weight back  A1C today = 6 6  Patient is compliant with metformin  Patient is up-to-date with eye exams  - pt is still not exercising regularly  Pt denies CP, palp, lightheadedness or other CV symptoms  Weight up a little but no increased edema noted  Pt  Sees Cardio once a year  Pt needs to get a new cardio  Previous one, Dr Km Allen, left the area  - pt states that his breathing can be labile  SOB with exertion may be a little worse since he gained weight  Up to date with Pulm  - no new Gi complaints  Due for colonoscopy  - no new  issues  - AWV done      The following portions of the patient's history were reviewed and updated as appropriate: He  has a past medical history of Chronic systolic congestive heart failure ([address] ), Diabetes mellitus ([address] ), History of cardiac catheterization (10/2012), and Pacemaker    He   Patient Active Problem List    Diagnosis Date Noted    Dyspnea on exertion 09/13/2019    Tinea cruris 08/06/2019    Sarcoidosis 05/22/2018    Medtronic dual chamber PM 04/08/2018    Health care maintenance 04/03/2018    Type 2 diabetes mellitus without complication, without long-term current use of insulin ([address] ) 11/06/2015    Morbid obesity with body mass index (BMI) of 40 0 to 44 9 in adult [facility]) 02/20/2015    Dry eye syndrome 07/17/2014    Dilated cardiomyopathy ([facility] ) 08/30/2013    Anxiety 11/05/2012    Obstructive sleep apnea 09/20/2012    Dyslipidemia 09/12/2012    Permanent atrial fibrillation ([facility] ) 09/05/2012    Essential hypertension 09/05/2012     He  has a past surgical history that includes Cardioversion; FISTULA REPAIR; and Tonsillectomy  He  reports that he quit smoking about 40 years ago  He has a 10 00 pack-year smoking history  He has never used smokeless tobacco  He reports current alcohol use of about 2 0 - 3 0 standard drinks of alcohol per week  He reports that he does not use drugs    Current Outpatient Medications   Medication Sig Dispense Refill    albuterol (ACCUNEB) 0 63 MG/3ML nebulizer solution Take 3 mL (0 63 mg total) by nebulization every 6 (six) hours as needed for wheezing 90 vial 11    albuterol (Ventolin HFA) 90 mcg/act inhaler Inhale 1 puff 4 (four) times a day 3 Inhaler 1    atorvastatin (LIPITOR) 40 mg tablet Take 1 tablet (40 mg total) by mouth daily at bedtime 90 tablet 3    citalopram (CeleXA) 20 mg tablet Take 1 tablet (20 mg total) by mouth daily 90 tablet 3    clotrimazole-betamethasone (LOTRISONE) 1-0 05 % cream Apply topically 2 (two) times a day (Patient not taking: Reported on 9/13/2019) 30 g 2    cycloSPORINE (RESTASIS) 0 05 % ophthalmic emulsion Apply 1 drop to eye 2 (two) times a day      diltiazem (TIAZAC) 360 MG 24 hr capsule Take 1 capsule (360 mg total) by mouth daily 90 capsule 3    erythromycin (ILOTYCIN) ophthalmic ointment Apply to eye      furosemide (LASIX) 40 mg tablet Take 1 tablet (40 mg total) by mouth daily 90 tablet 3    glucose blood test strip 1 each by Other route daily Use as instructed 50 each 5    guaiFENesin (MUCINEX) 600 mg 12 hr tablet Take 600 mg by mouth every 12 (twelve) hours      metFORMIN (GLUCOPHAGE) 1000 MG tablet Take 1 tablet (1,000 mg total) by mouth 2 (two) times a day with meals 180 tablet 3    metoprolol tartrate (LOPRESSOR) 50 mg tablet Take 1 tablet (50 mg total) by mouth 2 (two) times a day 180 tablet 3    mometasone (ASMANEX 120 METERED DOSES) 220 MCG/INH inhaler Inhale 1 puff 2 (two) times a day      NEBULIZER SYSTEM ALL-IN-ONE MISC 1 Device by Does not apply route every 4 (four) hours while awake 1 each 0    NEBULIZER SYSTEM ALL-IN-ONE MISC 1 Device by Does not apply route daily as needed (wheezing) 1 each 0    Respiratory Therapy Supplies (NEBULIZER/ADULT MASK) KIT by Does not apply route daily as needed (wheezing) 1 each 0    Respiratory Therapy Supplies (NEBULIZER/TUBING/MOUTHPIECE) KIT by Does not apply route as needed (wheezing) 1 each 0    rivaroxaban (XARELTO) 20 mg tablet Take 1 tablet (20 mg total) by mouth daily with breakfast 90 tablet 3     No current facility-administered medications for this visit  He has No Known Allergies       Review of Systems   Constitutional: Negative  HENT: Negative  Eyes: Negative  Respiratory: Positive for shortness of breath (mild, chronic with exertion)  Cardiovascular: Negative  Gastrointestinal: Negative  Endocrine: Negative  Genitourinary: Negative  Musculoskeletal: Positive for arthralgias  Negative for gait problem and myalgias  Skin: Negative  Allergic/Immunologic: Negative  Neurological: Negative  Hematological: Negative  Psychiatric/Behavioral: Negative  Objective:      /80   Pulse 70   Temp 97 9 °F (36 6 °C)   Ht 5' 11" (1 803 m)   Wt (!) 137 kg (302 lb)   BMI 42 12 kg/m²          Physical Exam  Constitutional:       Appearance: He is well-developed  HENT:      Head: Normocephalic and atraumatic  Comments: Pt masked     Right Ear: Tympanic membrane, ear canal and external ear normal       Left Ear: Tympanic membrane, ear canal and external ear normal    Eyes:      General: No scleral icterus  Extraocular Movements: Extraocular movements intact  Conjunctiva/sclera: Conjunctivae normal       Pupils: Pupils are equal, round, and reactive to light  Neck:      Musculoskeletal: Normal range of motion and neck supple  No muscular tenderness  Thyroid: No thyromegaly  Vascular: No carotid bruit  Cardiovascular:      Rate and Rhythm: Normal rate  Rhythm irregular  Pulses: no weak pulses          Dorsalis pedis pulses are 1+ on the right side and 1+ on the left side  Heart sounds: Normal heart sounds  No murmur  Pulmonary:      Effort: Pulmonary effort is normal       Breath sounds: Normal breath sounds  Abdominal:      General: Bowel sounds are normal  There is no distension  Palpations: Abdomen is soft  There is no mass  Tenderness: There is no abdominal tenderness  Musculoskeletal: Normal range of motion  General: No swelling, tenderness or deformity  Right lower leg: No edema  Left lower leg: No edema  Feet:      Right foot:      Skin integrity: No ulcer, skin breakdown, erythema, warmth, callus or dry skin  Left foot:      Skin integrity: No ulcer, skin breakdown, erythema, warmth, callus or dry skin  Lymphadenopathy:      Cervical: No cervical adenopathy  Skin:     General: Skin is warm and dry  Capillary Refill: Capillary refill takes less than 2 seconds  Neurological:      General: No focal deficit present  Mental Status: He is alert and oriented to person, place, and time  Cranial Nerves: No cranial nerve deficit  Sensory: No sensory deficit  Motor: No weakness  Coordination: Coordination normal       Gait: Gait normal       Deep Tendon Reflexes: Reflexes normal    Psychiatric:         Mood and Affect: Mood normal          Behavior: Behavior normal          Thought Content: Thought content normal          Judgment: Judgment normal            Patient's shoes and socks removed  Right Foot/Ankle   Right Foot Inspection  Skin Exam: skin normal and skin intact no dry skin, no warmth, no callus, no erythema, no maceration, no abnormal color, no pre-ulcer, no ulcer and no callus                          Toe Exam: ROM and strength within normal limits  Sensory   Vibration: intact    Monofilament testing: intact  Vascular  Capillary refills: < 3 seconds  The right DP pulse is 1+  Left Foot/Ankle  Left Foot Inspection  Skin Exam: skin normal and skin intactno dry skin, no warmth, no erythema, no maceration, normal color, no pre-ulcer, no ulcer and no callus                         Toe Exam: ROM and strength within normal limits                   Sensory   Vibration: intact    Monofilament: intact  Vascular  Capillary refills: < 3 seconds  The left DP pulse is 1+  Assign Risk Category:  No deformity present; No loss of protective sensation;  No weak pulses       Risk: 0

## 2020-09-08 NOTE — PATIENT INSTRUCTIONS

## 2020-09-08 NOTE — ASSESSMENT & PLAN NOTE
Lab Results   Component Value Date    HGBA1C 6 6 (A) 09/08/2020     Improved control despite increased weight  Continue present care  Stressed diet compliance and weight loss  BMI Counseling: Body mass index is 42 12 kg/m²  The BMI is above normal  Nutrition recommendations include reducing portion sizes, decreasing overall calorie intake, consuming healthier snacks, moderation in carbohydrate intake, increasing intake of lean protein, reducing intake of saturated fat and trans fat and reducing intake of cholesterol  Exercise recommendations include exercising 3-5 times per week    Recheck 6m

## 2020-09-08 NOTE — PROGRESS NOTES
Assessment and Plan:     Problem List Items Addressed This Visit        Endocrine    Type 2 diabetes mellitus without complication, without long-term current use of insulin ([facility] )       Lab Results   Component Value Date    HGBA1C 6 6 (A) 09/08/2020     Improved control despite increased weight  Continue present care  Stressed diet compliance and weight loss  BMI Counseling: Body mass index is 42 12 kg/m²  The BMI is above normal  Nutrition recommendations include reducing portion sizes, decreasing overall calorie intake, consuming healthier snacks, moderation in carbohydrate intake, increasing intake of lean protein, reducing intake of saturated fat and trans fat and reducing intake of cholesterol  Exercise recommendations include exercising 3-5 times per week  Recheck 6m           Relevant Orders    POCT hemoglobin A1c (Completed)    Comprehensive metabolic panel    Lipid panel       Respiratory    Obstructive sleep apnea     Pt not using CPAP  Discussed risk for worsening symptoms associated with increased weight  Watch for now  Recheck 6m            Cardiovascular and Mediastinum    Dilated cardiomyopathy (HCC)     Stable  No worsening symptoms  Continue present care  F/u with cardio  Recheck 6m         Essential hypertension     Well controlled  Cont present treatment  Monitor labs  Recheck 6m           Relevant Orders    CBC and differential    Permanent atrial fibrillation (HCC)     Rate controlled  Pt asymptomatic  F/u with Cardio  Continue present care         Relevant Orders    CBC and differential       Other    Dyslipidemia     Check labs  Continue atorvastatin  Recheck 6m         Morbid obesity with body mass index (BMI) of 40 0 to 44 9 in adult ([facility] [address] )     Urged weight loss  BMI Counseling: Body mass index is 42 12 kg/m²   The BMI is above normal  Nutrition recommendations include reducing portion sizes, decreasing overall calorie intake, consuming healthier snacks, moderation in carbohydrate intake, increasing intake of lean protein, reducing intake of saturated fat and trans fat and reducing intake of cholesterol  Exercise recommendations include exercising 3-5 times per week  Recheck 6m             Other Visit Diagnoses     Medicare annual wellness visit, subsequent    -  Primary    Need for hepatitis C screening test        Screening for colorectal cancer        Relevant Orders    Ambulatory referral to Colorectal Surgery           Preventive health issues were discussed with patient, and age appropriate screening tests were ordered as noted in patient's After Visit Summary  Personalized health advice and appropriate referrals for health education or preventive services given if needed, as noted in patient's After Visit Summary       History of Present Illness:     Patient presents for Medicare Annual Wellness visit    Patient Care Team:  Huyen Ovalle MD as PCP - MD Huyen Schwartz MD Rhetta Gallop, DO     Problem List:     Patient Active Problem List   Diagnosis    Anxiety    P[facility] fibrillation ([facility] )    Dilated cardiomyopathy ([facility] )    Dry eye syndrome    Dyslipidemia    Essential hypertension    Obstructive sleep apnea    Morbid obesity with body mass index (BMI) of 40 0 to 44 9 in [facility])    Type 2 diabetes mellitus without complication, without long-term current use of insulin ([facility] )   [facility]    Medtronic dual chamber PM    Sarcoidosis    Tinea cruris    Dyspnea on exertion      Past Medical and Surgical History:     Past Medical History:   Diagnosis Date    Chronic systolic congestive heart failure ([facility] )     last assessed - 26Feb2014    Diabetes mellitus ([facility] )     History of cardiac catheterization 10/2012    normal coronaries w/o significant obstructive coronary disease    Pacemaker     last assessed - 19Oct2017     Past Surgical History:   Procedure Laterality Date    CARDIOVERSION      Elective    FISTULA REPAIR      perirectal    TONSILLECTOMY            Family History:     Family History   Problem Relation Age of Onset    Atrial fibrillation Mother     Cancer Mother     Heart disease Father     Hypertension Father     Skin cancer Father     Cancer Family     Transient ischemic attack Family         without residual deficits      Social History:        Social History     Socioeconomic History    Marital status: /Civil Union     Spouse name: None    Number of children: None    Years of education: None    Highest education level: None   Occupational History    Occupation: Retired   Social Needs    Financial resource strain: None    Food insecurity     Worry: None     Inability: None    Transportation needs     Medical: None     Non-medical: None   Tobacco Use    Smoking status: Former Smoker     Packs/day: 2 00     Years: 5 00     Pack years: 10 00     Last attempt to quit: [address]     Years since quittin 7    Smokeless tobacco: Never Used    Tobacco comment: 2 ppd x 5 years   Substance and Sexual Activity    Alcohol use:  Yes     Alcohol/week: 2 0 - 3 0 standard drinks     Types: 2 - 3 Cans of beer per week     Comment: a week    Drug use: No    Sexual activity: None   Lifestyle    Physical activity     Days per week: None     Minutes per session: None    Stress: None   Relationships    Social connections     Talks on phone: None     Gets together: None     Attends Sabianism service: None     Active member of club or organization: None     Attends meetings of clubs or organizations: None     Relationship status: None    Intimate partner violence     Fear of current or ex partner: None     Emotionally abused: None     Physically abused: None     Forced sexual activity: None   Other Topics Concern    None   Social History Narrative    Always uses seat belt    Daily coffee consumption (___Cups/Day)    Dental care regularly    Pets/Animals    Uses safety Equipment - seatbelts Medications and Allergies:     Current Outpatient Medications   Medication Sig Dispense Refill    albuterol (ACCUNEB) 0 63 MG/3ML nebulizer solution Take 3 mL (0 63 mg total) by nebulization every 6 (six) hours as needed for wheezing 90 vial 11    albuterol (Ventolin HFA) 90 mcg/act inhaler Inhale 1 puff 4 (four) times a day 3 Inhaler 1    atorvastatin (LIPITOR) 40 mg tablet Take 1 tablet (40 mg total) by mouth daily at bedtime 90 tablet 3    citalopram (CeleXA) 20 mg tablet Take 1 tablet (20 mg total) by mouth daily 90 tablet 3    clotrimazole-betamethasone (LOTRISONE) 1-0 05 % cream Apply topically 2 (two) times a day (Patient not taking: Reported on 9/13/2019) 30 g 2    cycloSPORINE (RESTASIS) 0 05 % ophthalmic emulsion Apply 1 drop to eye 2 (two) times a day      diltiazem (TIAZAC) 360 MG 24 hr capsule Take 1 capsule (360 mg total) by mouth daily 90 capsule 3    erythromycin (ILOTYCIN) ophthalmic ointment Apply to eye      furosemide (LASIX) 40 mg tablet Take 1 tablet (40 mg total) by mouth daily 90 tablet 3    glucose blood test strip 1 each by Other route daily Use as instructed 50 each 5    guaiFENesin (MUCINEX) 600 mg 12 hr tablet Take 600 mg by mouth every 12 (twelve) hours      metFORMIN (GLUCOPHAGE) 1000 MG tablet Take 1 tablet (1,000 mg total) by mouth 2 (two) times a day with meals 180 tablet 3    metoprolol tartrate (LOPRESSOR) 50 mg tablet Take 1 tablet (50 mg total) by mouth 2 (two) times a day 180 tablet 3    mometasone (ASMANEX 120 METERED DOSES) 220 MCG/INH inhaler Inhale 1 puff 2 (two) times a day      NEBULIZER SYSTEM ALL-IN-ONE MISC 1 Device by Does not apply route every 4 (four) hours while awake 1 each 0    NEBULIZER SYSTEM ALL-IN-ONE MISC 1 Device by Does not apply route daily as needed (wheezing) 1 each 0    Respiratory Therapy Supplies (NEBULIZER/ADULT MASK) KIT by Does not apply route daily as needed (wheezing) 1 each 0    Respiratory Therapy Supplies (NEBULIZER/TUBING/MOUTHPIECE) KIT by Does not apply route as needed (wheezing) 1 each 0    rivaroxaban (XARELTO) 20 mg tablet Take 1 tablet (20 mg total) by mouth daily with breakfast 90 tablet 3     No current facility-administered medications for this visit  No Known Allergies   Immunizations:     Immunization History   Administered Date(s) Administered    Influenza Quadrivalent 3 years and older 10/14/2018    Influenza TIV (IM) 10/11/2013, 09/29/2015    Pneumococcal Conjugate 13-Valent 09/29/2015    Pneumococcal Polysaccharide PPV23 10/11/2013    Tdap 11/02/2018    Zoster Vaccine Recombinant 08/22/2019, 12/10/2019      Health Maintenance:         Topic Date Due    Hepatitis C Screening  Completed         Topic Date Due    Influenza Vaccine  07/01/2020      Medicare Health Risk Assessment:     /80   Pulse 70   Temp 97 9 °F (36 6 °C)   Ht 5' 11" (1 803 m)   Wt (!) 137 kg (302 lb)   BMI 42 12 kg/m²      Rajani Soto is here for his Subsequent Wellness visit  Last Medicare Wellness visit information reviewed, patient interviewed and updates made to the record today  Health Risk Assessment:   Patient rates overall health as good  Patient feels that their physical health rating is same  Eyesight was rated as same  Hearing was rated as same  Patient feels that their emotional and mental health rating is same  Pain experienced in the last 7 days has been none  Patient states that he has experienced no weight loss or gain in last 6 months  Depression Screening:   PHQ-2 Score: 0      Fall Risk Screening: In the past year, patient has experienced: no history of falling in past year      Home Safety:  Patient does not have trouble with stairs inside or outside of their home  Patient has working smoke alarms and has working carbon monoxide detector  Home safety hazards include: none  Nutrition:   Current diet is Diabetic and Other (please comment)   Does cheat    Medications:   Patient is currently taking over-the-counter supplements  OTC medications include: see medication list  Patient is able to manage medications  Activities of Daily Living (ADLs)/Instrumental Activities of Daily Living (IADLs):   Walk and transfer into and out of bed and chair?: Yes  Dress and groom yourself?: Yes    Bathe or shower yourself?: Yes    Feed yourself? Yes  Do your laundry/housekeeping?: Yes  Manage your money, pay your bills and track your expenses?: Yes  Make your own meals?: Yes    Do your own shopping?: Yes    Previous Hospitalizations:   Any hospitalizations or ED visits within the last 12 months?: No      Advance Care Planning:   Living will: No    Durable POA for healthcare: No    Advanced directive: No    Advanced directive counseling given: Yes    Five wishes given: Yes      Cognitive Screening:   Provider or family/friend/caregiver concerned regarding cognition?: No    PREVENTIVE SCREENINGS      Cardiovascular Screening:    General: Screening Current      Diabetes Screening:     General: Screening Not Indicated and History Diabetes      Colorectal Cancer Screening:     General: Screening Current      Prostate Cancer Screening:    General: Screening Current      Osteoporosis Screening:    General: Screening Not Indicated      Abdominal Aortic Aneurysm (AAA) Screening:    Risk factors include: tobacco use        Lung Cancer Screening:     General: Screening Not Indicated      Hepatitis C Screening:    General: Screening Current    Other Counseling Topics:   Calcium and vitamin D intake and regular weightbearing exercise         Km Chamberlain MD

## 2020-09-08 NOTE — ASSESSMENT & PLAN NOTE
Urged weight loss  BMI Counseling: Body mass index is 42 12 kg/m²  The BMI is above normal  Nutrition recommendations include reducing portion sizes, decreasing overall calorie intake, consuming healthier snacks, moderation in carbohydrate intake, increasing intake of lean protein, reducing intake of saturated fat and trans fat and reducing intake of cholesterol  Exercise recommendations include exercising 3-5 times per week    Recheck 6m

## 2020-11-10 ENCOUNTER — REMOTE DEVICE CLINIC VISIT (OUTPATIENT)
Dept: CARDIOLOGY CLINIC | Facility: CLINIC | Age: 60
End: 2020-11-10
Payer: MEDICARE

## 2020-11-10 DIAGNOSIS — Z95.0 PRESENCE OF PERMANENT CARDIAC PACEMAKER: Primary | ICD-10-CM

## 2020-11-10 PROCEDURE — 93296 REM INTERROG EVL PM/IDS: CPT | Performed by: INTERNAL MEDICINE

## 2020-11-10 PROCEDURE — 93294 REM INTERROG EVL PM/LDLS PM: CPT | Performed by: INTERNAL MEDICINE

## 2021-02-08 ENCOUNTER — TELEPHONE (OUTPATIENT)
Dept: FAMILY MEDICINE CLINIC | Facility: CLINIC | Age: 61
End: 2021-02-08

## 2021-02-23 ENCOUNTER — REMOTE DEVICE CLINIC VISIT (OUTPATIENT)
Dept: CARDIOLOGY CLINIC | Facility: CLINIC | Age: 61
End: 2021-02-23
Payer: MEDICARE

## 2021-02-23 DIAGNOSIS — Z95.0 PACEMAKER: Primary | ICD-10-CM

## 2021-02-23 PROCEDURE — 93296 REM INTERROG EVL PM/IDS: CPT | Performed by: INTERNAL MEDICINE

## 2021-02-23 PROCEDURE — 93294 REM INTERROG EVL PM/LDLS PM: CPT | Performed by: INTERNAL MEDICINE

## 2021-02-23 NOTE — PROGRESS NOTES
MDT-DUAL CHAMBER PPM (VVIR MODE)/ ACTIVE SYSTEM IS MRI CONDITIONAL   CARELINK TRANSMISSION: BATTERY VOLTAGE NEARING TEVIN (<3-10 MOS)  WILL SCHEDULE MONTHLY BATTERY CHECKS   42% (>40%/VVIR 60/AF)  ALL AVAILABLE LEAD PARAMETERS WITHIN NORMAL LIMITS  NO SIGNIFICANT HIGH RATE EPISODES  AF IN PROGRESS (100%) & PT TAKES XARELTO, DILITIAZEM, METOPROLOL TART   PACEMAKER FUNCTIONING APPROPRIATELY    EBS

## 2021-02-24 DIAGNOSIS — E78.5 DYSLIPIDEMIA: ICD-10-CM

## 2021-02-24 DIAGNOSIS — I48.91 ATRIAL FIBRILLATION, UNSPECIFIED TYPE (HCC): ICD-10-CM

## 2021-02-24 DIAGNOSIS — I48.21 PERMANENT ATRIAL FIBRILLATION (HCC): ICD-10-CM

## 2021-02-24 DIAGNOSIS — I10 ESSENTIAL HYPERTENSION: ICD-10-CM

## 2021-02-24 DIAGNOSIS — I50.9 CONGESTIVE HEART FAILURE, UNSPECIFIED HF CHRONICITY, UNSPECIFIED HEART FAILURE TYPE (HCC): ICD-10-CM

## 2021-02-24 RX ORDER — DILTIAZEM HYDROCHLORIDE 360 MG/1
360 CAPSULE, EXTENDED RELEASE ORAL DAILY
Qty: 90 CAPSULE | Refills: 3 | Status: SHIPPED | OUTPATIENT
Start: 2021-02-24 | End: 2021-04-01 | Stop reason: SDUPTHER

## 2021-02-24 RX ORDER — FUROSEMIDE 40 MG/1
40 TABLET ORAL DAILY
Qty: 90 TABLET | Refills: 0 | Status: SHIPPED | OUTPATIENT
Start: 2021-02-24 | End: 2021-04-01 | Stop reason: SDUPTHER

## 2021-02-24 RX ORDER — ATORVASTATIN CALCIUM 40 MG/1
40 TABLET, FILM COATED ORAL
Qty: 90 TABLET | Refills: 0 | Status: SHIPPED | OUTPATIENT
Start: 2021-02-24 | End: 2021-04-01 | Stop reason: SDUPTHER

## 2021-02-24 RX ORDER — METOPROLOL TARTRATE 50 MG/1
50 TABLET, FILM COATED ORAL 2 TIMES DAILY
Qty: 180 TABLET | Refills: 0 | Status: SHIPPED | OUTPATIENT
Start: 2021-02-24 | End: 2021-03-12 | Stop reason: SDUPTHER

## 2021-03-09 DIAGNOSIS — E11.9 TYPE 2 DIABETES MELLITUS WITHOUT COMPLICATION, WITHOUT LONG-TERM CURRENT USE OF INSULIN (HCC): ICD-10-CM

## 2021-03-12 ENCOUNTER — OFFICE VISIT (OUTPATIENT)
Dept: FAMILY MEDICINE CLINIC | Facility: CLINIC | Age: 61
End: 2021-03-12
Payer: MEDICARE

## 2021-03-12 VITALS
HEIGHT: 71 IN | SYSTOLIC BLOOD PRESSURE: 120 MMHG | HEART RATE: 74 BPM | DIASTOLIC BLOOD PRESSURE: 80 MMHG | TEMPERATURE: 97.9 F | WEIGHT: 309 LBS | BODY MASS INDEX: 43.26 KG/M2

## 2021-03-12 DIAGNOSIS — I42.0 DILATED CARDIOMYOPATHY (HCC): ICD-10-CM

## 2021-03-12 DIAGNOSIS — G47.33 OBSTRUCTIVE SLEEP APNEA: ICD-10-CM

## 2021-03-12 DIAGNOSIS — E11.9 TYPE 2 DIABETES MELLITUS WITHOUT COMPLICATION, WITHOUT LONG-TERM CURRENT USE OF INSULIN (HCC): Primary | ICD-10-CM

## 2021-03-12 DIAGNOSIS — I10 ESSENTIAL HYPERTENSION: ICD-10-CM

## 2021-03-12 DIAGNOSIS — I48.21 PERMANENT ATRIAL FIBRILLATION (HCC): ICD-10-CM

## 2021-03-12 LAB
CREAT UR-MCNC: 189 MG/DL
MICROALBUMIN UR-MCNC: 7.2 MG/L (ref 0–20)
MICROALBUMIN/CREAT 24H UR: 4 MG/G CREATININE (ref 0–30)
SL AMB POCT HEMOGLOBIN AIC: 7.6 (ref ?–6.5)

## 2021-03-12 PROCEDURE — 99214 OFFICE O/P EST MOD 30 MIN: CPT | Performed by: FAMILY MEDICINE

## 2021-03-12 PROCEDURE — 82570 ASSAY OF URINE CREATININE: CPT | Performed by: FAMILY MEDICINE

## 2021-03-12 PROCEDURE — 83036 HEMOGLOBIN GLYCOSYLATED A1C: CPT | Performed by: FAMILY MEDICINE

## 2021-03-12 PROCEDURE — 82043 UR ALBUMIN QUANTITATIVE: CPT | Performed by: FAMILY MEDICINE

## 2021-03-12 RX ORDER — CYCLOSPORINE 0.5 MG/ML
EMULSION OPHTHALMIC
COMMUNITY
End: 2021-04-01 | Stop reason: SDUPTHER

## 2021-03-12 RX ORDER — METOPROLOL TARTRATE 50 MG/1
50 TABLET, FILM COATED ORAL 2 TIMES DAILY
Qty: 180 TABLET | Refills: 1 | Status: SHIPPED | OUTPATIENT
Start: 2021-03-12 | End: 2021-04-01 | Stop reason: SDUPTHER

## 2021-03-12 NOTE — PROGRESS NOTES
Assessment/Plan:    Type 2 diabetes mellitus without complication, without long-term current use of insulin (HCC)    Lab Results   Component Value Date    HGBA1C 7 6 (A) 03/12/2021     Control suboptimal due to diet compliance issue  Urged compliance  Discussed adjusting meds - pt wants to work on diet and exercise first  BMI Counseling: Body mass index is 43 1 kg/m²  The BMI is above normal  Nutrition recommendations include consuming healthier snacks, moderation in carbohydrate intake, increasing intake of lean protein, reducing intake of saturated fat and trans fat and reducing intake of cholesterol  Exercise recommendations include exercising 3-5 times per week  Recheck 3m      Permanent atrial fibrillation (HCC)  Rate stable  Continue present care  F/u with Cardio  Recheck 6m    Dilated cardiomyopathy ([address] )  Pt appears euvolemic  Continue present care  Recheck 6m    Essential hypertension  Well controlled  Cont present treatment  Monitor labs  Recheck 6m         Diagnoses and all orders for this visit:    Type 2 diabetes mellitus without complication, without long-term current use of insulin (HCC)  -     POCT hemoglobin A1c  -     Microalbumin / creatinine urine ratio    Obstructive sleep apnea    Permanent atrial fibrillation (HCC)  -     metoprolol tartrate (LOPRESSOR) 50 mg tablet; Take 1 tablet (50 mg total) by mouth 2 (two) times a day    Dilated cardiomyopathy ([address] )    Essential hypertension    Other orders  -     cycloSPORINE (Restasis) 0 05 % ophthalmic emulsion; Apply to eye          Subjective:      Patient ID: Hellen Knight is a 59 y o  male  f/u multiple med issues   - pt has been doing well  Got both of his COVID vaccines (Elois Jump)  - fell off of diet since last visit  A1C increased to 7 6  Pt has not been exercising and is compliant with metformin  Patient is up-to-date with eye exams  - pt denies CP, palp, lightheadedness or other CV symptoms with or without exertion   Due to see Cardio  - pt states that his breathing is unchanged  Still can be labile  SOB with exertion is a little worse since he gained weight  Up to date with Pulm  - no new Gi complaints  Due for colonoscopy- scheduling with colorectal surgery  - no new  issues        The following portions of the patient's history were reviewed and updated as appropriate:   He  has a past medical history of Chronic systolic congestive heart failure ([place] [address] ), Diabetes mellitus ([place] [address] ), History of cardiac catheterization (10/2012), and Pacemaker  He   Patient Active Problem List    Diagnosis Date Noted    Dyspnea on exertion 09/13/2019    Tinea cruris 08/06/2019    Sarcoidosis 05/22/2018    Medtronic dual chamber PM 04/08/2018    Health care maintenance 04/03/2018    Type 2 diabetes mellitus without complication, without long-term current use of insulin ([place] [address] ) 11/06/2015    Morbid obesity with body mass index (BMI) of 40 0 to 44 9 in adult [facility]) 02/20/2015    Dry eye syndrome 07/17/2014    Dilated cardiomyopathy ([place] [address] ) 08/30/2013    Anxiety 11/05/2012    Obstructive sleep apnea 09/20/2012    Dyslipidemia 09/12/2012    Permanent atrial fibrillation ([place] [address] ) 09/05/2012    Essential hypertension 09/05/2012     He  has a past surgical history that includes Cardioversion; FISTULA REPAIR; and Tonsillectomy  He  reports that he quit smoking about 41 years ago  He has a 10 00 pack-year smoking history  He has never used smokeless tobacco  He reports current alcohol use of about 2 0 - 3 0 standard drinks of alcohol per week  He reports that he does not use drugs    Current Outpatient Medications   Medication Sig Dispense Refill    albuterol (ACCUNEB) 0 63 MG/3ML nebulizer solution Take 3 mL (0 63 mg total) by nebulization every 6 (six) hours as needed for wheezing 90 vial 11    albuterol (Ventolin HFA) 90 mcg/act inhaler Inhale 1 puff 4 (four) times a day 3 Inhaler 1    atorvastatin (LIPITOR) 40 mg tablet Take 1 tablet (40 mg total) by mouth daily at bedtime 90 tablet 0    citalopram (CeleXA) 20 mg tablet Take 1 tablet (20 mg total) by mouth daily 90 tablet 3    clotrimazole-betamethasone (LOTRISONE) 1-0 05 % cream Apply topically 2 (two) times a day (Patient not taking: Reported on 9/13/2019) 30 g 2    cycloSPORINE (RESTASIS) 0 05 % ophthalmic emulsion Apply 1 drop to eye 2 (two) times a day      cycloSPORINE (Restasis) 0 05 % ophthalmic emulsion Apply to eye      diltiazem (TIAZAC) 360 MG 24 hr capsule Take 1 capsule (360 mg total) by mouth daily 90 capsule 3    erythromycin (ILOTYCIN) ophthalmic ointment Apply to eye      furosemide (LASIX) 40 mg tablet Take 1 tablet (40 mg total) by mouth daily 90 tablet 0    glucose blood test strip 1 each by Other route daily Use as instructed 50 each 5    guaiFENesin (MUCINEX) 600 mg 12 hr tablet Take 600 mg by mouth every 12 (twelve) hours      metFORMIN (GLUCOPHAGE) 1000 MG tablet Take 1 tablet (1,000 mg total) by mouth 2 (two) times a day with meals 180 tablet 3    metoprolol tartrate (LOPRESSOR) 50 mg tablet Take 1 tablet (50 mg total) by mouth 2 (two) times a day 180 tablet 1    mometasone (ASMANEX 120 METERED DOSES) 220 MCG/INH inhaler Inhale 1 puff 2 (two) times a day      NEBULIZER SYSTEM ALL-IN-ONE MISC 1 Device by Does not apply route every 4 (four) hours while awake 1 each 0    NEBULIZER SYSTEM ALL-IN-ONE MISC 1 Device by Does not apply route daily as needed (wheezing) 1 each 0    Respiratory Therapy Supplies (NEBULIZER/ADULT MASK) KIT by Does not apply route daily as needed (wheezing) 1 each 0    Respiratory Therapy Supplies (NEBULIZER/TUBING/MOUTHPIECE) KIT by Does not apply route as needed (wheezing) 1 each 0    rivaroxaban (Xarelto) 20 mg tablet Take 1 tablet (20 mg total) by mouth daily with breakfast 90 tablet 0     No current facility-administered medications for this visit  He has No Known Allergies       Review of Systems   Constitutional: Negative      HENT: Negative  Eyes: Negative  Respiratory: Positive for shortness of breath  Negative for cough and wheezing  Cardiovascular: Positive for leg swelling  Negative for chest pain and palpitations  Gastrointestinal: Negative  Endocrine: Negative  Genitourinary: Negative  Musculoskeletal: Negative  Skin: Negative  Allergic/Immunologic: Negative  Neurological: Negative  Hematological: Negative  Psychiatric/Behavioral: Negative  Objective:      /80   Pulse 74   Temp 97 9 °F (36 6 °C)   Ht 5' 11" (1 803 m)   Wt (!) 140 kg (309 lb)   BMI 43 10 kg/m²          Physical Exam  Constitutional:       Appearance: He is well-developed  HENT:      Head: Normocephalic and atraumatic  Right Ear: Tympanic membrane, ear canal and external ear normal       Left Ear: Tympanic membrane, ear canal and external ear normal    Eyes:      General: No scleral icterus  Extraocular Movements: Extraocular movements intact  Conjunctiva/sclera: Conjunctivae normal       Pupils: Pupils are equal, round, and reactive to light  Neck:      Musculoskeletal: Normal range of motion and neck supple  No muscular tenderness  Thyroid: No thyromegaly  Cardiovascular:      Rate and Rhythm: Normal rate  Rhythm irregular  Pulses: no weak pulses          Dorsalis pedis pulses are 1+ on the right side and 1+ on the left side  Heart sounds: Normal heart sounds  No murmur  Pulmonary:      Effort: Pulmonary effort is normal       Breath sounds: Normal breath sounds  Abdominal:      General: Bowel sounds are normal  There is no distension  Palpations: Abdomen is soft  There is no mass  Tenderness: There is no abdominal tenderness  Musculoskeletal: Normal range of motion  General: No swelling or tenderness  Right lower leg: No edema  Left lower leg: No edema     Feet:      Right foot:      Skin integrity: No ulcer, skin breakdown, erythema, warmth, callus or dry skin  Left foot:      Skin integrity: No ulcer, skin breakdown, erythema, warmth, callus or dry skin  Lymphadenopathy:      Cervical: No cervical adenopathy  Skin:     General: Skin is warm and dry  Capillary Refill: Capillary refill takes less than 2 seconds  Neurological:      Mental Status: He is alert and oriented to person, place, and time  Cranial Nerves: No cranial nerve deficit  Sensory: No sensory deficit  Motor: No weakness  Gait: Gait normal    Psychiatric:         Mood and Affect: Mood normal          Behavior: Behavior normal          Thought Content: Thought content normal          Judgment: Judgment normal       Comments: PHQ-9 Depression Screening    PHQ-9:   Frequency of the following problems over the past two weeks:      Little interest or pleasure in doing things: 0 - not at all  Feeling down, depressed, or hopeless: 0 - not at all  PHQ-2 Score: 0              Patient's shoes and socks removed  Right Foot/Ankle   Right Foot Inspection  Skin Exam: skin normal and skin intact no dry skin, no warmth, no callus, no erythema, no maceration, no abnormal color, no pre-ulcer, no ulcer and no callus                          Toe Exam: ROM and strength within normal limits  Sensory   Vibration: intact    Monofilament testing: intact  Vascular  Capillary refills: < 3 seconds  The right DP pulse is 1+  Left Foot/Ankle  Left Foot Inspection  Skin Exam: skin normal and skin intactno dry skin, no warmth, no erythema, no maceration, normal color, no pre-ulcer, no ulcer and no callus                         Toe Exam: ROM and strength within normal limits                   Sensory   Vibration: intact    Monofilament: intact  Vascular  Capillary refills: < 3 seconds  The left DP pulse is 1+  Assign Risk Category:  No deformity present; No loss of protective sensation;  No weak pulses       Risk: 0

## 2021-03-14 NOTE — ASSESSMENT & PLAN NOTE
Lab Results   Component Value Date    HGBA1C 7 6 (A) 03/12/2021     Control suboptimal due to diet compliance issue  Urged compliance  Discussed adjusting meds - pt wants to work on diet and exercise first  BMI Counseling: Body mass index is 43 1 kg/m²  The BMI is above normal  Nutrition recommendations include consuming healthier snacks, moderation in carbohydrate intake, increasing intake of lean protein, reducing intake of saturated fat and trans fat and reducing intake of cholesterol  Exercise recommendations include exercising 3-5 times per week    Recheck 3m

## 2021-03-26 ENCOUNTER — REMOTE DEVICE CLINIC VISIT (OUTPATIENT)
Dept: CARDIOLOGY CLINIC | Facility: CLINIC | Age: 61
End: 2021-03-26

## 2021-03-26 DIAGNOSIS — Z95.0 PRESENCE OF PERMANENT CARDIAC PACEMAKER: Primary | ICD-10-CM

## 2021-03-26 PROCEDURE — RECHECK: Performed by: INTERNAL MEDICINE

## 2021-03-26 NOTE — PROGRESS NOTES
Results for orders placed or performed in visit on 03/26/21   Cardiac EP device report    Narrative    MDT-DUAL CHAMBER PPM (VVIR MODE)/ ACTIVE SYSTEM IS MRI CONDITIONAL  CARELINK TRANSMISSION: N/B --BATTERY CHECK---BATTERY VOLTAGE NEARING TEVIN  WILL SCHEDULE MONTHLY BATTERY CHECKS  (6 MONTHS)   35%  ALL AVAILABLE LEAD PARAMETERS WITHIN NORMAL LIMITS  1 rATP AT/AF EPISODE DETECTED  PATIENT CURRENTLY IN AF AND ON Yris Fitting  NORMAL DEVICE FUNCTION  ----ESTRADA

## 2021-04-01 ENCOUNTER — OFFICE VISIT (OUTPATIENT)
Dept: CARDIOLOGY CLINIC | Facility: CLINIC | Age: 61
End: 2021-04-01
Payer: MEDICARE

## 2021-04-01 VITALS
BODY MASS INDEX: 41.02 KG/M2 | WEIGHT: 293 LBS | HEART RATE: 83 BPM | DIASTOLIC BLOOD PRESSURE: 78 MMHG | HEIGHT: 71 IN | SYSTOLIC BLOOD PRESSURE: 118 MMHG

## 2021-04-01 DIAGNOSIS — I50.9 CONGESTIVE HEART FAILURE, UNSPECIFIED HF CHRONICITY, UNSPECIFIED HEART FAILURE TYPE (HCC): ICD-10-CM

## 2021-04-01 DIAGNOSIS — E11.9 TYPE 2 DIABETES MELLITUS WITHOUT COMPLICATION, WITHOUT LONG-TERM CURRENT USE OF INSULIN (HCC): ICD-10-CM

## 2021-04-01 DIAGNOSIS — E78.5 DYSLIPIDEMIA: ICD-10-CM

## 2021-04-01 DIAGNOSIS — I48.21 PERMANENT ATRIAL FIBRILLATION (HCC): Primary | ICD-10-CM

## 2021-04-01 DIAGNOSIS — I42.0 DILATED CARDIOMYOPATHY (HCC): ICD-10-CM

## 2021-04-01 DIAGNOSIS — D86.9 SARCOIDOSIS: ICD-10-CM

## 2021-04-01 DIAGNOSIS — Z95.0 PRESENCE OF CARDIAC PACEMAKER: ICD-10-CM

## 2021-04-01 DIAGNOSIS — G47.33 OBSTRUCTIVE SLEEP APNEA: ICD-10-CM

## 2021-04-01 DIAGNOSIS — R06.00 DYSPNEA ON EXERTION: ICD-10-CM

## 2021-04-01 DIAGNOSIS — I48.91 ATRIAL FIBRILLATION, UNSPECIFIED TYPE (HCC): ICD-10-CM

## 2021-04-01 DIAGNOSIS — I10 ESSENTIAL HYPERTENSION: ICD-10-CM

## 2021-04-01 PROCEDURE — 93000 ELECTROCARDIOGRAM COMPLETE: CPT | Performed by: INTERNAL MEDICINE

## 2021-04-01 PROCEDURE — 99215 OFFICE O/P EST HI 40 MIN: CPT | Performed by: INTERNAL MEDICINE

## 2021-04-01 RX ORDER — FUROSEMIDE 40 MG/1
40 TABLET ORAL DAILY
Qty: 90 TABLET | Refills: 3 | Status: SHIPPED | OUTPATIENT
Start: 2021-04-01 | End: 2022-05-25

## 2021-04-01 RX ORDER — DILTIAZEM HYDROCHLORIDE 360 MG/1
360 CAPSULE, EXTENDED RELEASE ORAL DAILY
Qty: 90 CAPSULE | Refills: 3 | Status: SHIPPED | OUTPATIENT
Start: 2021-04-01 | End: 2022-05-25

## 2021-04-01 RX ORDER — ATORVASTATIN CALCIUM 40 MG/1
40 TABLET, FILM COATED ORAL
Qty: 90 TABLET | Refills: 3 | Status: SHIPPED | OUTPATIENT
Start: 2021-04-01 | End: 2022-06-08

## 2021-04-01 RX ORDER — METOPROLOL TARTRATE 50 MG/1
50 TABLET, FILM COATED ORAL 2 TIMES DAILY
Qty: 180 TABLET | Refills: 3 | Status: SHIPPED | OUTPATIENT
Start: 2021-04-01 | End: 2022-05-25

## 2021-04-01 NOTE — PATIENT INSTRUCTIONS
Vickie Okeefe definitely needs to lose weight, his PCP has been on him about this as well with regards to his increased A1c of 7 6   He does need repeat lipids, blood work was ordered by his PCP September of 2020, he will going get this done   He needs a follow-up echocardiogram to assess LV function and RV function in setting of chronic atrial fibrillation and untreated obstructive sleep apnea due to CPAP intolerance   He is quite stable from a cardiac standpoint, and being checked monthly with regards to his device checks because of nearing TEVIN

## 2021-04-01 NOTE — PROGRESS NOTES
[facility]  Follow up note  Ree Blancas 59 y o  male MRN: [id number]        Problems    1  Permanent atrial fibrillation (HCC)  POCT ECG   2  Dilated cardiomyopathy ([address] )     3  Dyslipidemia     4  Dyspnea on exertion     5  Essential hypertension     6  Medtronic dual chamber PM     7  Obstructive sleep apnea     8  Sarcoidosis     9   Type 2 diabetes mellitus without complication, without long-term current use of insulin (HCC)         Impression:    Permanent atrial fibrillation  Well rate controlled  Occasional AFib with RVR episodes occur her on his device checks, sometimes associated with symptoms, but very rare, rate control does not need be change  Stable on Xarelto    Hypertension  Well controlled, 118/78 on diltiazem and metoprolol    Dyslipidemia  On atorvastatin  Last  in 2019, triglycerides 238  He is in need of labs    Nonischemic cardiomyopathy  With normalized LVEF on goal-directed medical therapy by 2019  Diuretic dependent  Only on metoprolol tartrate 50 mg twice a day from a goal-directed medical therapy standpoint  Unclear if his nonischemic cardiomyopathy was tachy mediated    Sick sinus syndrome status post dual-chamber permanent pacemaker, VVIR mode due to permanent atrial fibrillation  Last device check this month was unremarkable, but battery approaching TEVIN, he is on monthly checks    Obstructive sleep apnea  Previously not compliant with CPAP due to intolerance  He goes largely untreated but he does sleep most of the time in a recliner chair    Type 2 diabetes  A1c not ideally controlled at 7 6, this was most recently done 3/21, but prior to this A1c had dropped to 6 6 as of September 2020    Plan:    Dub Cleverly definitely needs to lose weight, his PCP has been on him about this as well with regards to his increased A1c of 7 6   He does need repeat lipids, blood work was ordered by his PCP September of 2020, he will going get this done   He needs a follow-up echocardiogram to assess LV function and RV function in setting of chronic atrial fibrillation and untreated obstructive sleep apnea due to CPAP intolerance   He is quite stable from a cardiac standpoint, and being checked monthly with regards to his device checks because of nearing TEVIN  HPI:   Anuja Melvin is a 59y o  year old male with a history of 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, permanent atrial fibrillation on a rate control strategy, chronic anticoagulation with Xarelto, sick sinus syndrome status post Medtronic dual-chamber pacemaker, hypertension, hyperlipidemia, type 2 diabetes, obesity and obstructive sleep apnea    He feels well, denies any CHF/decompensation symptoms, no hospitalizations, but has gained weight, A1c up to 7 6, had some recent edema for which he temporarily increase furosemide, has started to make some dietary changes as requested by his PCP, and thinks he has lost a couple lb  His blood pressure is excellent, his AFib rate control is excellent, he tolerates Xarelto well, his Medtronic device is nearing TEVIN, he is on monthly checks, his lipids were not well controlled in 2019 with an LDL of 114, but he is compliant with atorvastatin, and is in need of updated labs which were ordered by his PCP previously  He cannot tolerate CPAP, he went through treatment in the past, even home oxygen, but the overall cost became intolerable for him, and he mostly sleeps in a recliner chair anyway, his wife does not feel like he snores much  He has never had any significant RV dysfunction based on prior echoes  Review of Systems   Constitutional: Negative  HENT: Negative  Eyes: Negative  Respiratory: Negative  Cardiovascular: Negative  Gastrointestinal: Negative  Endocrine: Negative  Genitourinary: Negative  Musculoskeletal: Negative  Skin: Negative  Neurological: Negative  Hematological: Negative  Psychiatric/Behavioral: Negative          Past Medical History:   Diagnosis Date    Chronic systolic congestive heart failure [address] )     last assessed - [id number]    Diabetes mellitus ([facility] [facility] )     History of cardiac catheterization 10/2012    normal coronaries w/o significant obstructive coronary disease    Pacemaker     last assessed - 2017     Social History     Substance and Sexual Activity   Alcohol Use Yes    Alcohol/week: 2 0 - 3 0 standard drinks    Types: 2 - 3 Cans of beer per week    Comment: a week     Social History     Substance and Sexual Activity   Drug Use No     Social History     Tobacco Use   Smoking Status Former Smoker    Packs/day: 2 00    Years: 5 00    Pack years: 10 00    Quit date: 36    Years since quittin 2   Smokeless Tobacco Never Used   Tobacco Comment    2 ppd x 5 years       Allergies:  No Known Allergies    Medications:     Current Outpatient Medications:     albuterol (ACCUNEB) 0 63 MG/3ML nebulizer solution, Take 3 mL (0 63 mg total) by nebulization every 6 (six) hours as needed for wheezing, Disp: 90 vial, Rfl: 11    albuterol (Ventolin HFA) 90 mcg/act inhaler, Inhale 1 puff 4 (four) times a day, Disp: 3 Inhaler, Rfl: 1    atorvastatin (LIPITOR) 40 mg tablet, Take 1 tablet (40 mg total) by mouth daily at bedtime, Disp: 90 tablet, Rfl: 0    citalopram (CeleXA) 20 mg tablet, Take 1 tablet (20 mg total) by mouth daily, Disp: 90 tablet, Rfl: 3    cycloSPORINE (RESTASIS) 0 05 % ophthalmic emulsion, Apply 1 drop to eye 2 (two) times a day, Disp: , Rfl:     diltiazem (TIAZAC) 360 MG 24 hr capsule, Take 1 capsule (360 mg total) by mouth daily, Disp: 90 capsule, Rfl: 3    erythromycin (ILOTYCIN) ophthalmic ointment, Apply to eye, Disp: , Rfl:     furosemide (LASIX) 40 mg tablet, Take 1 tablet (40 mg total) by mouth daily, Disp: 90 tablet, Rfl: 0    glucose blood test strip, 1 each by Other route daily Use as instructed, Disp: 50 each, Rfl: 5    guaiFENesin (MUCINEX) 600 mg 12 hr tablet, Take 600 mg by mouth every 12 (twelve) hours, Disp: , Rfl:     metFORMIN (GLUCOPHAGE) 1000 MG tablet, Take 1 tablet (1,000 mg total) by mouth 2 (two) times a day with meals, Disp: 180 tablet, Rfl: 3    metoprolol tartrate (LOPRESSOR) 50 mg tablet, Take 1 tablet (50 mg total) by mouth 2 (two) times a day, Disp: 180 tablet, Rfl: 1    mometasone (ASMANEX 120 METERED DOSES) 220 MCG/INH inhaler, Inhale 1 puff 2 (two) times a day, Disp: , Rfl:     NEBULIZER SYSTEM ALL-IN-ONE MISC, 1 Device by Does not apply route every 4 (four) hours while awake, Disp: 1 each, Rfl: 0    Respiratory Therapy Supplies (NEBULIZER/ADULT MASK) KIT, by Does not apply route daily as needed (wheezing), Disp: 1 each, Rfl: 0    Respiratory Therapy Supplies (NEBULIZER/TUBING/MOUTHPIECE) KIT, by Does not apply route as needed (wheezing), Disp: 1 each, Rfl: 0    rivaroxaban (Xarelto) 20 mg tablet, Take 1 tablet (20 mg total) by mouth daily with breakfast, Disp: 90 tablet, Rfl: 0      Vitals:    04/01/21 1020   BP: 118/78   Pulse: 83     Weight (last 2 days)     Date/Time   Weight    04/01/21 1020   133 (293)            Physical Exam  Constitutional:       General: He is not in acute distress  Appearance: Normal appearance  He is well-developed  He is not diaphoretic  HENT:      Head: Normocephalic and atraumatic  Eyes:      General: No scleral icterus  Conjunctiva/sclera: Conjunctivae normal       Pupils: Pupils are equal, round, and reactive to light  Neck:      Musculoskeletal: Normal range of motion and neck supple  Thyroid: No thyromegaly  Vascular: No JVD  Cardiovascular:      Rate and Rhythm: Normal rate  Rhythm irregular  Heart sounds: Normal heart sounds  No murmur  No friction rub  No gallop  Pulmonary:      Effort: Pulmonary effort is normal  No respiratory distress  Breath sounds: Normal breath sounds  No wheezing or rales  Abdominal:      General: Bowel sounds are normal  There is no distension  Palpations: Abdomen is soft  Tenderness: There is no abdominal tenderness  Musculoskeletal: Normal range of motion  General: No deformity  Right lower leg: No edema  Left lower leg: No edema  Skin:     General: Skin is warm and dry  Findings: No erythema or rash  Neurological:      General: No focal deficit present  Mental Status: He is alert and oriented to person, place, and time  Cranial Nerves: No cranial nerve deficit  Motor: No weakness  Laboratory Studies:    Laboratory studies personally reviewed    Cardiac testing:     EKG reviewed personally:   4/1/2021-AFib, frequent PVCs    Echocardiogram:  2019-EF normal    Stress tests:      Catheterization:      Holter:         Ines Barahona MD    Portions of the record may have been created with voice recognition software  Occasional wrong word or "sound a like" substitutions may have occurred due to the inherent limitations of voice recognition software  Read the chart carefully and recognize, using context, where substitutions have occurred

## 2021-04-01 NOTE — LETTER
Cardiology Pre Operative Clearance      PRE OPERATIVE CARDIAC RISK ASSESSMENT    04/01/21    Leticia Pal  1960  [id number]    Date of Surgery: TBD    Type of Surgery: Colonoscopy    Surgeon: Dr Valeriano Pascual    No Cardiac Contraindication for Planned Surgical Procedures    Anticoagulation: yes    Physician Comment: Xarelto, hold 2 days prior    Electronically Signed: Arnold Robin MD

## 2021-04-27 ENCOUNTER — REMOTE DEVICE CLINIC VISIT (OUTPATIENT)
Dept: CARDIOLOGY CLINIC | Facility: CLINIC | Age: 61
End: 2021-04-27

## 2021-04-27 DIAGNOSIS — Z95.0 CARDIAC PACEMAKER IN SITU: Primary | ICD-10-CM

## 2021-04-27 PROCEDURE — RECHECK: Performed by: INTERNAL MEDICINE

## 2021-04-27 NOTE — PROGRESS NOTES
MDT-DUAL CHAMBER PPM (VVIR MODE)/ ACTIVE SYSTEM IS MRI CONDITIONAL   NON-BILLABLE CARELINK TRANSMISSION - BATTERY STATUS; BATTERY VOLTAGE NEARING TEVIN (~ 5 MOS/ <1-8 MOS)  WILL SCHEDULE MONTHLY BATTERY CHECKS   35 7% (>40%/VVIR 60/AF)  ALL AVAILABLE LEAD PARAMETERS WITHIN NORMAL LIMITS  NO SIGNIFICANT HIGH RATE EPISODES  AF IN PROGRESS (100%) & PT TAKES XARELTO, DILITIAZEM, METOPROLOL TART   PACEMAKER FUNCTIONING APPROPRIATELY    EBS

## 2021-05-11 ENCOUNTER — HOSPITAL ENCOUNTER (OUTPATIENT)
Dept: NON INVASIVE DIAGNOSTICS | Facility: CLINIC | Age: 61
Discharge: HOME | End: 2021-05-11
Payer: MEDICARE

## 2021-05-11 ENCOUNTER — IN-CLINIC DEVICE VISIT (OUTPATIENT)
Dept: CARDIOLOGY CLINIC | Facility: CLINIC | Age: 61
End: 2021-05-11
Payer: MEDICARE

## 2021-05-11 DIAGNOSIS — Z95.0 CARDIAC PACEMAKER IN SITU: Primary | ICD-10-CM

## 2021-05-11 DIAGNOSIS — I48.21 PERMANENT ATRIAL FIBRILLATION (HCC): ICD-10-CM

## 2021-05-11 PROCEDURE — 93306 TTE W/DOPPLER COMPLETE: CPT

## 2021-05-11 PROCEDURE — 93279 PRGRMG DEV EVAL PM/LDLS PM: CPT | Performed by: INTERNAL MEDICINE

## 2021-05-11 PROCEDURE — 93306 TTE W/DOPPLER COMPLETE: CPT | Performed by: INTERNAL MEDICINE

## 2021-05-11 NOTE — PROGRESS NOTES
Results for orders placed or performed in visit on 05/11/21   Cardiac EP device report    Narrative    MDT-DUAL CHAMBER PPM (VVIR MODE)/ ACTIVE SYSTEM IS MRI CONDITIONAL  DEVICE INTERROGATED IN THE [place] OFFICE: BATTERY VOLTAGE NEARING TEVIN 2 85V/2 83V  WILL SCHEDULE MONTHLY BATTERY CHECKS   32%  ALL AVAILABLE LEAD PARAMETERS WITHIN NORMAL LIMITS  NO SIGNIFICANT HIGH RATE EPISODES  100% AT/AF  PT ON XARELTO & METO TART  NO PROGRAMMING CHANGES MADE TO DEVICE PARAMETERS  NORMAL DEVICE FUNCTION   NC         Current Outpatient Medications:     albuterol (ACCUNEB) 0 63 MG/3ML nebulizer solution, Take 3 mL (0 63 mg total) by nebulization every 6 (six) hours as needed for wheezing, Disp: 90 vial, Rfl: 11    albuterol (Ventolin HFA) 90 mcg/act inhaler, Inhale 1 puff 4 (four) times a day, Disp: 3 Inhaler, Rfl: 1    atorvastatin (LIPITOR) 40 mg tablet, Take 1 tablet (40 mg total) by mouth daily at bedtime, Disp: 90 tablet, Rfl: 3    citalopram (CeleXA) 20 mg tablet, Take 1 tablet (20 mg total) by mouth daily, Disp: 90 tablet, Rfl: 3    cycloSPORINE (RESTASIS) 0 05 % ophthalmic emulsion, Apply 1 drop to eye 2 (two) times a day, Disp: , Rfl:     diltiazem (TIAZAC) 360 MG 24 hr capsule, Take 1 capsule (360 mg total) by mouth daily, Disp: 90 capsule, Rfl: 3    erythromycin (ILOTYCIN) ophthalmic ointment, Apply to eye, Disp: , Rfl:     furosemide (LASIX) 40 mg tablet, Take 1 tablet (40 mg total) by mouth daily, Disp: 90 tablet, Rfl: 3    glucose blood test strip, 1 each by Other route daily Use as instructed, Disp: 50 each, Rfl: 5    guaiFENesin (MUCINEX) 600 mg 12 hr tablet, Take 600 mg by mouth every 12 (twelve) hours, Disp: , Rfl:     metFORMIN (GLUCOPHAGE) 1000 MG tablet, Take 1 tablet (1,000 mg total) by mouth 2 (two) times a day with meals, Disp: 180 tablet, Rfl: 3    metoprolol tartrate (LOPRESSOR) 50 mg tablet, Take 1 tablet (50 mg total) by mouth 2 (two) times a day, Disp: 180 tablet, Rfl: 3    mometasone (ASMANEX 120 METERED DOSES) 220 MCG/INH inhaler, Inhale 1 puff 2 (two) times a day, Disp: , Rfl:     NEBULIZER SYSTEM ALL-IN-ONE MISC, 1 Device by Does not apply route every 4 (four) hours while awake, Disp: 1 each, Rfl: 0    Respiratory Therapy Supplies (NEBULIZER/ADULT MASK) KIT, by Does not apply route daily as needed (wheezing), Disp: 1 each, Rfl: 0    Respiratory Therapy Supplies (NEBULIZER/TUBING/MOUTHPIECE) KIT, by Does not apply route as needed (wheezing), Disp: 1 each, Rfl: 0    rivaroxaban (Xarelto) 20 mg tablet, Take 1 tablet (20 mg total) by mouth daily with breakfast, Disp: 90 tablet, Rfl: 3

## 2021-06-01 ENCOUNTER — TELEPHONE (OUTPATIENT)
Dept: CARDIOLOGY CLINIC | Facility: CLINIC | Age: 61
End: 2021-06-01

## 2021-06-01 NOTE — TELEPHONE ENCOUNTER
The patient should continue this regimen  It is well tolerated  Furthermore the patient has a permanent pacemaker

## 2021-06-01 NOTE — TELEPHONE ENCOUNTER
Received fax from [facility] for a possible drug interaction between diltiazem and metoprolol  The effects of both medications may be increased when given together  This may result in AV conduction disturbances, hypotension and bradycardia  Please advise if patient should stay on both

## 2021-06-09 ENCOUNTER — TELEPHONE (OUTPATIENT)
Dept: FAMILY MEDICINE CLINIC | Facility: CLINIC | Age: 61
End: 2021-06-09

## 2021-06-09 NOTE — TELEPHONE ENCOUNTER
He is having a colonoscopy Friday, he has to take the prep kaya,  She wants to know if he should mix the prep with sugar free gatorade or regular gatorade? Pl adv

## 2021-06-10 ENCOUNTER — ANESTHESIA EVENT (OUTPATIENT)
Dept: ANESTHESIOLOGY | Facility: HOSPITAL | Age: 61
End: 2021-06-10

## 2021-06-10 ENCOUNTER — ANESTHESIA (OUTPATIENT)
Dept: ANESTHESIOLOGY | Facility: HOSPITAL | Age: 61
End: 2021-06-10

## 2021-06-10 ENCOUNTER — TELEPHONE (OUTPATIENT)
Dept: GASTROENTEROLOGY | Facility: HOSPITAL | Age: 61
End: 2021-06-10

## 2021-06-11 ENCOUNTER — ANESTHESIA (OUTPATIENT)
Dept: GASTROENTEROLOGY | Facility: HOSPITAL | Age: 61
End: 2021-06-11

## 2021-06-11 ENCOUNTER — HOSPITAL ENCOUNTER (OUTPATIENT)
Dept: GASTROENTEROLOGY | Facility: HOSPITAL | Age: 61
Setting detail: OUTPATIENT SURGERY
Discharge: HOME | End: 2021-06-11
Attending: COLON & RECTAL SURGERY
Payer: MEDICARE

## 2021-06-11 ENCOUNTER — ANESTHESIA EVENT (OUTPATIENT)
Dept: GASTROENTEROLOGY | Facility: HOSPITAL | Age: 61
End: 2021-06-11

## 2021-06-11 VITALS
SYSTOLIC BLOOD PRESSURE: 123 MMHG | OXYGEN SATURATION: 94 % | RESPIRATION RATE: 18 BRPM | HEIGHT: 71 IN | DIASTOLIC BLOOD PRESSURE: 86 MMHG | TEMPERATURE: 97.2 F | BODY MASS INDEX: 41.02 KG/M2 | HEART RATE: 56 BPM | WEIGHT: 293 LBS

## 2021-06-11 DIAGNOSIS — Z12.11 SCREENING FOR COLON CANCER: ICD-10-CM

## 2021-06-11 LAB — GLUCOSE SERPL-MCNC: 124 MG/DL (ref 65–140)

## 2021-06-11 PROCEDURE — 99213 OFFICE O/P EST LOW 20 MIN: CPT | Performed by: COLON & RECTAL SURGERY

## 2021-06-11 PROCEDURE — 82948 REAGENT STRIP/BLOOD GLUCOSE: CPT

## 2021-06-11 PROCEDURE — 88305 TISSUE EXAM BY PATHOLOGIST: CPT | Performed by: PATHOLOGY

## 2021-06-11 PROCEDURE — 45385 COLONOSCOPY W/LESION REMOVAL: CPT | Performed by: COLON & RECTAL SURGERY

## 2021-06-11 RX ORDER — EPHEDRINE SULFATE 50 MG/ML
INJECTION INTRAVENOUS AS NEEDED
Status: DISCONTINUED | OUTPATIENT
Start: 2021-06-11 | End: 2021-06-11

## 2021-06-11 RX ORDER — SODIUM CHLORIDE 9 MG/ML
INJECTION, SOLUTION INTRAVENOUS CONTINUOUS PRN
Status: DISCONTINUED | OUTPATIENT
Start: 2021-06-11 | End: 2021-06-11

## 2021-06-11 RX ORDER — PROPOFOL 10 MG/ML
INJECTION, EMULSION INTRAVENOUS CONTINUOUS PRN
Status: DISCONTINUED | OUTPATIENT
Start: 2021-06-11 | End: 2021-06-11

## 2021-06-11 RX ORDER — PROPOFOL 10 MG/ML
INJECTION, EMULSION INTRAVENOUS AS NEEDED
Status: DISCONTINUED | OUTPATIENT
Start: 2021-06-11 | End: 2021-06-11

## 2021-06-11 RX ADMIN — SODIUM CHLORIDE: 0.9 INJECTION, SOLUTION INTRAVENOUS at 09:13

## 2021-06-11 RX ADMIN — PROPOFOL 120 MCG/KG/MIN: 10 INJECTION, EMULSION INTRAVENOUS at 09:18

## 2021-06-11 RX ADMIN — PROPOFOL 80 MG: 10 INJECTION, EMULSION INTRAVENOUS at 09:18

## 2021-06-11 RX ADMIN — PROPOFOL 20 MG: 10 INJECTION, EMULSION INTRAVENOUS at 09:23

## 2021-06-11 RX ADMIN — EPHEDRINE SULFATE 5 MG: 50 INJECTION, SOLUTION INTRAVENOUS at 09:37

## 2021-06-11 NOTE — ANESTHESIA PREPROCEDURE EVALUATION
Procedure:  COLONOSCOPY    Relevant Problems   CARDIO   (+) Dyspnea on exertion   (+) Essential hypertension   (+) Permanent atrial fibrillation (HCC)      ENDO   (+) Type 2 diabetes mellitus without complication, without long-term current use of insulin (HCC)      NEURO/PSYCH   (+) Anxiety      PULMONARY  Sarcoidosis  (+) Dyspnea on exertion   (+) Obstructive sleep apnea        Physical Exam    Airway    Mallampati score: II  TM Distance: >3 FB  Neck ROM: full     Dental       Cardiovascular      Pulmonary      Other Findings        Anesthesia Plan  ASA Score- 4     Anesthesia Type- IV sedation with anesthesia with ASA Monitors  Additional Monitors:   Airway Plan:           Plan Factors-Exercise tolerance (METS): >4 METS  Chart reviewed  Induction- intravenous  Postoperative Plan-     Informed Consent- Anesthetic plan and risks discussed with patient  I personally reviewed this patient with the CRNA  Discussed and agreed on the Anesthesia Plan with the CRNA  Rai Wood

## 2021-06-11 NOTE — ANESTHESIA POSTPROCEDURE EVALUATION
Post-Op Assessment Note    CV Status:  Stable  Pain Score: 0    Pain management: adequate     Mental Status:  Alert and awake   Hydration Status:  Euvolemic and stable   PONV Controlled:  Controlled   Airway Patency:  Patent      Post Op Vitals Reviewed: Yes      Comments: Report given to recovering RN, Vss< Pt states he is comfortable        No complications documented      /90 (06/11/21 0950)    Temp (!) 97 2 °F (36 2 °C) (06/11/21 0950)    Pulse 68 (06/11/21 0950)   Resp 18 (06/11/21 0950)    SpO2 94 % (06/11/21 0950)

## 2021-06-11 NOTE — H&P
History and Physical   Colon and Rectal Surgery   Sarai Castañeda 59 y o  male MRN: [id number]  Unit/Bed#:  Encounter: [id number]  06/11/21   9:09 AM      No chief complaint on file  Denies nausea/vomiting/abdominal pain, change in bowel habits, rectal bleeding, or other constitutional symptoms  History of Present Illness   HPI:  Sarai Castañeda is a 59 y o  male who presents for screening colonoscopy, last 2013  Denies nausea/vomiting/abdominal pain, change in bowel habits, rectal bleeding, or other constitutional symptoms            Historical Information   Past Medical History:   Diagnosis Date    A-fib [facility])     Chronic systolic congestive heart failure (HCC)     last assessed - [id number]    Diabetes mellitus ([address] )     History of cardiac catheterization 10/2012    normal coronaries w/o significant obstructive coronary disease    Pacemaker     last assessed - 19Oct2017    Sarcoidosis     Sleep apnea      Past Surgical History:   Procedure Laterality Date    CARDIOVERSION      Elective    FISTULA REPAIR      perirectal    INSERT / REPLACE / REMOVE PACEMAKER      TONSILLECTOMY      1960's       Meds/Allergies     (Not in a hospital admission)        Current Outpatient Medications:     albuterol (Ventolin HFA) 90 mcg/act inhaler, Inhale 1 puff 4 (four) times a day, Disp: 3 Inhaler, Rfl: 1    atorvastatin (LIPITOR) 40 mg tablet, Take 1 tablet (40 mg total) by mouth daily at bedtime, Disp: 90 tablet, Rfl: 3    citalopram (CeleXA) 20 mg tablet, Take 1 tablet (20 mg total) by mouth daily, Disp: 90 tablet, Rfl: 3    furosemide (LASIX) 40 mg tablet, Take 1 tablet (40 mg total) by mouth daily, Disp: 90 tablet, Rfl: 3    metFORMIN (GLUCOPHAGE) 1000 MG tablet, Take 1 tablet (1,000 mg total) by mouth 2 (two) times a day with meals, Disp: 180 tablet, Rfl: 3    metoprolol tartrate (LOPRESSOR) 50 mg tablet, Take 1 tablet (50 mg total) by mouth 2 (two) times a day, Disp: 180 tablet, Rfl: 3   albuterol (ACCUNEB) 0 63 MG/3ML nebulizer solution, Take 3 mL (0 63 mg total) by nebulization every 6 (six) hours as needed for wheezing, Disp: 90 vial, Rfl: 11    cycloSPORINE (RESTASIS) 0 05 % ophthalmic emulsion, Apply 1 drop to eye 2 (two) times a day, Disp: , Rfl:     diltiazem (TIAZAC) 360 MG 24 hr capsule, Take 1 capsule (360 mg total) by mouth daily, Disp: 90 capsule, Rfl: 3    erythromycin (ILOTYCIN) ophthalmic ointment, Apply to eye, Disp: , Rfl:     glucose blood test strip, 1 each by Other route daily Use as instructed, Disp: 50 each, Rfl: 5    guaiFENesin (MUCINEX) 600 mg 12 hr tablet, Take 600 mg by mouth every 12 (twelve) hours, Disp: , Rfl:     mometasone (ASMANEX 120 METERED DOSES) 220 MCG/INH inhaler, Inhale 1 puff 2 (two) times a day, Disp: , Rfl:     NEBULIZER SYSTEM ALL-IN-ONE MISC, 1 Device by Does not apply route every 4 (four) hours while awake, Disp: 1 each, Rfl: 0    Respiratory Therapy Supplies (NEBULIZER/ADULT MASK) KIT, by Does not apply route daily as needed (wheezing), Disp: 1 each, Rfl: 0    Respiratory Therapy Supplies (NEBULIZER/TUBING/MOUTHPIECE) KIT, by Does not apply route as needed (wheezing), Disp: 1 each, Rfl: 0    rivaroxaban (Xarelto) 20 mg tablet, Take 1 tablet (20 mg total) by mouth daily with breakfast, Disp: 90 tablet, Rfl: 3    No Known Allergies      Social History   Social History     Substance and Sexual Activity   Alcohol Use Yes    Alcohol/week: 4 0 - 5 0 standard drinks    Types: 4 - 5 Cans of beer per week    Comment: a week     Social History     Substance and Sexual Activity   Drug Use No     Social History     Tobacco Use   Smoking Status Former Smoker    Packs/day: 2 00    Years: 5 00    Pack years: 10 00    Quit date: Maddy Morton Years since quittin 4   Smokeless Tobacco Never Used   Tobacco Comment    2 ppd x 5 years       Family History:   Family History   Problem Relation Age of Onset    Atrial fibrillation Mother     Cancer Mother  Heart disease Father     Hypertension Father     Skin cancer Father     Cancer Family     Transient ischemic attack Family         without residual deficits       Objective     Current Vitals:   Blood Pressure: 132/73 (06/11/21 0804)  Pulse: 75 (06/11/21 0804)  Temperature: 97 6 °F (36 4 °C) (06/11/21 0804)  Temp Source: Tympanic (06/11/21 0804)  Respirations: 18 (06/11/21 0804)  Height: 5' 11" (180 3 cm) (06/11/21 0804)  Weight - Scale: 133 kg (293 lb) (06/11/21 0804)  SpO2: 94 % (06/11/21 0804)  No intake or output data in the 24 hours ending 06/11/21 0909    Physical Exam:  General:no distress  Eyes:perrla/eomi  ENT:moist mucus membranes  Neck:supple  Pulm:no increased work of breathing  CV:sinus  Abdomen:soft,nontender    ASSESSMENT:  Leticia Henry is a 59 y o  male who presents for screening colonoscopy, last 11/2013 for recall  Screening colonoscopy,discussed in a face-to-face, personal, informed consent process, the benefits, alternatives, risks including not limited to bleeding, missed lesion, perforation requiring emergent surgery discussed/understood    PLAN:  Colonoscopy

## 2021-06-25 ENCOUNTER — APPOINTMENT (OUTPATIENT)
Dept: LAB | Age: 61
End: 2021-06-25
Payer: MEDICARE

## 2021-06-25 DIAGNOSIS — I48.21 PERMANENT ATRIAL FIBRILLATION (HCC): ICD-10-CM

## 2021-06-25 DIAGNOSIS — I10 ESSENTIAL HYPERTENSION: ICD-10-CM

## 2021-06-25 DIAGNOSIS — E11.9 TYPE 2 DIABETES MELLITUS WITHOUT COMPLICATION, WITHOUT LONG-TERM CURRENT USE OF INSULIN (HCC): ICD-10-CM

## 2021-06-25 LAB
ALBUMIN SERPL BCP-MCNC: 4.1 G/DL (ref 3.5–5)
ALP SERPL-CCNC: 77 U/L (ref 46–116)
ALT SERPL W P-5'-P-CCNC: 35 U/L (ref 12–78)
ANION GAP SERPL CALCULATED.3IONS-SCNC: 4 MMOL/L (ref 4–13)
AST SERPL W P-5'-P-CCNC: 19 U/L (ref 5–45)
BASOPHILS # BLD AUTO: 0.04 THOUSANDS/ΜL (ref 0–0.1)
BASOPHILS NFR BLD AUTO: 1 % (ref 0–1)
BILIRUB SERPL-MCNC: 0.84 MG/DL (ref 0.2–1)
BUN SERPL-MCNC: 15 MG/DL (ref 5–25)
CALCIUM SERPL-MCNC: 9.4 MG/DL (ref 8.3–10.1)
CHLORIDE SERPL-SCNC: 103 MMOL/L (ref 100–108)
CHOLEST SERPL-MCNC: 140 MG/DL (ref 50–200)
CO2 SERPL-SCNC: 32 MMOL/L (ref 21–32)
CREAT SERPL-MCNC: 1.09 MG/DL (ref 0.6–1.3)
EOSINOPHIL # BLD AUTO: 0.12 THOUSAND/ΜL (ref 0–0.61)
EOSINOPHIL NFR BLD AUTO: 2 % (ref 0–6)
ERYTHROCYTE [DISTWIDTH] IN BLOOD BY AUTOMATED COUNT: 13.1 % (ref 11.6–15.1)
GFR SERPL CREATININE-BSD FRML MDRD: 72 ML/MIN/1.73SQ M
GLUCOSE P FAST SERPL-MCNC: 127 MG/DL (ref 65–99)
HCT VFR BLD AUTO: 45.7 % (ref 36.5–49.3)
HDLC SERPL-MCNC: 50 MG/DL
HGB BLD-MCNC: 15.7 G/DL (ref 12–17)
IMM GRANULOCYTES # BLD AUTO: 0.01 THOUSAND/UL (ref 0–0.2)
IMM GRANULOCYTES NFR BLD AUTO: 0 % (ref 0–2)
LDLC SERPL CALC-MCNC: 61 MG/DL (ref 0–100)
LYMPHOCYTES # BLD AUTO: 0.87 THOUSANDS/ΜL (ref 0.6–4.47)
LYMPHOCYTES NFR BLD AUTO: 15 % (ref 14–44)
MCH RBC QN AUTO: 31.5 PG (ref 26.8–34.3)
MCHC RBC AUTO-ENTMCNC: 34.4 G/DL (ref 31.4–37.4)
MCV RBC AUTO: 92 FL (ref 82–98)
MONOCYTES # BLD AUTO: 0.51 THOUSAND/ΜL (ref 0.17–1.22)
MONOCYTES NFR BLD AUTO: 9 % (ref 4–12)
NEUTROPHILS # BLD AUTO: 4.11 THOUSANDS/ΜL (ref 1.85–7.62)
NEUTS SEG NFR BLD AUTO: 73 % (ref 43–75)
NONHDLC SERPL-MCNC: 90 MG/DL
NRBC BLD AUTO-RTO: 0 /100 WBCS
PLATELET # BLD AUTO: 178 THOUSANDS/UL (ref 149–390)
PMV BLD AUTO: 11.2 FL (ref 8.9–12.7)
POTASSIUM SERPL-SCNC: 4.2 MMOL/L (ref 3.5–5.3)
PROT SERPL-MCNC: 7.2 G/DL (ref 6.4–8.2)
RBC # BLD AUTO: 4.98 MILLION/UL (ref 3.88–5.62)
SODIUM SERPL-SCNC: 139 MMOL/L (ref 136–145)
TRIGL SERPL-MCNC: 146 MG/DL
WBC # BLD AUTO: 5.66 THOUSAND/UL (ref 4.31–10.16)

## 2021-06-25 PROCEDURE — 85025 COMPLETE CBC W/AUTO DIFF WBC: CPT

## 2021-06-25 PROCEDURE — 80061 LIPID PANEL: CPT

## 2021-06-25 PROCEDURE — 36415 COLL VENOUS BLD VENIPUNCTURE: CPT

## 2021-06-25 PROCEDURE — 80053 COMPREHEN METABOLIC PANEL: CPT

## 2021-06-28 ENCOUNTER — REMOTE DEVICE CLINIC VISIT (OUTPATIENT)
Dept: CARDIOLOGY CLINIC | Facility: CLINIC | Age: 61
End: 2021-06-28

## 2021-06-28 ENCOUNTER — OFFICE VISIT (OUTPATIENT)
Dept: FAMILY MEDICINE CLINIC | Facility: CLINIC | Age: 61
End: 2021-06-28
Payer: MEDICARE

## 2021-06-28 VITALS
HEIGHT: 71 IN | BODY MASS INDEX: 39.76 KG/M2 | SYSTOLIC BLOOD PRESSURE: 124 MMHG | TEMPERATURE: 98.5 F | HEART RATE: 62 BPM | WEIGHT: 284 LBS | DIASTOLIC BLOOD PRESSURE: 82 MMHG

## 2021-06-28 DIAGNOSIS — I48.21 PERMANENT ATRIAL FIBRILLATION (HCC): ICD-10-CM

## 2021-06-28 DIAGNOSIS — Z95.0 CARDIAC PACEMAKER IN SITU: Primary | ICD-10-CM

## 2021-06-28 DIAGNOSIS — E11.9 TYPE 2 DIABETES MELLITUS WITHOUT COMPLICATION, WITHOUT LONG-TERM CURRENT USE OF INSULIN (HCC): Primary | ICD-10-CM

## 2021-06-28 LAB — SL AMB POCT HEMOGLOBIN AIC: 6.3 (ref ?–6.5)

## 2021-06-28 PROCEDURE — 99214 OFFICE O/P EST MOD 30 MIN: CPT | Performed by: FAMILY MEDICINE

## 2021-06-28 PROCEDURE — RECHECK: Performed by: INTERNAL MEDICINE

## 2021-06-28 PROCEDURE — 83036 HEMOGLOBIN GLYCOSYLATED A1C: CPT | Performed by: FAMILY MEDICINE

## 2021-06-28 NOTE — PROGRESS NOTES
Results for orders placed or performed in visit on 06/28/21   Cardiac EP device report    Narrative    MDT-DUAL CHAMBER PPM (VVIR MODE)/ ACTIVE SYSTEM IS MRI CONDITIONAL  CARELINK TRANSMISSION TO CHECK BATTERY STATUS- NB: BATTERY VOLTAGE NEARING TEVIN (3 MOS, <1-6 MOS)  WILL SCHEDULE MONTHLY BATTERY CHECKS  -37%  ALL AVAILABEL LEAD PARAMETERS WITHIN NORMAL LIMITS  NO SIGNIFICANT HIGH RATE EPISODES  PT IN CHRONIC AF & ON XARELTO, DILTIAZEM & METOPROLOL  NORMAL DEVICE FUNCTION   GV

## 2021-06-28 NOTE — ASSESSMENT & PLAN NOTE
Lab Results   Component Value Date    HGBA1C 6 3 06/28/2021   Chronic, improved from last A1c (7 6)  I reviewed his recent labs with him today  Continue metformin 1000mg BID  Advised patient he is due for diabetic retinopathy exam  UTD immunizations

## 2021-06-28 NOTE — PROGRESS NOTES
Cyrus Gil 1960 male MRN: [id number]    Family Medicine Follow-up Visit    Assessment/Plan   Permanent atrial fibrillation (HCC)  Chronic, stable  Rate controlled with diltiazem 360mg QD, AC Xarelto 20mg QD  F/u with cardiology    Type 2 diabetes mellitus without complication, without long-term current use of insulin (HCC)    Lab Results   Component Value Date    HGBA1C 6 3 06/28/2021   Chronic, improved from last A1c (7 6)  I reviewed his recent labs with him today  Continue metformin 1000mg BID  Advised patient he is due for diabetic retinopathy exam  UTD immunizations     BMI 39 0-39 9,adult  Patient lost 9lbs since last visit! BMI now <40  Continue present course     Next visit: no labs due  Bring Covid-19 vaccination card, he had [place]  Schedule eye exam      Damion Bocanegra was seen today for follow-up  Diagnoses and all orders for this visit:    Type 2 diabetes mellitus without complication, without long-term current use of insulin (HCC)  -     POCT hemoglobin A1c    Permanent atrial fibrillation ([address] )    BMI 39 0-39 9,adult        Nancy Mir MD  [address]  6/28/2021      Please be aware that this note contains text that was dictated and there may be errors pertaining to "sound-alike" words during the dictation process  SUBJECTIVE    CC: Follow-up    HPI:  Cyrus Gil is a 59 y o  male who presented for a follow-up of chronic conditions:    Type 2 diabetes - he's been watching his diet more closely since his last visit  He is compliant with medication  He denies hypoglycemic episodes  Cardiomyopathy & PAF - rate controlled with diltiazem, on Xarelto for AC  Compliant with Lasix  Follows with cardiology  Denies chest pain, palpitations, dyspnea on exertion, ankle edema  Review of Systems   Constitutional: Negative for chills and fever  HENT: Negative for ear pain and sore throat  Eyes: Negative for pain and visual disturbance     Respiratory: Negative for cough and shortness of breath  Cardiovascular: Negative for chest pain and palpitations  Gastrointestinal: Negative for abdominal pain and vomiting  Genitourinary: Negative for dysuria and hematuria  Musculoskeletal: Negative for arthralgias and back pain  Skin: Negative for color change and rash  Neurological: Negative for dizziness and headaches  All other systems reviewed and are negative      Historical Information     The following portions of the patient's history were reviewed and updated as appropriate: allergies, current medications, past medical history, past social history and problem list     Medications:   Meds/Allergies     Current Outpatient Medications:     albuterol (ACCUNEB) 0 63 MG/3ML nebulizer solution, Take 3 mL (0 63 mg total) by nebulization every 6 (six) hours as needed for wheezing, Disp: 90 vial, Rfl: 11    albuterol (Ventolin HFA) 90 mcg/act inhaler, Inhale 1 puff 4 (four) times a day, Disp: 3 Inhaler, Rfl: 1    atorvastatin (LIPITOR) 40 mg tablet, Take 1 tablet (40 mg total) by mouth daily at bedtime, Disp: 90 tablet, Rfl: 3    citalopram (CeleXA) 20 mg tablet, Take 1 tablet (20 mg total) by mouth daily, Disp: 90 tablet, Rfl: 3    cycloSPORINE (RESTASIS) 0 05 % ophthalmic emulsion, Apply 1 drop to eye 2 (two) times a day, Disp: , Rfl:     diltiazem (TIAZAC) 360 MG 24 hr capsule, Take 1 capsule (360 mg total) by mouth daily, Disp: 90 capsule, Rfl: 3    erythromycin (ILOTYCIN) ophthalmic ointment, Apply to eye, Disp: , Rfl:     furosemide (LASIX) 40 mg tablet, Take 1 tablet (40 mg total) by mouth daily, Disp: 90 tablet, Rfl: 3    glucose blood test strip, 1 each by Other route daily Use as instructed, Disp: 50 each, Rfl: 5    guaiFENesin (MUCINEX) 600 mg 12 hr tablet, Take 600 mg by mouth every 12 (twelve) hours, Disp: , Rfl:     metFORMIN (GLUCOPHAGE) 1000 MG tablet, Take 1 tablet (1,000 mg total) by mouth 2 (two) times a day with meals, Disp: 180 tablet, Rfl: 3    metoprolol tartrate (LOPRESSOR) 50 mg tablet, Take 1 tablet (50 mg total) by mouth 2 (two) times a day, Disp: 180 tablet, Rfl: 3    mometasone (ASMANEX 120 METERED DOSES) 220 MCG/INH inhaler, Inhale 1 puff 2 (two) times a day, Disp: , Rfl:     NEBULIZER SYSTEM ALL-IN-ONE MISC, 1 Device by Does not apply route every 4 (four) hours while awake, Disp: 1 each, Rfl: 0    Respiratory Therapy Supplies (NEBULIZER/ADULT MASK) KIT, by Does not apply route daily as needed (wheezing), Disp: 1 each, Rfl: 0    Respiratory Therapy Supplies (NEBULIZER/TUBING/MOUTHPIECE) KIT, by Does not apply route as needed (wheezing), Disp: 1 each, Rfl: 0    rivaroxaban (Xarelto) 20 mg tablet, Take 1 tablet (20 mg total) by mouth daily with breakfast, Disp: 90 tablet, Rfl: 3  No Known Allergies    OBJECTIVE    Vitals:   Vitals:    06/28/21 1350   BP: 124/82   Pulse: 62   Temp: 98 5 °F (36 9 °C)   Weight: 129 kg (284 lb)   Height: 5' 11" (1 803 m)     Wt Readings from Last 3 Encounters:   06/28/21 129 kg (284 lb)   06/11/21 133 kg (293 lb)   04/01/21 133 kg (293 lb)     Body mass index is 39 61 kg/m²  BP Readings from Last 3 Encounters:   06/28/21 124/82   06/11/21 123/86   04/01/21 118/78     Pulse Readings from Last 3 Encounters:   06/28/21 62   06/11/21 56   04/01/21 83     No LMP for male patient  Physical Exam:    Physical Exam  Vitals and nursing note reviewed  Constitutional:       General: He is not in acute distress  Appearance: He is well-developed  He is not diaphoretic  HENT:      Head: Normocephalic and atraumatic  Eyes:      Conjunctiva/sclera: Conjunctivae normal       Pupils: Pupils are equal, round, and reactive to light  Cardiovascular:      Rate and Rhythm: Normal rate and regular rhythm  Heart sounds: Normal heart sounds  No murmur heard  Pulmonary:      Effort: Pulmonary effort is normal  No respiratory distress  Breath sounds: Normal breath sounds  No wheezing  Abdominal:      General: Bowel sounds are normal  There is no distension  Palpations: Abdomen is soft  Tenderness: There is no abdominal tenderness  Musculoskeletal:      Cervical back: Normal range of motion and neck supple  Skin:     General: Skin is warm and dry  Neurological:      Mental Status: He is alert  Labs: I have personally reviewed all pertinent results  Appointment on 06/25/2021   Component Date Value Ref Range Status    Sodium 06/25/2021 139  136 - 145 mmol/L Final    Potassium 06/25/2021 4 2  3 5 - 5 3 mmol/L Final    Chloride 06/25/2021 103  100 - 108 mmol/L Final    CO2 06/25/2021 32  21 - 32 mmol/L Final    ANION GAP 06/25/2021 4  4 - 13 mmol/L Final    BUN 06/25/2021 15  5 - 25 mg/dL Final    Creatinine 06/25/2021 1 09  0 60 - 1 30 mg/dL Final    Standardized to IDMS reference method    Glucose, Fasting 06/25/2021 127* 65 - 99 mg/dL Final    Specimen collection should occur prior to Sulfasalazine administration due to the potential for falsely depressed results  Specimen collection should occur prior to Sulfapyridine administration due to the potential for falsely elevated results   Calcium 06/25/2021 9 4  8 3 - 10 1 mg/dL Final    AST 06/25/2021 19  5 - 45 U/L Final    Specimen collection should occur prior to Sulfasalazine administration due to the potential for falsely depressed results   ALT 06/25/2021 35  12 - 78 U/L Final    Specimen collection should occur prior to Sulfasalazine and/or Sulfapyridine administration due to the potential for falsely depressed results   Alkaline Phosphatase 06/25/2021 77  46 - 116 U/L Final    Total Protein 06/25/2021 7 2  6 4 - 8 2 g/dL Final    Albumin 06/25/2021 4 1  3 5 - 5 0 g/dL Final    Total Bilirubin 06/25/2021 0 84  0 20 - 1 00 mg/dL Final    Use of this assay is not recommended for patients undergoing treatment with eltrombopag due to the potential for falsely elevated results      eGFR 06/25/2021 72  ml/min/1 73sq m Final    Cholesterol 06/25/2021 140  50 - 200 mg/dL Final    Cholesterol:       Desirable         <200 mg/dl       Borderline         200-239 mg/dl       High              >239           Triglycerides 06/25/2021 146  <=150 mg/dL Final    Triglyceride:     Normal          <150 mg/dl     Borderline High 150-199 mg/dl     High            200-499 mg/dl        Very High       >499 mg/dl    Specimen collection should occur prior to N-Acetylcysteine or Metamizole administration due to the potential for falsely depressed results   HDL, Direct 06/25/2021 50  >=40 mg/dL Final    HDL Cholesterol:       Low     <41 mg/dL  Specimen collection should occur prior to Metamizole administration due to the potential for falsley depressed results   LDL Calculated 06/25/2021 61  0 - 100 mg/dL Final    LDL Cholesterol:     Optimal           <100 mg/dl     Near Optimal      100-129 mg/dl     Above Optimal       Borderline High 130-159 mg/dl       High            160-189 mg/dl       Very High       >189 mg/dl         This screening LDL is a calculated result  It does not have the accuracy of the Direct Measured LDL in the monitoring of patients with hyperlipidemia and/or statin therapy  Direct Measure LDL ([id number]) must be ordered separately in these patients      Non-HDL-Chol (CHOL-HDL) 06/25/2021 90  mg/dl Final    WBC 06/25/2021 5 66  4 31 - 10 16 Thousand/uL Final    RBC 06/25/2021 4 98  3 88 - 5 62 Million/uL Final    Hemoglobin 06/25/2021 15 7  12 0 - 17 0 g/dL Final    Hematocrit 06/25/2021 45 7  36 5 - 49 3 % Final    MCV 06/25/2021 92  82 - 98 fL Final    MCH 06/25/2021 31 5  26 8 - 34 3 pg Final    MCHC 06/25/2021 34 4  31 4 - 37 4 g/dL Final    RDW 06/25/2021 13 1  11 6 - 15 1 % Final    MPV 06/25/2021 11 2  8 9 - 12 7 fL Final    Platelets 55/98/2470 178  149 - 390 Thousands/uL Final    nRBC 06/25/2021 0  /100 WBCs Final    Neutrophils Relative 06/25/2021 73  43 - 75 % Final  Immat GRANS % 06/25/2021 0  0 - 2 % Final    Lymphocytes Relative 06/25/2021 15  14 - 44 % Final    Monocytes Relative 06/25/2021 9  4 - 12 % Final    Eosinophils Relative 06/25/2021 2  0 - 6 % Final    Basophils Relative 06/25/2021 1  0 - 1 % Final    Neutrophils Absolute 06/25/2021 4 11  1 85 - 7 62 Thousands/µL Final    Immature Grans Absolute 06/25/2021 0 01  0 00 - 0 20 Thousand/uL Final    Lymphocytes Absolute 06/25/2021 0 87  0 60 - 4 47 Thousands/µL Final    Monocytes Absolute 06/25/2021 0 51  0 17 - 1 22 Thousand/µL Final    Eosinophils Absolute 06/25/2021 0 12  0 00 - 0 61 Thousand/µL Final    Basophils Absolute 06/25/2021 0 04  0 00 - 0 10 Thousands/µL Final     Imaging:  I have personally reviewed all pertinent results

## 2021-07-13 DIAGNOSIS — E11.9 TYPE 2 DIABETES MELLITUS WITHOUT COMPLICATION, WITHOUT LONG-TERM CURRENT USE OF INSULIN (HCC): ICD-10-CM

## 2021-07-29 ENCOUNTER — REMOTE DEVICE CLINIC VISIT (OUTPATIENT)
Dept: CARDIOLOGY CLINIC | Facility: CLINIC | Age: 61
End: 2021-07-29

## 2021-07-29 DIAGNOSIS — Z95.0 CARDIAC PACEMAKER IN SITU: Primary | ICD-10-CM

## 2021-07-29 PROCEDURE — RECHECK: Performed by: INTERNAL MEDICINE

## 2021-07-29 NOTE — PROGRESS NOTES
Results for orders placed or performed in visit on 07/29/21   Cardiac EP device report    Narrative    MDT-DUAL CHAMBER PPM (VVIR MODE)/ ACTIVE SYSTEM IS MRI CONDITIONAL  NB/BAT-CARELINK TRANSMISSION: BATTERY VOLTAGE NEARING TEVIN-3 MONS  WILL SCHEDULE MONTHLY BATTERY CHECKS   31%  ALL AVAILABLE LEAD PARAMETERS WITHIN NORMAL LIMITS  NO SIGNIFICANT HIGH RATE EPISODES  NORMAL DEVICE FUNCTION   NC         Current Outpatient Medications:     albuterol (ACCUNEB) 0 63 MG/3ML nebulizer solution, Take 3 mL (0 63 mg total) by nebulization every 6 (six) hours as needed for wheezing, Disp: 90 vial, Rfl: 11    albuterol (Ventolin HFA) 90 mcg/act inhaler, Inhale 1 puff 4 (four) times a day, Disp: 3 Inhaler, Rfl: 1    atorvastatin (LIPITOR) 40 mg tablet, Take 1 tablet (40 mg total) by mouth daily at bedtime, Disp: 90 tablet, Rfl: 3    citalopram (CeleXA) 20 mg tablet, Take 1 tablet (20 mg total) by mouth daily, Disp: 90 tablet, Rfl: 3    cycloSPORINE (RESTASIS) 0 05 % ophthalmic emulsion, Apply 1 drop to eye 2 (two) times a day, Disp: , Rfl:     diltiazem (TIAZAC) 360 MG 24 hr capsule, Take 1 capsule (360 mg total) by mouth daily, Disp: 90 capsule, Rfl: 3    erythromycin (ILOTYCIN) ophthalmic ointment, Apply to eye, Disp: , Rfl:     furosemide (LASIX) 40 mg tablet, Take 1 tablet (40 mg total) by mouth daily, Disp: 90 tablet, Rfl: 3    glucose blood test strip, Use 1 each daily Use as instructed, Disp: 50 each, Rfl: 5    guaiFENesin (MUCINEX) 600 mg 12 hr tablet, Take 600 mg by mouth every 12 (twelve) hours, Disp: , Rfl:     metFORMIN (GLUCOPHAGE) 1000 MG tablet, Take 1 tablet (1,000 mg total) by mouth 2 (two) times a day with meals, Disp: 180 tablet, Rfl: 3    metoprolol tartrate (LOPRESSOR) 50 mg tablet, Take 1 tablet (50 mg total) by mouth 2 (two) times a day, Disp: 180 tablet, Rfl: 3    mometasone (ASMANEX 120 METERED DOSES) 220 MCG/INH inhaler, Inhale 1 puff 2 (two) times a day, Disp: , Rfl:     NEBULIZER SYSTEM ALL-IN-ONE MISC, 1 Device by Does not apply route every 4 (four) hours while awake, Disp: 1 each, Rfl: 0    Respiratory Therapy Supplies (NEBULIZER/ADULT MASK) KIT, by Does not apply route daily as needed (wheezing), Disp: 1 each, Rfl: 0    Respiratory Therapy Supplies (NEBULIZER/TUBING/MOUTHPIECE) KIT, by Does not apply route as needed (wheezing), Disp: 1 each, Rfl: 0    rivaroxaban (Xarelto) 20 mg tablet, Take 1 tablet (20 mg total) by mouth daily with breakfast, Disp: 90 tablet, Rfl: 3

## 2021-08-30 ENCOUNTER — REMOTE DEVICE CLINIC VISIT (OUTPATIENT)
Dept: CARDIOLOGY CLINIC | Facility: CLINIC | Age: 61
End: 2021-08-30
Payer: MEDICARE

## 2021-08-30 DIAGNOSIS — Z95.0 CARDIAC PACEMAKER IN SITU: Primary | ICD-10-CM

## 2021-08-30 PROCEDURE — 93296 REM INTERROG EVL PM/IDS: CPT | Performed by: INTERNAL MEDICINE

## 2021-08-30 PROCEDURE — 93294 REM INTERROG EVL PM/LDLS PM: CPT | Performed by: INTERNAL MEDICINE

## 2021-08-30 NOTE — PROGRESS NOTES
Results for orders placed or performed in visit on 08/30/21   Cardiac EP device report    Narrative    MDT-DUAL CHAMBER PPM (VVIR MODE)/ ACTIVE SYSTEM IS MRI CONDITIONAL  CARELINK TRANSMISSION: BATTERY VOLTAGE NEARING TEVIN (1 MO, <1-3 MOS)  WILL SCHEDULE MONTHLY BATTERY CHECKS  -35%  ALL AVAILABEL LEAD PARAMETERS WITHIN NORMAL LIMITS  NO SIGNIFICANT HIGH RATE EPISODES  PT IN CHRONIC AF & ON XARELTO, DILTIAZEM & METOPROLOL  NORMAL DEVICE FUNCTION   GV

## 2021-09-08 DIAGNOSIS — F41.9 ANXIETY: ICD-10-CM

## 2021-09-08 RX ORDER — CITALOPRAM 20 MG/1
20 TABLET ORAL DAILY
Qty: 90 TABLET | Refills: 0 | Status: SHIPPED | OUTPATIENT
Start: 2021-09-08 | End: 2021-11-22 | Stop reason: SDUPTHER

## 2021-10-01 ENCOUNTER — REMOTE DEVICE CLINIC VISIT (OUTPATIENT)
Dept: CARDIOLOGY CLINIC | Facility: CLINIC | Age: 61
End: 2021-10-01

## 2021-10-01 DIAGNOSIS — Z95.0 CARDIAC PACEMAKER IN SITU: Primary | ICD-10-CM

## 2021-10-01 PROCEDURE — RECHECK: Performed by: INTERNAL MEDICINE

## 2021-10-06 ENCOUNTER — TELEPHONE (OUTPATIENT)
Dept: OTHER | Facility: OTHER | Age: 61
End: 2021-10-06

## 2021-11-11 ENCOUNTER — OFFICE VISIT (OUTPATIENT)
Dept: CARDIOLOGY CLINIC | Facility: CLINIC | Age: 61
End: 2021-11-11
Payer: MEDICARE

## 2021-11-11 VITALS
WEIGHT: 299.4 LBS | HEART RATE: 64 BPM | HEIGHT: 71 IN | SYSTOLIC BLOOD PRESSURE: 114 MMHG | BODY MASS INDEX: 41.92 KG/M2 | DIASTOLIC BLOOD PRESSURE: 80 MMHG

## 2021-11-11 DIAGNOSIS — Z95.0 PRESENCE OF CARDIAC PACEMAKER: ICD-10-CM

## 2021-11-11 DIAGNOSIS — R06.00 DYSPNEA ON EXERTION: ICD-10-CM

## 2021-11-11 DIAGNOSIS — I10 ESSENTIAL HYPERTENSION: ICD-10-CM

## 2021-11-11 DIAGNOSIS — Z79.01 ANTICOAGULATION ADEQUATE: ICD-10-CM

## 2021-11-11 DIAGNOSIS — I42.0 DILATED CARDIOMYOPATHY (HCC): ICD-10-CM

## 2021-11-11 DIAGNOSIS — I48.21 PERMANENT ATRIAL FIBRILLATION (HCC): Primary | ICD-10-CM

## 2021-11-11 PROCEDURE — 93000 ELECTROCARDIOGRAM COMPLETE: CPT | Performed by: INTERNAL MEDICINE

## 2021-11-11 PROCEDURE — 99214 OFFICE O/P EST MOD 30 MIN: CPT | Performed by: INTERNAL MEDICINE

## 2021-11-12 ENCOUNTER — HOSPITAL ENCOUNTER (OUTPATIENT)
Dept: NON INVASIVE DIAGNOSTICS | Facility: CLINIC | Age: 61
Discharge: HOME | End: 2021-11-12
Payer: MEDICARE

## 2021-11-12 VITALS
DIASTOLIC BLOOD PRESSURE: 80 MMHG | SYSTOLIC BLOOD PRESSURE: 114 MMHG | HEIGHT: 71 IN | WEIGHT: 299 LBS | HEART RATE: 75 BPM | BODY MASS INDEX: 41.86 KG/M2

## 2021-11-12 DIAGNOSIS — I42.0 DILATED CARDIOMYOPATHY (HCC): ICD-10-CM

## 2021-11-12 DIAGNOSIS — I48.21 PERMANENT ATRIAL FIBRILLATION (HCC): ICD-10-CM

## 2021-11-12 DIAGNOSIS — Z95.0 PRESENCE OF CARDIAC PACEMAKER: ICD-10-CM

## 2021-11-12 DIAGNOSIS — I10 ESSENTIAL HYPERTENSION: ICD-10-CM

## 2021-11-12 LAB
AORTIC ROOT: 3.4 CM
APICAL FOUR CHAMBER EJECTION FRACTION: 47 %
FRACTIONAL SHORTENING: 26 % (ref 28–44)
INTERVENTRICULAR SEPTUM IN DIASTOLE (PARASTERNAL SHORT AXIS VIEW): 1.2 CM
LEFT ATRIUM AREA SYSTOLE SINGLE PLANE A4C: 24.5 CM2
LEFT INTERNAL DIMENSION IN SYSTOLE: 4.3 CM (ref 2.1–4)
LEFT VENTRICULAR INTERNAL DIMENSION IN DIASTOLE: 5.8 CM (ref 18.91–28.19)
LEFT VENTRICULAR POSTERIOR WALL IN END DIASTOLE: 1.2 CM
LEFT VENTRICULAR STROKE VOLUME: 85 ML
RIGHT ATRIUM AREA SYSTOLE A4C: 23.1 CM2
RIGHT VENTRICLE ID DIMENSION: 4.4 CM
RV PSP: 34 MMHG
SL CV LV EF: 50
SL CV PED ECHO LEFT VENTRICLE DIASTOLIC VOLUME (MOD BIPLANE) 2D: 167 ML
SL CV PED ECHO LEFT VENTRICLE SYSTOLIC VOLUME (MOD BIPLANE) 2D: 82 ML
TRICUSPID VALVE PEAK REGURGITATION VELOCITY: 2.82 M/S
TRICUSPID VALVE S': 2.8 CM/S
TV PEAK GRADIENT: 32 MMHG
Z-SCORE OF LEFT VENTRICULAR DIMENSION IN END SYSTOLE: -13.81

## 2021-11-12 PROCEDURE — 93306 TTE W/DOPPLER COMPLETE: CPT

## 2021-11-12 PROCEDURE — 93306 TTE W/DOPPLER COMPLETE: CPT | Performed by: INTERNAL MEDICINE

## 2021-11-17 ENCOUNTER — PREP FOR PROCEDURE (OUTPATIENT)
Dept: CARDIOLOGY CLINIC | Facility: CLINIC | Age: 61
End: 2021-11-17

## 2021-11-17 DIAGNOSIS — I48.21 PERMANENT ATRIAL FIBRILLATION (HCC): Primary | ICD-10-CM

## 2021-11-22 DIAGNOSIS — F41.9 ANXIETY: ICD-10-CM

## 2021-11-22 RX ORDER — CITALOPRAM 20 MG/1
20 TABLET ORAL DAILY
Qty: 90 TABLET | Refills: 1 | Status: SHIPPED | OUTPATIENT
Start: 2021-11-22 | End: 2022-05-09 | Stop reason: SDUPTHER

## 2021-11-23 ENCOUNTER — APPOINTMENT (OUTPATIENT)
Dept: LAB | Age: 61
End: 2021-11-23
Payer: MEDICARE

## 2021-11-23 DIAGNOSIS — I48.21 PERMANENT ATRIAL FIBRILLATION (HCC): ICD-10-CM

## 2021-11-23 LAB
ALBUMIN SERPL BCP-MCNC: 4 G/DL (ref 3.5–5)
ALP SERPL-CCNC: 90 U/L (ref 46–116)
ALT SERPL W P-5'-P-CCNC: 30 U/L (ref 12–78)
ANION GAP SERPL CALCULATED.3IONS-SCNC: 7 MMOL/L (ref 4–13)
AST SERPL W P-5'-P-CCNC: 14 U/L (ref 5–45)
BASOPHILS # BLD AUTO: 0.07 THOUSANDS/ΜL (ref 0–0.1)
BASOPHILS NFR BLD AUTO: 1 % (ref 0–1)
BILIRUB SERPL-MCNC: 1 MG/DL (ref 0.2–1)
BUN SERPL-MCNC: 14 MG/DL (ref 5–25)
CALCIUM SERPL-MCNC: 10.4 MG/DL (ref 8.3–10.1)
CHLORIDE SERPL-SCNC: 102 MMOL/L (ref 100–108)
CO2 SERPL-SCNC: 29 MMOL/L (ref 21–32)
CREAT SERPL-MCNC: 1.25 MG/DL (ref 0.6–1.3)
EOSINOPHIL # BLD AUTO: 0.27 THOUSAND/ΜL (ref 0–0.61)
EOSINOPHIL NFR BLD AUTO: 3 % (ref 0–6)
ERYTHROCYTE [DISTWIDTH] IN BLOOD BY AUTOMATED COUNT: 13.1 % (ref 11.6–15.1)
GFR SERPL CREATININE-BSD FRML MDRD: 61 ML/MIN/1.73SQ M
GLUCOSE P FAST SERPL-MCNC: 150 MG/DL (ref 65–99)
HCT VFR BLD AUTO: 46.4 % (ref 36.5–49.3)
HGB BLD-MCNC: 15.4 G/DL (ref 12–17)
IMM GRANULOCYTES # BLD AUTO: 0.01 THOUSAND/UL (ref 0–0.2)
IMM GRANULOCYTES NFR BLD AUTO: 0 % (ref 0–2)
LYMPHOCYTES # BLD AUTO: 0.84 THOUSANDS/ΜL (ref 0.6–4.47)
LYMPHOCYTES NFR BLD AUTO: 10 % (ref 14–44)
MCH RBC QN AUTO: 31 PG (ref 26.8–34.3)
MCHC RBC AUTO-ENTMCNC: 33.2 G/DL (ref 31.4–37.4)
MCV RBC AUTO: 94 FL (ref 82–98)
MONOCYTES # BLD AUTO: 0.85 THOUSAND/ΜL (ref 0.17–1.22)
MONOCYTES NFR BLD AUTO: 10 % (ref 4–12)
NEUTROPHILS # BLD AUTO: 6.55 THOUSANDS/ΜL (ref 1.85–7.62)
NEUTS SEG NFR BLD AUTO: 76 % (ref 43–75)
NRBC BLD AUTO-RTO: 0 /100 WBCS
PLATELET # BLD AUTO: 177 THOUSANDS/UL (ref 149–390)
PMV BLD AUTO: 10.1 FL (ref 8.9–12.7)
POTASSIUM SERPL-SCNC: 5.2 MMOL/L (ref 3.5–5.3)
PROT SERPL-MCNC: 7.3 G/DL (ref 6.4–8.2)
RBC # BLD AUTO: 4.96 MILLION/UL (ref 3.88–5.62)
SODIUM SERPL-SCNC: 138 MMOL/L (ref 136–145)
WBC # BLD AUTO: 8.59 THOUSAND/UL (ref 4.31–10.16)

## 2021-11-23 PROCEDURE — 85025 COMPLETE CBC W/AUTO DIFF WBC: CPT

## 2021-11-23 PROCEDURE — 36415 COLL VENOUS BLD VENIPUNCTURE: CPT

## 2021-11-23 PROCEDURE — 80053 COMPREHEN METABOLIC PANEL: CPT

## 2021-11-30 ENCOUNTER — TELEPHONE (OUTPATIENT)
Dept: CARDIOLOGY CLINIC | Facility: CLINIC | Age: 61
End: 2021-11-30

## 2021-12-02 ENCOUNTER — ANESTHESIA EVENT (OUTPATIENT)
Dept: NON INVASIVE DIAGNOSTICS | Facility: HOSPITAL | Age: 61
End: 2021-12-02
Payer: MEDICARE

## 2021-12-02 PROBLEM — E66.01 MORBID OBESITY WITH BMI OF 40.0-44.9, ADULT (HCC): Status: ACTIVE | Noted: 2021-12-02

## 2021-12-03 ENCOUNTER — HOSPITAL ENCOUNTER (OUTPATIENT)
Facility: HOSPITAL | Age: 61
Setting detail: OUTPATIENT SURGERY
Discharge: HOME | End: 2021-12-03
Attending: INTERNAL MEDICINE | Admitting: INTERNAL MEDICINE
Payer: MEDICARE

## 2021-12-03 ENCOUNTER — ANESTHESIA (OUTPATIENT)
Dept: NON INVASIVE DIAGNOSTICS | Facility: HOSPITAL | Age: 61
End: 2021-12-03
Payer: MEDICARE

## 2021-12-03 ENCOUNTER — APPOINTMENT (OUTPATIENT)
Dept: RADIOLOGY | Facility: HOSPITAL | Age: 61
End: 2021-12-03
Payer: MEDICARE

## 2021-12-03 VITALS
HEART RATE: 75 BPM | TEMPERATURE: 97.9 F | RESPIRATION RATE: 17 BRPM | SYSTOLIC BLOOD PRESSURE: 102 MMHG | DIASTOLIC BLOOD PRESSURE: 64 MMHG | OXYGEN SATURATION: 91 %

## 2021-12-03 DIAGNOSIS — I48.21 PERMANENT ATRIAL FIBRILLATION (HCC): ICD-10-CM

## 2021-12-03 DIAGNOSIS — Z95.0 PRESENCE OF CARDIAC PACEMAKER: Primary | ICD-10-CM

## 2021-12-03 LAB
ANION GAP SERPL CALCULATED.3IONS-SCNC: 5 MMOL/L (ref 4–13)
ATRIAL RATE: 71 BPM
ATRIAL RATE: 75 BPM
BUN SERPL-MCNC: 15 MG/DL (ref 5–25)
CALCIUM SERPL-MCNC: 8.8 MG/DL (ref 8.3–10.1)
CHLORIDE SERPL-SCNC: 104 MMOL/L (ref 100–108)
CO2 SERPL-SCNC: 29 MMOL/L (ref 21–32)
CREAT SERPL-MCNC: 1.08 MG/DL (ref 0.6–1.3)
ERYTHROCYTE [DISTWIDTH] IN BLOOD BY AUTOMATED COUNT: 12.8 % (ref 11.6–15.1)
FLUAV RNA RESP QL NAA+PROBE: NEGATIVE
FLUBV RNA RESP QL NAA+PROBE: NEGATIVE
GFR SERPL CREATININE-BSD FRML MDRD: 73 ML/MIN/1.73SQ M
GLUCOSE P FAST SERPL-MCNC: 196 MG/DL (ref 65–99)
GLUCOSE SERPL-MCNC: 173 MG/DL (ref 65–140)
GLUCOSE SERPL-MCNC: 196 MG/DL (ref 65–140)
HCT VFR BLD AUTO: 44 % (ref 36.5–49.3)
HGB BLD-MCNC: 15.2 G/DL (ref 12–17)
INR PPP: 1.11 (ref 0.84–1.19)
MCH RBC QN AUTO: 31.3 PG (ref 26.8–34.3)
MCHC RBC AUTO-ENTMCNC: 34.5 G/DL (ref 31.4–37.4)
MCV RBC AUTO: 91 FL (ref 82–98)
P AXIS: 105 DEGREES
PLATELET # BLD AUTO: 153 THOUSANDS/UL (ref 149–390)
PMV BLD AUTO: 10.1 FL (ref 8.9–12.7)
POTASSIUM SERPL-SCNC: 4 MMOL/L (ref 3.5–5.3)
PROTHROMBIN TIME: 13.9 SECONDS (ref 11.6–14.5)
QRS AXIS: 85 DEGREES
QRS AXIS: 95 DEGREES
QRSD INTERVAL: 160 MS
QRSD INTERVAL: 79 MS
QT INTERVAL: 371 MS
QT INTERVAL: 432 MS
QTC INTERVAL: 415 MS
QTC INTERVAL: 473 MS
RBC # BLD AUTO: 4.85 MILLION/UL (ref 3.88–5.62)
RSV RNA RESP QL NAA+PROBE: NEGATIVE
SARS-COV-2 RNA RESP QL NAA+PROBE: NEGATIVE
SODIUM SERPL-SCNC: 138 MMOL/L (ref 136–145)
T WAVE AXIS: -70 DEGREES
T WAVE AXIS: 23 DEGREES
VENTRICULAR RATE: 72 BPM
VENTRICULAR RATE: 75 BPM
WBC # BLD AUTO: 5.46 THOUSAND/UL (ref 4.31–10.16)

## 2021-12-03 PROCEDURE — 0241U HB NFCT DS VIR RESP RNA 4 TRGT: CPT | Performed by: PHYSICIAN ASSISTANT

## 2021-12-03 PROCEDURE — 93010 ELECTROCARDIOGRAM REPORT: CPT | Performed by: INTERNAL MEDICINE

## 2021-12-03 PROCEDURE — C1892 INTRO/SHEATH,FIXED,PEEL-AWAY: HCPCS | Performed by: INTERNAL MEDICINE

## 2021-12-03 PROCEDURE — C1887 CATHETER, GUIDING: HCPCS | Performed by: INTERNAL MEDICINE

## 2021-12-03 PROCEDURE — 71045 X-RAY EXAM CHEST 1 VIEW: CPT

## 2021-12-03 PROCEDURE — 82948 REAGENT STRIP/BLOOD GLUCOSE: CPT

## 2021-12-03 PROCEDURE — C1769 GUIDE WIRE: HCPCS | Performed by: INTERNAL MEDICINE

## 2021-12-03 PROCEDURE — NC001 PR NO CHARGE: Performed by: INTERNAL MEDICINE

## 2021-12-03 PROCEDURE — 33229 REMV&REPLC PM GEN MULT LEADS: CPT | Performed by: INTERNAL MEDICINE

## 2021-12-03 PROCEDURE — C1730 CATH, EP, 19 OR FEW ELECT: HCPCS | Performed by: INTERNAL MEDICINE

## 2021-12-03 PROCEDURE — 93005 ELECTROCARDIOGRAM TRACING: CPT

## 2021-12-03 PROCEDURE — 85027 COMPLETE CBC AUTOMATED: CPT | Performed by: PHYSICIAN ASSISTANT

## 2021-12-03 PROCEDURE — 33225 L VENTRIC PACING LEAD ADD-ON: CPT | Performed by: INTERNAL MEDICINE

## 2021-12-03 PROCEDURE — 80048 BASIC METABOLIC PNL TOTAL CA: CPT | Performed by: PHYSICIAN ASSISTANT

## 2021-12-03 PROCEDURE — C2621 PMKR, OTHER THAN SING/DUAL: HCPCS | Performed by: INTERNAL MEDICINE

## 2021-12-03 PROCEDURE — C1900 LEAD, CORONARY VENOUS: HCPCS | Performed by: INTERNAL MEDICINE

## 2021-12-03 PROCEDURE — 85610 PROTHROMBIN TIME: CPT | Performed by: PHYSICIAN ASSISTANT

## 2021-12-03 DEVICE — LEAD 459888 MRI S-TIP US
Type: IMPLANTABLE DEVICE | Site: HEART | Status: FUNCTIONAL
Brand: ATTAIN PERFORMA™ S MRI SURESCAN™

## 2021-12-03 DEVICE — ENVELOPE CMRM6122 ABSORB MED MR
Type: IMPLANTABLE DEVICE | Site: CHEST | Status: FUNCTIONAL
Brand: TYRX™

## 2021-12-03 DEVICE — IMPLANTABLE DEVICE: Type: IMPLANTABLE DEVICE | Site: CHEST | Status: FUNCTIONAL

## 2021-12-03 RX ORDER — GLYCOPYRROLATE 0.2 MG/ML
INJECTION INTRAMUSCULAR; INTRAVENOUS AS NEEDED
Status: DISCONTINUED | OUTPATIENT
Start: 2021-12-03 | End: 2021-12-03

## 2021-12-03 RX ORDER — EPHEDRINE SULFATE 50 MG/ML
INJECTION INTRAVENOUS AS NEEDED
Status: DISCONTINUED | OUTPATIENT
Start: 2021-12-03 | End: 2021-12-03

## 2021-12-03 RX ORDER — PROPOFOL 10 MG/ML
INJECTION, EMULSION INTRAVENOUS AS NEEDED
Status: DISCONTINUED | OUTPATIENT
Start: 2021-12-03 | End: 2021-12-03

## 2021-12-03 RX ORDER — ACETAMINOPHEN 325 MG/1
650 TABLET ORAL EVERY 4 HOURS PRN
Status: DISCONTINUED | OUTPATIENT
Start: 2021-12-03 | End: 2021-12-03 | Stop reason: HOSPADM

## 2021-12-03 RX ORDER — LIDOCAINE HYDROCHLORIDE 10 MG/ML
INJECTION, SOLUTION EPIDURAL; INFILTRATION; INTRACAUDAL; PERINEURAL AS NEEDED
Status: DISCONTINUED | OUTPATIENT
Start: 2021-12-03 | End: 2021-12-03 | Stop reason: HOSPADM

## 2021-12-03 RX ORDER — FENTANYL CITRATE 50 UG/ML
INJECTION, SOLUTION INTRAMUSCULAR; INTRAVENOUS AS NEEDED
Status: DISCONTINUED | OUTPATIENT
Start: 2021-12-03 | End: 2021-12-03

## 2021-12-03 RX ORDER — GENTAMICIN SULFATE 40 MG/ML
INJECTION, SOLUTION INTRAMUSCULAR; INTRAVENOUS AS NEEDED
Status: DISCONTINUED | OUTPATIENT
Start: 2021-12-03 | End: 2021-12-03 | Stop reason: HOSPADM

## 2021-12-03 RX ORDER — ONDANSETRON 2 MG/ML
INJECTION INTRAMUSCULAR; INTRAVENOUS AS NEEDED
Status: DISCONTINUED | OUTPATIENT
Start: 2021-12-03 | End: 2021-12-03

## 2021-12-03 RX ORDER — DEXAMETHASONE SODIUM PHOSPHATE 4 MG/ML
INJECTION, SOLUTION INTRA-ARTICULAR; INTRALESIONAL; INTRAMUSCULAR; INTRAVENOUS; SOFT TISSUE AS NEEDED
Status: DISCONTINUED | OUTPATIENT
Start: 2021-12-03 | End: 2021-12-03

## 2021-12-03 RX ORDER — FENTANYL CITRATE/PF 50 MCG/ML
25 SYRINGE (ML) INJECTION
Status: DISCONTINUED | OUTPATIENT
Start: 2021-12-03 | End: 2021-12-03 | Stop reason: HOSPADM

## 2021-12-03 RX ORDER — ONDANSETRON 2 MG/ML
4 INJECTION INTRAMUSCULAR; INTRAVENOUS ONCE AS NEEDED
Status: DISCONTINUED | OUTPATIENT
Start: 2021-12-03 | End: 2021-12-03 | Stop reason: HOSPADM

## 2021-12-03 RX ORDER — LIDOCAINE HYDROCHLORIDE 10 MG/ML
INJECTION, SOLUTION EPIDURAL; INFILTRATION; INTRACAUDAL; PERINEURAL AS NEEDED
Status: DISCONTINUED | OUTPATIENT
Start: 2021-12-03 | End: 2021-12-03

## 2021-12-03 RX ORDER — SODIUM CHLORIDE 9 MG/ML
INJECTION, SOLUTION INTRAVENOUS CONTINUOUS PRN
Status: DISCONTINUED | OUTPATIENT
Start: 2021-12-03 | End: 2021-12-03

## 2021-12-03 RX ADMIN — PROPOFOL 150 MG: 10 INJECTION, EMULSION INTRAVENOUS at 08:04

## 2021-12-03 RX ADMIN — FENTANYL CITRATE 25 MCG: 50 INJECTION INTRAMUSCULAR; INTRAVENOUS at 08:35

## 2021-12-03 RX ADMIN — LIDOCAINE HYDROCHLORIDE 50 MG: 10 INJECTION, SOLUTION EPIDURAL; INFILTRATION; INTRACAUDAL; PERINEURAL at 08:04

## 2021-12-03 RX ADMIN — ONDANSETRON 4 MG: 2 INJECTION INTRAMUSCULAR; INTRAVENOUS at 09:15

## 2021-12-03 RX ADMIN — GLYCOPYRROLATE 0.1 MG: 0.2 INJECTION, SOLUTION INTRAMUSCULAR; INTRAVENOUS at 08:45

## 2021-12-03 RX ADMIN — PHENYLEPHRINE HYDROCHLORIDE 30 MCG/MIN: 10 INJECTION INTRAVENOUS at 08:17

## 2021-12-03 RX ADMIN — EPHEDRINE SULFATE 10 MG: 50 INJECTION, SOLUTION INTRAVENOUS at 08:39

## 2021-12-03 RX ADMIN — DEXAMETHASONE SODIUM PHOSPHATE 8 MG: 4 INJECTION INTRA-ARTICULAR; INTRALESIONAL; INTRAMUSCULAR; INTRAVENOUS; SOFT TISSUE at 08:30

## 2021-12-03 RX ADMIN — SODIUM CHLORIDE: 0.9 INJECTION, SOLUTION INTRAVENOUS at 07:24

## 2021-12-03 RX ADMIN — Medication 3000 MG: at 08:10

## 2021-12-17 ENCOUNTER — IN-CLINIC DEVICE VISIT (OUTPATIENT)
Dept: CARDIOLOGY CLINIC | Facility: CLINIC | Age: 61
End: 2021-12-17

## 2021-12-17 DIAGNOSIS — Z95.0 PACEMAKER: Primary | ICD-10-CM

## 2021-12-17 PROCEDURE — 99024 POSTOP FOLLOW-UP VISIT: CPT | Performed by: INTERNAL MEDICINE

## 2022-04-01 ENCOUNTER — IN-CLINIC DEVICE VISIT (OUTPATIENT)
Dept: CARDIOLOGY CLINIC | Facility: CLINIC | Age: 62
End: 2022-04-01
Payer: MEDICARE

## 2022-04-01 DIAGNOSIS — Z95.0 CARDIAC PACEMAKER IN SITU: Primary | ICD-10-CM

## 2022-04-01 PROCEDURE — 93281 PM DEVICE PROGR EVAL MULTI: CPT | Performed by: INTERNAL MEDICINE

## 2022-04-01 NOTE — PROGRESS NOTES
Results for orders placed or performed in visit on 04/01/22   Cardiac EP device report    Narrative    MDT BI-V PPM (VVIR MODE)/ ACTIVE SYSTEM IS MRI CONDITIONAL  DEVICE INTERROGATED IN THE [facility] OFFICE: BATTERY VOLTAGE ADEQUATE-10 YRS   66% VSRP 33%  ALL AVAILABLE LEAD PARAMETERS WITHIN NORMAL LIMITS  NO SIGNIFICANT HIGH RATE EPISODES  OPTI-VOL WITHIN NORMAL LIMITS  INCREASE MADE TO RV PW TO PROVIDE ADEQUATE SAFETY MARGIN  NORMAL DEVICE FUNCTION   NC         Current Outpatient Medications:     albuterol (ACCUNEB) 0 63 MG/3ML nebulizer solution, Take 3 mL (0 63 mg total) by nebulization every 6 (six) hours as needed for wheezing, Disp: 90 vial, Rfl: 11    albuterol (Ventolin HFA) 90 mcg/act inhaler, Inhale 1 puff 4 (four) times a day, Disp: 3 Inhaler, Rfl: 1    atorvastatin (LIPITOR) 40 mg tablet, Take 1 tablet (40 mg total) by mouth daily at bedtime, Disp: 90 tablet, Rfl: 3    citalopram (CeleXA) 20 mg tablet, Take 1 tablet (20 mg total) by mouth daily, Disp: 90 tablet, Rfl: 1    cycloSPORINE (RESTASIS) 0 05 % ophthalmic emulsion, Apply 1 drop to eye 2 (two) times a day, Disp: , Rfl:     diltiazem (TIAZAC) 360 MG 24 hr capsule, Take 1 capsule (360 mg total) by mouth daily, Disp: 90 capsule, Rfl: 3    erythromycin (ILOTYCIN) ophthalmic ointment, Apply to eye, Disp: , Rfl:     furosemide (LASIX) 40 mg tablet, Take 1 tablet (40 mg total) by mouth daily, Disp: 90 tablet, Rfl: 3    glucose blood test strip, Use 1 each daily Use as instructed, Disp: 50 each, Rfl: 5    guaiFENesin (MUCINEX) 600 mg 12 hr tablet, Take 600 mg by mouth every 12 (twelve) hours, Disp: , Rfl:     metFORMIN (GLUCOPHAGE) 1000 MG tablet, Take 1 tablet (1,000 mg total) by mouth 2 (two) times a day with meals, Disp: 180 tablet, Rfl: 3    metoprolol tartrate (LOPRESSOR) 50 mg tablet, Take 1 tablet (50 mg total) by mouth 2 (two) times a day, Disp: 180 tablet, Rfl: 3    mometasone (ASMANEX 120 METERED DOSES) 220 MCG/INH inhaler, Inhale 1 puff 2 (two) times a day, Disp: , Rfl:     NEBULIZER SYSTEM ALL-IN-ONE MISC, 1 Device by Does not apply route every 4 (four) hours while awake, Disp: 1 each, Rfl: 0    Respiratory Therapy Supplies (NEBULIZER/ADULT MASK) KIT, by Does not apply route daily as needed (wheezing), Disp: 1 each, Rfl: 0    Respiratory Therapy Supplies (NEBULIZER/TUBING/MOUTHPIECE) KIT, by Does not apply route as needed (wheezing), Disp: 1 each, Rfl: 0    rivaroxaban (Xarelto) 20 mg tablet, Take 1 tablet (20 mg total) by mouth daily with breakfast, Disp: 90 tablet, Rfl: 3

## 2022-05-09 DIAGNOSIS — I48.21 PERMANENT ATRIAL FIBRILLATION (HCC): ICD-10-CM

## 2022-05-09 DIAGNOSIS — E11.9 TYPE 2 DIABETES MELLITUS WITHOUT COMPLICATION, WITHOUT LONG-TERM CURRENT USE OF INSULIN (HCC): ICD-10-CM

## 2022-05-09 DIAGNOSIS — F41.9 ANXIETY: ICD-10-CM

## 2022-05-09 RX ORDER — CITALOPRAM 20 MG/1
20 TABLET ORAL DAILY
Qty: 90 TABLET | Refills: 1 | Status: SHIPPED | OUTPATIENT
Start: 2022-05-09

## 2022-05-24 ENCOUNTER — OFFICE VISIT (OUTPATIENT)
Dept: CARDIOLOGY CLINIC | Facility: CLINIC | Age: 62
End: 2022-05-24
Payer: MEDICARE

## 2022-05-24 VITALS
DIASTOLIC BLOOD PRESSURE: 74 MMHG | HEIGHT: 71 IN | WEIGHT: 296.8 LBS | BODY MASS INDEX: 41.55 KG/M2 | HEART RATE: 92 BPM | SYSTOLIC BLOOD PRESSURE: 114 MMHG | OXYGEN SATURATION: 96 %

## 2022-05-24 DIAGNOSIS — E78.5 DYSLIPIDEMIA: ICD-10-CM

## 2022-05-24 DIAGNOSIS — I49.3 PVC'S (PREMATURE VENTRICULAR CONTRACTIONS): ICD-10-CM

## 2022-05-24 DIAGNOSIS — E66.01 MORBID OBESITY WITH BMI OF 40.0-44.9, ADULT (HCC): ICD-10-CM

## 2022-05-24 DIAGNOSIS — Z95.0 PRESENCE OF CARDIAC PACEMAKER: ICD-10-CM

## 2022-05-24 DIAGNOSIS — I10 ESSENTIAL HYPERTENSION: ICD-10-CM

## 2022-05-24 DIAGNOSIS — E11.9 TYPE 2 DIABETES MELLITUS WITHOUT COMPLICATION, WITHOUT LONG-TERM CURRENT USE OF INSULIN (HCC): ICD-10-CM

## 2022-05-24 DIAGNOSIS — I42.0 DILATED CARDIOMYOPATHY (HCC): ICD-10-CM

## 2022-05-24 DIAGNOSIS — I48.21 PERMANENT ATRIAL FIBRILLATION (HCC): Primary | ICD-10-CM

## 2022-05-24 PROCEDURE — 93000 ELECTROCARDIOGRAM COMPLETE: CPT | Performed by: INTERNAL MEDICINE

## 2022-05-24 PROCEDURE — 99214 OFFICE O/P EST MOD 30 MIN: CPT | Performed by: INTERNAL MEDICINE

## 2022-05-24 NOTE — PROGRESS NOTES
[facility]  Follow up note  Hubert Rae 57 y o  male MRN: [id number]        Problems    1  Permanent atrial fibrillation (HCC)  POCT ECG   2  Dilated cardiomyopathy (HCC)  Holter monitor   3  Dyslipidemia  Lipid panel   4  Essential hypertension  Basic metabolic panel   5  Type 2 diabetes mellitus without complication, without long-term current use of insulin (HCC)  Hemoglobin A1C   6  Medtronic dual chamber PM     7  Morbid obesity with BMI of 40 0-44 9, adult ([facility] [address] )     8  PVC's (premature ventricular contractions)  Holter monitor       Impression:    Permanent atrial fibrillation  Well rate controlled in the office today  Ventricular pacing with frequent PVCs noted  He is on Xarelto, metoprolol, diltiazem  Hypertension  Well controlled    Dyslipidemia  Well controlled on atorvastatin, prior LDL 61    Nonischemic cardiomyopathy  With a normalized LVEF on goal-directed medical therapy by 2019  He is on diltiazem  He is on furosemide      Sick sinus syndrome   Prior dual-chamber permanent pacemaker  Upgraded to Bi V pacemaker 12/21 at generator change  66% Bi V pacing is less than adequate    Premature ventricular contractions  Frequent, in trigeminy on EKG today, might be the cause of the inadequate Bi V pacing%  He is on diltiazem and metoprolol    Obstructive sleep apnea  Untreated due to CPAP intolerance    Type 2 diabetes  A1c last summer down to 6 3, much better controlled    Plan:    He had a Bi V upgrade with his generator change 12/21  Unfortunately his Bi V pacing percentage is at 66% he is in permanent AFib, thus no atrial pacing  He has frequent PVCs, after his Bi V pacer upgrade, he did have PVC suppression with higher heart rate programming at 75 beats per minute per electrophysiology    I am going to send him for repeat Holter to assess the degree of his PVCs currently, and I will have him follow-up with electrophysiology to address any arrhythmia interfering with his Bi V pacing percentage  Repeat labs have been ordered as well, BMP, lipids, A1c  He is otherwise stable from a cardiac perspective  HPI:   Nicole Little is a 57y o  year old male with a history of 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, permanent atrial fibrillation on a rate control strategy, chronic anticoagulation with Xarelto, sick sinus syndrome status post Medtronic dual-chamber pacemaker, hypertension, hyperlipidemia, type 2 diabetes, obesity and obstructive sleep apnea    Overall feels well  No CHF decompensation  Diabetes well controlled with an A1c of 6 3  Still severely overweight, does not treated sleep apnea due to CPAP intolerance  No bleeding on Xarelto  AFib well rate controlled  Bi V upgrade to his dual-chamber permanent pacemaker 12/21 after generator change was required  Unfortunately Bi V pacing is at 66%  He continues on diltiazem, metoprolol, atorvastatin, furosemide, Xarelto  He has no palpitations, no chest pain, no shortness of breath      Review of Systems   Constitutional: Negative for appetite change, diaphoresis, fatigue and fever  Respiratory: Negative for chest tightness, shortness of breath and wheezing  Cardiovascular: Negative for chest pain, palpitations and leg swelling  Gastrointestinal: Negative for abdominal pain and blood in stool  Musculoskeletal: Negative for arthralgias and joint swelling  Skin: Negative for rash  Neurological: Negative for dizziness, syncope and light-headedness         Past Medical History:   Diagnosis Date    A-fib [facility])     Chronic systolic congestive heart failure ([facility] [address] )     last assessed - [id number]    Diabetes mellitus ([facility] [address] )     History of cardiac catheterization 10/2012    normal coronaries w/o significant obstructive coronary disease    Pacemaker     last assessed - 19Oct2017    Sarcoidosis     Sleep apnea      Social History     Substance and Sexual Activity   Alcohol Use Yes    Alcohol/week: 4 0 - 5 0 standard drinks    Types: 4 - 5 Cans of beer per week    Comment: a week     Social History     Substance and Sexual Activity   Drug Use No     Social History     Tobacco Use   Smoking Status Former Smoker    Packs/day: 2 00    Years: 5 00    Pack years: 10 00    Quit date: 34    Years since quittin 4   Smokeless Tobacco Never Used   Tobacco Comment    2 ppd x 5 years       Allergies:  No Known Allergies    Medications:     Current Outpatient Medications:     albuterol (ACCUNEB) 0 63 MG/3ML nebulizer solution, Take 3 mL (0 63 mg total) by nebulization every 6 (six) hours as needed for wheezing, Disp: 90 vial, Rfl: 11    albuterol (Ventolin HFA) 90 mcg/act inhaler, Inhale 1 puff 4 (four) times a day, Disp: 3 Inhaler, Rfl: 1    atorvastatin (LIPITOR) 40 mg tablet, Take 1 tablet (40 mg total) by mouth daily at bedtime, Disp: 90 tablet, Rfl: 3    citalopram (CeleXA) 20 mg tablet, Take 1 tablet (20 mg total) by mouth daily, Disp: 90 tablet, Rfl: 1    diltiazem (TIAZAC) 360 MG 24 hr capsule, Take 1 capsule (360 mg total) by mouth daily, Disp: 90 capsule, Rfl: 3    furosemide (LASIX) 40 mg tablet, Take 1 tablet (40 mg total) by mouth daily, Disp: 90 tablet, Rfl: 3    glucose blood test strip, Use 1 each daily Use as instructed, Disp: 50 each, Rfl: 5    guaiFENesin (MUCINEX) 600 mg 12 hr tablet, Take 600 mg by mouth every 12 (twelve) hours, Disp: , Rfl:     metFORMIN (GLUCOPHAGE) 1000 MG tablet, Take 1 tablet (1,000 mg total) by mouth 2 (two) times a day with meals, Disp: 180 tablet, Rfl: 3    metoprolol tartrate (LOPRESSOR) 50 mg tablet, Take 1 tablet (50 mg total) by mouth 2 (two) times a day, Disp: 180 tablet, Rfl: 3    mometasone (ASMANEX TWISTHALER) 220 MCG/INH inhaler, Inhale 1 puff 2 (two) times a day, Disp: , Rfl:     NEBULIZER SYSTEM ALL-IN-ONE MISC, 1 Device by Does not apply route every 4 (four) hours while awake, Disp: 1 each, Rfl: 0    Respiratory Therapy Supplies (NEBULIZER/ADULT MASK) KIT, by Does not apply route daily as needed (wheezing), Disp: 1 each, Rfl: 0    Respiratory Therapy Supplies (NEBULIZER/TUBING/MOUTHPIECE) KIT, by Does not apply route as needed (wheezing), Disp: 1 each, Rfl: 0    rivaroxaban (Xarelto) 20 mg tablet, Take 1 tablet (20 mg total) by mouth daily with breakfast, Disp: 90 tablet, Rfl: 3    cycloSPORINE (RESTASIS) 0 05 % ophthalmic emulsion, Apply 1 drop to eye 2 (two) times a day (Patient not taking: Reported on 5/24/2022), Disp: , Rfl:     erythromycin (ILOTYCIN) ophthalmic ointment, Apply to eye (Patient not taking: Reported on 5/24/2022), Disp: , Rfl:       Vitals:    05/24/22 0910   BP: 114/74   Pulse: 92   SpO2: 96%     Weight (last 2 days)     Date/Time Weight    05/24/22 0910 135 (296 8)        Physical Exam  Constitutional:       General: He is not in acute distress  Appearance: He is not diaphoretic  HENT:      Head: Normocephalic and atraumatic  Eyes:      General: No scleral icterus  Conjunctiva/sclera: Conjunctivae normal    Neck:      Vascular: No JVD  Cardiovascular:      Rate and Rhythm: Normal rate and regular rhythm  Heart sounds: Normal heart sounds  No murmur heard  Pulmonary:      Effort: Pulmonary effort is normal  No respiratory distress  Breath sounds: Normal breath sounds  No decreased breath sounds, wheezing, rhonchi or rales  Musculoskeletal:      Cervical back: Normal range of motion  Right lower leg: Normal  No edema  Left lower leg: Normal  No edema  Skin:     General: Skin is warm and dry  Neurological:      Mental Status: He is alert and oriented to person, place, and time           Laboratory Studies:    Laboratory studies personally reviewed    Cardiac testing:     EKG reviewed personally:   Results for orders placed or performed in visit on 05/24/22   POCT ECG    Impression    Atrial fibrillation, ventricular paced rhythm with frequent PVCs in trigeminy         Echocardiogram:  2019-EF normal  11/21-LVEF 50%, mild LVH, RV mildly dilated, LA mildly dilated, RA mildly dilated, mild-to-moderate MR, moderate TR with normal pulmonary pressure    Stress tests:      Catheterization:      Holter:         John Ambriz MD    Portions of the record may have been created with voice recognition software  Occasional wrong word or "sound a like" substitutions may have occurred due to the inherent limitations of voice recognition software  Read the chart carefully and recognize, using context, where substitutions have occurred

## 2022-05-25 DIAGNOSIS — I50.9 CONGESTIVE HEART FAILURE, UNSPECIFIED HF CHRONICITY, UNSPECIFIED HEART FAILURE TYPE (HCC): ICD-10-CM

## 2022-05-25 DIAGNOSIS — I48.91 ATRIAL FIBRILLATION, UNSPECIFIED TYPE (HCC): ICD-10-CM

## 2022-05-25 DIAGNOSIS — I10 ESSENTIAL HYPERTENSION: ICD-10-CM

## 2022-05-25 DIAGNOSIS — I48.21 PERMANENT ATRIAL FIBRILLATION (HCC): ICD-10-CM

## 2022-05-25 RX ORDER — FUROSEMIDE 40 MG/1
TABLET ORAL
Qty: 90 TABLET | Refills: 3 | Status: SHIPPED | OUTPATIENT
Start: 2022-05-25

## 2022-05-25 RX ORDER — DILTIAZEM HYDROCHLORIDE 360 MG/1
CAPSULE, EXTENDED RELEASE ORAL
Qty: 90 CAPSULE | Refills: 3 | Status: SHIPPED | OUTPATIENT
Start: 2022-05-25 | End: 2022-07-08

## 2022-05-25 RX ORDER — METOPROLOL TARTRATE 50 MG/1
TABLET, FILM COATED ORAL
Qty: 180 TABLET | Refills: 3 | Status: SHIPPED | OUTPATIENT
Start: 2022-05-25

## 2022-05-25 RX ORDER — RIVAROXABAN 20 MG/1
TABLET, FILM COATED ORAL
Qty: 90 TABLET | Refills: 3 | Status: SHIPPED | OUTPATIENT
Start: 2022-05-25

## 2022-06-08 DIAGNOSIS — E78.5 DYSLIPIDEMIA: ICD-10-CM

## 2022-06-08 RX ORDER — ATORVASTATIN CALCIUM 40 MG/1
TABLET, FILM COATED ORAL
Qty: 90 TABLET | Refills: 0 | Status: SHIPPED | OUTPATIENT
Start: 2022-06-08

## 2022-06-14 ENCOUNTER — HOSPITAL ENCOUNTER (OUTPATIENT)
Dept: NON INVASIVE DIAGNOSTICS | Facility: CLINIC | Age: 62
Discharge: HOME | End: 2022-06-14
Payer: MEDICARE

## 2022-06-14 DIAGNOSIS — I49.3 PVC'S (PREMATURE VENTRICULAR CONTRACTIONS): ICD-10-CM

## 2022-06-14 DIAGNOSIS — I42.0 DILATED CARDIOMYOPATHY (HCC): ICD-10-CM

## 2022-06-14 PROCEDURE — 93225 XTRNL ECG REC<48 HRS REC: CPT

## 2022-06-14 PROCEDURE — 93226 XTRNL ECG REC<48 HR SCAN A/R: CPT

## 2022-06-20 PROCEDURE — 93227 XTRNL ECG REC<48 HR R&I: CPT | Performed by: INTERNAL MEDICINE

## 2022-06-22 ENCOUNTER — TELEPHONE (OUTPATIENT)
Dept: CARDIOLOGY CLINIC | Facility: CLINIC | Age: 62
End: 2022-06-22

## 2022-06-22 NOTE — TELEPHONE ENCOUNTER
Wife called back and advised  results  Wife verbally understood, will see Dr Terrial Opitz on July 8th

## 2022-06-22 NOTE — TELEPHONE ENCOUNTER
----- Message from Gabriel Whitten MD sent at 6/21/2022  3:27 PM EDT -----  Please let the patient know that his Holter came back with high PVC burden, 21% of his heartbeats are due to these extra heartbeats, which clearly effects how effective his biventricular pacing function can be, and it is important to follow-up with electrophysiology on July 8th I believe is the date he has been scheduled so that they can address this

## 2022-07-07 ENCOUNTER — REMOTE DEVICE CLINIC VISIT (OUTPATIENT)
Dept: CARDIOLOGY CLINIC | Facility: CLINIC | Age: 62
End: 2022-07-07
Payer: MEDICARE

## 2022-07-07 DIAGNOSIS — Z95.0 PRESENCE OF CARDIAC PACEMAKER: Primary | ICD-10-CM

## 2022-07-07 PROCEDURE — 93296 REM INTERROG EVL PM/IDS: CPT | Performed by: INTERNAL MEDICINE

## 2022-07-07 PROCEDURE — 93294 REM INTERROG EVL PM/LDLS PM: CPT | Performed by: INTERNAL MEDICINE

## 2022-07-07 NOTE — PROGRESS NOTES
EPS Consultation/New Patient Evaluation - Dorie Daniels 57 y o  male MRN: [id number]           ASSESSMENT:  No diagnosis found  PLAN:    1  Permanent atrial fibrillation    2  Dilated cardiomyopathy    3  Essential hypertension    CC/HPI:     Patient presents to the office today for consult regarding Holter  He has no cardiac related complaints today  ROS: ROS       Objective:     Vitals: There were no vitals taken for this visit  , There is no height or weight on file to calculate BMI ,        Physical Exam:    GEN: Dorie Daniels appears well, alert and oriented x 3, pleasant and cooperative   HEENT: pupils equal, round, and reactive to light; extraocular muscles intact  NECK: supple, no carotid bruits   HEART: regular rhythm, normal S1 and S2, no murmurs, clicks, gallops or rubs   LUNGS: clear to auscultation bilaterally; no wheezes, rales, or rhonchi   ABDOMEN: normal bowel sounds, soft, no tenderness, no distention  EXTREMITIES: peripheral pulses normal; no clubbing, cyanosis, or edema  NEURO: no focal findings   SKIN: normal without suspicious lesions on exposed skin    Medications:      Current Outpatient Medications:     albuterol (ACCUNEB) 0 63 MG/3ML nebulizer solution, Take 3 mL (0 63 mg total) by nebulization every 6 (six) hours as needed for wheezing, Disp: 90 vial, Rfl: 11    albuterol (Ventolin HFA) 90 mcg/act inhaler, Inhale 1 puff 4 (four) times a day, Disp: 3 Inhaler, Rfl: 1    atorvastatin (LIPITOR) 40 mg tablet, TAKE ONE TABLET BY MOUTH AT BEDTIME, Disp: 90 tablet, Rfl: 0    citalopram (CeleXA) 20 mg tablet, Take 1 tablet (20 mg total) by mouth daily, Disp: 90 tablet, Rfl: 1    cycloSPORINE (RESTASIS) 0 05 % ophthalmic emulsion, Apply 1 drop to eye 2 (two) times a day (Patient not taking: Reported on 5/24/2022), Disp: , Rfl:     diltiazem (CARDIZEM CD) 360 MG 24 hr capsule, TAKE 1 CAPSULE DAILY, Disp: 90 capsule, Rfl: 3    erythromycin (ILOTYCIN) ophthalmic ointment, Apply to eye (Patient not taking: Reported on 2022), Disp: , Rfl:     furosemide (LASIX) 40 mg tablet, TAKE 1 TABLET DAILY, Disp: 90 tablet, Rfl: 3    glucose blood test strip, Use 1 each daily Use as instructed, Disp: 50 each, Rfl: 5    guaiFENesin (MUCINEX) 600 mg 12 hr tablet, Take 600 mg by mouth every 12 (twelve) hours, Disp: , Rfl:     metFORMIN (GLUCOPHAGE) 1000 MG tablet, Take 1 tablet (1,000 mg total) by mouth 2 (two) times a day with meals, Disp: 180 tablet, Rfl: 3    metoprolol tartrate (LOPRESSOR) 50 mg tablet, TAKE ONE TABLET BY MOUTH 2 TIMES A DAY, Disp: 180 tablet, Rfl: 3    mometasone (ASMANEX TWISTHALER) 220 MCG/INH inhaler, Inhale 1 puff 2 (two) times a day, Disp: , Rfl:     NEBULIZER SYSTEM ALL-IN-ONE MISC, 1 Device by Does not apply route every 4 (four) hours while awake, Disp: 1 each, Rfl: 0    Respiratory Therapy Supplies (NEBULIZER/ADULT MASK) KIT, by Does not apply route daily as needed (wheezing), Disp: 1 each, Rfl: 0    Respiratory Therapy Supplies (NEBULIZER/TUBING/MOUTHPIECE) KIT, by Does not apply route as needed (wheezing), Disp: 1 each, Rfl: 0    Xarelto 20 MG tablet, TAKE ONE TABLET BY MOUTH DAILY WITH BREAKFAST, Disp: 90 tablet, Rfl: 3     Family History   Problem Relation Age of Onset    Atrial fibrillation Mother     Cancer Mother     Heart disease Father     Hypertension Father     Skin cancer Father     Cancer Family     Transient ischemic attack Family         without residual deficits     Social History     Socioeconomic History    Marital status: /Civil Union     Spouse name: Not on file    Number of children: Not on file    Years of education: Not on file    Highest education level: Not on file   Occupational History    Occupation: Retired   Tobacco Use    Smoking status: Former Smoker     Packs/day: 2 00     Years: 5 00     Pack years: 10 00     Quit date:      Years since quittin 5    Smokeless tobacco: Never Used    Tobacco comment: 2 ppd x 5 years   Vaping Use    Vaping Use: Never used   Substance and Sexual Activity    Alcohol use: Yes     Alcohol/week: 4 0 - 5 0 standard drinks     Types: 4 - 5 Cans of beer per week     Comment: a week    Drug use: No    Sexual activity: Not on file   Other Topics Concern    Not on file   Social History Narrative    Always uses seat belt    Daily coffee consumption (___Cups/Day)    Dental care regularly    Pets/Animals    Uses safety Equipment - seatbelts     Social Determinants of Health     Financial Resource Strain: Not on file   Food Insecurity: Not on file   Transportation Needs: Not on file   Physical Activity: Not on file   Stress: Not on file   Social Connections: Not on file   Intimate Partner Violence: Not on file   Housing Stability: Not on file     Social History     Tobacco Use   Smoking Status Former Smoker    Packs/day: 2 00    Years: 5 00    Pack years: 10 00    Quit date: 36    Years since quittin 5   Smokeless Tobacco Never Used   Tobacco Comment    2 ppd x 5 years     Social History     Substance and Sexual Activity   Alcohol Use Yes    Alcohol/week: 4 0 - 5 0 standard drinks    Types: 4 - 5 Cans of beer per week    Comment: a week       Labs & Results:  Below is the patient's most recent value for Albumin, ALT, AST, BUN, Calcium, Chloride, Cholesterol, CO2, Creatinine, GFR, Glucose, HDL, Hematocrit, Hemoglobin, Hemoglobin A1C, LDL, Magnesium, Phosphorus, Platelets, Potassium, PSA, Sodium, Triglycerides, and WBC     Lab Results   Component Value Date    ALT 30 2021    AST 14 2021    BUN 15 2021    CALCIUM 8 8 2021     2021    CHOL 156 2015    CO2 29 2021    CREATININE 1 08 2021    HDL 50 2021    HCT 44 0 2021    HGB 15 2 2021    HGBA1C 6 3 2021    MG 2 1 2019    PHOS 3 1 2019     2021    K 4 0 2021    PSA 0 3 2020     2015    TRIG 146 2021    WBC 5 46 2021     Note: for a comprehensive list of the patient's lab results, access the Results Review activity  Cardiac testing:   Results for orders placed during the hospital encounter of 21    Echo complete with contrast if indicated    Narrative  [address]  [address], [address] [facility]  [phone]    Transthoracic Echocardiogram  2D, M-mode, Doppler, and Color Doppler    Study date:  11-May-2021    Patient: Will Less  MR number: [id number]  Account number: [de-identified]  : 1960  Age: 59 years  Gender: Male  Status: Outpatient  Location: [address] [place] [address]  Height: 71 in  Weight: 293 lb  BP: 118/ 78 mmHg    Indications: A-fib  Diagnoses: I48 0 - Atrial fibrillation    Sonographer:  AUSTIN Alves  Primary Physician:  Isabell Cobb MD  Referring Physician:  Errol Turner MD  Group:  [id number]  Interpreting Physician:  Yamileth Guardado MD    SUMMARY    LEFT VENTRICLE:  The ventricle was mildly dilated  Systolic function was at the lower limits of normal  Ejection fraction was estimated to be 50 %  Although no diagnostic regional wall motion abnormality was identified, this possibility cannot be completely excluded on the basis of this study  VENTRICULAR SEPTUM:  There was mild dyssynergic motion  LEFT ATRIUM:  The atrium was moderately dilated  MITRAL VALVE:  There was mild regurgitation  TRICUSPID VALVE:  There was mild regurgitation  Pulmonary artery systolic pressure was at the upper limits of normal     HISTORY: PRIOR HISTORY: DM2;TOVA; A-fib;DCM;HTN;ATKINSON;DHF;Dual chamber pacer  PROCEDURE: The study was performed in the [address]  This was a routine study  The transthoracic approach was used  The study included complete 2D imaging, M-mode, complete spectral Doppler, and color Doppler   The  heart rate was 77 bpm, at the start of the study  This was a technically difficult study  LEFT VENTRICLE: The ventricle was mildly dilated  Systolic function was at the lower limits of normal  Ejection fraction was estimated to be 50 %  Although no diagnostic regional wall motion abnormality was identified, this possibility  cannot be completely excluded on the basis of this study  Wall thickness was normal  DOPPLER: Transmitral flow pattern: atrial fibrillation  Left ventricular diastolic function parameters were abnormal  There was no evidence of elevated  ventricular filling pressure by Doppler parameters  VENTRICULAR SEPTUM: There was mild dyssynergic motion  RIGHT VENTRICLE: The size was normal  Systolic function was normal  Wall thickness was normal     LEFT ATRIUM: The atrium was moderately dilated  RIGHT ATRIUM: Size was at the upper limits of normal     MITRAL VALVE: Valve structure was normal  There was normal leaflet separation  DOPPLER: The transmitral velocity was within the normal range  There was no evidence for stenosis  There was mild regurgitation  AORTIC VALVE: The valve was trileaflet  Leaflets exhibited normal thickness and normal cuspal separation  DOPPLER: Transaortic velocity was within the normal range  There was no evidence for stenosis  There was no significant  regurgitation  TRICUSPID VALVE: The valve structure was normal  There was normal leaflet separation  DOPPLER: The transtricuspid velocity was within the normal range  There was no evidence for stenosis  There was mild regurgitation  Pulmonary artery  systolic pressure was at the upper limits of normal     PULMONIC VALVE: Not well visualized  DOPPLER: The transpulmonic velocity was within the normal range  There was no significant regurgitation  PERICARDIUM: There was no pericardial effusion  The pericardium was normal in appearance  AORTA: The root exhibited normal size      SYSTEMIC VEINS: IVC: The inferior vena cava was normal in size     PULMONARY VEINS: DOPPLER: Doppler signals were not recordable in the pulmonary vein(s)  SYSTEM MEASUREMENT TABLES    2D  %FS: 23 97 %  Ao Diam: 3 16 cm  EDV(Teich): 229 56 ml  EF(Teich): 46 64 %  ESV(Teich): 122 5 ml  IVSd: 0 77 cm  LA Area: 33 13 cm2  LA Diam: 4 39 cm  LVEDV MOD A4C: 109 35 ml  LVEF MOD A4C: 48 95 %  LVESV MOD A4C: 55 82 ml  LVIDd: 6 68 cm  LVIDs: 5 08 cm  LVLd A4C: 8 67 cm  LVLs A4C: 7 48 cm  LVPWd: 0 78 cm  RA Area: 20 41 cm2  RVIDd: 4 22 cm  SV MOD A4C: 53 53 ml  SV(Teich): 107 07 ml    CW  AV Vmax: 1 44 m/s  AV maxP 24 mmHg  TR Vmax: 2 83 m/s  TR maxP 11 mmHg    MM  TAPSE: 2 12 cm    PW  E' Sept: 0 11 m/s    Inters\[facility] [place] [facility]\"" Commission Accredited Echocardiography Laboratory    Prepared and electronically signed by    Aston Ward MD  Signed 11-May-2021 14:58:53    No results found for this or any previous visit  No results found for this or any previous visit  No results found for this or any previous visit

## 2022-07-08 ENCOUNTER — CONSULT (OUTPATIENT)
Dept: CARDIOLOGY CLINIC | Facility: CLINIC | Age: 62
End: 2022-07-08
Payer: MEDICARE

## 2022-07-08 VITALS
WEIGHT: 299.6 LBS | DIASTOLIC BLOOD PRESSURE: 80 MMHG | SYSTOLIC BLOOD PRESSURE: 120 MMHG | HEART RATE: 70 BPM | BODY MASS INDEX: 41.79 KG/M2

## 2022-07-08 DIAGNOSIS — E78.5 DYSLIPIDEMIA: ICD-10-CM

## 2022-07-08 DIAGNOSIS — I49.3 PREMATURE VENTRICULAR CONTRACTIONS (PVCS) (VPCS): ICD-10-CM

## 2022-07-08 DIAGNOSIS — I48.91 ATRIAL FIBRILLATION, UNSPECIFIED TYPE (HCC): Primary | ICD-10-CM

## 2022-07-08 DIAGNOSIS — I10 ESSENTIAL HYPERTENSION: ICD-10-CM

## 2022-07-08 DIAGNOSIS — I48.21 PERMANENT ATRIAL FIBRILLATION (HCC): ICD-10-CM

## 2022-07-08 DIAGNOSIS — I42.0 DILATED CARDIOMYOPATHY (HCC): ICD-10-CM

## 2022-07-08 PROCEDURE — 93000 ELECTROCARDIOGRAM COMPLETE: CPT | Performed by: INTERNAL MEDICINE

## 2022-07-08 PROCEDURE — 99214 OFFICE O/P EST MOD 30 MIN: CPT | Performed by: INTERNAL MEDICINE

## 2022-07-08 RX ORDER — DILTIAZEM HYDROCHLORIDE 240 MG/1
240 CAPSULE, COATED, EXTENDED RELEASE ORAL DAILY
Qty: 90 CAPSULE | Refills: 3 | Status: SHIPPED | OUTPATIENT
Start: 2022-07-08

## 2022-07-08 NOTE — PATIENT INSTRUCTIONS
Stop diltiazem 360 daily    Start diltiazem 240 mg daily    Get echocardiogram in next 3 months    F/U Dr Aurelia Suresh in 3-6 months after echocardiogram

## 2022-07-08 NOTE — PROGRESS NOTES
Consultation - Electrophysiology Cardiology (EP)   Vega Diaz 57 y o  male MRN: [id number]  Unit/Bed#:  Encounter: [id number]      Consults  PCP: Shelly Logan MD  Outpatient Cardiologist: Larry Painting       Assessment/Plan:    1  BiV pacing: History of Dual-chamber pacemaker has reached elective replacement index prior to generator change I have recommended an echocardiogram as his ejection fraction went from 60% to 50% with at if it is below 50% given that he ventricular paces approximately 40% and he had an upgrade to CRT-P  -patient is BiV pacing 66% of the time (narrow complex and nice capture)     2  PVCs he does have frequent PVCs on his rhythm strip and holter revealing for 21% burden    -Patient is asymptomatic and no reason to pursue invasive ablation or other antiarrhymtics at this time unless this starts to affect his LV function  -PVCs appear that they may be coming from basal-mid septum or RVOT   -repeat TTE      3  Atrial fibrillation his rate is well controlled he has permanent atrial fibrillation  -patient may be getting some LE swelling from higher dose diltiazem and will trial 240 mg daily   -continue current dose of lopressor  -will monitor with device checks for episodes of RVR      4  History of hypertension blood pressure well controlled     5     Anticoagulation on appropriately dosed Xarelto he will hold this the morning of the procedure        Case discussed and reviewed with Dr Jason Claire pending attending addendum  Thank you for involving us in the care of your patient  Cathie Daniels MD  Cardiology Fellow PGY-5    Addendum: This patient was personally seen and examined  I reviewed his pacemaker interrogation personally and suggested programming changes  He has asymptomatic PVCs proximally 25% burden    He recently underwent upgrade from a dual-chamber pacemaker to a cardiac resynchronization therapy pacemaker he felt less short of breath for period of time however he has gained weight back that he lost and remains somewhat short of breath    He has not had an echocardiogram performed since he was upgraded to Bi V pacemaker    My recommendation is to decrease his diltiazem as his blood pressure is adequately controlled and he is having leg swelling, repeat echocardiogram and if ejection fraction is normal since we follow him with serial echoes if his ejection fraction has dropped then I would recommend catheter ablation of his PVCs    On physical exam today he is alert oriented x3 he is slightly overweight cardiac auscultation reveals S1-S2 no murmurs no rubs no gallops lungs are clear to auscultation bilaterally with no rales rhonchi or wheezes abdomen edema bilaterally with mild hyperpigmentation from chronic venous stasis    All questions were answered I agree with the history physical exam assessment and plan from the cardiology fellow    ==========================================================================================  Addendum  History of Present Illness   Physician Requesting Consult: No att  providers found  Reason for Consult / Principal Problem: PVC [place]     Marylene Alken is a 57y o  year old male with a history of 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, permanent atrial fibrillation on a rate control strategy, chronic anticoagulation with Xarelto, sick sinus syndrome status post [employer] dual-chamber pacemaker, hypertension, hyperlipidemia, type 2 diabetes, obesity and obstructive sleep apnea presenting for follow-up for PVC burden  States he is feeling better since upgrade to CRT-P  He has never felt his PVCs before and currently Denies any new symptoms of fevers/chills, nausea/vomiting, abdominal pain, diarrhea, cough, chest pain, shortness of breath, PND, orthopnea, presyncopal symptoms, syncopal episodes  He does report some lower extremity swelling that his lasix daily seems to mostly contro          ECG:  BIV paced with udnerlying a fib and frequent PVCs      DEVICE INTERROGATION   MDT BI-V PPM (VVIR MODE)/ ACTIVE SYSTEM IS MRI CONDITIONAL  DEVICE INTERROGATED IN THE [place] OFFICE: BATTERY VOLTAGE ADEQUATE-10 YRS   66% VSRP 33%  ALL AVAILABLE LEAD PARAMETERS WITHIN NORMAL LIMITS  NO SIGNIFICANT HIGH RATE EPISODES  OPTI-VOL WITHIN NORMAL LIMITS  INCREASE MADE TO RV PW TO PROVIDE ADEQUATE SAFETY MARGIN  NORMAL DEVICE FUNCTION  NC            HOLTER  No results found for this or any previous visit  Review of Systems  ROS as noted above in HPI  Historical Information   Past Medical History:   Diagnosis Date    A-fib [facility])     Chronic systolic congestive heart failure ([facility] )     last assessed - [id number]    Diabetes mellitus ([facility] )     History of cardiac catheterization 10/2012    normal coronaries w/o significant obstructive coronary disease    Pacemaker     last assessed - 2017    Sarcoidosis     Sleep apnea      Past Surgical History:   Procedure Laterality Date    CARDIAC ELECTROPHYSIOLOGY PROCEDURE N/A 12/3/2021    Procedure: PPM generator change - dual;  Surgeon: Sean Camp DO;  [facility]: [facility]; Service: Cardiology    CARDIAC ELECTROPHYSIOLOGY PROCEDURE N/A 12/3/2021    Procedure: CARDIAC UPGRADE PACER TO BIV PACER;  Surgeon: Sean Camp DO;  Location: [facility];   Service: Cardiology    CARDIOVERSION      Elective    FISTULA REPAIR      perirectal    INSERT / REPLACE / [address]           Social History     Substance and Sexual Activity   Alcohol Use Yes    Alcohol/week: 4 0 - 5 0 standard drinks    Types: 4 - 5 Cans of beer per week    Comment: a week     Social History     Substance and Sexual Activity   Drug Use No     Social History     Tobacco Use   Smoking Status Former Smoker    Packs/day: 2 00    Years: 5 00    Pack years: 10 00    Quit date: 34    Years since quittin 5   Smokeless Tobacco Never Used   Tobacco Comment    2 ppd x 5 years     Family History: non-contributory    Meds/Allergies   Hospital Medications:   No current facility-administered medications for this visit  Home Medications: (Not in a hospital admission)      No Known Allergies    Objective   Vitals: Blood pressure 120/80, weight 136 kg (299 lb 9 6 oz)  [unfilled]    Invasive Devices  Report    None                 Physical Exam    GEN: Marissa Robin appears well, alert and oriented x 3, pleasant and cooperative   HEENT:  Normocephalic, atraumatic, anicteric, moist mucous membranes  NECK: No JVD or carotid bruits   HEART: regular rhythm, regular rate, normal S1 and S2, no murmurs, clicks, gallops or rubs   LUNGS: Clear to auscultation bilaterally; no wheezes, rales, or rhonchi; respiration nonlabored   ABDOMEN:  Normoactive bowel sounds, soft, no tenderness, no distention  EXTREMITIES: peripheral pulses palpable; no edema  NEURO: no gross focal findings; cranial nerves grossly intact   SKIN:  Dry, intact, warm to touch    Lab Results: I have personally reviewed pertinent lab results  Imaging: I have personally reviewed pertinent reports  Previous STRESS TEST:  Results for orders placed in visit on 09/08/15    Stress test only, exercise    Narrative  10/1/12     No results found for this or any previous visit  No results found for this or any previous visit  Previous Cath/PCI:  No results found for this or any previous visit  No results found for this or any previous visit  No results found for this or any previous visit        ECHO:  Results for orders placed during the hospital encounter of 05/11/21    Echo complete with contrast if indicated    Narrative  [facility]  [facility]  [phone]    Transthoracic Echocardiogram  2D, M-mode, Doppler, and Color Doppler    Study date:  11-May-2021    Patient: Rosa Hernandez  MR number: [id number]  Account number: [de-identified]  : 1960  Age: 59 years  Gender: Male  Status: Outpatient  Location: [address] [place] [address] [facility]  Height: 71 in  Weight: 293 lb  BP: 118/ 78 mmHg    Indications: A-fib  Diagnoses: I48 0 - Atrial fibrillation    Sonographer:  AUSTIN Kimball  Primary Physician:  Km Chamberlain MD  Referring Physician:  Suzanne Leavitt MD  Group:  [id number]  Interpreting Physician:  Henry Garzon MD    SUMMARY    LEFT VENTRICLE:  The ventricle was mildly dilated  Systolic function was at the lower limits of normal  Ejection fraction was estimated to be 50 %  Although no diagnostic regional wall motion abnormality was identified, this possibility cannot be completely excluded on the basis of this study  VENTRICULAR SEPTUM:  There was mild dyssynergic motion  LEFT ATRIUM:  The atrium was moderately dilated  MITRAL VALVE:  There was mild regurgitation  TRICUSPID VALVE:  There was mild regurgitation  Pulmonary artery systolic pressure was at the upper limits of normal     HISTORY: PRIOR HISTORY: DM2;TOVA; A-fib;DCM;HTN;ATKINSON;DHF;Dual chamber pacer  PROCEDURE: The study was performed in the [address] [facility]  This was a routine study  The transthoracic approach was used  The study included complete 2D imaging, M-mode, complete spectral Doppler, and color Doppler  The  heart rate was 77 bpm, at the start of the study  This was a technically difficult study  LEFT VENTRICLE: The ventricle was mildly dilated  Systolic function was at the lower limits of normal  Ejection fraction was estimated to be 50 %  Although no diagnostic regional wall motion abnormality was identified, this possibility  cannot be completely excluded on the basis of this study  Wall thickness was normal  DOPPLER: Transmitral flow pattern: atrial fibrillation   Left ventricular diastolic function parameters were abnormal  There was no evidence of elevated  ventricular filling pressure by Doppler parameters  VENTRICULAR SEPTUM: There was mild dyssynergic motion  RIGHT VENTRICLE: The size was normal  Systolic function was normal  Wall thickness was normal     LEFT ATRIUM: The atrium was moderately dilated  RIGHT ATRIUM: Size was at the upper limits of normal     MITRAL VALVE: Valve structure was normal  There was normal leaflet separation  DOPPLER: The transmitral velocity was within the normal range  There was no evidence for stenosis  There was mild regurgitation  AORTIC VALVE: The valve was trileaflet  Leaflets exhibited normal thickness and normal cuspal separation  DOPPLER: Transaortic velocity was within the normal range  There was no evidence for stenosis  There was no significant  regurgitation  TRICUSPID VALVE: The valve structure was normal  There was normal leaflet separation  DOPPLER: The transtricuspid velocity was within the normal range  There was no evidence for stenosis  There was mild regurgitation  Pulmonary artery  systolic pressure was at the upper limits of normal     PULMONIC VALVE: Not well visualized  DOPPLER: The transpulmonic velocity was within the normal range  There was no significant regurgitation  PERICARDIUM: There was no pericardial effusion  The pericardium was normal in appearance  AORTA: The root exhibited normal size  SYSTEMIC VEINS: IVC: The inferior vena cava was normal in size  PULMONARY VEINS: DOPPLER: Doppler signals were not recordable in the pulmonary vein(s)      SYSTEM MEASUREMENT TABLES    2D  %FS: 23 97 %  Ao Diam: 3 16 cm  EDV(Teich): 229 56 ml  EF(Teich): 46 64 %  ESV(Teich): 122 5 ml  IVSd: 0 77 cm  LA Area: 33 13 cm2  LA Diam: 4 39 cm  LVEDV MOD A4C: 109 35 ml  LVEF MOD A4C: 48 95 %  LVESV MOD A4C: 55 82 ml  LVIDd: 6 68 cm  LVIDs: 5 08 cm  LVLd A4C: 8 67 cm  LVLs A4C: 7 48 cm  LVPWd: 0 78 cm  RA Area: 20 41 cm2  RVIDd: 4 22 cm  SV MOD A4C: 53 53 ml  SV(Teich): 107 07 ml    CW  AV Vmax: 1 44 m/s  AV maxP 24 mmHg  TR Vmax: 2 83 m/s  TR maxP 11 mmHg    MM  TAPSE: 2 12 cm    PW  E' Sept: 0 11 m/s    [facility] Accredited Echocardi[facility] Laboratory    Prepared and electronically signed by    Dawood Merida MD  Signed 11-May-2021 14:58:53    Results for orders placed during the hospital encounter of 21    Echo complete w/ contrast if indicated    Interpretation Summary    Left Ventricle: Left ventricular cavity size is mildly dilated  Wall thickness is mildly increased  There is eccentric hypertrophy  The left ventricular ejection fraction is 50%  Systolic function is low normal  Wall motion is grossly normal, although the septum was dyssynergic    IVS: There is abnormal septal motion consistent with left bundle branch block or paced beats    Right Ventricle: Right ventricular cavity size is mildly dilated  Systolic function is normal     Left Atrium: The atrium is mildly dilated    Right Atrium: The atrium is mildly dilated    Mitral Valve: There is mild to moderate regurgitation    Tricuspid Valve: There is moderate regurgitation  The estimated right ventricular systolic pressure is 34 mmHg  No significant change when compared to the prior study  ROHAN:  No results found for this or any previous visit  No results found for this or any previous visit  CMR:  No results found for this or any previous visit  No results found for this or any previous visit  No results found for this or any previous visit  HOLTER  No results found for this or any previous visit      Results for orders placed during the hospital encounter of 22    Holter monitor    Interpretation Summary  Indication: Palpitations    Predominant rhythm: Ventricular paced rhythm    Minimum HR: 80  Average HR: 90  Maximum HR: 104    Premature ventricular contractions: 16460 (21% burden)  Ventricular runs: none    Supraventricular contractions: 350  Supraventricular runs: none    Longest RR: 0 9 sec    Arrhythmias: No sustained arrhythmias recorded    Symptom diary submitted: No      Impression    Predominant ventricular paced rhythm with variable QRS duration of paced complexes suggests intermittent biventricular pacing  Frequent PVCs, some in bigeminal and trigeminal runs with high PVC burden of 21% with pacer spikes noted on the premature ventricular complexes (?failure to sense)  Underlying atrial rhythm probable atrial fibrillation  No symptom correlation  Abnormal holter  Signed by: Timothy Haddad MD          Counseling / Coordination of Care  Total floor / unit time spent today 30 minutes minutes  Greater than 50% of total time was spent with the patient and / or family counseling and / or coordination of care  A description of the counseling / coordination of care:          Epic/ Allscripts/Care Everywhere records reviewed:     ** Please Note: Fluency DirectDictation voice to text software may have been used in the creation of this document   **

## 2022-08-09 ENCOUNTER — TELEPHONE (OUTPATIENT)
Dept: FAMILY MEDICINE CLINIC | Facility: CLINIC | Age: 62
End: 2022-08-09

## 2022-08-09 NOTE — TELEPHONE ENCOUNTER
----- Message from Hao Hoyos MD sent at 8/9/2022  7:54 AM EDT -----  Pt overdue for follow up - please contact pt and have him male appt before the end of the year

## 2022-08-29 ENCOUNTER — HOSPITAL ENCOUNTER (OUTPATIENT)
Dept: NON INVASIVE DIAGNOSTICS | Facility: CLINIC | Age: 62
Discharge: HOME | End: 2022-08-29
Payer: MEDICARE

## 2022-08-29 VITALS
SYSTOLIC BLOOD PRESSURE: 120 MMHG | WEIGHT: 299 LBS | HEART RATE: 70 BPM | BODY MASS INDEX: 41.86 KG/M2 | DIASTOLIC BLOOD PRESSURE: 80 MMHG | HEIGHT: 71 IN

## 2022-08-29 DIAGNOSIS — I48.91 ATRIAL FIBRILLATION, UNSPECIFIED TYPE (HCC): ICD-10-CM

## 2022-08-29 LAB
AORTIC ROOT: 3.1 CM
APICAL FOUR CHAMBER EJECTION FRACTION: 38 %
ASCENDING AORTA: 3.6 CM
FRACTIONAL SHORTENING: 33 % (ref 28–44)
INTERVENTRICULAR SEPTUM IN DIASTOLE (PARASTERNAL SHORT AXIS VIEW): 1.1 CM
INTERVENTRICULAR SEPTUM: 1.1 CM (ref 0.6–1.1)
LAAS-AP2: 18.7 CM2
LAAS-AP4: 24 CM2
LEFT ATRIUM SIZE: 5.1 CM
LEFT INTERNAL DIMENSION IN SYSTOLE: 4.1 CM (ref 2.1–4)
LEFT VENTRICULAR INTERNAL DIMENSION IN DIASTOLE: 6.1 CM (ref 3.5–6)
LEFT VENTRICULAR POSTERIOR WALL IN END DIASTOLE: 1.4 CM
LEFT VENTRICULAR STROKE VOLUME: 110 ML
LVSV (TEICH): 110 ML
MV E'TISSUE VEL-SEP: 13 CM/S
RIGHT ATRIAL 2D VOLUME: 63 ML
RIGHT ATRIUM AREA SYSTOLE A4C: 22.2 CM2
RIGHT VENTRICLE ID DIMENSION: 3 CM
SL CV LEFT ATRIUM LENGTH A2C: 5.7 CM
SL CV LV EF: 40
SL CV PED ECHO LEFT VENTRICLE DIASTOLIC VOLUME (MOD BIPLANE) 2D: 186 ML
SL CV PED ECHO LEFT VENTRICLE SYSTOLIC VOLUME (MOD BIPLANE) 2D: 76 ML
TR MAX PG: 34 MMHG
TR PEAK VELOCITY: 2.9 M/S
TRICUSPID VALVE PEAK REGURGITATION VELOCITY: 2.89 M/S

## 2022-08-29 PROCEDURE — 93306 TTE W/DOPPLER COMPLETE: CPT

## 2022-08-29 PROCEDURE — 93306 TTE W/DOPPLER COMPLETE: CPT | Performed by: INTERNAL MEDICINE

## 2022-09-26 ENCOUNTER — RA CDI HCC (OUTPATIENT)
Dept: OTHER | Facility: HOSPITAL | Age: 62
End: 2022-09-26

## 2022-09-26 NOTE — PROGRESS NOTES
[facility] [address]  coding opportunities       Chart reviewed, no opportunity found: CHART REVIEWED, NO OPPORTUNITY FOUND        Patients Insurance     Medicare Insurance: Medicare

## 2022-10-01 ENCOUNTER — NURSE TRIAGE (OUTPATIENT)
Dept: OTHER | Facility: OTHER | Age: 62
End: 2022-10-01

## 2022-10-01 ENCOUNTER — TELEMEDICINE (OUTPATIENT)
Dept: FAMILY MEDICINE CLINIC | Facility: CLINIC | Age: 62
End: 2022-10-01
Payer: MEDICARE

## 2022-10-01 VITALS — OXYGEN SATURATION: 96 %

## 2022-10-01 DIAGNOSIS — U07.1 COVID-19: Primary | ICD-10-CM

## 2022-10-01 PROCEDURE — 99442 PR PHYS/QHP TELEPHONE EVALUATION 11-20 MIN: CPT | Performed by: FAMILY MEDICINE

## 2022-10-01 NOTE — TELEPHONE ENCOUNTER
Patient is not with his wife at this time and he does not have a phone on him at this time   Please call patient back at [phone] around 2:45

## 2022-10-01 NOTE — PROGRESS NOTES
COVID-19 Outpatient Progress Note    Assessment/Plan:    Problem List Items Addressed This Visit        Other    COVID-19 - Primary     Day 2  Discussed paxlovid interaction  Sent molnupiravir  Check pulse ox  Follow up in office         Relevant Medications    molnupiravir 200 mg capsule         Disposition:     Discussed symptom directed medication options with patient  Patient meets criteria for Molnupiravir and they have been counseled according to EUA documentation provided by the FDA  After discussion, patient agrees to treatment  [place] is an investigational medicine used to treat mild-to-moderate COVID-19 in adults with positive results of direct SARS-CoV-2 viral testing, and who are at high risk for progressing to severe COVID-19 including hospitalization or death, and for whom other COVID-19 treatment options authorized by the FDA are not accessible or clinically appropriate  The FDA has authorized the emergency use of molnupiravir for the treatment of mild-tomoderate COVID-19 in adults under an EUA  [place] Gal[place] is not authorized:  - for use in people less than 25years of age   - for prevention of COVID-19   - for people needing hospitalization for COVID-19   - for use for longer than 5 consecutive days    What is the most important information I should know about molnupiravir? [place] Galan may cause serious side effects, including:    [place] Galan may cause harm to your unborn baby  It is not known if molnupiravir will harm your baby if you take molnupiravir during pregnancy  - [place] Galan is not recommended for use in pregnancy  - [place] Galan has not been studied in pregnancy  [place] Galan was studied in pregnant animals only   When molnupiravir was given to pregnant animals, molnupiravir caused harm to their unborn babies   - You and your healthcare provider may decide that you should take molnupiravir duringpregnancy if there are no other COVID-19 treatment options authorized by the FDA that are accessible or clinically appropriate for you  - If you and your healthcare provider decide that you should take molnupiravir during pregnancy, you and your healthcare provider should discuss the known and potential benefits and the potential risks of taking molnupiravir during pregnancy  For individuals who are able to become pregnant:  - You should use a reliable method of birth control (contraception) consistently and correctly during treatment with molnupiravir and for 4 days after the last dose of molnupiravir  Talk to your healthcare provider about reliable birth control methods  - Before starting treatment with molnupiravir your healthcare provider may do a pregnancy test to see if you are pregnant before starting treatment with molnupiravir  - Tell your healthcare provider right away if you become pregnant or think you may be pregnant during treatment with molnupiravir  Pregnancy Surveillance Program:  - There is a pregnancy surveillance program for individuals who take molnupiravir during pregnancy  The purpose of this program is to collect information about the health of you and your baby  Talk to your healthcare provider about how to take part in this program   - If you take molnupiravir during pregnancy and you agree to participate in the pregnancy surveillance program and allow your healthcare provider to share your information with Willem Schultz, then your healthcare provider will report your use of molnupiravir during pregnancy to [address] [place] [address],[address]  by calling [phone] or pregnancyreporting msd com  For individuals who are sexually active with partners who are able to become pregnant:  - It is not known if molnupiravir can affect sperm  While the risk is regarded as low, animal studies to fully assess the potential for molnupiravir to affect the babies of males treated with molnupiravir have not been completed   A reliable method of birth control (contraception) should be used consistently and correctly during treatment with molnupiravir and for at least 3 months after the last dose  The risk to sperm beyond 3 months is not known  Studies to understand the risk to sperm beyond 3 months are ongoing  Talk to your healthcare provider about reliable birth control methods  Talk to your healthcare provider if you have questions or concerns about how molnupiravir may affect sperm  How do I take molnupiravir? - Take molnupiravir exactly as your healthcare provider tells you to take it  - Take 4 capsules of molnupiravir every 12 hours (for example, at 8 am and at 8 pm)  - Take molnupiravir for 5 days  It is important that you complete the full 5 days of treatment with molnupiravir  Do not stop taking molnupiravir before you complete the full 5 days of treatment, even if you feel better  - Take molnupiravir with or without food  - You should stay in isolation for as long as your healthcare provider tells you to  Talk to your healthcare provider if you are not sure about how to properly isolate while you have COVID-19   - Swallow molnupiravir capsules whole  Do not open, break, or crush the capsules  If you cannot swallow capsules whole, tell your healthcare provider  What to do if you miss a dose:  - If it has been less than 10 hours since the missed dose, take it as soon as you remember  - If it has been more than 10 hours since the missed dose, skip the missed dose and take your dose at the next scheduled time  - Do not double the dose of molnupiravir to make up for a missed dose  What are the important possible side effects of molnupiravir? Possible side effects of molnupiravir are:  - See, Dakota Doran is the most important information I should know about molnupiravir?   - Diarrhea  - Nausea  - Dizziness    These are not all the possible side effects of molnupiravir  Not many people have taken molnupiravir   Serious and unexpected side effects may happen  This medicine is still being studied, so it is possible that all of the risks are not known at this time  What other treatment choices are there? Like Sherrine Fluke may allow for the emergency use of other medicines to treat people with COVID-19  Go to https://[employer]/ for more information  It is your choice to be treated or not to be treated with molnupiravir  Should you decide not to take it, it will not change your standard medical care  What if I am breastfeeding? Breastfeeding is not recommended during treatment with molnupiravir and for 4 days after the last dose of molnupiravir  If you are breastfeeding or plan to breastfeed, talk to your healthcare provider about your options and specific situation before taking molnupiravir  How do I report side effects with molnupiravir? Contact your healthcare provider if you have any side effects that bother you or do not go away  Report side effects to FDA MedWatch at www [employer] gov/medwatch or call 8-078-MQP-3370 (1-672.423.5078)  How should I store Mayelin Burgess? - Store molnupiravir capsules at room temperature between 68°F to 77°F (20°C to 25°C)  - Keep molnupiravir and all medicines out of the reach of children and pets  Full fact sheet for patients, parents, and caregivers can be found at: [employer]    I have spent 15 minutes directly with the patient  Encounter provider: Esther Marlow DO     Provider located at: [address] [place] [id number]     Recent Visits  No visits were found meeting these conditions    Showing recent visits within past 7 days and meeting all other requirements  Today's Visits  Date Type Provider Dept   10/01/22 Telemedicine Esther Marlow DO [facility]   Showing today's visits and meeting all other requirements  Future Appointments  No visits were found meeting these conditions  Showing future appointments within next 150 days and meeting all other requirements     This virtual check-in was done via telephone and he agrees to proceed  Patient agrees to participate in a virtual check in via telephone or video visit instead of presenting to the office to address urgent/immediate medical needs  Patient is aware this is a billable service  He acknowledged consent and understanding of privacy and security of the video platform  The patient has agreed to participate and understands they can discontinue the visit at any time  After connecting through Telephone, the patient was identified by name and date of birth  Trever Garibay was informed that this was a telemedicine visit and that the exam was being conducted confidentially over secure lines  My office door was closed  No one else was in the room  Trever Garibay acknowledged consent and understanding of privacy and security of the telemedicine visit  I informed the patient that I have reviewed his record in Epic and presented the opportunity for him to ask any questions regarding the visit today  The patient agreed to participate  It was my intent to perform this visit via video technology but the patient was not able to do a video connection so the visit was completed via audio telephone only  Verification of patient location:  Patient is located in the following state in which I hold an active license: [place]    Subjective:   Trever Garibay is a 57 y o  male who is concerned about COVID-19  Patient's symptoms include fatigue, sore throat, cough (improved-better), shortness of breath (better) and headache  Patient denies fever, anosmia and loss of taste       - Date of symptom onset: 9/29/2022      COVID-19 vaccination status: Fully vaccinated (primary series)    Exposure:   Contact with a person who is under investigation (PUI) for or who is positive for COVID-19 within the last 14 days?: No    Hospitalized recently for fever and/or lower respiratory symptoms?: No      Currently a healthcare worker that is involved in direct patient care?: No      Works in a special setting where the risk of COVID-19 transmission may be high? (this may include long-term care, correctional and FDC facilities; homeless shelters; assisted-living facilities and group homes ): No      Resident in a special setting where the risk of COVID-19 transmission may be high? (this may include long-term care, correctional and FDC facilities; homeless shelters; assisted-living facilities and group homes ): No      Daughter with covid next door    Lab Results   Component Value Date    SARSCOV2 Negative 12/03/2021       Review of Systems   Constitutional: Positive for fatigue  Negative for fever  HENT: Positive for sore throat  Respiratory: Positive for cough (improved-better) and shortness of breath (better)  Neurological: Positive for headaches       Current Outpatient Medications on File Prior to Visit   Medication Sig    albuterol (ACCUNEB) 0 63 MG/3ML nebulizer solution Take 3 mL (0 63 mg total) by nebulization every 6 (six) hours as needed for wheezing    albuterol (Ventolin HFA) 90 mcg/act inhaler Inhale 1 puff 4 (four) times a day    atorvastatin (LIPITOR) 40 mg tablet TAKE ONE TABLET BY MOUTH AT BEDTIME    citalopram (CeleXA) 20 mg tablet Take 1 tablet (20 mg total) by mouth daily    cycloSPORINE (RESTASIS) 0 05 % ophthalmic emulsion Apply 1 drop to eye 2 (two) times a day (Patient not taking: No sig reported)    diltiazem (CARDIZEM CD) 240 mg 24 hr capsule Take 1 capsule (240 mg total) by mouth daily    erythromycin (ILOTYCIN) ophthalmic ointment Apply to eye (Patient not taking: No sig reported)    furosemide (LASIX) 40 mg tablet TAKE 1 TABLET DAILY    glucose blood test strip Use 1 each daily Use as instructed (Patient not taking: No sig reported)    guaiFENesin (MUCINEX) 600 mg 12 hr tablet Take 600 mg by mouth every 12 (twelve) hours (Patient not taking: Reported on 7/8/2022)    metFORMIN (GLUCOPHAGE) 1000 MG tablet Take 1 tablet (1,000 mg total) by mouth 2 (two) times a day with meals    metoprolol tartrate (LOPRESSOR) 50 mg tablet TAKE ONE TABLET BY MOUTH 2 TIMES A DAY    mometasone (ASMANEX TWISTHALER) 220 MCG/INH inhaler Inhale 1 puff 2 (two) times a day    NEBULIZER SYSTEM ALL-IN-ONE MISC 1 Device by Does not apply route every 4 (four) hours while awake    Respiratory Therapy Supplies (NEBULIZER/ADULT MASK) KIT by Does not apply route daily as needed (wheezing)    Respiratory Therapy Supplies (NEBULIZER/TUBING/MOUTHPIECE) KIT by Does not apply route as needed (wheezing)    Xarelto 20 MG tablet TAKE ONE TABLET BY MOUTH DAILY WITH BREAKFAST       Objective:    SpO2 96%      Physical Exam  Kitty Wood DO

## 2022-10-01 NOTE — TELEPHONE ENCOUNTER
Reason for Disposition   HIGH RISK for severe COVID complications (e g , weak immune system, age > 59 years, obesity with BMI > 22, pregnant, chronic lung disease or other chronic medical condition)  (Exception: Already seen by PCP and no new or worsening symptoms )    Answer Assessment - Initial Assessment Questions  1  COVID-19 DIAGNOSIS: "Who made your COVID-19 diagnosis?" "Was it confirmed by a positive lab test or self-test?" If not diagnosed by a doctor (or NP/PA), ask "Are there lots of cases (community spread) where you live?" Note: See public health department website, if unsure  Home test  2  COVID-19 EXPOSURE: "Was there any known exposure to COVID before the symptoms began?" CDC Definition of close contact: within 6 feet (2 meters) for a total of 15 minutes or more over a 24-hour period  Unsure   3  ONSET: "When did the COVID-19 symptoms start?"       A couple days   4  WORST SYMPTOM: "What is your worst symptom?" (e g , cough, fever, shortness of breath, muscle aches)      "breathing was bad"  5  COUGH: "Do you have a cough?" If Yes, ask: "How bad is the cough?"        Yes  6  FEVER: "Do you have a fever?" If Yes, ask: "What is your temperature, how was it measured, and when did it start?"      No  7  RESPIRATORY STATUS: "Describe your breathing?" (e g , shortness of breath, wheezing, unable to speak)       "it was bad"  8  BETTER-SAME-WORSE: "Are you getting better, staying the same or getting worse compared to yesterday?"  If getting worse, ask, "In what way?"      Worse  9  HIGH RISK DISEASE: "Do you have any chronic medical problems?" (e g , asthma, heart or lung disease, weak immune system, obesity, etc )      Pacemaker,Sarcoidosis  10  VACCINE: "Have you had the COVID-19 vaccine?" If Yes, ask: "Which one, how many shots, when did you get it?"        Yes  11  BOOSTER: "Have you received your COVID-19 booster?" If Yes, ask: "Which one and when did you get it?"        Yes  12  PREGNANCY: "Is there any chance you are pregnant?" "When was your last menstrual period?"        No  13  OTHER SYMPTOMS: "Do you have any other symptoms?"  (e g , chills, fatigue, headache, loss of smell or taste, muscle pain, sore throat)        Fatigue  14   O2 SATURATION MONITOR:  "Do you use an oxygen saturation monitor (pulse oximeter) at home?" If Yes, ask "What is your reading (oxygen level) today?" "What is your usual oxygen saturation reading?" (e g , 95%)        No    Protocols used: CORONAVIRUS (COVID-19) DIAGNOSED OR SUSPECTED-ADULT-

## 2022-10-01 NOTE — TELEPHONE ENCOUNTER
Regarding: Covid Positive, Cough, Phlegm, SOB Yesterday  ----- Message from Temitope العلي sent at 10/1/2022  1:42 PM EDT -----  " My  tested positive for Covid today  H/o Sarcoidosis  He has a Cough, Phlegm, SOB yesterday   Scratchy Throat, Joint Aches "

## 2022-10-01 NOTE — TELEPHONE ENCOUNTER
On call provider states that she will contact patient but he needs to have a virtual visit on Monday

## 2022-10-03 ENCOUNTER — TELEMEDICINE (OUTPATIENT)
Dept: FAMILY MEDICINE CLINIC | Facility: CLINIC | Age: 62
End: 2022-10-03
Payer: MEDICARE

## 2022-10-03 DIAGNOSIS — U07.1 COVID-19: Primary | ICD-10-CM

## 2022-10-03 PROCEDURE — NC001 PR NO CHARGE: Performed by: FAMILY MEDICINE

## 2022-10-03 NOTE — PROGRESS NOTES
COVID-19 Outpatient Progress Note    Assessment/Plan:    Problem List Items Addressed This Visit        Other    COVID-19 - Primary         Disposition:     Patient has asymptomatic or mild COVID-19 infection  Based off CDC guidelines, they were recommended to isolate for 5 days  If they are asymptomatic or symptoms are improving with no fevers in the past 24 hours, isolation may be ended followed by 5 days of wearing a mask when around othes to minimize risk of infecting others  If still have a fever or other symptoms have not improved, continue to isolate until they improve  Regardless of when they end isolation, avoid being around people who are more likely to get very sick from COVID-19 until at least day 11  Discussed symptom directed medication options with patient  Discussed vitamin D, vitamin C, and/or zinc supplementation with patient  Day for of COVID symptoms  Patient improving  Recommend finish antiviral therapy  Continue nebulizer and other pulmonary treatments  Patient will call on Thursday with [id number] if he should start to worsen  Patient call for any other problems or concerns in the interim    I have spent 12 minutes directly with the patient  Greater than 50% of this time was spent in counseling/coordination of care regarding: diagnostic results, prognosis, risks and benefits of treatment options, instructions for management, patient and family education, importance of treatment compliance, risk factor reductions and impressions  Encounter provider: Yvrose Araujo MD     Provider located at: [address] [place] [phone]     Recent Visits  No visits were found meeting these conditions    Showing recent visits within past 7 days and meeting all other requirements  Today's Visits  Date Type Provider Dept   10/03/22 Telemedicine Stephane Joe today's visits and meeting all other requirements  Future Appointments  No visits were found meeting these conditions  Showing future appointments within next 150 days and meeting all other requirements       Patient agrees to participate in a virtual check in via telephone or video visit instead of presenting to the office to address urgent/immediate medical needs  Patient is aware this is a billable service  He acknowledged consent and understanding of privacy and security of the video platform  The patient has agreed to participate and understands they can discontinue the visit at any time  After connecting through [facility], the patient was identified by name and date of birth  Katlyn Garcia was informed that this was a telemedicine visit and that the exam was being conducted confidentially over secure lines  Katlyn Garcia acknowledged consent and understanding of privacy and security of the telemedicine visit  I informed the patient that I have reviewed his record in Epic and presented the opportunity for him to ask any questions regarding the visit today  The patient agreed to participate  Verification of patient location:  Patient is located in the following state in which I hold an active license: [place]    Subjective:   Katlyn Garcia is a 57 y o  male who has been screened for COVID-19  Symptom change since last report: improving  Patient's symptoms include chills, fatigue, nasal congestion, cough, shortness of breath, myalgias and headache  Patient denies fever, malaise, rhinorrhea, sore throat, anosmia, loss of taste, chest tightness, abdominal pain, nausea, vomiting and diarrhea  - Date of symptom onset: 9/29/2022      COVID-19 vaccination status: Fully vaccinated (primary series)    Nina Darling has been staying home and has isolated themselves in his home   He is taking care to not share personal items and is cleaning all surfaces that are touched often, like counters, tabletops, and doorknobs using household cleaning sprays or wipes  He is wearing a mask when he leaves his room  Day 4 of COVID symptoms  Symptoms are as above but all are improving  Patient reached out to on-call provider on 10/01 and was started on monulpiravir which seems to be helping  Initially had some shortness of breath but this is improved nicely with antiviral therapy and his nebulized treatments  Lab Results   Component Value Date    SARSCOV2 Negative 12/03/2021       Review of Systems   Constitutional: Positive for chills and fatigue  Negative for fever  HENT: Positive for congestion  Negative for rhinorrhea and sore throat  Respiratory: Positive for cough and shortness of breath  Negative for chest tightness  Gastrointestinal: Negative for abdominal pain, diarrhea, nausea and vomiting  Musculoskeletal: Positive for myalgias  Neurological: Positive for headaches       Current Outpatient Medications on File Prior to Visit   Medication Sig    albuterol (ACCUNEB) 0 63 MG/3ML nebulizer solution Take 3 mL (0 63 mg total) by nebulization every 6 (six) hours as needed for wheezing    albuterol (Ventolin HFA) 90 mcg/act inhaler Inhale 1 puff 4 (four) times a day    atorvastatin (LIPITOR) 40 mg tablet TAKE ONE TABLET BY MOUTH AT BEDTIME    citalopram (CeleXA) 20 mg tablet Take 1 tablet (20 mg total) by mouth daily    cycloSPORINE (RESTASIS) 0 05 % ophthalmic emulsion Apply 1 drop to eye 2 (two) times a day (Patient not taking: No sig reported)    diltiazem (CARDIZEM CD) 240 mg 24 hr capsule Take 1 capsule (240 mg total) by mouth daily    erythromycin (ILOTYCIN) ophthalmic ointment Apply to eye (Patient not taking: No sig reported)    furosemide (LASIX) 40 mg tablet TAKE 1 TABLET DAILY    glucose blood test strip Use 1 each daily Use as instructed (Patient not taking: No sig reported)    guaiFENesin (MUCINEX) 600 mg 12 hr tablet Take 600 mg by mouth every 12 (twelve) hours (Patient not taking: Reported on 7/8/2022)    metFORMIN (GLUCOPHAGE) 1000 MG tablet Take 1 tablet (1,000 mg total) by mouth 2 (two) times a day with meals    metoprolol tartrate (LOPRESSOR) 50 mg tablet TAKE ONE TABLET BY MOUTH 2 TIMES A DAY    molnupiravir 200 mg capsule Take 4 capsules (800 mg total) by mouth every 12 (twelve) hours for 5 days    mometasone (ASMANEX TWISTHALER) 220 MCG/INH inhaler Inhale 1 puff 2 (two) times a day    NEBULIZER SYSTEM ALL-IN-ONE MISC 1 Device by Does not apply route every 4 (four) hours while awake    Respiratory Therapy Supplies (NEBULIZER/ADULT MASK) KIT by Does not apply route daily as needed (wheezing)    Respiratory Therapy Supplies (NEBULIZER/TUBING/MOUTHPIECE) KIT by Does not apply route as needed (wheezing)    Xarelto 20 MG tablet TAKE ONE TABLET BY MOUTH DAILY WITH BREAKFAST       Objective: There were no vitals taken for this visit  Physical Exam  Constitutional:       Comments: Well-appearing male in no apparent distress   HENT:      Nose: No congestion  Pulmonary:      Effort: Pulmonary effort is normal  No respiratory distress  Musculoskeletal:      Cervical back: No rigidity  Neurological:      General: No focal deficit present  Mental Status: He is alert and oriented to person, place, and time         Lucila Mercedes MD

## 2022-10-04 DIAGNOSIS — J40 BRONCHITIS: ICD-10-CM

## 2022-10-04 RX ORDER — ALBUTEROL SULFATE 0.63 MG/3ML
0.63 SOLUTION RESPIRATORY (INHALATION) EVERY 6 HOURS PRN
Qty: 360 ML | Refills: 5 | Status: SHIPPED | OUTPATIENT
Start: 2022-10-04

## 2022-10-05 ENCOUNTER — TELEPHONE (OUTPATIENT)
Dept: FAMILY MEDICINE CLINIC | Facility: CLINIC | Age: 62
End: 2022-10-05

## 2022-10-06 ENCOUNTER — TELEPHONE (OUTPATIENT)
Dept: FAMILY MEDICINE CLINIC | Facility: CLINIC | Age: 62
End: 2022-10-06

## 2022-10-06 ENCOUNTER — REMOTE DEVICE CLINIC VISIT (OUTPATIENT)
Dept: CARDIOLOGY CLINIC | Facility: CLINIC | Age: 62
End: 2022-10-06
Payer: MEDICARE

## 2022-10-06 DIAGNOSIS — Z95.0 CARDIAC PACEMAKER IN SITU: Primary | ICD-10-CM

## 2022-10-06 PROCEDURE — 93294 REM INTERROG EVL PM/LDLS PM: CPT | Performed by: INTERNAL MEDICINE

## 2022-10-06 PROCEDURE — 93296 REM INTERROG EVL PM/IDS: CPT | Performed by: INTERNAL MEDICINE

## 2022-10-06 NOTE — TELEPHONE ENCOUNTER
Patient called to update that he feels good today  He states that everything has seemed to cleared up  He no longer has a headache or cough  He is on the mend

## 2022-10-06 NOTE — PROGRESS NOTES
MDT BI-V PPM (VVIR MODE)/ ACTIVE SYSTEM IS MRI CONDITIONAL   CARELINK TRANSMISSION: BATTERY VOLTAGE ADEQUATE (8 4 YRS)  BVP>99% (AF/TOTAL -66 8%+VSR PACE-32 4%)  ALL AVAILABEL LEAD PARAMETERS WITHIN NORMAL LIMITS  3 SVT-AF EPISODES @ 171-182 BPM MAX DURATION 5 6 MINS  1,814 VENT SENSING EPISODES MAX DURATION 27 25 MINS- RVR & FUSION BEATS  PT IN CHRONIC AF & ON XARELTO, DILTIAZEM & METOPROLOL  OPTI-VOL WITHIN NORMAL LIMITS  NORMAL DEVICE FUNCTION   GV

## 2022-10-24 DIAGNOSIS — I42.0 DILATED CARDIOMYOPATHY (HCC): Primary | ICD-10-CM

## 2022-10-24 DIAGNOSIS — R93.1 ABNORMAL FINDINGS ON DIAGNOSTIC IMAGING OF HEART AND CORONARY CIRCULATION: ICD-10-CM

## 2022-10-24 NOTE — PROGRESS NOTES
EF 40% with RWMA, recommend stress myoview  Ordered  Office will make patient aware  Seeing EP, high PVC burden and only 66% BIV pacing  Might need AAD or PVC ablation depending on stress results

## 2022-10-25 ENCOUNTER — TELEPHONE (OUTPATIENT)
Dept: CARDIOLOGY CLINIC | Facility: CLINIC | Age: 62
End: 2022-10-25

## 2022-10-25 NOTE — TELEPHONE ENCOUNTER
----- Message from Sylwia Montalvo DO sent at 10/24/2022  5:24 PM EDT -----  Regarding: RE: PVC and low EF  Sounds like a plan  Follow-up in the EP office in 2-3 months thank you   ----- Message -----  From: Ru Meneses MD  Sent: 10/24/2022   3:06 PM EDT  To: Sylwia Montalvo DO, Santa Zayas  Subject: PVC and low EF                                   Raf, echo reviewed, it is low, 40%  PVC suppression with AAD or ablation would be my recommendation  Some regionality to his low ef as well, I would recommend stress myoview  I'll have the office advise him to move ahead with that   ----- Message -----  From: Padminicyndymark Henry  Sent: 10/24/2022  11:31 AM EDT  To: Ru Meneses MD, Sylwia Montalvo DO    This was sent back to the clinical pool  I am not clear who is addressing it     ----- Message -----  From: Esther Mark  Sent: 10/24/2022  10:58 AM EDT  To: [facility] [place] [facility]    Forwarded to [place] [facility]         Message  Received: Sophy Costa MD  Cc: [facility] [place]  I think he is your patient  Do you want me to talk to him about PVC ablation? See if you agree with the echo read        Previous Messages    Echo complete w/ contrast if indicated  Order: [id number]  Status: Final result    Visible to patient: No (inaccessible in [address] [facility])    Dx: Atrial fibrillation, unspecified type       0 Result Notes    Details      Reading Physician Reading Date Result Priority  Carlos Villegas MD  [phone] 8/29/2022 Routine    Result Text     ·  Left Ventricle: Left ventricular cavity size is normal  Wall thickness is mildly increased  There is mild concentric hypertrophy  The left ventricular ejection fraction is 40%  Systolic function is mild to moderately reduced  There is mild to moderate global hypokinesis with regional variation  Diastolic function is normal   ·  IVS: There is abnormal septal motion consistent with right ventricular pacing    · Left Atrium: The atrium is moderately dilated  ·  Right Atrium: The atrium is mildly dilated  ·  Mitral Valve: There is mild to moderate regurgitation  ·  Tricuspid Valve: There is moderate regurgitation  The right ventricular systolic pressure is mildly elevated  ·  Aorta: The aortic root is normal in size  The ascending aorta is mildly dilated  The ascending aorta is 3 6 cm          All Measurements            Myocardial Findings    Left Ventricle Left ventricular cavity size is normal  Wall thickness is mildly increased  There is mild concentric hypertrophy  The left ventricular ejection fraction is 40%  Systolic function is mild to moderately reduced  There is mild to moderate global hypokinesis with regional variation  Diastolic function is normal   Right Ventricle Right ventricular cavity size is normal  Systolic function is normal  Wall thickness is normal  A pacer wire is present  Left Atrium The atrium is moderately dilated  Right Atrium The atrium is mildly dilated  IVC/SVC The inferior vena cava is normal in size  Mitral Valve There is mild thickening  There is mild to moderate regurgitation  There is no evidence of stenosis  The valve has normal function  Tricuspid Valve Tricuspid valve structure is normal  There is moderate regurgitation  There is no evidence of stenosis  The right ventricular systolic pressure is mildly elevated  Aortic Valve The aortic valve is trileaflet  The leaflets are not thickened  The leaflets are not calcified  The leaflets exhibit normal mobility  There is no evidence of regurgitation  The aortic valve has no significant stenosis  Pulmonic Valve Pulmonic valve structure is normal  There is no evidence of regurgitation  There is no evidence of stenosis  Ascending Aorta The aortic root is normal in size  The ascending aorta is mildly dilated  The ascending aorta is 3 6 cm  Pericardium There is no pericardial effusion   The pericardium is normal in appearance  Study Details    This transthoracic echocardiogram was performed in the echo lab  This was a routine, outpatient study  Study quality was adequate  A complete 2D, color flow Doppler, spectral Doppler, 2D, color flow Doppler and spectral Doppler transthoracic echocardiogram was performed  The apical, parasternal, subcostal and suprasternal views were obtained            Exam Ended: 08/29/22  8:24 AM Last Resulted: 08/29/22 10:22 AM      Order Details     View Encounter     Lab and Collection Details     Routing     Result History          Result Care Coordination        Patient Communication       Released  Not seen Back to Top        Status of Other Orders    Canceled   Echo follow up/limited w/ contrast if indicated 08/29/22  Reason: Other                 Routing History  Expand All  Collapse All    Priority Sent On From To Last Action Message Type   10/23/2022  6:39 PM Cristofer Mark MD  Results     P Cardiology [facility] AT [place] Display at 10/24/2022 10:55 AM      Detailed Action Log         8/29/2022 10:25 AM MD Cami Slater, DO Forward at 10/23/2022  6:38 PM Results     Detailed Action Log

## 2022-10-25 NOTE — TELEPHONE ENCOUNTER
Called and sw pts wife Hayley Ortiz  Addressed both concerns of a lower EF and  PVCs  Advised to schedule stress test with central scheduling and f/u visit with Dr Tino Paredes

## 2022-10-31 ENCOUNTER — HOSPITAL ENCOUNTER (OUTPATIENT)
Dept: NON INVASIVE DIAGNOSTICS | Facility: CLINIC | Age: 62
Discharge: HOME | End: 2022-10-31

## 2022-10-31 VITALS
BODY MASS INDEX: 41.86 KG/M2 | DIASTOLIC BLOOD PRESSURE: 74 MMHG | HEIGHT: 71 IN | SYSTOLIC BLOOD PRESSURE: 110 MMHG | WEIGHT: 299 LBS | HEART RATE: 58 BPM

## 2022-10-31 DIAGNOSIS — I42.0 DILATED CARDIOMYOPATHY (HCC): ICD-10-CM

## 2022-10-31 DIAGNOSIS — R93.1 ABNORMAL FINDINGS ON DIAGNOSTIC IMAGING OF HEART AND CORONARY CIRCULATION: ICD-10-CM

## 2022-10-31 LAB
MAX HR: 96 BPM
RATE PRESSURE PRODUCT: NORMAL
SL CV REST NUCLEAR ISOTOPE DOSE: 14.91 MCI
SL CV STRESS NUCLEAR ISOTOPE DOSE: 46.8 MCI
SL CV STRESS RECOVERY BP: NORMAL MMHG
SL CV STRESS RECOVERY HR: 69 BPM
SL CV STRESS RECOVERY O2 SAT: 95 %
STRESS ANGINA INDEX: 0
STRESS BASELINE BP: NORMAL MMHG
STRESS BASELINE HR: 58 BPM
STRESS O2 SAT REST: 95 %
STRESS PEAK HR: 91 BPM
STRESS POST O2 SAT PEAK: 95 %
STRESS POST PEAK BP: 136 MMHG

## 2022-10-31 RX ADMIN — REGADENOSON 0.4 MG: 0.08 INJECTION, SOLUTION INTRAVENOUS at 08:49

## 2022-11-02 ENCOUNTER — TELEPHONE (OUTPATIENT)
Dept: FAMILY MEDICINE CLINIC | Facility: CLINIC | Age: 62
End: 2022-11-02

## 2022-11-02 ENCOUNTER — TELEPHONE (OUTPATIENT)
Dept: PULMONOLOGY | Facility: CLINIC | Age: 62
End: 2022-11-02

## 2022-11-02 NOTE — TELEPHONE ENCOUNTER
FYI   Patients wife called asking for assistance for Alexandragerry Smyth as he has been short of breath, coughing a lot, and has coughed up blood  She wanted him to have his lungs checked again  I advised her as we have not seen Alexandragerry Smyth in over 3 years he would be considered a new patient of ours and we would need to schedule him for a consultation  I also advised the wife to have him taken to the Hospital/ED for evaluation per Hernandez Lentz   I wanted to offer a sooner consult appointment with our office but she insisted he only to be seen by [facility] again and is okay with him waiting to be seen on 11/29 in [facility], and on wait list  Thank you

## 2022-11-03 LAB
CHEST PAIN STATEMENT: NORMAL
MAX DIASTOLIC BP: 80 MMHG
MAX HEART RATE: 96 BPM
MAX PREDICTED HEART RATE: 156 BPM
MAX. SYSTOLIC BP: 136 MMHG
PROTOCOL NAME: NORMAL
REASON FOR TERMINATION: NORMAL
TARGET HR FORMULA: NORMAL
TEST INDICATION: NORMAL
TIME IN EXERCISE PHASE: NORMAL

## 2022-11-08 ENCOUNTER — OFFICE VISIT (OUTPATIENT)
Dept: FAMILY MEDICINE CLINIC | Facility: CLINIC | Age: 62
End: 2022-11-08

## 2022-11-08 VITALS
DIASTOLIC BLOOD PRESSURE: 84 MMHG | OXYGEN SATURATION: 93 % | BODY MASS INDEX: 39.62 KG/M2 | TEMPERATURE: 98 F | SYSTOLIC BLOOD PRESSURE: 120 MMHG | WEIGHT: 283 LBS | HEIGHT: 71 IN | HEART RATE: 88 BPM

## 2022-11-08 DIAGNOSIS — E11.9 TYPE 2 DIABETES MELLITUS WITHOUT COMPLICATION, WITHOUT LONG-TERM CURRENT USE OF INSULIN (HCC): ICD-10-CM

## 2022-11-08 DIAGNOSIS — I10 ESSENTIAL HYPERTENSION: ICD-10-CM

## 2022-11-08 DIAGNOSIS — Z23 IMMUNIZATION DUE: ICD-10-CM

## 2022-11-08 DIAGNOSIS — E78.5 DYSLIPIDEMIA: ICD-10-CM

## 2022-11-08 DIAGNOSIS — I48.21 PERMANENT ATRIAL FIBRILLATION (HCC): ICD-10-CM

## 2022-11-08 DIAGNOSIS — R04.2 HEMOPTYSIS: ICD-10-CM

## 2022-11-08 DIAGNOSIS — I42.0 DILATED CARDIOMYOPATHY (HCC): ICD-10-CM

## 2022-11-08 DIAGNOSIS — J40 BRONCHITIS: ICD-10-CM

## 2022-11-08 DIAGNOSIS — Z00.00 MEDICARE ANNUAL WELLNESS VISIT, SUBSEQUENT: Primary | ICD-10-CM

## 2022-11-08 DIAGNOSIS — D86.9 SARCOIDOSIS: ICD-10-CM

## 2022-11-08 LAB — SL AMB POCT HEMOGLOBIN AIC: 6.7 (ref ?–6.5)

## 2022-11-08 RX ORDER — BUDESONIDE, GLYCOPYRROLATE, AND FORMOTEROL FUMARATE 160; 9; 4.8 UG/1; UG/1; UG/1
2 AEROSOL, METERED RESPIRATORY (INHALATION) 2 TIMES DAILY
Qty: 10.7 G | Refills: 5 | Status: SHIPPED | OUTPATIENT
Start: 2022-11-08

## 2022-11-08 RX ORDER — AZITHROMYCIN 250 MG/1
TABLET, FILM COATED ORAL
Qty: 6 TABLET | Refills: 0 | Status: SHIPPED | OUTPATIENT
Start: 2022-11-08 | End: 2022-11-12

## 2022-11-08 NOTE — PROGRESS NOTES
Assessment and Plan:     Problem List Items Addressed This Visit        Endocrine    Type 2 diabetes mellitus without complication, without long-term current use of insulin ([facility] )       Lab Results   Component Value Date    HGBA1C 6 7 (H) 11/10/2022   A1c remains well controlled  Continue present treatment  Recheck 6 months         Relevant Orders    POCT hemoglobin A1c (Completed)    Microalbumin / creatinine urine ratio (Completed)    Comprehensive metabolic panel (Completed)    Lipid panel    CBC and differential (Completed)       Cardiovascular and Mediastinum    Dilated cardiomyopathy ([facility] )     Patient does not appear to be fluid overloaded  Breathing stable  Follow-up with Cardiology  Recheck 3-6 months         Essential hypertension     Blood pressure well controlled  Continue present treatment  Monitor labs  Recheck 6 months         Permanent atrial fibrillation (HCC)     Ventricular rate is controlled  Continue present treatment  Recheck 6 months            Other    Dyslipidemia     Check labs  Continue atorvastatin 40 mg a day  Adjust dose if not at goal   Recheck 6 months         Hemoptysis     Resolved though concerning  Check chest x-ray  Start Zithromax  I will reach out to pulmonology to facilitate re-evaluation  Recheck if hemoptysis returns otherwise follow-up with pulmonology         Relevant Medications    Budeson-Glycopyrrol-Formoterol (Breztri Aerosphere) 160-9-4 8 MCG/ACT AERO    azithromycin (ZITHROMAX) 250 mg tablet    Other Relevant Orders    XR chest pa & lateral (Completed)    Sarcoidosis     Breathing had been stable however post COVID induced hemoptysis is concerning  Follow-up with pulmonology             Other Visit Diagnoses     Medicare annual wellness visit, subsequent    -  Primary    Bronchitis        Relevant Medications    Budeson-Glycopyrrol-Formoterol (Breztri Aerosphere) 160-9-4 8 MCG/ACT AERO    azithromycin (ZITHROMAX) 250 mg tablet    Immunization due Relevant Orders    influenza vaccine, quadrivalent, recombinant, PF, 0 5 mL, for patients 18 yr+ (FLUBLOK) (Completed)           Preventive health issues were discussed with patient, and age appropriate screening tests were ordered as noted in patient's After Visit Summary  Personalized health advice and appropriate referrals for health education or preventive services given if needed, as noted in patient's After Visit Summary  History of Present Illness:     Patient presents for a Medicare Wellness Visit    f/u multiple med issues and AWV  - patient developed COVID symptoms back on 09/29  He had a video visit with me on 10/03 and was started on PACS lobe id  Patient slowly improved, and was seeming to do well until he had a stress test on 10/31  After which, fail should felt that his chest was "filling up" any developed some intermittent hemoptysis  The hemoptysis lasted for 3 or 4 days and resolved approximately 3 days ago  Patient was mildly short of breath during this  But has improved since the hemoptysis resolved  He denies any fever chills during this period of time  Today is the best he has felt in a couple of weeks  -patient had the stress test as above due to decrease in his ejection fraction  Ejection fraction decreased to 40%  Patient had the chemical option (unable to walk on the treadmill due to lung issues)  Fortunately, the test appeared to be negative without symptoms during the test or evidence of perfusion defects  - patient has been watching his diet, and had an A1c today of 6 7  Patient is compliant with metformin though is not compliant with diet  - pt denies any new GI or  complaints  - no new extremity complaints  - AWV done  Patient Care Team:  Diego Morales MD as PCP - MD Diego Blackwell MD  Wash Dress, DO     Review of Systems:     Review of Systems   Constitutional: Negative  HENT: Negative  Eyes: Negative  Respiratory: Positive for cough  Negative for shortness of breath and stridor          (+) hemoptysis   Cardiovascular: Negative  Gastrointestinal: Negative  Endocrine: Negative  Genitourinary: Negative  Musculoskeletal: Positive for arthralgias (bilat shoulder pain)  Negative for back pain, joint swelling and myalgias  Skin: Negative  Allergic/Immunologic: Negative  Neurological: Negative  Hematological: Negative  Psychiatric/Behavioral: Negative  Problem List:     Patient Active Problem List   Diagnosis   • Anxiety   • Permanent atrial fibrillation (HCC)   • Dilated cardiomyopathy (HCC)   • Dry eye syndrome   • Dyslipidemia   • Essential hypertension   • Obstructive sleep apnea   • BMI 39 0-39 9,adult   • Type 2 diabetes mellitus without complication, without long-term current use of insulin ([facility] )   • Health care maintenance   • Medtronic dual chamber PM   • Sarcoidosis   • Dyspnea on exertion   • Morbid obesity with BMI of 40 0-44 9, adult ([facility] )   • Premature ventricular contractions (PVCs) ([facility])   • Hemoptysis      Past Medical and Surgical History:     Past Medical History:   Diagnosis Date   • A-fib [facility])    • Chronic systolic congestive heart failure ([facility] )     last assessed - [id number]   • Diabetes mellitus ([facility] )    • History of cardiac catheterization 10/2012    normal coronaries w/o significant obstructive coronary disease   • Pacemaker     last assessed - [id number]   • Sarcoidosis    • Sleep apnea      Past Surgical History:   Procedure Laterality Date   • CARDIAC ELECTROPHYSIOLOGY PROCEDURE N/A 12/3/2021    Procedure: PPM generator change - dual;  Surgeon: Diana Oakley DO;  Location: [facility]; Service: Cardiology   • CARDIAC ELECTROPHYSIOLOGY PROCEDURE N/A 12/3/2021    Procedure: CARDIAC UPGRADE PACER TO BIV PACER;  Surgeon: Diana Oakley DO;  Location: [facility]ARD[facility];   Service: Cardiology   • CARDIOVERSION      Elective   • FISTULA REPAIR perirectal   • INSERT / REPLACE / REMOVE PACEMAKER     • TONSILLECTOMY            Family History:     Family History   Problem Relation Age of Onset   • Atrial fibrillation Mother    • Cancer Mother    • Heart disease Father    • Hypertension Father    • Skin cancer Father    • Cancer Family    • Transient ischemic attack Family         without residual deficits      Social History:     Social History     Socioeconomic History   • Marital status: /Civil Union     Spouse name: None   • Number of children: None   • Years of education: None   • Highest education level: None   Occupational History   • Occupation: Retired   Tobacco Use   • Smoking status: Former Smoker     Packs/day: 1 00     Years: 8      Pack years: 8      Start date:      Quit date:      Years since quittin 8   • Smokeless tobacco: Never Used   • Tobacco comment: 2 ppd x 5 years   Vaping Use   • Vaping Use: Never used   Substance and Sexual Activity   • Alcohol use: Yes     Alcohol/week: 4 0 - 5 0 standard drinks     Types: 4 - 5 Cans of beer per week     Comment: rare   • Drug use: No   • Sexual activity: None   Other Topics Concern   • None   Social History Narrative    Always uses seat belt    Daily coffee consumption (___Cups/Day)    Dental care regularly    Pets/Animals    Uses safety Equipment - seatbelts     Social Determinants of Health     Financial Resource Strain: Low Risk    • Difficulty of Paying Living Expenses: Not hard at all   Food Insecurity: Not on file   Transportation Needs: No Transportation Needs   • Lack of Transportation (Medical): No   • Lack of Transportation (Non-Medical):  No   Physical Activity: Not on file   Stress: Not on file   Social Connections: Not on file   Intimate Partner Violence: Not on file   Housing Stability: Not on file      Medications and Allergies:     Current Outpatient Medications   Medication Sig Dispense Refill   • albuterol (ACCUNEB) 0 63 MG/3ML nebulizer solution Take 3 mL (0 63 mg total) by nebulization every 6 (six) hours as needed for wheezing 360 mL 5   • albuterol (Ventolin HFA) 90 mcg/act inhaler Inhale 1 puff 4 (four) times a day 3 Inhaler 1   • atorvastatin (LIPITOR) 40 mg tablet TAKE ONE TABLET BY MOUTH AT BEDTIME 90 tablet 0   • azithromycin (ZITHROMAX) 250 mg tablet 2 tab today then 1 tab po qd x 4 d 6 tablet 0   • Budeson-Glycopyrrol-Formoterol (Breztri Aerosphere) 160-9-4 8 MCG/ACT AERO Inhale 2 puffs 2 (two) times a day Rinse mouth after use  (Patient not taking: Reported on 11/10/2022) 10 7 g 5   • citalopram (CeleXA) 20 mg tablet Take 1 tablet (20 mg total) by mouth daily 90 tablet 1   • diltiazem (CARDIZEM CD) 240 mg 24 hr capsule Take 1 capsule (240 mg total) by mouth daily 90 capsule 3   • furosemide (LASIX) 40 mg tablet TAKE 1 TABLET DAILY 90 tablet 3   • guaiFENesin (MUCINEX) 600 mg 12 hr tablet Take 600 mg by mouth every 12 (twelve) hours     • metFORMIN (GLUCOPHAGE) 1000 MG tablet Take 1 tablet (1,000 mg total) by mouth 2 (two) times a day with meals 180 tablet 3   • metoprolol tartrate (LOPRESSOR) 50 mg tablet TAKE ONE TABLET BY MOUTH 2 TIMES A  tablet 3   • NEBULIZER SYSTEM ALL-IN-ONE MISC 1 Device by Does not apply route every 4 (four) hours while awake 1 each 0   • Respiratory Therapy Supplies (NEBULIZER/ADULT MASK) KIT by Does not apply route daily as needed (wheezing) 1 each 0   • Respiratory Therapy Supplies (NEBULIZER/TUBING/MOUTHPIECE) KIT by Does not apply route as needed (wheezing) 1 each 0   • Xarelto 20 MG tablet TAKE ONE TABLET BY MOUTH DAILY WITH BREAKFAST 90 tablet 3   • glucose blood test strip Use 1 each daily Use as instructed 50 each 5   • predniSONE 10 mg tablet Take 1 tablet (10 mg total) by mouth see administration instructions 2 tablet daily for 1 week then 1 tablet daily for 1 week 21 tablet 0     No current facility-administered medications for this visit       No Known Allergies   Immunizations:     Immunization History Administered Date(s) Administered   • Influenza Quadrivalent 3 years and older 10/14/2018   • Influenza, recombinant, quadrivalent,injectable, preservative free 11/08/2022   • Influenza, seasonal, injectable 10/11/2013, 09/29/2015   • Pneumococcal Conjugate 13-Valent 09/29/2015   • Pneumococcal Polysaccharide PPV23 10/11/2013   • Tdap 11/02/2018, 11/24/2020   • Zoster Vaccine Recombinant 08/22/2019, 12/10/2019      Health Maintenance:         Topic Date Due   • HIV Screening  03/13/2023 (Originally 1/21/1975)   • Colorectal Cancer Screening  06/11/2024   • Hepatitis C Screening  Completed         Topic Date Due   • COVID-19 Vaccine (1) Never done      Medicare Screening Tests and Risk Assessments:     Sheila Alexandra is here for his Subsequent Wellness visit  Last Medicare Wellness visit information reviewed, patient interviewed and updates made to the record today  Health Risk Assessment:   Patient rates overall health as good  Patient feels that their physical health rating is same  Patient is very satisfied with their life  Eyesight was rated as same  Hearing was rated as same  Patient feels that their emotional and mental health rating is same  Patients states they are never, rarely angry  Patient states they are never, rarely unusually tired/fatigued  Pain experienced in the last 7 days has been some  Patient's pain rating has been 8/10  Patient states that he has experienced weight loss or gain in last 6 months  Bilateral shoulders status post stress test-"my shoulders dome moved that way" meaning that he had difficulty bringing his arms above his head for the imaging portion of the stress test   He has had pain ever since  It does seem to be improving slowly  Depression Screening:   PHQ-2 Score: 0      Fall Risk Screening: In the past year, patient has experienced: no history of falling in past year      Home Safety:  Patient does not have trouble with stairs inside or outside of their home   Patient has working smoke alarms and has working carbon monoxide detector  Home safety hazards include: none  Nutrition:   Current diet is Regular and Diabetic  Medications:   Patient is currently taking over-the-counter supplements  OTC medications include: see medication list  Patient is able to manage medications  Activities of Daily Living (ADLs)/Instrumental Activities of Daily Living (IADLs):   Walk and transfer into and out of bed and chair?: Yes  Dress and groom yourself?: Yes    Bathe or shower yourself?: Yes    Feed yourself? Yes  Do your laundry/housekeeping?: Yes  Manage your money, pay your bills and track your expenses?: Yes  Make your own meals?: Yes    Do your own shopping?: Yes    Previous Hospitalizations:   Any hospitalizations or ED visits within the last 12 months?: Yes    How many hospitalizations have you had in the last year?: 1-2    Hospitalization Comments: For Pacemaker last December    Advance Care Planning:   Living will: Yes    Durable POA for healthcare:  Yes    Advanced directive: Yes    Advanced directive counseling given: Yes      Cognitive Screening:   Provider or family/friend/caregiver concerned regarding cognition?: No    PREVENTIVE SCREENINGS      Cardiovascular Screening:    General: Screening Current      Diabetes Screening:     General: Screening Not Indicated and History Diabetes      Colorectal Cancer Screening:     General: Screening Current      Prostate Cancer Screening:    General: Risks and Benefits Discussed    Due for: PSA      Osteoporosis Screening:    General: Screening Not Indicated      Abdominal Aortic Aneurysm (AAA) Screening:    Risk factors include: tobacco use        Lung Cancer Screening:     General: Screening Not Indicated      Hepatitis C Screening:    General: Screening Current    Screening, Brief Intervention, and Referral to Treatment (SBIRT)    Screening    Typical number of drinks in a week: 0    AUDIT-C Screenin) How often did you have a drink containing alcohol in the past year? monthly or less  2) How many drinks did you have on a typical day when you were drinking in the past year? 1 to 2  3) How often did you have 6 or more drinks on one occasion in the past year? never    AUDIT-C Score: 1  Interpretation: Score 0-3 (male): Negative screen for alcohol misuse    Single Item Drug Screening:  How often have you used an illegal drug (including marijuana) or a prescription medication for non-medical reasons in the past year? never    Single Item Drug Screen Score: 0  Interpretation: Negative screen for possible drug use disorder    Other Counseling Topics:   Regular weightbearing exercise and calcium and vitamin D intake  No exam data present     Physical Exam:     /84   Pulse 88   Temp 98 °F (36 7 °C)   Ht 5' 11" (1 803 m)   Wt 128 kg (283 lb)   SpO2 93%   BMI 39 47 kg/m²     Physical Exam  Vitals reviewed  Constitutional:       Appearance: He is well-developed  HENT:      Head: Normocephalic and atraumatic  Right Ear: Tympanic membrane, ear canal and external ear normal       Left Ear: Ear canal and external ear normal       Mouth/Throat:      Mouth: Mucous membranes are moist    Eyes:      Extraocular Movements: Extraocular movements intact  Conjunctiva/sclera: Conjunctivae normal       Pupils: Pupils are equal, round, and reactive to light  Neck:      Thyroid: No thyromegaly  Vascular: No JVD  Cardiovascular:      Rate and Rhythm: Normal rate and regular rhythm  Pulses: Normal pulses  Heart sounds: Normal heart sounds  No murmur heard  Comments: No S3 appreciated  Pulmonary:      Effort: Pulmonary effort is normal  No respiratory distress  Breath sounds: Normal breath sounds  No wheezing or rales  Abdominal:      General: Bowel sounds are normal  There is no distension  Palpations: Abdomen is soft  There is no mass  Tenderness: There is no abdominal tenderness  Musculoskeletal:         General: No swelling, tenderness or deformity  Normal range of motion  Cervical back: Normal range of motion and neck supple  No muscular tenderness  Right lower leg: No edema  Left lower leg: No edema  Lymphadenopathy:      Cervical: No cervical adenopathy  Skin:     General: Skin is warm  Capillary Refill: Capillary refill takes less than 2 seconds  Neurological:      Mental Status: He is alert and oriented to person, place, and time  Cranial Nerves: No cranial nerve deficit  Sensory: No sensory deficit  Motor: No weakness or abnormal muscle tone  Gait: Gait normal       Comments: minicog 5/5   Psychiatric:         Mood and Affect: Mood normal          Behavior: Behavior normal          Thought Content:  Thought content normal          Judgment: Judgment normal       Comments: PHQ-2/9 Depression Screening    Little interest or pleasure in doing things: 0 - not at all  Feeling down, depressed, or hopeless: 0 - not at all  PHQ-2 Score: 0  PHQ-2 Interpretation: Negative depression screen               Jennifer Ballesteros MD

## 2022-11-08 NOTE — PATIENT INSTRUCTIONS
Medicare Preventive Visit Patient Instructions  Thank you for completing your Welcome to Medicare Visit or Medicare Annual Wellness Visit today  Your next wellness visit will be due in one year (11/9/2023)  The screening/preventive services that you may require over the next 5-10 years are detailed below  Some tests may not apply to you based off risk factors and/or age  Screening tests ordered at today's visit but not completed yet may show as past due  Also, please note that scanned in results may not display below  Preventive Screenings:  Service Recommendations Previous Testing/Comments   Colorectal Cancer Screening  · Colonoscopy    · Fecal Occult Blood Test (FOBT)/Fecal Immunochemical Test (FIT)  · Fecal DNA/Cologuard Test  · Flexible Sigmoidoscopy Age: 39-70 years old   Colonoscopy: every 10 years (May be performed more frequently if at higher risk)  OR  FOBT/FIT: every 1 year  OR  Cologuard: every 3 years  OR  Sigmoidoscopy: every 5 years  Screening may be recommended earlier than age 39 if at higher risk for colorectal cancer  Also, an individualized decision between you and your healthcare provider will decide whether screening between the ages of 74-80 would be appropriate   Colonoscopy: 06/11/2021  FOBT/FIT: Not on file  Cologuard: Not on file  Sigmoidoscopy: Not on file    Screening Current     Prostate Cancer Screening Individualized decision between patient and health care provider in men between ages of 53-78   Medicare will cover every 12 months beginning on the day after your 50th birthday PSA: 0 3 ng/mL           Hepatitis C Screening Once for adults born between 1945 and 1965  More frequently in patients at high risk for Hepatitis C Hep C Antibody: 01/19/2019    Screening Current   Diabetes Screening 1-2 times per year if you're at risk for diabetes or have pre-diabetes Fasting glucose: 196 mg/dL (12/3/2021)  A1C: 6 3 (6/28/2021)  Screening Not Indicated  History Diabetes   Cholesterol Screening Once every 5 years if you don't have a lipid disorder  May order more often based on risk factors  Lipid panel: 06/25/2021  Screening Current      Other Preventive Screenings Covered by Medicare:  1  Abdominal Aortic Aneurysm (AAA) Screening: covered once if your at risk  You're considered to be at risk if you have a family history of AAA or a male between the age of 73-68 who smoking at least 100 cigarettes in your lifetime  2  Lung Cancer Screening: covers low dose CT scan once per year if you meet all of the following conditions: (1) Age 50-69; (2) No signs or symptoms of lung cancer; (3) Current smoker or have quit smoking within the last 15 years; (4) You have a tobacco smoking history of at least 20 pack years (packs per day x number of years you smoked); (5) You get a written order from a healthcare provider  3  Glaucoma Screening: covered annually if you're considered high risk: (1) You have diabetes OR (2) Family history of glaucoma OR (3)  aged 48 and older OR (3)  American aged 72 and older  3  Osteoporosis Screening: covered every 2 years if you meet one of the following conditions: (1) Have a vertebral abnormality; (2) On glucocorticoid therapy for more than 3 months; (3) Have primary hyperparathyroidism; (4) On osteoporosis medications and need to assess response to drug therapy  5  HIV Screening: covered annually if you're between the age of 12-76  Also covered annually if you are younger than 13 and older than 72 with risk factors for HIV infection  For pregnant patients, it is covered up to 3 times per pregnancy      Immunizations:  Immunization Recommendations   Influenza Vaccine Annual influenza vaccination during flu season is recommended for all persons aged >= 6 months who do not have contraindications   Pneumococcal Vaccine   * Pneumococcal conjugate vaccine = PCV13 (Prevnar 13), PCV15 (Vaxneuvance), PCV20 (Prevnar 20)  * Pneumococcal polysaccharide vaccine = PPSV23 (Pneumovax) Adults 2364 years old: 1-3 doses may be recommended based on certain risk factors  Adults 72 years old: 1-2 doses may be recommended based off what pneumonia vaccine you previously received   Hepatitis B Vaccine 3 dose series if at intermediate or high risk (ex: diabetes, end stage renal disease, liver disease)   Tetanus (Td) Vaccine - COST NOT COVERED BY MEDICARE PART B Following completion of primary series, a booster dose should be given every 10 years to maintain immunity against tetanus  Td may also be given as tetanus wound prophylaxis  Tdap Vaccine - COST NOT COVERED BY MEDICARE PART B Recommended at least once for all adults  For pregnant patients, recommended with each pregnancy  Shingles Vaccine (Shingrix) - COST NOT COVERED BY MEDICARE PART B  2 shot series recommended in those aged 48 and above     Health Maintenance Due:      Topic Date Due   • HIV Screening  03/13/2023 (Originally 1/21/1975)   • Colorectal Cancer Screening  06/11/2024   • Hepatitis C Screening  Completed     Immunizations Due:      Topic Date Due   • COVID-19 Vaccine (1) Never done   • Influenza Vaccine (1) 09/01/2022     Advance Directives   What are advance directives? Advance directives are legal documents that state your wishes and plans for medical care  These plans are made ahead of time in case you lose your ability to make decisions for yourself  Advance directives can apply to any medical decision, such as the treatments you want, and if you want to donate organs  What are the types of advance directives? There are many types of advance directives, and each state has rules about how to use them  You may choose a combination of any of the following:  · Living will: This is a written record of the treatment you want  You can also choose which treatments you do not want, which to limit, and which to stop at a certain time  This includes surgery, medicine, IV fluid, and tube feedings     · Durable power of  for healthcare [facility]): This is a written record that states who you want to make healthcare choices for you when you are unable to make them for yourself  This person, called a proxy, is usually a family member or a friend  You may choose more than 1 proxy  · Do not resuscitate (DNR) order:  A DNR order is used in case your heart stops beating or you stop breathing  It is a request not to have certain forms of treatment, such as CPR  A DNR order may be included in other types of advance directives  · Medical directive: This covers the care that you want if you are in a coma, near death, or unable to make decisions for yourself  You can list the treatments you want for each condition  Treatment may include pain medicine, surgery, blood transfusions, dialysis, IV or tube feedings, and a ventilator (breathing machine)  · Values history: This document has questions about your views, beliefs, and how you feel and think about life  This information can help others choose the care that you would choose  Why are advance directives important? An advance directive helps you control your care  Although spoken wishes may be used, it is better to have your wishes written down  Spoken wishes can be misunderstood, or not followed  Treatments may be given even if you do not want them  An advance directive may make it easier for your family to make difficult choices about your care  Weight Management   Why it is important to manage your weight:  Being overweight increases your risk of health conditions such as heart disease, high blood pressure, type 2 diabetes, and certain types of cancer  It can also increase your risk for osteoarthritis, sleep apnea, and other respiratory problems  Aim for a slow, steady weight loss  Even a small amount of weight loss can lower your risk of health problems    How to lose weight safely:  A safe and healthy way to lose weight is to eat fewer calories and get regular exercise  You can lose up about 1 pound a week by decreasing the number of calories you eat by 500 calories each day  Healthy meal plan for weight management:  A healthy meal plan includes a variety of foods, contains fewer calories, and helps you stay healthy  A healthy meal plan includes the following:  · Eat whole-grain foods more often  A healthy meal plan should contain fiber  Fiber is the part of grains, fruits, and vegetables that is not broken down by your body  Whole-grain foods are healthy and provide extra fiber in your diet  Some examples of whole-grain foods are whole-wheat breads and pastas, oatmeal, brown rice, and bulgur  · Eat a variety of vegetables every day  Include dark, leafy greens such as spinach, kale, capo greens, and mustard greens  Eat yellow and orange vegetables such as carrots, sweet potatoes, and winter squash  · Eat a variety of fruits every day  Choose fresh or canned fruit (canned in its own juice or light syrup) instead of juice  Fruit juice has very little or no fiber  · Eat low-fat dairy foods  Drink fat-free (skim) milk or 1% milk  Eat fat-free yogurt and low-fat cottage cheese  Try low-fat cheeses such as mozzarella and other reduced-fat cheeses  · Choose meat and other protein foods that are low in fat  Choose beans or other legumes such as split peas or lentils  Choose fish, skinless poultry (chicken or turkey), or lean cuts of red meat (beef or pork)  Before you cook meat or poultry, cut off any visible fat  · Use less fat and oil  Try baking foods instead of frying them  Add less fat, such as margarine, sour cream, regular salad dressing and mayonnaise to foods  Eat fewer high-fat foods  Some examples of high-fat foods include french fries, doughnuts, ice cream, and cakes  · Eat fewer sweets  Limit foods and drinks that are high in sugar  This includes candy, cookies, regular soda, and sweetened drinks    Exercise:  Exercise at least 30 minutes per day on most days of the week  Some examples of exercise include walking, biking, dancing, and swimming  You can also fit in more physical activity by taking the stairs instead of the elevator or parking farther away from stores  Ask your healthcare provider about the best exercise plan for you  © Copyright [employer] 2018 Information is for End User's use only and may not be sold, redistributed or otherwise used for commercial purposes  All illustrations and images included in CareNotes® are the copyrighted property of [employer]  or [facility] Preventive Visit Patient Instructions  Thank you for completing your Welcome to Medicare Visit or Medicare Annual Wellness Visit today  Your next wellness visit will be due in one year (11/12/2023)  The screening/preventive services that you may require over the next 5-10 years are detailed below  Some tests may not apply to you based off risk factors and/or age  Screening tests ordered at today's visit but not completed yet may show as past due  Also, please note that scanned in results may not display below  Preventive Screenings:  Service Recommendations Previous Testing/Comments   Colorectal Cancer Screening  · Colonoscopy    · Fecal Occult Blood Test (FOBT)/Fecal Immunochemical Test (FIT)  · Fecal DNA/Cologuard Test  · Flexible Sigmoidoscopy Age: 39-70 years old   Colonoscopy: every 10 years (May be performed more frequently if at higher risk)  OR  FOBT/FIT: every 1 year  OR  Cologuard: every 3 years  OR  Sigmoidoscopy: every 5 years  Screening may be recommended earlier than age 39 if at higher risk for colorectal cancer  Also, an individualized decision between you and your healthcare provider will decide whether screening between the ages of 74-80 would be appropriate   Colonoscopy: 06/11/2021  FOBT/FIT: Not on file  Cologuard: Not on file  Sigmoidoscopy: Not on file    Screening Current     Prostate Cancer Screening Individualized decision between patient and health care provider in men between ages of 53-78   Medicare will cover every 12 months beginning on the day after your 50th birthday PSA: 0 3 ng/mL     Risks and Benefits Discussed  Due for PSA     Hepatitis C Screening Once for adults born between 1945 and 1965  More frequently in patients at high risk for Hepatitis C Hep C Antibody: 01/19/2019    Screening Current   Diabetes Screening 1-2 times per year if you're at risk for diabetes or have pre-diabetes Fasting glucose: 152 mg/dL (11/10/2022)  A1C: 6 7 % (11/10/2022)  Screening Not Indicated  History Diabetes   Cholesterol Screening Once every 5 years if you don't have a lipid disorder  May order more often based on risk factors  Lipid panel: 11/10/2022  Screening Current      Other Preventive Screenings Covered by Medicare:  6  Abdominal Aortic Aneurysm (AAA) Screening: covered once if your at risk  You're considered to be at risk if you have a family history of AAA or a male between the age of 73-68 who smoking at least 100 cigarettes in your lifetime  7  Lung Cancer Screening: covers low dose CT scan once per year if you meet all of the following conditions: (1) Age 50-69; (2) No signs or symptoms of lung cancer; (3) Current smoker or have quit smoking within the last 15 years; (4) You have a tobacco smoking history of at least 20 pack years (packs per day x number of years you smoked); (5) You get a written order from a healthcare provider  8  Glaucoma Screening: covered annually if you're considered high risk: (1) You have diabetes OR (2) Family history of glaucoma OR (3)  aged 48 and older OR (3)  American aged 72 and older  5   Osteoporosis Screening: covered every 2 years if you meet one of the following conditions: (1) Have a vertebral abnormality; (2) On glucocorticoid therapy for more than 3 months; (3) Have primary hyperparathyroidism; (4) On osteoporosis medications and need to assess response to drug therapy  10  HIV Screening: covered annually if you're between the age of 12-76  Also covered annually if you are younger than 13 and older than 72 with risk factors for HIV infection  For pregnant patients, it is covered up to 3 times per pregnancy  Immunizations:  Immunization Recommendations   Influenza Vaccine Annual influenza vaccination during flu season is recommended for all persons aged >= 6 months who do not have contraindications   Pneumococcal Vaccine   * Pneumococcal conjugate vaccine = PCV13 (Prevnar 13), PCV15 (Vaxneuvance), PCV20 (Prevnar 20)  * Pneumococcal polysaccharide vaccine = PPSV23 (Pneumovax) Adults 25-60 years old: 1-3 doses may be recommended based on certain risk factors  Adults 72 years old: 1-2 doses may be recommended based off what pneumonia vaccine you previously received   Hepatitis B Vaccine 3 dose series if at intermediate or high risk (ex: diabetes, end stage renal disease, liver disease)   Tetanus (Td) Vaccine - COST NOT COVERED BY MEDICARE PART B Following completion of primary series, a booster dose should be given every 10 years to maintain immunity against tetanus  Td may also be given as tetanus wound prophylaxis  Tdap Vaccine - COST NOT COVERED BY MEDICARE PART B Recommended at least once for all adults  For pregnant patients, recommended with each pregnancy  Shingles Vaccine (Shingrix) - COST NOT COVERED BY MEDICARE PART B  2 shot series recommended in those aged 48 and above     Health Maintenance Due:      Topic Date Due   • HIV Screening  03/13/2023 (Originally 1/21/1975)   • Colorectal Cancer Screening  06/11/2024   • Hepatitis C Screening  Completed     Immunizations Due:      Topic Date Due   • COVID-19 Vaccine (1) Never done     Advance Directives   What are advance directives? Advance directives are legal documents that state your wishes and plans for medical care   These plans are made ahead of time in case you lose your ability to make decisions for yourself  Advance directives can apply to any medical decision, such as the treatments you want, and if you want to donate organs  What are the types of advance directives? There are many types of advance directives, and each state has rules about how to use them  You may choose a combination of any of the following:  · Living will: This is a written record of the treatment you want  You can also choose which treatments you do not want, which to limit, and which to stop at a certain time  This includes surgery, medicine, IV fluid, and tube feedings  · Durable power of  for healthcare [facility] [facility]): This is a written record that states who you want to make healthcare choices for you when you are unable to make them for yourself  This person, called a proxy, is usually a family member or a friend  You may choose more than 1 proxy  · Do not resuscitate (DNR) order:  A DNR order is used in case your heart stops beating or you stop breathing  It is a request not to have certain forms of treatment, such as CPR  A DNR order may be included in other types of advance directives  · Medical directive: This covers the care that you want if you are in a coma, near death, or unable to make decisions for yourself  You can list the treatments you want for each condition  Treatment may include pain medicine, surgery, blood transfusions, dialysis, IV or tube feedings, and a ventilator (breathing machine)  · Values history: This document has questions about your views, beliefs, and how you feel and think about life  This information can help others choose the care that you would choose  Why are advance directives important? An advance directive helps you control your care  Although spoken wishes may be used, it is better to have your wishes written down  Spoken wishes can be misunderstood, or not followed  Treatments may be given even if you do not want them   An advance directive may make it easier for your family to make difficult choices about your care  Weight Management   Why it is important to manage your weight:  Being overweight increases your risk of health conditions such as heart disease, high blood pressure, type 2 diabetes, and certain types of cancer  It can also increase your risk for osteoarthritis, sleep apnea, and other respiratory problems  Aim for a slow, steady weight loss  Even a small amount of weight loss can lower your risk of health problems  How to lose weight safely:  A safe and healthy way to lose weight is to eat fewer calories and get regular exercise  You can lose up about 1 pound a week by decreasing the number of calories you eat by 500 calories each day  Healthy meal plan for weight management:  A healthy meal plan includes a variety of foods, contains fewer calories, and helps you stay healthy  A healthy meal plan includes the following:  · Eat whole-grain foods more often  A healthy meal plan should contain fiber  Fiber is the part of grains, fruits, and vegetables that is not broken down by your body  Whole-grain foods are healthy and provide extra fiber in your diet  Some examples of whole-grain foods are whole-wheat breads and pastas, oatmeal, brown rice, and bulgur  · Eat a variety of vegetables every day  Include dark, leafy greens such as spinach, kale, capo greens, and mustard greens  Eat yellow and orange vegetables such as carrots, sweet potatoes, and winter squash  · Eat a variety of fruits every day  Choose fresh or canned fruit (canned in its own juice or light syrup) instead of juice  Fruit juice has very little or no fiber  · Eat low-fat dairy foods  Drink fat-free (skim) milk or 1% milk  Eat fat-free yogurt and low-fat cottage cheese  Try low-fat cheeses such as mozzarella and other reduced-fat cheeses  · Choose meat and other protein foods that are low in fat  Choose beans or other legumes such as split peas or lentils   Choose fish, skinless poultry (chicken or turkey), or lean cuts of red meat (beef or pork)  Before you cook meat or poultry, cut off any visible fat  · Use less fat and oil  Try baking foods instead of frying them  Add less fat, such as margarine, sour cream, regular salad dressing and mayonnaise to foods  Eat fewer high-fat foods  Some examples of high-fat foods include french fries, doughnuts, ice cream, and cakes  · Eat fewer sweets  Limit foods and drinks that are high in sugar  This includes candy, cookies, regular soda, and sweetened drinks  Exercise:  Exercise at least 30 minutes per day on most days of the week  Some examples of exercise include walking, biking, dancing, and swimming  You can also fit in more physical activity by taking the stairs instead of the elevator or parking farther away from stores  Ask your healthcare provider about the best exercise plan for you  © Copyright [employer] 2018 Information is for End User's use only and may not be sold, redistributed or otherwise used for commercial purposes   All illustrations and images included in CareNotes® are the copyrighted property of [employer]  or [address] [place] [address] [employer]

## 2022-11-09 ENCOUNTER — APPOINTMENT (OUTPATIENT)
Dept: RADIOLOGY | Age: 62
End: 2022-11-09

## 2022-11-09 DIAGNOSIS — R04.2 HEMOPTYSIS: ICD-10-CM

## 2022-11-09 LAB
CREAT UR-MCNC: 43.1 MG/DL
MICROALBUMIN UR-MCNC: <5 MG/L (ref 0–20)
MICROALBUMIN/CREAT 24H UR: <12 MG/G CREATININE (ref 0–30)

## 2022-11-10 ENCOUNTER — CONSULT (OUTPATIENT)
Dept: PULMONOLOGY | Facility: CLINIC | Age: 62
End: 2022-11-10

## 2022-11-10 ENCOUNTER — APPOINTMENT (OUTPATIENT)
Dept: LAB | Age: 62
End: 2022-11-10

## 2022-11-10 VITALS
HEIGHT: 71 IN | SYSTOLIC BLOOD PRESSURE: 116 MMHG | BODY MASS INDEX: 39.62 KG/M2 | HEART RATE: 86 BPM | RESPIRATION RATE: 18 BRPM | TEMPERATURE: 98.4 F | OXYGEN SATURATION: 91 % | WEIGHT: 283 LBS | DIASTOLIC BLOOD PRESSURE: 62 MMHG

## 2022-11-10 DIAGNOSIS — I48.21 PERMANENT ATRIAL FIBRILLATION (HCC): ICD-10-CM

## 2022-11-10 DIAGNOSIS — R06.09 DYSPNEA ON EXERTION: ICD-10-CM

## 2022-11-10 DIAGNOSIS — E78.5 DYSLIPIDEMIA: ICD-10-CM

## 2022-11-10 DIAGNOSIS — I10 ESSENTIAL HYPERTENSION: ICD-10-CM

## 2022-11-10 DIAGNOSIS — D86.9 SARCOIDOSIS: Primary | ICD-10-CM

## 2022-11-10 DIAGNOSIS — E11.9 TYPE 2 DIABETES MELLITUS WITHOUT COMPLICATION, WITHOUT LONG-TERM CURRENT USE OF INSULIN (HCC): ICD-10-CM

## 2022-11-10 LAB
ALBUMIN SERPL BCP-MCNC: 3.5 G/DL (ref 3.5–5)
ALP SERPL-CCNC: 99 U/L (ref 46–116)
ALT SERPL W P-5'-P-CCNC: 26 U/L (ref 12–78)
ANION GAP SERPL CALCULATED.3IONS-SCNC: 1 MMOL/L (ref 4–13)
AST SERPL W P-5'-P-CCNC: 15 U/L (ref 5–45)
BASOPHILS # BLD AUTO: 0.06 THOUSANDS/ÂΜL (ref 0–0.1)
BASOPHILS NFR BLD AUTO: 1 % (ref 0–1)
BILIRUB SERPL-MCNC: 0.8 MG/DL (ref 0.2–1)
BUN SERPL-MCNC: 10 MG/DL (ref 5–25)
CALCIUM SERPL-MCNC: 9.2 MG/DL (ref 8.3–10.1)
CHLORIDE SERPL-SCNC: 105 MMOL/L (ref 96–108)
CHOLEST SERPL-MCNC: 120 MG/DL
CO2 SERPL-SCNC: 32 MMOL/L (ref 21–32)
CREAT SERPL-MCNC: 0.97 MG/DL (ref 0.6–1.3)
EOSINOPHIL # BLD AUTO: 0.16 THOUSAND/ÂΜL (ref 0–0.61)
EOSINOPHIL NFR BLD AUTO: 2 % (ref 0–6)
ERYTHROCYTE [DISTWIDTH] IN BLOOD BY AUTOMATED COUNT: 13.1 % (ref 11.6–15.1)
EST. AVERAGE GLUCOSE BLD GHB EST-MCNC: 146 MG/DL
GFR SERPL CREATININE-BSD FRML MDRD: 82 ML/MIN/1.73SQ M
GLUCOSE P FAST SERPL-MCNC: 152 MG/DL (ref 65–99)
HBA1C MFR BLD: 6.7 %
HCT VFR BLD AUTO: 42.8 % (ref 36.5–49.3)
HDLC SERPL-MCNC: 43 MG/DL
HGB BLD-MCNC: 13.9 G/DL (ref 12–17)
IMM GRANULOCYTES # BLD AUTO: 0.02 THOUSAND/UL (ref 0–0.2)
IMM GRANULOCYTES NFR BLD AUTO: 0 % (ref 0–2)
LDLC SERPL CALC-MCNC: 52 MG/DL (ref 0–100)
LYMPHOCYTES # BLD AUTO: 0.54 THOUSANDS/ÂΜL (ref 0.6–4.47)
LYMPHOCYTES NFR BLD AUTO: 8 % (ref 14–44)
MCH RBC QN AUTO: 30.6 PG (ref 26.8–34.3)
MCHC RBC AUTO-ENTMCNC: 32.5 G/DL (ref 31.4–37.4)
MCV RBC AUTO: 94 FL (ref 82–98)
MONOCYTES # BLD AUTO: 0.57 THOUSAND/ÂΜL (ref 0.17–1.22)
MONOCYTES NFR BLD AUTO: 9 % (ref 4–12)
NEUTROPHILS # BLD AUTO: 5.31 THOUSANDS/ÂΜL (ref 1.85–7.62)
NEUTS SEG NFR BLD AUTO: 80 % (ref 43–75)
NONHDLC SERPL-MCNC: 77 MG/DL
NRBC BLD AUTO-RTO: 0 /100 WBCS
PLATELET # BLD AUTO: 218 THOUSANDS/UL (ref 149–390)
PMV BLD AUTO: 9.4 FL (ref 8.9–12.7)
POTASSIUM SERPL-SCNC: 4 MMOL/L (ref 3.5–5.3)
PROT SERPL-MCNC: 6.9 G/DL (ref 6.4–8.4)
RBC # BLD AUTO: 4.54 MILLION/UL (ref 3.88–5.62)
SODIUM SERPL-SCNC: 138 MMOL/L (ref 135–147)
TRIGL SERPL-MCNC: 127 MG/DL
WBC # BLD AUTO: 6.66 THOUSAND/UL (ref 4.31–10.16)

## 2022-11-10 NOTE — LETTER
November 11, 2022     Law Stone MD  [address]  [address] [facility], [place], operated by [facility]    Patient: Thais Cruz   YOB: 1960   Date of Visit: 11/10/2022       Dear Dr Kyrie Freeman:    Thank you for referring Tamika Hawk to me for evaluation  Below are my notes for this consultation  If you have questions, please do not hesitate to call me  I look forward to following your patient along with you  Sincerely,        Tisha Holter, MD        CC: Jass Lord MD  11/11/2022  4:09 PM  Sign when Signing Visit      Consultation - Pulmonary Medicine   Thais Cruz 57 y o  male MRN: [id number]    Physician Requesting Consult:  Dr Kyrie Freeman  Reason for Consult:  Sarcoidosis    Sarcoidosis  · Possible slight progression radiographically but will eventually need repeat CT scan to confirm but would wait several weeks to ensure full resolution of recent respiratory infection  · Chest x-ray does have suggestion of mild superimposed pulmonary edema based on the formal radiology interpretation  This could also be some residual changes related to recent COVID infection  · And may have recommended a trial of prednisone      Hemoptysis  · Several days worth of scant hemoptysis but now resolved   · He had severe coughing with anticoagulation chronically  This likely was associated with his cough and it was self limited and has since resolved   · At this point would not pursue bronchoscopy unless symptoms worsen or hemoptysis recurs  · CT scan has been ordered for late December  Will review at that time  If new abnormality, need for bronchoscopy could be re-evaluated    Permanent atrial fibrillation [facility])  · The formal radiology reading came after the office visit    Documentation is occurring subsequent day and suggestion of edema was noted  · He was encouraged to increase his furosemide for 3 days to 80 mg daily to see if this improves his cough and shortness of breath    Dyspnea on exertion  · This is likely multifactorial due to weight, recent respiratory illness, possibly mild superimposed congestive heart failure, and potential inflammatory process   · Diuretics increased for 3 days to see if this improves symptoms  · If no improvement, will fill prescription for prednisone taper  · Breztri inhaler was ordered by his primary care physician and I did give him 2 samples during the office visit to initiate this to see if this is of benefit  · Continue the nebulizers as needed    I will be in contact with him with the results of the follow-up CT scan  If his symptoms are not improving after changes, should contact me  ______________________________________________________________________    HPI:    Frieda Magaña is a 57 y o  male who presents for evaluation of shortness of breath and hemoptysis  He had a recent COVID infection in since COVID infection has been quite short of breath  He had severe coughing the coughing was associated with several days worth of mild hemoptysis mixed with sputum  This was self limited however and has not persisted  His shortness of breath has been persisting since that infectious process  No chest pain  He has noticed some atrial fibrillation intermittently  He does follow with Cardiology  He is on a stable dose of Lasix  He was recently prescribed Breztri but it had not been initiated yet  He had not been taking any inhalers other than the albuterol and the nebulizer  At the time of the office visit he was feeling considerably better  He did call me the day after this office visit as his symptoms were worse again with significant coughing  No fever or chills  He does help to take care of his young granddaughter who has had several respiratory illnesses after coming home from her first year of school  No weight loss or appetite change    He does have diagnosed obstructive sleep apnea but not willing to use CPAP  He does not have supplemental oxygen at home currently but did have this previously  No other new symptoms reported    Review of Systems:  Aside from what is mentioned in the HPI, the review of systems otherwise negative      Current Medications:    Current Outpatient Medications:   •  albuterol (ACCUNEB) 0 63 MG/3ML nebulizer solution, Take 3 mL (0 63 mg total) by nebulization every 6 (six) hours as needed for wheezing, Disp: 360 mL, Rfl: 5  •  albuterol (Ventolin HFA) 90 mcg/act inhaler, Inhale 1 puff 4 (four) times a day, Disp: 3 Inhaler, Rfl: 1  •  atorvastatin (LIPITOR) 40 mg tablet, TAKE ONE TABLET BY MOUTH AT BEDTIME, Disp: 90 tablet, Rfl: 0  •  azithromycin (ZITHROMAX) 250 mg tablet, 2 tab today then 1 tab po qd x 4 d, Disp: 6 tablet, Rfl: 0  •  citalopram (CeleXA) 20 mg tablet, Take 1 tablet (20 mg total) by mouth daily, Disp: 90 tablet, Rfl: 1  •  diltiazem (CARDIZEM CD) 240 mg 24 hr capsule, Take 1 capsule (240 mg total) by mouth daily, Disp: 90 capsule, Rfl: 3  •  furosemide (LASIX) 40 mg tablet, TAKE 1 TABLET DAILY, Disp: 90 tablet, Rfl: 3  •  glucose blood test strip, Use 1 each daily Use as instructed, Disp: 50 each, Rfl: 5  •  guaiFENesin (MUCINEX) 600 mg 12 hr tablet, Take 600 mg by mouth every 12 (twelve) hours, Disp: , Rfl:   •  metFORMIN (GLUCOPHAGE) 1000 MG tablet, Take 1 tablet (1,000 mg total) by mouth 2 (two) times a day with meals, Disp: 180 tablet, Rfl: 3  •  metoprolol tartrate (LOPRESSOR) 50 mg tablet, TAKE ONE TABLET BY MOUTH 2 TIMES A DAY, Disp: 180 tablet, Rfl: 3  •  NEBULIZER SYSTEM ALL-IN-ONE MISC, 1 Device by Does not apply route every 4 (four) hours while awake, Disp: 1 each, Rfl: 0  •  Respiratory Therapy Supplies (NEBULIZER/ADULT MASK) KIT, by Does not apply route daily as needed (wheezing), Disp: 1 each, Rfl: 0  •  Respiratory Therapy Supplies (NEBULIZER/TUBING/MOUTHPIECE) KIT, by Does not apply route as needed (wheezing), Disp: 1 each, Rfl: 0  •  Xarelto 20 MG tablet, TAKE ONE TABLET BY MOUTH DAILY WITH BREAKFAST, Disp: 90 tablet, Rfl: 3  •  Budeson-Glycopyrrol-Formoterol (Breztri Aerosphere) 160-9-4 8 MCG/ACT AERO, Inhale 2 puffs 2 (two) times a day Rinse mouth after use  (Patient not taking: Reported on 11/10/2022), Disp: 10 7 g, Rfl: 5  •  predniSONE 10 mg tablet, Take 1 tablet (10 mg total) by mouth see administration instructions 2 tablet daily for 1 week then 1 tablet daily for 1 week, Disp: 21 tablet, Rfl: 0    Historical Information   Past Medical History:   Diagnosis Date   • A-fib [facility])    • Chronic systolic congestive heart failure ([facility] [address] )     last assessed -    • Diabetes mellitus ([facility] [address] )    • History of cardiac catheterization 10/2012    normal coronaries w/o significant obstructive coronary disease   • Pacemaker     last assessed - 2017   • Sarcoidosis    • Sleep apnea      Past Surgical History:   Procedure Laterality Date   • CARDIAC ELECTROPHYSIOLOGY PROCEDURE N/A 12/3/2021    Procedure: PPM generator change - dual;  Surgeon: Jhonatan Olivares DO;  [facility]; Service: Cardiology   • CARDIAC ELECTROPHYSIOLOGY PROCEDURE N/A 12/3/2021    Procedure: CARDIAC UPGRADE PACER TO BIV PACER;  Surgeon: Jhonatan Olivares DO;  Location: [facility] CARD[facility];   Service: Cardiology   • CARDIOVERSION      Elective   • FISTULA REPAIR      perirectal   • INSERT / REPLACE / REMOVE PACEMAKER     • TONSILLECTOMY      's     Social History   Social History     Tobacco Use   Smoking Status Former Smoker   • Packs/day: 1 00   • Years: 8    • Pack years: 8    • Start date: 0   • Quit date: 36   • Years since quittin 8   Smokeless Tobacco Never Used   Tobacco Comment    2 ppd x 5 years       Occupational history:  Retired     Family History:   Family History   Problem Relation Age of Onset   • Atrial fibrillation Mother    • Cancer Mother    • Heart disease Father    • Hypertension Father    • Skin cancer Father    • Cancer Family • Transient ischemic attack Family         without residual deficits         PhysicalExamination:  Vitals:   /62 (BP Location: Right arm, Patient Position: Sitting, Cuff Size: Adult)   Pulse 86   Temp 98 4 °F (36 9 °C) (Tympanic)   Resp 18   Ht 5' 11" (1 803 m)   Wt 128 kg (283 lb)   SpO2 91%   BMI 39 47 kg/m²   Gen:  Significantly overweight  Comfortable on room air  No conversational dyspnea  HEENT:  Conjugate gaze  sclerae anicteric  Oropharynx moist  Neck: Trachea is midline  No JVD  No adenopathy  Chest:  Symmetric but limited chest wall excursion  Breath sounds are clear without rales or crackles  Cardiac:  Irregular with distant heart tones  no murmur  Abdomen:  benign  Extremities: No edema  Neuro:  Normal speech and mentation      Diagnostic Data:  Labs: I personally reviewed the most recent laboratory data pertinent to today's visit    Lab Results   Component Value Date    WBC 6 66 11/10/2022    HGB 13 9 11/10/2022    HCT 42 8 11/10/2022    MCV 94 11/10/2022     11/10/2022     Lab Results   Component Value Date    GLUCOSE 200 (H) 09/30/2015    CALCIUM 9 2 11/10/2022     09/30/2015    K 4 0 11/10/2022    CO2 32 11/10/2022     11/10/2022    BUN 10 11/10/2022    CREATININE 0 97 11/10/2022     No results found for: IGE  Lab Results   Component Value Date    ALT 26 11/10/2022    AST 15 11/10/2022    GGT 20 01/19/2019    ALKPHOS 99 11/10/2022    BILITOT 0 70 09/30/2015       PFT results:  No recent pulmonary function testing    6 minute walk testing performed in the office today did not show any evidence of exertional desaturation  Lowest saturation 91%  Total 6 minute walk distance 252 m    Imaging:  I personally reviewed the images on the [facility] system pertinent to today's visit  Chest x-ray demonstrates cardiomegaly  There are interstitial changes consistent with known sarcoidosis    Possibly some mild superimposed pulmonary edema verses residual COVID infectious changes    Mariana Holter, MD

## 2022-11-11 ENCOUNTER — TELEPHONE (OUTPATIENT)
Dept: PULMONOLOGY | Facility: CLINIC | Age: 62
End: 2022-11-11

## 2022-11-11 ENCOUNTER — TELEPHONE (OUTPATIENT)
Dept: CARDIOLOGY CLINIC | Facility: CLINIC | Age: 62
End: 2022-11-11

## 2022-11-11 DIAGNOSIS — R05.9 COUGH, UNSPECIFIED TYPE: Primary | ICD-10-CM

## 2022-11-11 PROBLEM — B35.6 TINEA CRURIS: Status: RESOLVED | Noted: 2019-08-06 | Resolved: 2022-11-11

## 2022-11-11 PROBLEM — Z79.01 ANTICOAGULATION ADEQUATE: Status: RESOLVED | Noted: 2021-11-11 | Resolved: 2022-11-11

## 2022-11-11 PROBLEM — U07.1 COVID-19: Status: RESOLVED | Noted: 2022-10-01 | Resolved: 2022-11-11

## 2022-11-11 PROBLEM — R04.2 HEMOPTYSIS: Status: ACTIVE | Noted: 2022-11-11

## 2022-11-11 RX ORDER — PREDNISONE 10 MG/1
10 TABLET ORAL SEE ADMIN INSTRUCTIONS
Qty: 21 TABLET | Refills: 0 | Status: SHIPPED | OUTPATIENT
Start: 2022-11-11

## 2022-11-11 NOTE — ASSESSMENT & PLAN NOTE
· Several days worth of scant hemoptysis but now resolved   · He had severe coughing with anticoagulation chronically  This likely was associated with his cough and it was self limited and has since resolved   · At this point would not pursue bronchoscopy unless symptoms worsen or hemoptysis recurs  · CT scan has been ordered for late December  Will review at that time    If new abnormality, need for bronchoscopy could be re-evaluated

## 2022-11-11 NOTE — TELEPHONE ENCOUNTER
Patients wife called, she said yesterday at his appointment yesterday Saskia Tolentino was doing okay but come today he is not doing well  He completed his CXR on 11/9 and the wife is hoping you can take a look at it and see if it shows anything to find out what is going on  Saskia Tolentino has used his inhaler and nebulizer treatments today but is still coughing like crazy   Please advise

## 2022-11-11 NOTE — ASSESSMENT & PLAN NOTE
· This is likely multifactorial due to weight, recent respiratory illness, possibly mild superimposed congestive heart failure, and potential inflammatory process   · Diuretics increased for 3 days to see if this improves symptoms  · If no improvement, will fill prescription for prednisone taper  · Breztri inhaler was ordered by his primary care physician and I did give him 2 samples during the office visit to initiate this to see if this is of benefit  · Continue the nebulizers as needed

## 2022-11-11 NOTE — PROGRESS NOTES
Consultation - Pulmonary Medicine   Law Boone 57 y o  male MRN: [id number]    Physician Requesting Consult:  Dr Krzysztof Grady  Reason for Consult:  Sarcoidosis    Sarcoidosis  · Possible slight progression radiographically but will eventually need repeat CT scan to confirm but would wait several weeks to ensure full resolution of recent respiratory infection  · Chest x-ray does have suggestion of mild superimposed pulmonary edema based on the formal radiology interpretation  This could also be some residual changes related to recent COVID infection  · And may have recommended a trial of prednisone      Hemoptysis  · Several days worth of scant hemoptysis but now resolved   · He had severe coughing with anticoagulation chronically  This likely was associated with his cough and it was self limited and has since resolved   · At this point would not pursue bronchoscopy unless symptoms worsen or hemoptysis recurs  · CT scan has been ordered for late December  Will review at that time  If new abnormality, need for bronchoscopy could be re-evaluated    Permanent atrial fibrillation [facility])  · The formal radiology reading came after the office visit    Documentation is occurring subsequent day and suggestion of edema was noted  · He was encouraged to increase his furosemide for 3 days to 80 mg daily to see if this improves his cough and shortness of breath    Dyspnea on exertion  · This is likely multifactorial due to weight, recent respiratory illness, possibly mild superimposed congestive heart failure, and potential inflammatory process   · Diuretics increased for 3 days to see if this improves symptoms  · If no improvement, will fill prescription for prednisone taper  · Breztri inhaler was ordered by his primary care physician and I did give him 2 samples during the office visit to initiate this to see if this is of benefit  · Continue the nebulizers as needed    I will be in contact with him with the results of the follow-up CT scan  If his symptoms are not improving after changes, should contact me  ______________________________________________________________________    HPI:    Nicole Little is a 57 y o  male who presents for evaluation of shortness of breath and hemoptysis  He had a recent COVID infection in since COVID infection has been quite short of breath  He had severe coughing the coughing was associated with several days worth of mild hemoptysis mixed with sputum  This was self limited however and has not persisted  His shortness of breath has been persisting since that infectious process  No chest pain  He has noticed some atrial fibrillation intermittently  He does follow with Cardiology  He is on a stable dose of Lasix  He was recently prescribed Breztri but it had not been initiated yet  He had not been taking any inhalers other than the albuterol and the nebulizer  At the time of the office visit he was feeling considerably better  He did call me the day after this office visit as his symptoms were worse again with significant coughing  No fever or chills  He does help to take care of his young granddaughter who has had several respiratory illnesses after coming home from her first year of school  No weight loss or appetite change  He does have diagnosed obstructive sleep apnea but not willing to use CPAP  He does not have supplemental oxygen at home currently but did have this previously  No other new symptoms reported    Review of Systems:  Aside from what is mentioned in the HPI, the review of systems otherwise negative      Current Medications:    Current Outpatient Medications:   •  albuterol (ACCUNEB) 0 63 MG/3ML nebulizer solution, Take 3 mL (0 63 mg total) by nebulization every 6 (six) hours as needed for wheezing, Disp: 360 mL, Rfl: 5  •  albuterol (Ventolin HFA) 90 mcg/act inhaler, Inhale 1 puff 4 (four) times a day, Disp: 3 Inhaler, Rfl: 1  •  atorvastatin (LIPITOR) 40 mg tablet, TAKE ONE TABLET BY MOUTH AT BEDTIME, Disp: 90 tablet, Rfl: 0  •  azithromycin (ZITHROMAX) 250 mg tablet, 2 tab today then 1 tab po qd x 4 d, Disp: 6 tablet, Rfl: 0  •  citalopram (CeleXA) 20 mg tablet, Take 1 tablet (20 mg total) by mouth daily, Disp: 90 tablet, Rfl: 1  •  diltiazem (CARDIZEM CD) 240 mg 24 hr capsule, Take 1 capsule (240 mg total) by mouth daily, Disp: 90 capsule, Rfl: 3  •  furosemide (LASIX) 40 mg tablet, TAKE 1 TABLET DAILY, Disp: 90 tablet, Rfl: 3  •  glucose blood test strip, Use 1 each daily Use as instructed, Disp: 50 each, Rfl: 5  •  guaiFENesin (MUCINEX) 600 mg 12 hr tablet, Take 600 mg by mouth every 12 (twelve) hours, Disp: , Rfl:   •  metFORMIN (GLUCOPHAGE) 1000 MG tablet, Take 1 tablet (1,000 mg total) by mouth 2 (two) times a day with meals, Disp: 180 tablet, Rfl: 3  •  metoprolol tartrate (LOPRESSOR) 50 mg tablet, TAKE ONE TABLET BY MOUTH 2 TIMES A DAY, Disp: 180 tablet, Rfl: 3  •  NEBULIZER SYSTEM ALL-IN-ONE MISC, 1 Device by Does not apply route every 4 (four) hours while awake, Disp: 1 each, Rfl: 0  •  Respiratory Therapy Supplies (NEBULIZER/ADULT MASK) KIT, by Does not apply route daily as needed (wheezing), Disp: 1 each, Rfl: 0  •  Respiratory Therapy Supplies (NEBULIZER/TUBING/MOUTHPIECE) KIT, by Does not apply route as needed (wheezing), Disp: 1 each, Rfl: 0  •  Xarelto 20 MG tablet, TAKE ONE TABLET BY MOUTH DAILY WITH BREAKFAST, Disp: 90 tablet, Rfl: 3  •  Budeson-Glycopyrrol-Formoterol (Breztri Aerosphere) 160-9-4 8 MCG/ACT AERO, Inhale 2 puffs 2 (two) times a day Rinse mouth after use   (Patient not taking: Reported on 11/10/2022), Disp: 10 7 g, Rfl: 5  •  predniSONE 10 mg tablet, Take 1 tablet (10 mg total) by mouth see administration instructions 2 tablet daily for 1 week then 1 tablet daily for 1 week, Disp: 21 tablet, Rfl: 0    Historical Information   Past Medical History:   Diagnosis Date   • [facility]    • Chronic systolic congestive heart failure [facility])     last assessed - [id number]   • Diabetes mellitus ([facility] [address] )    • History of cardiac catheterization 10/2012    normal coronaries w/o significant obstructive coronary disease   • Pacemaker     last assessed - 2017   • Sarcoidosis    • Sleep apnea      Past Surgical History:   Procedure Laterality Date   • CARDIAC ELECTROPHYSIOLOGY PROCEDURE N/A 12/3/2021    Procedure: PPM generator change - dual;  Surgeon: Diana Oakley DO;  [facility]; Service: Cardiology   • CARDIAC ELECTROPHYSIOLOGY PROCEDURE N/A 12/3/2021    Procedure: CARDIAC UPGRADE PACER TO BIV PACER;  Surgeon: Diana Oakley DO;  [facility]; Service: Cardiology   • CARDIOVERSION      Elective   • FISTULA REPAIR      perirectal   • INSERT / REPLACE / REMOVE PACEMAKER     • TONSILLECTOMY           Social History   Social History     Tobacco Use   Smoking Status Former Smoker   • Packs/day: 1 00   • Years: 8 00   • Pack years: 8 00   • Start date: 0   • Quit date: 36   • Years since quittin 8   Smokeless Tobacco Never Used   Tobacco Comment    2 ppd x 5 years       Occupational history:  Retired     Family History:   Family History   Problem Relation Age of Onset   • Atrial fibrillation Mother    • Cancer Mother    • Heart disease Father    • Hypertension Father    • Skin cancer Father    • Cancer Family    • Transient ischemic attack Family         without residual deficits         PhysicalExamination:  Vitals:   /62 (BP Location: Right arm, Patient Position: Sitting, Cuff Size: Adult)   Pulse 86   Temp 98 4 °F (36 9 °C) (Tympanic)   Resp 18   Ht 5' 11" (1 803 m)   Wt 128 kg (283 lb)   SpO2 91%   BMI 39 47 kg/m²   Gen:  Significantly overweight  Comfortable on room air  No conversational dyspnea  HEENT:  Conjugate gaze  sclerae anicteric  Oropharynx moist  Neck: Trachea is midline  No JVD  No adenopathy  Chest:  Symmetric but limited chest wall excursion  Breath sounds are clear without rales or crackles  Cardiac:  Irregular with distant heart tones  no murmur  Abdomen:  benign  Extremities: No edema  Neuro:  Normal speech and mentation      Diagnostic Data:  Labs: I personally reviewed the most recent laboratory data pertinent to today's visit    Lab Results   Component Value Date    WBC 6 66 11/10/2022    HGB 13 9 11/10/2022    HCT 42 8 11/10/2022    MCV 94 11/10/2022     11/10/2022     Lab Results   Component Value Date    GLUCOSE 200 (H) 09/30/2015    CALCIUM 9 2 11/10/2022     09/30/2015    K 4 0 11/10/2022    CO2 32 11/10/2022     11/10/2022    BUN 10 11/10/2022    CREATININE 0 97 11/10/2022     No results found for: IGE  Lab Results   Component Value Date    ALT 26 11/10/2022    AST 15 11/10/2022    GGT 20 01/19/2019    ALKPHOS 99 11/10/2022    BILITOT 0 70 09/30/2015       PFT results:  No recent pulmonary function testing    6 minute walk testing performed in the office today did not show any evidence of exertional desaturation  Lowest saturation 91%  Total 6 minute walk distance 252 m    Imaging:  I personally reviewed the images on the [facility] system pertinent to today's visit  Chest x-ray demonstrates cardiomegaly  There are interstitial changes consistent with known sarcoidosis    Possibly some mild superimposed pulmonary edema verses residual COVID infectious changes    Robel Thomas MD

## 2022-11-11 NOTE — TELEPHONE ENCOUNTER
----- Message from Gabriel Whitten MD sent at 11/11/2022  2:09 PM EST -----  Please report to the patient that his stress test shows no imaging abnormalities to suggest a heart blockage, so I still think his mildly weakened heart muscle is from the frequent premature ventricular contractions which is being addressed by electrophysiology

## 2022-11-11 NOTE — ASSESSMENT & PLAN NOTE
· Possible slight progression radiographically but will eventually need repeat CT scan to confirm but would wait several weeks to ensure full resolution of recent respiratory infection  · Chest x-ray does have suggestion of mild superimposed pulmonary edema based on the formal radiology interpretation    This could also be some residual changes related to recent COVID infection  · And may have recommended a trial of prednisone

## 2022-11-11 NOTE — ASSESSMENT & PLAN NOTE
· The formal radiology reading came after the office visit    Documentation is occurring subsequent day and suggestion of edema was noted  · He was encouraged to increase his furosemide for 3 days to 80 mg daily to see if this improves his cough and shortness of breath

## 2022-11-11 NOTE — PROGRESS NOTES
Spoke with patient's wife  Today he is coughing much more than he had been previously  More short of breath  X-ray final read suggests possibly some increased edema  I did recommend that he increase his Lasix for the next 3 days and will take 80 mg for 3 days to see if this improves the breathing and the cough    Continue Breztri inhaler    I did also order a course of steroids which he will initiate if he is not getting relief from the additional diuretic    Sharyn Jon

## 2022-11-12 NOTE — ASSESSMENT & PLAN NOTE
Lab Results   Component Value Date    HGBA1C 6 7 (H) 11/10/2022   A1c remains well controlled  Continue present treatment    Recheck 6 months

## 2022-11-12 NOTE — ASSESSMENT & PLAN NOTE
Patient does not appear to be fluid overloaded  Breathing stable  Follow-up with Cardiology    Recheck 3-6 months

## 2022-11-12 NOTE — ASSESSMENT & PLAN NOTE
Resolved though concerning  Check chest x-ray  Start Zithromax  I will reach out to pulmonology to facilitate re-evaluation    Recheck if hemoptysis returns otherwise follow-up with pulmonology

## 2022-11-12 NOTE — ASSESSMENT & PLAN NOTE
Breathing had been stable however post COVID induced hemoptysis is concerning  Follow-up with pulmonology

## 2022-12-01 ENCOUNTER — OFFICE VISIT (OUTPATIENT)
Dept: URGENT CARE | Age: 62
End: 2022-12-01

## 2022-12-01 VITALS
DIASTOLIC BLOOD PRESSURE: 92 MMHG | OXYGEN SATURATION: 99 % | TEMPERATURE: 97.8 F | RESPIRATION RATE: 18 BRPM | SYSTOLIC BLOOD PRESSURE: 148 MMHG | HEART RATE: 78 BPM

## 2022-12-01 DIAGNOSIS — S01.81XA CHIN LACERATION, INITIAL ENCOUNTER: Primary | ICD-10-CM

## 2022-12-01 NOTE — PROGRESS NOTES
[facility]        NAME: Ryder Kimball is a 57 y o  male  : 1960    MRN: [id number]  DATE: 2022  TIME: 2:21 PM    Assessment and Plan   Chin laceration, initial encounter Carvel Crass  1  Chin laceration, initial encounter  Laceration repair            Patient Instructions     Watch for redness, increasing pain and pus  If these occur, reports sign of infection ASAP  Tylenol OTC p r n  for pain  Follow up with PCP in 3-5 days  Proceed to  ER if symptoms worsen  Chief Complaint     Chief Complaint   Patient presents with   • Facial Laceration     Patient hit himself with a tool this am on the right chin area- laceration occurred         History of Present Illness       HPI   Presents to clinic with complaint of laceration to the chin  Worse wheezing and equipment when somehow it hit him on the jaw  That was less than 2 hours ago  There was some bleeding and he applied pressure to stop the bleeding  He takes blood thinners  Denies any current pain  Last tetanus vaccine was in   No dizziness or loss of consciousness    Review of Systems   Review of Systems   Constitutional: Negative for activity change  Musculoskeletal: Negative for neck pain  Skin: Positive for wound (chin)  Neurological: Negative for light-headedness and headaches  Current Medications       Current Outpatient Medications:   •  albuterol (ACCUNEB) 0 63 MG/3ML nebulizer solution, Take 3 mL (0 63 mg total) by nebulization every 6 (six) hours as needed for wheezing, Disp: 360 mL, Rfl: 5  •  albuterol (Ventolin HFA) 90 mcg/act inhaler, Inhale 1 puff 4 (four) times a day, Disp: 3 Inhaler, Rfl: 1  •  atorvastatin (LIPITOR) 40 mg tablet, TAKE ONE TABLET BY MOUTH AT BEDTIME, Disp: 90 tablet, Rfl: 0  •  Budeson-Glycopyrrol-Formoterol (Breztri Aerosphere) 160-9-4 8 MCG/ACT AERO, Inhale 2 puffs 2 (two) times a day Rinse mouth after use   (Patient not taking: Reported on 11/10/2022), Disp: 10 7 g, Rfl: 5  • citalopram (CeleXA) 20 mg tablet, Take 1 tablet (20 mg total) by mouth daily, Disp: 90 tablet, Rfl: 1  •  diltiazem (CARDIZEM CD) 240 mg 24 hr capsule, Take 1 capsule (240 mg total) by mouth daily, Disp: 90 capsule, Rfl: 3  •  furosemide (LASIX) 40 mg tablet, TAKE 1 TABLET DAILY, Disp: 90 tablet, Rfl: 3  •  glucose blood test strip, Use 1 each daily Use as instructed, Disp: 50 each, Rfl: 5  •  guaiFENesin (MUCINEX) 600 mg 12 hr tablet, Take 600 mg by mouth every 12 (twelve) hours, Disp: , Rfl:   •  metFORMIN (GLUCOPHAGE) 1000 MG tablet, Take 1 tablet (1,000 mg total) by mouth 2 (two) times a day with meals, Disp: 180 tablet, Rfl: 3  •  metoprolol tartrate (LOPRESSOR) 50 mg tablet, TAKE ONE TABLET BY MOUTH 2 TIMES A DAY, Disp: 180 tablet, Rfl: 3  •  NEBULIZER SYSTEM ALL-IN-ONE MISC, 1 Device by Does not apply route every 4 (four) hours while awake, Disp: 1 each, Rfl: 0  •  predniSONE 10 mg tablet, Take 1 tablet (10 mg total) by mouth see administration instructions 2 tablet daily for 1 week then 1 tablet daily for 1 week, Disp: 21 tablet, Rfl: 0  •  Respiratory Therapy Supplies (NEBULIZER/ADULT MASK) KIT, by Does not apply route daily as needed (wheezing), Disp: 1 each, Rfl: 0  •  Respiratory Therapy Supplies (NEBULIZER/TUBING/MOUTHPIECE) KIT, by Does not apply route as needed (wheezing), Disp: 1 each, Rfl: 0  •  Xarelto 20 MG tablet, TAKE ONE TABLET BY MOUTH DAILY WITH BREAKFAST, Disp: 90 tablet, Rfl: 3    Current Allergies     Allergies as of 12/01/2022   • (No Known Allergies)            The following portions of the patient's history were reviewed and updated as appropriate: allergies, current medications, past family history, past medical history, past social history, past surgical history and problem list      Past Medical History:   Diagnosis Date   • A-fib [facility])    • Chronic systolic congestive heart failure (HCC)     last assessed - [id number]   • Diabetes mellitus (HCC)    • History of cardiac catheterization 10/2012    normal coronaries w/o significant obstructive coronary disease   • Pacemaker     last assessed - [id number]   • Sarcoidosis    • Sleep apnea        Past Surgical History:   Procedure Laterality Date   • CARDIAC ELECTROPHYSIOLOGY PROCEDURE N/A 12/3/2021    Procedure: PPM generator change - dual;  Surgeon: Melvin Casiano DO;  Location: [facility] CARD[facility]AC [facility]ATH LAB; Service: Cardiology   • CARDIAC ELECTROPHYSIOLOGY PROCEDURE N/A 12/3/2021    Procedure: CARDIAC UPGRADE PACER TO BIV PACER;  Surgeon: Melvin Casiano DO;  Location: [facility] CARDIAC CATH LAB; Service: Cardiology   • CARDIOVERSION      Elective   • FISTULA REPAIR      perirectal   • INSERT / REPLACE / REMOVE PACEMAKER     • TONSILLECTOMY      1960's       Family History   Problem Relation Age of Onset   • Atrial fibrillation Mother    • Cancer Mother    • Heart disease Father    • Hypertension Father    • Skin cancer Father    • Cancer Family    • Transient ischemic attack Family         without residual deficits         Medications have been verified  Objective   /92 (BP Location: Right arm, Patient Position: Sitting, Cuff Size: Standard)   Pulse 78   Temp 97 8 °F (36 6 °C)   Resp 18   SpO2 99%   No LMP for male patient  Physical Exam     Physical Exam  Musculoskeletal:         General: Tenderness (mild) present  No swelling  Skin:     Findings: Lesion (there is a 1cm long laceration and a cross at the top to form a "T" that is about 0 5 cm ) present  Comments: Surrounding skin is normal   Neurological:      General: No focal deficit present  Mental Status: He is oriented to person, place, and time  Laceration repair    Date/Time: 12/1/2022 1:17 PM  Performed by: EDD Wilcox  Authorized by: EDD Wilcox   Consent: Verbal consent obtained    Risks and benefits: risks, benefits and alternatives were discussed  Consent given by: patient and spouse  Patient understanding: patient states understanding of the procedure being performed  Site marked: the operative site was marked  Patient identity confirmed: verbally with patient  Time out: Immediately prior to procedure a "time out" was called to verify the correct patient, procedure, equipment, support staff and site/side marked as required  Body area: head/neck  Location details: chin  Laceration length: 1 5 cm  Foreign bodies: no foreign bodies  Tendon involvement: none  Nerve involvement: none  Vascular damage: no  Anesthesia: local infiltration    Anesthesia:  Local Anesthetic: lidocaine 1% without epinephrine  Anesthetic total: 3 mL    Sedation:  Patient sedated: no      Wound Dehiscence:  Superficial Wound Dehiscence: simple closure      Procedure Details:  Preparation: Patient was prepped and draped in the usual sterile fashion  Amount of cleaning: standard  Debridement: none  Degree of undermining: minimal  Skin closure: 3-0 nylon  Technique: simple  Approximation: close  Approximation difficulty: simple  Dressing: 4x4 sterile gauze  Patient tolerance: patient tolerated the procedure well with no immediate complications  Comments: Antibiotics is applied and covered with gauze and taped  Wound care instructions  Red flags discussed  Upto date with tetanus

## 2022-12-02 NOTE — PROGRESS NOTES
- Electrophysiology Cardiology (EP)   Kelvin Pozo 57 y o  male MRN: [id number]  Unit/Bed#:  Encounter: [id number]        PCP: Rodolfo Siu MD  Outpatient Cardiologist: Khadijah Salcedo       Assessment/Plan:    1  Presence of permanent cardiac pacemaker        2  Premature ventricular contractions (PVCs) (VPCs)        3  Atrial fibrillation, unspecified type ([address] )        4  Essential hypertension            PLAN:    1  Cardiac Pacemaker working appropriately I did make modifications to decrease his pacing burden today    2  PVC's--stop metoprolol and diltiazem; start nebivolol     3  Atrial Fib permanent rate controlled    4  HTN well-controlled    He has asymptomatic PVCs and A-fib however there is concern that his PVC burden may be contributing to a drop in his ejection fraction therefore we will maximize his beta-blocker will also consider digoxin in the future for rate control of his atrial fibrillation      Kelvin Pozo returns to the office today, 12/6/2022, as a follow up per Dr Millie Xiao to discuss EF  MPI from 10/31/2022 shows that EF dropped to 40%  Pt has asymptomatic PVC's, no changes in how he has been feeling  [employer] Device interrogated today, not pacemaker dependant  PVC's about 30 %  Med changes-STOP metoprolol and diltiazem  START nebivolol 20 mg once daily  Return to the [place] office on 12/14/2022 for an EKG  PLAN from 7/8/2022 OV  1  BiV pacing: History of Dual-chamber pacemaker has reached elective replacement index prior to generator change I have recommended an echocardiogram as his ejection fraction went from 60% to 50% with at if it is below 50% given that he ventricular paces approximately 40% and he had an upgrade to CRT-P  -patient is BiV pacing 66% of the time (narrow complex and nice capture)     2   PVCs he does have frequent PVCs on his rhythm strip and holter revealing for 21% burden    -Patient is asymptomatic and no reason to pursue invasive ablation or other antiarrhymtics at this time unless this starts to affect his LV function  -PVCs appear that they may be coming from basal-mid septum or RVOT   -repeat TTE      3  Atrial fibrillation his rate is well controlled he has permanent atrial fibrillation  -patient may be getting some LE swelling from higher dose diltiazem and will trial 240 mg daily   -continue current dose of lopressor  -will monitor with device checks for episodes of RVR      4  History of hypertension blood pressure well controlled     5     Anticoagulation on appropriately dosed Xarelto he will hold this the morning of the procedure        At  on 7/8/2022, Case discussed and reviewed with Dr Asmita Sebastian pending attending addendum  Thank you for involving us in the care of your patient  Cardiology Fellow PGY-5  Addendum: This patient was personally seen and examined  I reviewed his pacemaker interrogation personally and suggested programming changes  He has asymptomatic PVCs proximally 25% burden    He recently underwent upgrade from a dual-chamber pacemaker to a cardiac resynchronization therapy pacemaker he felt less short of breath for period of time however he has gained weight back that he lost and remains somewhat short of breath    He has not had an echocardiogram performed since he was upgraded to Bi V pacemaker    My recommendation is to decrease his diltiazem as his blood pressure is adequately controlled and he is having leg swelling, repeat echocardiogram and if ejection fraction is normal since we follow him with serial echoes if his ejection fraction has dropped then I would recommend catheter ablation of his PVCs    On physical exam today he is alert oriented x3 he is slightly overweight cardiac auscultation reveals S1-S2 no murmurs no rubs no gallops lungs are clear to auscultation bilaterally with no rales rhonchi or wheezes abdomen edema bilaterally with mild hyperpigmentation from chronic venous stasis    All questions were answered I agree with the history physical exam assessment and plan from the cardiology fellow    ==========================================================================================  Addendum from 7/8/2022  History of Present Illness   Physician Requesting Consult: No att  providers found  Reason for Consult / Principal Problem: PVC [place]     As mentioned in my previous note dated 7/8/2022, Kelvin Pozo is a 57y o  year old male with a history of 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, permanent atrial fibrillation on a rate control strategy, chronic anticoagulation with Xarelto, sick sinus syndrome status post Medtronic dual-chamber pacemaker, hypertension, hyperlipidemia, type 2 diabetes, obesity and obstructive sleep apnea presenting for follow-up for PVC burden  States he is feeling better since upgrade to CRT-P  He has never felt his PVCs before and currently Denies any new symptoms of fevers/chills, nausea/vomiting, abdominal pain, diarrhea, cough, chest pain, shortness of breath, PND, orthopnea, presyncopal symptoms, syncopal episodes  He does report some lower extremity swelling that his lasix daily seems to mostly contro  ECG: Atrial fibrillation with ventricular pacing and frequent PVCs likely aortic cusp        DEVICE INTERROGATION  10/5/2022    MDT BI-V PPM (VVIR MODE)/ ACTIVE SYSTEM IS MRI CONDITIONAL   CARELINK TRANSMISSION: BATTERY VOLTAGE ADEQUATE (8 4 YRS)  BVP>99% (AF/TOTAL -66 8%+VSR PACE-32 4%)  ALL AVAILABEL LEAD PARAMETERS WITHIN NORMAL LIMITS  3 SVT-AF EPISODES @ 171-182 BPM MAX DURATION 5 6 MINS  1,814 VENT SENSING EPISODES MAX DURATION 27 25 MINS- RVR & FUSION BEATS  PT IN CHRONIC AF & ON XARELTO, DILTIAZEM & METOPROLOL  OPTI-VOL WITHIN NORMAL LIMITS  NORMAL DEVICE FUNCTION  GV        HOLTER  No results found for this or any previous visit  Review of Systems   Constitutional: Negative for chills and fever     HENT: Negative for congestion and sore throat  Eyes: Negative for blurred vision and double vision  Respiratory: Negative for cough, sputum production and shortness of breath  Cardiovascular: Negative for chest pain, dyspnea on exertion, palpitations, orthopnea, claudication, leg swelling, syncope, PND and near-syncope  Gastrointestinal: Negative for abdominal pain, nausea and vomiting  Genitourinary: Negative for hematuria  Musculoskeletal: Negative for arthritis and joint pain  Skin: Negative for itching and rash  Neurological: Negative for dizziness, focal weakness, weakness, light-headedness and headaches  Hematological: Does not bruise/bleed easily  Psychiatric/Behavioral: Negative for depression  The patient is not nervous/anxious  Review of Systems   Constitutional: Negative for chills and fever  HENT: Negative for congestion and sore throat  Eyes: Negative for blurred vision and double vision  Cardiovascular: Negative for chest pain, claudication, dyspnea on exertion, leg swelling, near-syncope, orthopnea, palpitations, paroxysmal nocturnal dyspnea and syncope  Respiratory: Negative for cough, shortness of breath and sputum production  Hematologic/Lymphatic: Negative for bleeding problem  Does not bruise/bleed easily  Skin: Negative for itching and rash  Musculoskeletal: Negative for arthritis and joint pain  Gastrointestinal: Negative for abdominal pain, nausea and vomiting  Genitourinary: Negative for hematuria  Neurological: Negative for dizziness, focal weakness, headaches, light-headedness and weakness  Psychiatric/Behavioral: Negative for depression  The patient is not nervous/anxious          Historical Information   Past Medical History:   Diagnosis Date   • A-fib [facility])    • Chronic systolic congestive heart failure ([facility] [address] )     last assessed - [id number]   • Diabetes mellitus ([facility] )    • History of cardiac catheterization 10/2012    normal coronaries w/o significant obstructive coronary disease   • Pacemaker     last assessed - 2017   • Sarcoidosis    • Sleep apnea      Past Surgical History:   Procedure Laterality Date   • CARDIAC ELECTROPHYSIOLOGY PROCEDURE N/A 12/3/2021    Procedure: PPM generator change - dual;  Surgeon: Justina Lara DO;  Location: [facility] CARDIAC C[facility] LAB; Service: Cardiology   • CARDIAC ELECTROPHYSIOLOGY PROCEDURE N/A 12/3/2021    Procedure: CARDIAC UPGRADE PACER TO BIV PACER;  Surgeon: Justina Lara DO;  Location: [facility] CARDIAC CATH LAB; Service: Cardiology   • CARDIOVERSION      Elective   • FISTULA REPAIR      perirectal   • INSERT / REPLACE / REMOVE PACEMAKER     • TONSILLECTOMY           Social History     Substance and Sexual Activity   Alcohol Use Yes   • Alcohol/week: 4 0 - 5 0 standard drinks   • Types: 4 - 5 Cans of beer per week    Comment: rare     Social History     Substance and Sexual Activity   Drug Use No     Social History     Tobacco Use   Smoking Status Former   • Packs/day: 1 00   • Years: 8 00   • Pack years: 8 00   • Types: Cigarettes   • Start date: 0   • Quit date: 36   • Years since quittin 9   Smokeless Tobacco Never   Tobacco Comments    2 ppd x 5 years     Family History: non-contributory    Meds/Allergies   Hospital Medications:   No current facility-administered medications for this visit  Home Medications: (Not in a hospital admission)      No Known Allergies    Objective   Vitals: Blood pressure 120/86, pulse 87, height 5' 11" (1 803 m), weight 133 kg (292 lb 4 8 oz)            [unfilled]    Invasive Devices     None                 Physical Exam   GEN: patient appears well, alert and oriented x 3, pleasant and cooperative   HEENT: pupils equal, round, and reactive to light; extraocular muscles intact  NECK: supple, no carotid bruits   HEART: regular rhythm, normal S1 and S2, no murmurs, clicks, gallops or rubs   LUNGS: clear to auscultation bilaterally; no wheezes, rales, or rhonchi ABDOMEN: normal bowel sounds, soft, no tenderness, no distention  EXTREMITIES: peripheral pulses normal; no clubbing, cyanosis, or edema  NEURO: no focal findings   SKIN: normal without suspicious lesions on exposed skin      Lab Results: I have personally reviewed pertinent lab results  Imaging: I have personally reviewed pertinent reports  Nuclear Stress Test  10/31/2022    •  Resting ECG: ECG is abnormal   EKG V paced  The ECG shows ventricular pacing  The ECG shows frequent premature ventricular contractions  •  Stress ECG: Arrhythmias during stress: frequent PVCs  Arrhythmias during recovery: frequent PVCs  Ventricular paced rhythm  Nondiagnostic ST segment response after pharmacologic vasodilation, without reproduction of symptoms  •  Perfusion: There are no perfusion defects  Prone imaging was utilized  •  Stress Function: LVEF could not be calculated in the setting of frequent PVCs  Nuclear Stress Findings    Isotope Administration The isotope used for nuclear imaging was Tc 99m tetrofosmin  The isotope was administered intravenously via the left antecubital fossa  Imaging was performed at rest 43 minutes after an injection on 10/31/2022 at 07:45 EDT of 14 91 mCi  Imaging was performed at peak stress 57 minutes after an injection on 10/31/2022 at 08:50 EDT of 46 80 mCi  Nuclear Study Quality A perfusion 1-day rest/stress protocol was performed  Images were viewed in the short axis, vertical long axis and horizontal long axis  Prone imaging acquired after stress imaging  Gating was not possible due to frequent premature ventricular contractions  Stress Findings A pharmacological stress test was performed using regadenoson  The patient and had a maximal HR of 96 bpm ( % of MPHR) and  METS  The patient experienced no angina during the test  The patient reached the end of the protocol  The patient reported dyspnea during the stress test  Symptoms ended during recovery  Perfusion There are no perfusion defects  Prone imaging was utilized  Stress Function LVEF could not be calculated in the setting of frequent PVCs  Stress test only, exercise          Previous Cath/PCI:  No results found for this or any previous visit  No results found for this or any previous visit  No results found for this or any previous visit  ECHO:      Echo complete with contrast if indicated  8/29/2022    •  Left Ventricle: Left ventricular cavity size is normal  Wall thickness is mildly increased  There is mild concentric hypertrophy  The left ventricular ejection fraction is 40%  Systolic function is mild to moderately reduced  There is mild to moderate global hypokinesis with regional variation  Diastolic function is normal   •  IVS: There is abnormal septal motion consistent with right ventricular pacing  •  Left Atrium: The atrium is moderately dilated  •  Right Atrium: The atrium is mildly dilated  •  Mitral Valve: There is mild to moderate regurgitation  •  Tricuspid Valve: There is moderate regurgitation  The right ventricular systolic pressure is mildly elevated  •  Aorta: The aortic root is normal in size  The ascending aorta is mildly dilated  The ascending aorta is 3 6 cm      Findings    Left Ventricle Left ventricular cavity size is normal  Wall thickness is mildly increased  There is mild concentric hypertrophy  The left ventricular ejection fraction is 40%  Systolic function is mild to moderately reduced  There is mild to moderate global hypokinesis with regional variation  Diastolic function is normal    Interventricular Septum There is abnormal septal motion consistent with right ventricular pacing  Right Ventricle Right ventricular cavity size is normal  Systolic function is normal  Wall thickness is normal  A pacer wire is present  Left Atrium The atrium is moderately dilated  Right Atrium The atrium is mildly dilated     Aortic Valve The aortic valve is trileaflet  The leaflets are not thickened  The leaflets are not calcified  The leaflets exhibit normal mobility  There is no evidence of regurgitation  The aortic valve has no significant stenosis  Mitral Valve There is mild thickening  There is mild to moderate regurgitation  There is no evidence of stenosis  The valve has normal function  Tricuspid Valve Tricuspid valve structure is normal  There is moderate regurgitation  There is no evidence of stenosis  The right ventricular systolic pressure is mildly elevated  Pulmonic Valve Pulmonic valve structure is normal  There is no evidence of regurgitation  There is no evidence of stenosis  Ascending Aorta The aortic root is normal in size  The ascending aorta is mildly dilated  The ascending aorta is 3 6 cm  IVC/SVC The inferior vena cava is normal in size  Pericardium There is no pericardial effusion  The pericardium is normal in appearance  ROHAN:  No results found for this or any previous visit  No results found for this or any previous visit  CMR:  No results found for this or any previous visit  No results found for this or any previous visit  No results found for this or any previous visit  HOLTER  No results found for this or any previous visit  Results for orders placed during the hospital encounter of 06/14/22    Holter monitor    Interpretation Summary  Indication: Palpitations    Predominant rhythm: Ventricular paced rhythm    Minimum HR: 80  Average HR: 90  Maximum HR: 104    Premature ventricular contractions: 16044 (21% burden)  Ventricular runs: none    Supraventricular contractions: 350  Supraventricular runs: none    Longest RR: 0 9 sec    Arrhythmias: No sustained arrhythmias recorded    Symptom diary submitted: No      Impression    Predominant ventricular paced rhythm with variable QRS duration of paced complexes suggests intermittent biventricular pacing    Frequent PVCs, some in bigeminal and trigeminal runs with high PVC burden of 21% with pacer spikes noted on the premature ventricular complexes (?failure to sense)  Underlying atrial rhythm probable atrial fibrillation  No symptom correlation  Abnormal holter  Signed by: Gretchen Suarez MD          Counseling / Coordination of Care  Total floor / unit time spent today 30 minutes minutes  Greater than 50% of total time was spent with the patient and / or family counseling and / or coordination of care  A description of the counseling / coordination of care:          Epic/ Allscripts/Care Everywhere records reviewed:     ** Please Note: Fluency DirectDictation voice to text software may have been used in the creation of this document   **

## 2022-12-06 ENCOUNTER — OFFICE VISIT (OUTPATIENT)
Dept: CARDIOLOGY CLINIC | Facility: CLINIC | Age: 62
End: 2022-12-06

## 2022-12-06 VITALS
DIASTOLIC BLOOD PRESSURE: 86 MMHG | BODY MASS INDEX: 40.92 KG/M2 | HEART RATE: 87 BPM | HEIGHT: 71 IN | SYSTOLIC BLOOD PRESSURE: 120 MMHG | WEIGHT: 292.3 LBS

## 2022-12-06 DIAGNOSIS — I48.21 PERMANENT ATRIAL FIBRILLATION (HCC): ICD-10-CM

## 2022-12-06 DIAGNOSIS — I42.0 DILATED CARDIOMYOPATHY (HCC): Primary | ICD-10-CM

## 2022-12-06 DIAGNOSIS — Z95.0 PRESENCE OF PERMANENT CARDIAC PACEMAKER: ICD-10-CM

## 2022-12-06 DIAGNOSIS — Z95.0 PRESENCE OF CARDIAC PACEMAKER: ICD-10-CM

## 2022-12-06 DIAGNOSIS — I48.91 ATRIAL FIBRILLATION, UNSPECIFIED TYPE (HCC): ICD-10-CM

## 2022-12-06 DIAGNOSIS — I49.3 PREMATURE VENTRICULAR CONTRACTIONS (PVCS) (VPCS): ICD-10-CM

## 2022-12-06 DIAGNOSIS — I10 ESSENTIAL HYPERTENSION: ICD-10-CM

## 2022-12-06 RX ORDER — NEBIVOLOL 20 MG/1
20 TABLET ORAL DAILY
Qty: 30 TABLET | Refills: 3 | Status: SHIPPED | OUTPATIENT
Start: 2022-12-06 | End: 2023-01-16 | Stop reason: SDUPTHER

## 2022-12-06 NOTE — PATIENT INSTRUCTIONS
Please STOP the metoprolol and diltiazem  START nebivolol 20 mg once daily  Return to the [place] office on 12/14/2022 for an EKG

## 2022-12-07 DIAGNOSIS — F41.9 ANXIETY: ICD-10-CM

## 2022-12-07 DIAGNOSIS — E78.5 DYSLIPIDEMIA: ICD-10-CM

## 2022-12-07 RX ORDER — ATORVASTATIN CALCIUM 40 MG/1
TABLET, FILM COATED ORAL
Qty: 90 TABLET | Refills: 0 | Status: SHIPPED | OUTPATIENT
Start: 2022-12-07

## 2022-12-07 RX ORDER — CITALOPRAM 20 MG/1
20 TABLET ORAL DAILY
Qty: 90 TABLET | Refills: 1 | Status: SHIPPED | OUTPATIENT
Start: 2022-12-07

## 2022-12-14 ENCOUNTER — CLINICAL SUPPORT (OUTPATIENT)
Dept: CARDIOLOGY CLINIC | Facility: CLINIC | Age: 62
End: 2022-12-14

## 2022-12-14 VITALS
WEIGHT: 290 LBS | SYSTOLIC BLOOD PRESSURE: 114 MMHG | HEIGHT: 71 IN | BODY MASS INDEX: 40.6 KG/M2 | DIASTOLIC BLOOD PRESSURE: 74 MMHG | HEART RATE: 107 BPM

## 2022-12-14 DIAGNOSIS — I48.21 PERMANENT ATRIAL FIBRILLATION (HCC): Primary | ICD-10-CM

## 2022-12-14 RX ORDER — NEBIVOLOL 10 MG/1
10 TABLET ORAL DAILY
Qty: 30 TABLET | Refills: 3 | Status: SHIPPED | OUTPATIENT
Start: 2022-12-14

## 2022-12-14 NOTE — PROGRESS NOTES
Patient came in under the direction of Dr Nelson  Patient was taken off metoprolol 50mg BID and diltiazem 240 mg, and it was replaced with bystolic 81XK daily  Patient feels well but is still having palpitations at times  Dr Nelson saw the patient and he recommended for the patient to continue the bystolic 20 and to add bystolic 88XC in the evening  Dr Nelson also stated to have him come in on 12/21/22 for an ekg to see if the medication is working

## 2022-12-21 ENCOUNTER — CLINICAL SUPPORT (OUTPATIENT)
Dept: CARDIOLOGY CLINIC | Facility: CLINIC | Age: 62
End: 2022-12-21

## 2022-12-21 VITALS
BODY MASS INDEX: 40.6 KG/M2 | SYSTOLIC BLOOD PRESSURE: 98 MMHG | DIASTOLIC BLOOD PRESSURE: 70 MMHG | WEIGHT: 290 LBS | HEIGHT: 71 IN | HEART RATE: 99 BPM

## 2022-12-21 DIAGNOSIS — I48.21 PERMANENT ATRIAL FIBRILLATION (HCC): Primary | ICD-10-CM

## 2022-12-21 DIAGNOSIS — I49.3 PREMATURE VENTRICULAR CONTRACTIONS (PVCS) (VPCS): Primary | ICD-10-CM

## 2022-12-21 RX ORDER — DIGOXIN 125 MCG
125 TABLET ORAL DAILY
Qty: 90 TABLET | Refills: 3 | Status: SHIPPED | OUTPATIENT
Start: 2022-12-21 | End: 2023-08-24 | Stop reason: SDUPTHER

## 2022-12-21 NOTE — PROGRESS NOTES
Patient presented for EKG post increase in Bystolic dose from 18WG daily to 20mg AM/10mg PM   BP today 98/70  HR 90  EKG reviewed by Dr Cindi Sosa who did speak with the patient today  Medication changes were made

## 2022-12-27 ENCOUNTER — HOSPITAL ENCOUNTER (OUTPATIENT)
Dept: RADIOLOGY | Age: 62
Discharge: HOME | End: 2022-12-27

## 2022-12-27 DIAGNOSIS — D86.9 SARCOIDOSIS: ICD-10-CM

## 2022-12-29 ENCOUNTER — REMOTE DEVICE CLINIC VISIT (OUTPATIENT)
Dept: CARDIOLOGY CLINIC | Facility: CLINIC | Age: 62
End: 2022-12-29

## 2022-12-29 DIAGNOSIS — Z95.0 PRESENCE OF CARDIAC PACEMAKER: Primary | ICD-10-CM

## 2022-12-30 NOTE — PROGRESS NOTES
Results for orders placed or performed in visit on 12/29/22   Cardiac EP device report    Narrative    MDT BI-V PPM (VVIR MODE)/ ACTIVE SYSTEM IS MRI CONDITIONAL  NON-BILLABLE CARELINK TRANSMISSION: 1 WK CHECK PER DR  DT HR/EPISODES: BATTERY VOLTAGE ADEQUATE (8 4 YRS)  : 3 8% (MVP-OFF)  ALL AVAILABLE LEAD PARAMETERS WITHIN NORMAL LIMITS  NO SIGNIFICANT HIGH RATE EPISODES  1 VENSE EPISODE W/ MARKER SHOWING FUSION  HR  BPM WITH AVG HR  BPM  PT TAKES BYSTOLIC, XARELTO, DIGOXIN  EF: 40% (ECHO 8/29/22)  PACEMAKER FUNCTIONING APPROPRIATELY    [address] [place] [address] Street

## 2023-01-01 ENCOUNTER — OFFICE VISIT (OUTPATIENT)
Dept: URGENT CARE | Age: 63
End: 2023-01-01

## 2023-01-01 VITALS
RESPIRATION RATE: 16 BRPM | HEIGHT: 71 IN | TEMPERATURE: 97.8 F | WEIGHT: 290 LBS | BODY MASS INDEX: 40.6 KG/M2 | OXYGEN SATURATION: 97 % | HEART RATE: 47 BPM

## 2023-01-01 DIAGNOSIS — S61.012A LACERATION OF LEFT THUMB WITHOUT FOREIGN BODY WITHOUT DAMAGE TO NAIL, INITIAL ENCOUNTER: Primary | ICD-10-CM

## 2023-01-01 NOTE — PROGRESS NOTES
[facility] [employer] [facility]        NAME: Sasha Hart is a 57 y o  male  : 1960    MRN: [id number]  DATE: 2023  TIME: 2:06 PM    Assessment and Plan   Laceration of left thumb without foreign body without damage to nail, initial encounter [S61 012A]  1  Laceration of left thumb without foreign body without damage to nail, initial encounter  Laceration repair            Patient Instructions     Report any s/s of infection ASAP  No need for tetanus vaccine as he is up-to-date  Tylenol OTC prn for pain  Follow up with PCP in 3-5 days  Proceed to  ER if symptoms worsen  Chief Complaint     Chief Complaint   Patient presents with   • Thumb Laceration     Using a lathe and his hand slipped and struck the matthieu    History of Present Illness       HPI   Reports he was making a toy for his grand-daughter and accidentally cut the left thumb  Has been bleeding since; 1 5 - 2 hrs  Review of Systems   Review of Systems   Musculoskeletal: Negative for arthralgias  Skin: Positive for wound (left thumb)  Negative for color change  Neurological: Negative for numbness           Current Medications       Current Outpatient Medications:   •  albuterol (ACCUNEB) 0 63 MG/3ML nebulizer solution, Take 3 mL (0 63 mg total) by nebulization every 6 (six) hours as needed for wheezing, Disp: 360 mL, Rfl: 5  •  albuterol (Ventolin HFA) 90 mcg/act inhaler, Inhale 1 puff 4 (four) times a day, Disp: 3 Inhaler, Rfl: 1  •  atorvastatin (LIPITOR) 40 mg tablet, TAKE ONE TABLET BY MOUTH AT BEDTIME, Disp: 90 tablet, Rfl: 0  •  Budeson-Glycopyrrol-Formoterol (Breztri Aerosphere) 160-9-4 8 MCG/ACT AERO, Inhale 2 puffs 2 (two) times a day Rinse mouth after use , Disp: 10 7 g, Rfl: 5  •  citalopram (CeleXA) 20 mg tablet, Take 1 tablet (20 mg total) by mouth daily, Disp: 90 tablet, Rfl: 1  •  digoxin (LANOXIN) 0 125 mg tablet, Take 1 tablet (125 mcg total) by mouth daily, Disp: 90 tablet, Rfl: 3  •  furosemide (LASIX) 40 mg tablet, TAKE 1 TABLET DAILY, Disp: 90 tablet, Rfl: 3  •  glucose blood test strip, Use 1 each daily Use as instructed, Disp: 50 each, Rfl: 5  •  guaiFENesin (MUCINEX) 600 mg 12 hr tablet, Take 600 mg by mouth every 12 (twelve) hours, Disp: , Rfl:   •  metFORMIN (GLUCOPHAGE) 1000 MG tablet, Take 1 tablet (1,000 mg total) by mouth 2 (two) times a day with meals, Disp: 180 tablet, Rfl: 3  •  nebivolol (BYSTOLIC) 10 mg tablet, Take 1 tablet (10 mg total) by mouth daily, Disp: 30 tablet, Rfl: 3  •  nebivolol (BYSTOLIC) 20 MG tablet, Take 1 tablet (20 mg total) by mouth daily, Disp: 30 tablet, Rfl: 3  •  NEBULIZER SYSTEM ALL-IN-ONE MISC, 1 Device by Does not apply route every 4 (four) hours while awake, Disp: 1 each, Rfl: 0  •  predniSONE 10 mg tablet, Take 1 tablet (10 mg total) by mouth see administration instructions 2 tablet daily for 1 week then 1 tablet daily for 1 week, Disp: 21 tablet, Rfl: 0  •  Respiratory Therapy Supplies (NEBULIZER/ADULT MASK) KIT, by Does not apply route daily as needed (wheezing), Disp: 1 each, Rfl: 0  •  Respiratory Therapy Supplies (NEBULIZER/TUBING/MOUTHPIECE) KIT, by Does not apply route as needed (wheezing), Disp: 1 each, Rfl: 0  •  Xarelto 20 MG tablet, TAKE ONE TABLET BY MOUTH DAILY WITH BREAKFAST, Disp: 90 tablet, Rfl: 3    Current Allergies     Allergies as of 01/01/2023   • (No Known Allergies)            The following portions of the patient's history were reviewed and updated as appropriate: allergies, current medications, past family history, past medical history, past social history, past surgical history and problem list      Past Medical History:   Diagnosis Date   • A-fib ([facility] )    • Chronic systolic congestive heart failure (HCC)     last assessed - [id number]   • Diabetes mellitus ([facility] )    • History of cardiac catheterization 10/2012    normal coronaries w/o significant obstructive coronary disease   • Pacemaker     last assessed - [id number]   • Sarcoidosis    • Sleep apnea        Past Surgical History:   Procedure Laterality Date   • CARDIAC ELECTROPHYSIOLOGY PROCEDURE N/A 12/3/2021    Procedure: PPM generator change - dual;  Surgeon: Martine Cooper DO;  Location: [facility]; Service: Cardiology   • CARDIAC ELECTROPHYSIOLOGY PROCEDURE N/A 12/3/2021    Procedure: CARDIAC UPGRADE PACER TO BIV PACER;  Surgeon: Martine Cooper DO;  Location: [facility]; Service: Cardiology   • CARDIOVERSION      Elective   • FISTULA REPAIR      perirectal   • INSERT / REPLACE / REMOVE PACEMAKER     • TONSILLECTOMY      1960's       Family History   Problem Relation Age of Onset   • Atrial fibrillation Mother    • Cancer Mother    • Heart disease Father    • Hypertension Father    • Skin cancer Father    • Cancer Family    • Transient ischemic attack Family         without residual deficits         Medications have been verified  Objective   Pulse (!) 47   Temp 97 8 °F (36 6 °C)   Resp 16   Ht 5' 11" (1 803 m)   Wt 132 kg (290 lb)   SpO2 97%   BMI 40 45 kg/m²   No LMP for male patient  Physical Exam     Physical Exam  Musculoskeletal:         General: Tenderness (with palpation of the laceration) present  No swelling  Comments: Nail is intact   Skin:     Capillary Refill: Capillary refill takes less than 2 seconds  Comments: There is a 1 5 cm laceration on the lateral aspect of the left thumb  Extends towards the base of the nail  Rough edges     Laceration repair    Date/Time: 1/1/2023 1:50 PM  Performed by: EDD Persaud  Authorized by: EDD Persaud   Consent: Verbal consent obtained  Written consent not obtained    Risks and benefits: risks, benefits and alternatives were discussed  Consent given by: patient  Patient understanding: patient states understanding of the procedure being performed  Patient identity confirmed: verbally with patient  Body area: upper extremity  Location details: left thumb  Laceration length: 2 cm  Foreign bodies: no foreign bodies  Tendon involvement: none  Nerve involvement: none  Vascular damage: no  Anesthesia: local infiltration    Anesthesia:  Local Anesthetic: lidocaine 1% without epinephrine  Anesthetic total: 4 mL    Sedation:  Patient sedated: no      Wound Dehiscence:  Superficial Wound Dehiscence: simple closure      Procedure Details:  Irrigation solution: wound cleansing solution, and betadine  Amount of cleaning: standard  Debridement: none  Degree of undermining: none  Skin closure: 3-0 nylon  Number of sutures: 6  Technique: simple  Approximation: close  Approximation difficulty: simple  Dressing: 4x4 sterile gauze, antibiotic ointment and pressure dressing  Patient tolerance: patient tolerated the procedure well with no immediate complications  Comments: RTC in 10 days for suture removal  Foreign body: no foreign materials

## 2023-01-03 ENCOUNTER — TELEPHONE (OUTPATIENT)
Dept: PULMONOLOGY | Facility: CLINIC | Age: 63
End: 2023-01-03

## 2023-01-03 DIAGNOSIS — I48.21 PERMANENT ATRIAL FIBRILLATION (HCC): Primary | ICD-10-CM

## 2023-01-03 NOTE — PROGRESS NOTES
Ordering limited echo for 3 months, patient had beta-blocker increased, digoxin added, not felt to be a great candidate for PVC ablation due to potential source/anatomy, and increased rate control for A  fib to try to improve BiV pacing    Discussed with EP

## 2023-01-04 ENCOUNTER — OFFICE VISIT (OUTPATIENT)
Dept: PULMONOLOGY | Facility: CLINIC | Age: 63
End: 2023-01-04

## 2023-01-04 VITALS
DIASTOLIC BLOOD PRESSURE: 64 MMHG | WEIGHT: 294 LBS | TEMPERATURE: 97.3 F | HEART RATE: 92 BPM | BODY MASS INDEX: 41.16 KG/M2 | OXYGEN SATURATION: 93 % | HEIGHT: 71 IN | SYSTOLIC BLOOD PRESSURE: 100 MMHG

## 2023-01-04 DIAGNOSIS — E66.01 MORBID OBESITY WITH BMI OF 40.0-44.9, ADULT (HCC): ICD-10-CM

## 2023-01-04 DIAGNOSIS — R06.09 DYSPNEA ON EXERTION: ICD-10-CM

## 2023-01-04 DIAGNOSIS — D86.9 SARCOIDOSIS: ICD-10-CM

## 2023-01-04 DIAGNOSIS — R91.1 LUNG NODULE: Primary | ICD-10-CM

## 2023-01-04 PROBLEM — R04.2 HEMOPTYSIS: Status: RESOLVED | Noted: 2022-11-11 | Resolved: 2023-01-04

## 2023-01-04 NOTE — ASSESSMENT & PLAN NOTE
· CT scan demonstrates stable findings of sarcoidosis with mid to upper lung zone peribronchovascular scarring/nodularity  · Mild adenopathy which is unchanged

## 2023-01-04 NOTE — ASSESSMENT & PLAN NOTE
· Hemoptysis resolved  · Exertional dyspnea significantly improved  · No longer taking regular inhalers and completed prednisone  · Continues to have the nebulizer but has not needed it recently

## 2023-01-04 NOTE — PROGRESS NOTES
Progress note - Pulmonary Medicine   Katlyn Garcia 57 y o  male MRN: [id number]       Impression & Plan:     Lung nodule  · Basilar lung nodule seen on CT scanning  · Findings are suspicious for inflammatory nodule, very close to the diaphragm  · Short interval follow-up CT indicated  We will schedule for 3-month scan and office follow-up after    Sarcoidosis  · CT scan demonstrates stable findings of sarcoidosis with mid to upper lung zone peribronchovascular scarring/nodularity  · Mild adenopathy which is unchanged      Dyspnea on exertion  · Hemoptysis resolved  · Exertional dyspnea significantly improved  · No longer taking regular inhalers and completed prednisone  · Continues to have the nebulizer but has not needed it recently    Nina Darling follow-up with me after the CT scan which will be performed in late March     ______________________________________________________________________    HPI:    Katlyn Garcia presents today for follow-up of recent URI/COVID-19 and significant exertional dyspnea  He had some mild hemoptysis associated with a persistent cough  He was placed on inhalers, nebulizer treatments and prednisone with eventual improvement in the symptoms  He was sent for follow-up CAT scan which was performed at the end of December  This showed stable sarcoidosis findings but did identify a potential left basilar lung nodule versus sequelae from recent infection versus related to sarcoid changes    Overall reports no new symptoms  He is significantly improved from a respiratory standpoint since the last visit  He is no longer taking Breztri  He is no longer taking the nebulizer and has much less exertional shortness of breath  Denies any cough, persistent hemoptysis, or other respiratory symptoms at this time  No cardiac complaints  No leg swelling  No other new symptoms    Remains overweight  Weight has been stable      Current Medications:    Current Outpatient Medications:   • albuterol (Ventolin HFA) 90 mcg/act inhaler, Inhale 1 puff 4 (four) times a day, Disp: 3 Inhaler, Rfl: 1  •  atorvastatin (LIPITOR) 40 mg tablet, TAKE ONE TABLET BY MOUTH AT BEDTIME, Disp: 90 tablet, Rfl: 0  •  citalopram (CeleXA) 20 mg tablet, Take 1 tablet (20 mg total) by mouth daily, Disp: 90 tablet, Rfl: 1  •  digoxin (LANOXIN) 0 125 mg tablet, Take 1 tablet (125 mcg total) by mouth daily, Disp: 90 tablet, Rfl: 3  •  furosemide (LASIX) 40 mg tablet, TAKE 1 TABLET DAILY, Disp: 90 tablet, Rfl: 3  •  glucose blood test strip, Use 1 each daily Use as instructed, Disp: 50 each, Rfl: 5  •  metFORMIN (GLUCOPHAGE) 1000 MG tablet, Take 1 tablet (1,000 mg total) by mouth 2 (two) times a day with meals, Disp: 180 tablet, Rfl: 3  •  nebivolol (BYSTOLIC) 10 mg tablet, Take 1 tablet (10 mg total) by mouth daily, Disp: 30 tablet, Rfl: 3  •  nebivolol (BYSTOLIC) 20 MG tablet, Take 1 tablet (20 mg total) by mouth daily, Disp: 30 tablet, Rfl: 3  •  Xarelto 20 MG tablet, TAKE ONE TABLET BY MOUTH DAILY WITH BREAKFAST, Disp: 90 tablet, Rfl: 3  •  NEBULIZER SYSTEM ALL-IN-ONE MISC, 1 Device by Does not apply route every 4 (four) hours while awake (Patient not taking: Reported on 2023), Disp: 1 each, Rfl: 0    Review of Systems:  Aside from what is mentioned in the HPI, the review of systems is otherwise negative    Past medical history, surgical history, and family history were reviewed and updated as appropriate    Social history updates:  Social History     Tobacco Use   Smoking Status Former   • Packs/day: 1 00   • Years: 8 00   • Pack years: 8 00   • Types: Cigarettes   • Start date: 0   • Quit date: 36   • Years since quittin 0   Smokeless Tobacco Never   Tobacco Comments    2 ppd x 5 years       PhysicalExamination:  Vitals:   /64 (BP Location: Left arm, Patient Position: Sitting, Cuff Size: Standard)   Pulse 92   Temp (!) 97 3 °F (36 3 °C) (Tympanic)   Ht 5' 11" (1 803 m)   Wt 133 kg (294 lb)   SpO2 93%   BMI 41 00 kg/m²     Gen: Obese  Comfortable on room air  No conversational dyspnea  HEENT:  Conjugate gaze  sclerae anicteric  Oropharynx moist  Neck: Trachea is midline  No JVD  No adenopathy  Chest: Symmetric chest wall excursion with clear breath sounds  Cardiac: Regular  no murmur  Abdomen: Obese, benign  Extremities: No edema  Neuro:  Normal speech and mentation    Diagnostic Data:  Labs: I personally reviewed the most recent laboratory data pertinent to today's visit    Lab Results   Component Value Date    WBC 6 66 11/10/2022    HGB 13 9 11/10/2022    HCT 42 8 11/10/2022    MCV 94 11/10/2022     11/10/2022     Lab Results   Component Value Date    SODIUM 138 11/10/2022    K 4 0 11/10/2022    CO2 32 11/10/2022     11/10/2022    BUN 10 11/10/2022    CREATININE 0 97 11/10/2022    CALCIUM 9 2 11/10/2022       Imaging:  I personally reviewed the images on the [facility] system pertinent to today's visit  CT of the chest from December 28 shows stable sarcoidosis findings  There are stable lymph nodes  Left base shows a somewhat spiculated appearing lung nodule and some basilar atelectatic findings  Lung nodule is very close to the diaphragm at the left base    Short interval follow-up recommended by the radiologist      Yvon Lange MD

## 2023-01-04 NOTE — ASSESSMENT & PLAN NOTE
· Basilar lung nodule seen on CT scanning  · Findings are suspicious for inflammatory nodule, very close to the diaphragm  · Short interval follow-up CT indicated    We will schedule for 3-month scan and office follow-up after

## 2023-01-05 ENCOUNTER — TELEPHONE (OUTPATIENT)
Dept: CARDIOLOGY CLINIC | Facility: CLINIC | Age: 63
End: 2023-01-05

## 2023-01-05 ENCOUNTER — REMOTE DEVICE CLINIC VISIT (OUTPATIENT)
Dept: CARDIOLOGY CLINIC | Facility: CLINIC | Age: 63
End: 2023-01-05

## 2023-01-05 DIAGNOSIS — Z95.0 CARDIAC PACEMAKER IN SITU: Primary | ICD-10-CM

## 2023-01-05 NOTE — PROGRESS NOTES
Results for orders placed or performed in visit on 01/05/23   Cardiac EP device report    Narrative    MDT BI-V PPM (VVIR MODE)/ ACTIVE SYSTEM IS MRI CONDITIONAL  CARELINK TRANSMISSION: BATTERY STATUS "8 5 YRS "  8%  ALL AVAILABLE LEAD PARAMETERS WITHIN NORMAL LIMITS  NO SIGNIFICANT HIGH RATE EPISODES  OPTI-VOL WITHIN NORMAL LIMITS  NORMAL DEVICE FUNCTION   NC

## 2023-01-05 NOTE — TELEPHONE ENCOUNTER
----- Message from Matt Sierra MD sent at 1/3/2023  5:08 PM EST -----  Please have the patient get an echo in 3 months, and see me in 4 months

## 2023-01-06 ENCOUNTER — TELEPHONE (OUTPATIENT)
Dept: CARDIOLOGY CLINIC | Facility: CLINIC | Age: 63
End: 2023-01-06

## 2023-01-16 DIAGNOSIS — I49.3 PREMATURE VENTRICULAR CONTRACTIONS (PVCS) (VPCS): ICD-10-CM

## 2023-01-16 DIAGNOSIS — I48.91 ATRIAL FIBRILLATION, UNSPECIFIED TYPE (HCC): ICD-10-CM

## 2023-01-16 DIAGNOSIS — I48.21 PERMANENT ATRIAL FIBRILLATION (HCC): ICD-10-CM

## 2023-01-16 RX ORDER — NEBIVOLOL 20 MG/1
20 TABLET ORAL DAILY
Qty: 90 TABLET | Refills: 0 | Status: SHIPPED | OUTPATIENT
Start: 2023-01-16

## 2023-01-16 RX ORDER — NEBIVOLOL 10 MG/1
10 TABLET ORAL DAILY
Qty: 90 TABLET | Refills: 0 | Status: SHIPPED | OUTPATIENT
Start: 2023-01-16

## 2023-03-06 ENCOUNTER — HOSPITAL ENCOUNTER (OUTPATIENT)
Dept: RADIOLOGY | Age: 63
Discharge: HOME | End: 2023-03-06

## 2023-03-06 DIAGNOSIS — R91.1 LUNG NODULE: ICD-10-CM

## 2023-03-15 ENCOUNTER — TELEMEDICINE (OUTPATIENT)
Dept: FAMILY MEDICINE CLINIC | Facility: CLINIC | Age: 63
End: 2023-03-15

## 2023-03-15 DIAGNOSIS — U07.1 COVID-19: Primary | ICD-10-CM

## 2023-03-15 RX ORDER — MOLNUPIRAVIR 200 MG/1
800 CAPSULE ORAL EVERY 12 HOURS
Qty: 40 CAPSULE | Refills: 0 | Status: SHIPPED | OUTPATIENT
Start: 2023-03-15 | End: 2023-03-20

## 2023-03-15 NOTE — PROGRESS NOTES
COVID-19 Outpatient Progress Note    Assessment/Plan:    Problem List Items Addressed This Visit    None  Visit Diagnoses     COVID-19    -  Primary    Relevant Medications    molnupiravir (Lagevrio) 200 mg capsule         Disposition:     Discussed symptom directed medication options with patient  Discussed vitamin D, vitamin C, and/or zinc supplementation with patient  Isolation and masking recommendations reviewed with patient and his wife    Patient meets criteria for Molnupiravir and they have been counseled according to EUA documentation provided by the FDA  After discussion, patient agrees to treatment  Willean  is an investigational medicine used to treat mild-to-moderate COVID-19 in adults with positive results of direct SARS-CoV-2 viral testing, and who are at high risk for progressing to severe COVID-19 including hospitalization or death, and for whom other COVID-19 treatment options authorized by the FDA are not accessible or clinically appropriate  The FDA has authorized the emergency use of molnupiravir for the treatment of mild-tomoderate COVID-19 in adults under an EUA  Willean Muldoon is not authorized:  - for use in people less than 25years of age   - for prevention of COVID-19   - for people needing hospitalization for COVID-19   - for use for longer than 5 consecutive days    What is the most important information I should know about molnupiravir? Willean Muldoon may cause serious side effects, including:    Willean Muldoon may cause harm to your unborn baby  It is not known if molnupiravir will harm your baby if you take molnupiravir during pregnancy  - Willean Muldoon is not recommended for use in pregnancy  - Willean Muldoon has not been studied in pregnancy  Willean  was studied in pregnant animals only   When molnupiravir was given to pregnant animals, molnupiravir caused harm to their unborn babies   - You and your healthcare provider may decide that you should take molnupiravir duringpregnancy if there are no other COVID-19 treatment options authorized by the FDA that are accessible or clinically appropriate for you  - If you and your healthcare provider decide that you should take molnupiravir during pregnancy, you and your healthcare provider should discuss the known and potential benefits and the potential risks of taking molnupiravir during pregnancy  For individuals who are able to become pregnant:  - You should use a reliable method of birth control (contraception) consistently and correctly during treatment with molnupiravir and for 4 days after the last dose of molnupiravir  Talk to your healthcare provider about reliable birth control methods  - Before starting treatment with molnupiravir your healthcare provider may do a pregnancy test to see if you are pregnant before starting treatment with molnupiravir  - Tell your healthcare provider right away if you become pregnant or think you may be pregnant during treatment with molnupiravir  Pregnancy Surveillance Program:  - There is a pregnancy surveillance program for individuals who take molnupiravir during pregnancy  The purpose of this program is to collect information about the health of you and your baby  Talk to your healthcare provider about how to take part in this program   - If you take molnupiravir during pregnancy and you agree to participate in the pregnancy surveillance program and allow your healthcare provider to share your information with Willem Schultz, then your healthcare provider will report your use of molnupiravir during pregnancy to [address] [place] [address],[address]  by calling [phone] or pregnancyreporting msd com  For individuals who are sexually active with partners who are able to become pregnant:  - It is not known if molnupiravir can affect sperm   While the risk is regarded as low, animal studies to fully assess the potential for molnupiravir to affect the babies of males treated with molnupiravir have not been completed  A reliable method of birth control (contraception) should be used consistently and correctly during treatment with molnupiravir and for at least 3 months after the last dose  The risk to sperm beyond 3 months is not known  Studies to understand the risk to sperm beyond 3 months are ongoing  Talk to your healthcare provider about reliable birth control methods  Talk to your healthcare provider if you have questions or concerns about how molnupiravir may affect sperm  How do I take molnupiravir? - Take molnupiravir exactly as your healthcare provider tells you to take it  - Take 4 capsules of molnupiravir every 12 hours (for example, at 8 am and at 8 pm)  - Take molnupiravir for 5 days  It is important that you complete the full 5 days of treatment with molnupiravir  Do not stop taking molnupiravir before you complete the full 5 days of treatment, even if you feel better  - Take molnupiravir with or without food  - You should stay in isolation for as long as your healthcare provider tells you to  Talk to your healthcare provider if you are not sure about how to properly isolate while you have COVID-19   - Swallow molnupiravir capsules whole  Do not open, break, or crush the capsules  If you cannot swallow capsules whole, tell your healthcare provider  What to do if you miss a dose:  - If it has been less than 10 hours since the missed dose, take it as soon as you remember  - If it has been more than 10 hours since the missed dose, skip the missed dose and take your dose at the next scheduled time  - Do not double the dose of molnupiravir to make up for a missed dose  What are the important possible side effects of molnupiravir? Possible side effects of molnupiravir are:  - SeeJillian is the most important information I should know about molnupiravir?”  - Diarrhea  - Nausea  - Dizziness    These are not all the possible side effects of molnupiravir   Not many people have taken molnupiravir  Serious and unexpected side effects may happen  This medicine is still being studied, so it is possible that all of the risks are not known at this time  What other treatment choices are there? Like Miracle Rod may allow for the emergency use of other medicines to treat people with COVID-19  Go to [facility] for more information  It is your choice to be treated or not to be treated with molnupiravir  Should you decide not to take it, it will not change your standard medical care  What if I am breastfeeding? Breastfeeding is not recommended during treatment with molnupiravir and for 4 days after the last dose of molnupiravir  If you are breastfeeding or plan to breastfeed, talk to your healthcare provider about your options and specific situation before taking molnupiravir  How do I report side effects with molnupiravir? Contact your healthcare provider if you have any side effects that bother you or do not go away  Report side effects to FDA MedWatch at www [employer] gov/medwatch or call 6-135-VHW-8132 (1-848.844.6080)  How should I store [address]? - Store molnupiravir capsules at room temperature between 68°F to 77°F (20°C to 25°C)  - Keep molnupiravir and all medicines out of the reach of children and pets  Full fact sheet for patients, parents, and caregivers can be found at: [employer]    I have spent a total time of 14 minutes on the day of the encounter for this patient including discussing prognosis, risks and benefits of treatment options, instructions for management, patient and family education, importance of treatment compliance, risk factor reductions, impressions and reviewing/ordering tests, medicine, procedures         Encounter provider: EDD Wagner     Provider located at: [facility] [address] [facility]  [address] [facility] [address] [facility] [address]     Recent Visits  No visits were found meeting these conditions  Showing recent visits within past 7 days and meeting all other requirements  Today's Visits  Date Type Provider Dept   03/15/23 Telemedicine Anita Claros [place] 429 today's visits and meeting all other requirements  Future Appointments  No visits were found meeting these conditions  Showing future appointments within next 150 days and meeting all other requirements     This virtual check-in was done via [address] and patient was informed that this is a secure, HIPAA-compliant platform  He agrees to proceed  Patient agrees to participate in a virtual check in via telephone or video visit instead of presenting to the office to address urgent/immediate medical needs  Patient is aware this is a billable service  He acknowledged consent and understanding of privacy and security of the video platform  The patient has agreed to participate and understands they can discontinue the visit at any time  After connecting through [facility], the patient was identified by name and date of birth  Stacie Alvarado was informed that this was a telemedicine visit and that the exam was being conducted confidentially over secure lines  My office door was closed  No one else was in the room  Stacie Alvarado acknowledged consent and understanding of privacy and security of the telemedicine visit  I informed the patient that I have reviewed his record in Epic and presented the opportunity for him to ask any questions regarding the visit today  The patient agreed to participate  Verification of patient location:  Patient is located in the following state in which I hold an active license: [place]    Subjective:   Stacie Alvarado is a 70 y o  male who is concerned about COVID-19   Patient's symptoms include fever, fatigue, nasal congestion, sore throat, cough, shortness of breath, chest tightness, nausea, diarrhea, myalgias and headache  Patient denies chills, malaise, rhinorrhea, anosmia, loss of taste, abdominal pain and vomiting      - Date of symptom onset: 3/13/2023      COVID-19 vaccination status: Fully vaccinated (primary series)    Exposure:   Contact with a person who is under investigation (PUI) for or who is positive for COVID-19 within the last 14 days?: No    Hospitalized recently for fever and/or lower respiratory symptoms?: No      Currently a healthcare worker that is involved in direct patient care?: No      Works in a special setting where the risk of COVID-19 transmission may be high? (this may include long-term care, correctional and long-term facilities; homeless shelters; assisted-living facilities and group homes ): No      Resident in a special setting where the risk of COVID-19 transmission may be high? (this may include long-term care, correctional and long-term facilities; homeless shelters; assisted-living facilities and group homes ): No      Patient's wife thinks his oxygen saturations drop slightly when moving around     Lab Results   Component Value Date    [address] [facility] 98 Negative 12/03/2021       Review of Systems   Constitutional: Positive for fatigue and fever  Negative for chills  HENT: Positive for congestion and sore throat  Negative for ear pain, postnasal drip, rhinorrhea, sinus pressure, sinus pain and sneezing  Respiratory: Positive for cough, chest tightness, shortness of breath and wheezing  Cardiovascular: Negative for chest pain and palpitations  Gastrointestinal: Positive for diarrhea and nausea  Negative for abdominal pain and vomiting  Musculoskeletal: Positive for myalgias  Neurological: Positive for headaches  Negative for dizziness and light-headedness  Psychiatric/Behavioral: Negative        Current Outpatient Medications on File Prior to Visit   Medication Sig   • albuterol (Ventolin HFA) 90 mcg/act inhaler Inhale 1 puff 4 (four) times a day   • atorvastatin (LIPITOR) 40 mg tablet TAKE ONE TABLET BY MOUTH AT BEDTIME   • citalopram (CeleXA) 20 mg tablet Take 1 tablet (20 mg total) by mouth daily   • digoxin (LANOXIN) 0 125 mg tablet Take 1 tablet (125 mcg total) by mouth daily   • furosemide (LASIX) 40 mg tablet TAKE 1 TABLET DAILY   • glucose blood test strip Use 1 each daily Use as instructed   • metFORMIN (GLUCOPHAGE) 1000 MG tablet Take 1 tablet (1,000 mg total) by mouth 2 (two) times a day with meals   • nebivolol (BYSTOLIC) 10 mg tablet Take 1 tablet (10 mg total) by mouth daily   • nebivolol (BYSTOLIC) 20 MG tablet Take 1 tablet (20 mg total) by mouth daily   • NEBULIZER SYSTEM ALL-IN-ONE MISC 1 Device by Does not apply route every 4 (four) hours while awake (Patient not taking: Reported on 1/4/2023)   • Xarelto 20 MG tablet TAKE ONE TABLET BY MOUTH DAILY WITH BREAKFAST     Objective: There were no vitals taken for this visit  (Vital signs not performed during visit due to limitations of telemedicine format along with patient's availability to needed equipment)    Physical Exam  Constitutional:       General: He is not in acute distress  Appearance: He is not ill-appearing  HENT:      Head: Normocephalic and atraumatic  Right Ear: External ear normal       Left Ear: External ear normal       Mouth/Throat:      Lips: Pink  Eyes:      General: Lids are normal       Extraocular Movements: Extraocular movements intact  Conjunctiva/sclera: Conjunctivae normal       Pupils: Pupils are equal, round, and reactive to light  Neck:      Trachea: Trachea and phonation normal    Cardiovascular:      Rate and Rhythm: Normal rate  Pulmonary:      Effort: Pulmonary effort is normal  No tachypnea, bradypnea, accessory muscle usage or respiratory distress  Breath sounds: Wheezing present  Neurological:      General: No focal deficit present        Mental Status: He is alert and oriented to person, place, and time  Psychiatric:         Mood and Affect: Mood normal          Behavior: Behavior is cooperative         [address]

## 2023-03-23 ENCOUNTER — OFFICE VISIT (OUTPATIENT)
Dept: PULMONOLOGY | Facility: CLINIC | Age: 63
End: 2023-03-23

## 2023-03-23 VITALS
SYSTOLIC BLOOD PRESSURE: 120 MMHG | BODY MASS INDEX: 40.91 KG/M2 | DIASTOLIC BLOOD PRESSURE: 74 MMHG | WEIGHT: 293.3 LBS | RESPIRATION RATE: 16 BRPM | OXYGEN SATURATION: 96 % | HEART RATE: 60 BPM | TEMPERATURE: 96.6 F

## 2023-03-23 DIAGNOSIS — R06.09 DYSPNEA ON EXERTION: ICD-10-CM

## 2023-03-23 DIAGNOSIS — R91.1 LUNG NODULE: ICD-10-CM

## 2023-03-23 DIAGNOSIS — D86.9 SARCOIDOSIS: Primary | ICD-10-CM

## 2023-03-23 RX ORDER — ALBUTEROL SULFATE 2.5 MG/3ML
2.5 SOLUTION RESPIRATORY (INHALATION) EVERY 6 HOURS PRN
COMMUNITY

## 2023-03-23 NOTE — PROGRESS NOTES
Progress note - Pulmonary Medicine   Sarai Castañeda 70 y o  male MRN: [id number]       Impression & Plan:     Sarcoidosis  · Findings are stable  · Symptoms are controlled  · No indication for additional treatment at this time  · Continue as needed albuterol  · Has nebulizer if needed with albuterol as well  · Previously used Breztri when sick but does not need to continue on this chronically    Lung nodule  · Previously seen lung nodule has resolved  · No further follow-up required    Dyspnea on exertion  · Does have persistent dyspnea on exertion but this is multifactorial due to weight, deconditioning, sarcoidosis, and atrial fibrillation  · Continue to encourage weight loss  · Symptoms are not progressive and seem controlled at this time    Follow-up in 6 months  ______________________________________________________________________    HPI:    Sarai Castañeda presents today for follow-up of sarcoidosis with abnormal CAT scan and dyspnea on exertion  He is feeling better but did have COVID a couple of weeks ago  This is the second occasion that he had COVID  He is feeling much better and has returned to baseline  He did use the nebulizer and his albuterol rescue inhaler while he had COVID but his symptoms have largely resolved at this time    He reports no chest pain  No worsening of his atrial fibrillation  His weight and appetite have remained fairly stable  His BMI is 40 however    He does have obstructive sleep apnea and does not use CPAP    Otherwise, he reports no new symptoms or concerns    Current Medications:    Current Outpatient Medications:   •  albuterol (2 5 mg/3 mL) 0 083 % nebulizer solution, Take 2 5 mg by nebulization every 6 (six) hours as needed, Disp: , Rfl:   •  albuterol (Ventolin HFA) 90 mcg/act inhaler, Inhale 1 puff 4 (four) times a day, Disp: 3 Inhaler, Rfl: 1  •  atorvastatin (LIPITOR) 40 mg tablet, TAKE ONE TABLET BY MOUTH AT BEDTIME, Disp: 90 tablet, Rfl: 0  • citalopram (CeleXA) 20 mg tablet, Take 1 tablet (20 mg total) by mouth daily, Disp: 90 tablet, Rfl: 1  •  digoxin (LANOXIN) 0 125 mg tablet, Take 1 tablet (125 mcg total) by mouth daily, Disp: 90 tablet, Rfl: 3  •  furosemide (LASIX) 40 mg tablet, TAKE 1 TABLET DAILY, Disp: 90 tablet, Rfl: 3  •  glucose blood test strip, Use 1 each daily Use as instructed, Disp: 50 each, Rfl: 5  •  metFORMIN (GLUCOPHAGE) 1000 MG tablet, Take 1 tablet (1,000 mg total) by mouth 2 (two) times a day with meals, Disp: 180 tablet, Rfl: 3  •  nebivolol (BYSTOLIC) 10 mg tablet, Take 1 tablet (10 mg total) by mouth daily, Disp: 90 tablet, Rfl: 0  •  nebivolol (BYSTOLIC) 20 MG tablet, Take 1 tablet (20 mg total) by mouth daily, Disp: 90 tablet, Rfl: 0  •  NEBULIZER SYSTEM ALL-IN-ONE MISC, 1 Device by Does not apply route every 4 (four) hours while awake, Disp: 1 each, Rfl: 0  •  Xarelto 20 MG tablet, TAKE ONE TABLET BY MOUTH DAILY WITH BREAKFAST, Disp: 90 tablet, Rfl: 3    Review of Systems:  Aside from what is mentioned in the HPI, the review of systems is otherwise negative    Past medical history, surgical history, and family history were reviewed and updated as appropriate    Social history updates:  Social History     Tobacco Use   Smoking Status Former   • Packs/day: 1 00   • Years: 8 00   • Pack years: 8 00   • Types: Cigarettes   • Start date: 0   • Quit date: 36   • Years since quittin 2   Smokeless Tobacco Never   Tobacco Comments    2 ppd x 5 years       PhysicalExamination:  Vitals:   /74 (BP Location: Left arm, Patient Position: Sitting, Cuff Size: Standard)   Pulse 60   Temp (!) 96 6 °F (35 9 °C) (Tympanic)   Resp 16   Wt 133 kg (293 lb 4 8 oz)   SpO2 96%   BMI 40 91 kg/m²     Gen: He is obese  Comfortable on room air  No conversational dyspnea  HEENT:  Conjugate gaze  sclerae anicteric  Oropharynx moist  Neck: Trachea is midline  No JVD  No adenopathy  Chest: Symmetric chest wall excursion    Breath sounds slightly distant  No wheezes or crackles appreciated  Cardiac: Distant heart tones, heart rate is rate controlled  no murmur  Abdomen:  benign  Extremities: Trace peripheral edema  Neuro:  Normal speech and mentation    Diagnostic Data:  Labs: I personally reviewed the most recent laboratory data pertinent to today's visit    Lab Results   Component Value Date    WBC 6 66 11/10/2022    HGB 13 9 11/10/2022    HCT 42 8 11/10/2022    MCV 94 11/10/2022     11/10/2022     Lab Results   Component Value Date    SODIUM 138 11/10/2022    K 4 0 11/10/2022    CO2 32 11/10/2022     11/10/2022    BUN 10 11/10/2022    CREATININE 0 97 11/10/2022    CALCIUM 9 2 11/10/2022       Imaging:  I personally reviewed the images on the [facility] system pertinent to today's visit  CAT scan of the chest was viewed on the PACS system and shows resolution of the basilar lung nodule  Minimal residual groundglass    Sarcoid changes stable      Elizabeth Peres MD

## 2023-03-23 NOTE — ASSESSMENT & PLAN NOTE
· Does have persistent dyspnea on exertion but this is multifactorial due to weight, deconditioning, sarcoidosis, and atrial fibrillation  · Continue to encourage weight loss  · Symptoms are not progressive and seem controlled at this time

## 2023-03-23 NOTE — ASSESSMENT & PLAN NOTE
· Findings are stable  · Symptoms are controlled  · No indication for additional treatment at this time  · Continue as needed albuterol  · Has nebulizer if needed with albuterol as well  · Previously used Breztri when sick but does not need to continue on this chronically

## 2023-04-03 ENCOUNTER — TELEPHONE (OUTPATIENT)
Dept: CARDIOLOGY CLINIC | Facility: CLINIC | Age: 63
End: 2023-04-03

## 2023-04-03 ENCOUNTER — HOSPITAL ENCOUNTER (OUTPATIENT)
Dept: NON INVASIVE DIAGNOSTICS | Facility: CLINIC | Age: 63
Discharge: HOME | End: 2023-04-03

## 2023-04-03 VITALS
HEIGHT: 71 IN | BODY MASS INDEX: 41.02 KG/M2 | WEIGHT: 293 LBS | DIASTOLIC BLOOD PRESSURE: 74 MMHG | HEART RATE: 60 BPM | SYSTOLIC BLOOD PRESSURE: 120 MMHG

## 2023-04-03 DIAGNOSIS — I48.21 PERMANENT ATRIAL FIBRILLATION (HCC): ICD-10-CM

## 2023-04-03 LAB
APICAL FOUR CHAMBER EJECTION FRACTION: 63 %
FRACTIONAL SHORTENING: 22 (ref 28–44)
INTERVENTRICULAR SEPTUM IN DIASTOLE (PARASTERNAL SHORT AXIS VIEW): 1.2 CM
INTERVENTRICULAR SEPTUM: 1.2 CM (ref 0.6–1.1)
LEFT INTERNAL DIMENSION IN SYSTOLE: 4.3 CM (ref 2.1–4)
LEFT VENTRICULAR INTERNAL DIMENSION IN DIASTOLE: 5.5 CM (ref 3.5–6)
LEFT VENTRICULAR POSTERIOR WALL IN END DIASTOLE: 1.3 CM
LEFT VENTRICULAR STROKE VOLUME: 61 ML
LVSV (TEICH): 61 ML
RA PRESSURE ESTIMATED: 8 MMHG
RV PSP: 30 MMHG
SL CV LV EF: 65
SL CV PED ECHO LEFT VENTRICLE DIASTOLIC VOLUME (MOD BIPLANE) 2D: 145 ML
SL CV PED ECHO LEFT VENTRICLE SYSTOLIC VOLUME (MOD BIPLANE) 2D: 83 ML
TR MAX PG: 22 MMHG
TR PEAK VELOCITY: 2.3 M/S
TRICUSPID ANNULAR PLANE SYSTOLIC EXCURSION: 2.2 CM
TRICUSPID VALVE PEAK REGURGITATION VELOCITY: 2.32 M/S

## 2023-04-03 NOTE — TELEPHONE ENCOUNTER
----- Message from Cristine Scott MD sent at 4/3/2023  2:17 PM EDT -----  Please let this patient know that I am very happy with the results of his repeat echo, heart function/ejection fraction up to 65% from 40% after the medical treatment changes instituted over the last few months

## 2023-04-03 NOTE — TELEPHONE ENCOUNTER
SW wife, Ana Wolf  They were pleased with the results  Ana Wolf was wondering when you wanted to follow-up

## 2023-04-07 ENCOUNTER — IN-CLINIC DEVICE VISIT (OUTPATIENT)
Dept: CARDIOLOGY CLINIC | Facility: CLINIC | Age: 63
End: 2023-04-07

## 2023-04-07 DIAGNOSIS — I48.91 ATRIAL FIBRILLATION, UNSPECIFIED TYPE (HCC): ICD-10-CM

## 2023-04-07 DIAGNOSIS — I48.21 PERMANENT ATRIAL FIBRILLATION (HCC): ICD-10-CM

## 2023-04-07 DIAGNOSIS — I49.3 PREMATURE VENTRICULAR CONTRACTIONS (PVCS) (VPCS): ICD-10-CM

## 2023-04-07 DIAGNOSIS — Z95.0 CARDIAC PACEMAKER IN SITU: Primary | ICD-10-CM

## 2023-04-07 RX ORDER — NEBIVOLOL 20 MG/1
20 TABLET ORAL DAILY
Qty: 90 TABLET | Refills: 3 | Status: SHIPPED | OUTPATIENT
Start: 2023-04-07

## 2023-04-07 RX ORDER — NEBIVOLOL 10 MG/1
10 TABLET ORAL DAILY
Qty: 90 TABLET | Refills: 3 | Status: SHIPPED | OUTPATIENT
Start: 2023-04-07

## 2023-04-07 NOTE — PROGRESS NOTES
Results for orders placed or performed in visit on 04/07/23   Cardiac EP device report    Narrative    MDT BI-V PPM (VVIR MODE)/ ACTIVE SYSTEM IS MRI CONDITIONAL  DEVICE INTERROGATED IN THE [facility] OFFICE  BATTERY VOLTAGE ADEQUATE (8 9 YRS)  BVP-11% (AF/VSR Octavianus@[employer])  ALL LEAD PARAMETERS WITHIN NORMAL LIMITS  2VHR EPISODES @ 154 & 171 BPM- RVR ON EGM'S  >10K VENT SENSING EPISODES  PT IN CHRONIC AF & ON XARELTO, DIG & NEBIVOLOL  OPTI-VOL WITHIN NORMAL LIMITS  PT STATED FEELING GOOD SINCE REPROGRAMMING DONE BY DR RIVERS IN DEC  NO PROGRAMMING CHANGES MADE TO DEVICE PARAMETERS  NORMAL DEVICE FUNCTION   GV

## 2023-05-11 ENCOUNTER — OFFICE VISIT (OUTPATIENT)
Dept: FAMILY MEDICINE CLINIC | Facility: CLINIC | Age: 63
End: 2023-05-11

## 2023-05-11 VITALS
HEART RATE: 60 BPM | DIASTOLIC BLOOD PRESSURE: 82 MMHG | OXYGEN SATURATION: 98 % | WEIGHT: 291 LBS | TEMPERATURE: 97.3 F | SYSTOLIC BLOOD PRESSURE: 128 MMHG | BODY MASS INDEX: 40.74 KG/M2 | HEIGHT: 71 IN

## 2023-05-11 DIAGNOSIS — E11.9 TYPE 2 DIABETES MELLITUS WITHOUT COMPLICATION, WITHOUT LONG-TERM CURRENT USE OF INSULIN (HCC): Primary | ICD-10-CM

## 2023-05-11 DIAGNOSIS — I10 ESSENTIAL HYPERTENSION: ICD-10-CM

## 2023-05-11 DIAGNOSIS — F41.9 ANXIETY: ICD-10-CM

## 2023-05-11 DIAGNOSIS — Z12.5 SCREENING FOR MALIGNANT NEOPLASM OF PROSTATE: ICD-10-CM

## 2023-05-11 DIAGNOSIS — I42.0 DILATED CARDIOMYOPATHY (HCC): ICD-10-CM

## 2023-05-11 DIAGNOSIS — I48.21 PERMANENT ATRIAL FIBRILLATION (HCC): ICD-10-CM

## 2023-05-11 LAB — SL AMB POCT HEMOGLOBIN AIC: 6.9 (ref ?–6.5)

## 2023-05-11 RX ORDER — CITALOPRAM 20 MG/1
20 TABLET ORAL DAILY
Qty: 90 TABLET | Refills: 1 | Status: SHIPPED | OUTPATIENT
Start: 2023-05-11

## 2023-05-11 NOTE — PROGRESS NOTES
Name: Elham Nettles      : 1960      MRN: [id number]  Encounter Provider: Michiel Angelucci, MD  Encounter Date: 2023   Encounter department: [address] [place] [address]     1  Type 2 diabetes mellitus without complication, without long-term current use of insulin (HCC)  Assessment & Plan:    Lab Results   Component Value Date    HGBA1C 6 9 (A) 2023   At goal  Encouraged weight loss  Continue present care  Recheck 6m    Orders:  -     POCT hemoglobin A1c  -     metFORMIN (GLUCOPHAGE) 1000 MG tablet; Take 1 tablet (1,000 mg total) by mouth 2 (two) times a day with meals  -     Comprehensive metabolic panel; Future; Expected date: 2023  -     CBC and differential; Future; Expected date: 2023  -     Hemoglobin A1C; Future; Expected date: 2023  -     Lipid Panel with Direct LDL reflex; Future; Expected date: 2023    2  Permanent atrial fibrillation (HCC)  Assessment & Plan:  HR sl slo though pt is asymptomatic  Continue present care  F/u with Cardio  Recheck 6m      3  Dilated cardiomyopathy (HCC)  Assessment & Plan:  EF improved with treatment of PVCs  Continue present care  F/u with Cardio  Recheck 6m      4  Essential hypertension  Assessment & Plan:  Well controlled  Cont present treatment  Monitor labs  Recheck 6m      Orders:  -     CBC and differential; Future  -     Comprehensive metabolic panel; Future  -     Lipid panel; Future    5  Screening for malignant neoplasm of prostate  -     PSA, Total Screen; Future    6  Anxiety  Assessment & Plan:  Stable on present care  Refilled citalopram  Recheck 6m - earlier if worse    Orders:  -     citalopram (CeleXA) 20 mg tablet; Take 1 tablet (20 mg total) by mouth daily           Subjective     f/u multiple med issues  - patient had episode of increased fatigue in December  Cardiac monitoring showed a high level of PVCs (30% of beats) as well as a decreased ejection fraction (40%)  Patient was seen by cardiology who felt that the high PVC burden was having an adverse effect on his ejection fraction and creating new symptoms  Attempts to control with higher doses of diltiazem led to leg swelling  Patient was transition from metoprolol and diltiazem to nebivolol with good effect  Patient states his ejection fraction is back up to 65% and patient is back to his baseline   - patient has fallen off of checking home BGs   Doing well with diet  A1C in office today = 6 9  Up to date with ophth  - patient up to date with Pulm  No worsening breathing issues  - no new GI or  issues      Review of Systems   Constitutional: Negative  HENT: Negative  Eyes: Negative  Respiratory: Negative  Cardiovascular: Negative  Gastrointestinal: Negative  Endocrine: Negative  Genitourinary: Negative  Musculoskeletal: Negative  Skin: Negative  Allergic/Immunologic: Negative  Neurological: Negative  Hematological: Negative  Psychiatric/Behavioral: Negative  Past Medical History:   Diagnosis Date   • A-fib [facility])    • Chronic systolic congestive heart failure ([facility] [address] )     last assessed - [id number]   • Diabetes mellitus ([facility] [address] )    • History of cardiac catheterization 10/2012    normal coronaries w/o significant obstructive coronary disease   • Pacemaker     last assessed - 19Oct2017   • Sarcoidosis    • Sleep apnea      Past Surgical History:   Procedure Laterality Date   • CARDIAC ELECTROPHYSIOLOGY PROCEDURE N/A 12/3/2021    Procedure: PPM generator change - dual;  Surgeon: Carrie Mccray DO;  Location: [facility]; Service: Cardiology   • CARDIAC ELECTROPHYSIOLOGY PROCEDURE N/A 12/3/2021    Procedure: CARDIAC UPGRADE PACER TO BIV PACER;  Surgeon: Carrie Mccray DO;  Location: [facility];   Service: Cardiology   • CARDIOVERSION      Elective   • FISTULA REPAIR      perirectal   • INSERT / Gregery Harlan / REMOVE PACEMAKER     • TONSILLECTOMY      1960's     Family History   Problem Relation Age of Onset   • Atrial fibrillation Mother    • Cancer Mother    • Heart disease Father    • Hypertension Father    • Skin cancer Father    • Cancer Family    • Transient ischemic attack Family         without residual deficits     Social History     Socioeconomic History   • Marital status: /Civil Union     Spouse name: None   • Number of children: None   • Years of education: None   • Highest education level: None   Occupational History   • Occupation: Retired   Tobacco Use   • Smoking status: Former     Packs/day: 1 00     Years: 8 00     Pack years: 8 00     Types: Cigarettes     Start date: 0     Quit date:      Years since quittin 3   • Smokeless tobacco: Never   • Tobacco comments:     2 ppd x 5 years   Vaping Use   • Vaping Use: Never used   Substance and Sexual Activity   • Alcohol use: Yes     Alcohol/week: 4 0 - 5 0 standard drinks     Types: 4 - 5 Cans of beer per week     Comment: rare   • Drug use: No   • Sexual activity: None   Other Topics Concern   • None   Social History Narrative    Always uses seat belt    Daily coffee consumption (___Cups/Day)    Dental care regularly    Pets/Animals    Uses safety Equipment - seatbelts     Social Determinants of Health     Financial Resource Strain: Low Risk    • Difficulty of Paying Living Expenses: Not hard at all   Food Insecurity: Not on file   Transportation Needs: No Transportation Needs   • Lack of Transportation (Medical): No   • Lack of Transportation (Non-Medical):  No   Physical Activity: Not on file   Stress: Not on file   Social Connections: Not on file   Intimate Partner Violence: Not on file   Housing Stability: Not on file     Current Outpatient Medications on File Prior to Visit   Medication Sig   • albuterol (2 5 mg/3 mL) 0 083 % nebulizer solution Take 2 5 mg by nebulization every 6 (six) hours as needed   • albuterol (Ventolin HFA) 90 mcg/act inhaler Inhale 1 puff 4 (four) times a day   • "atorvastatin (LIPITOR) 40 mg tablet TAKE ONE TABLET BY MOUTH AT BEDTIME   • digoxin (LANOXIN) 0 125 mg tablet Take 1 tablet (125 mcg total) by mouth daily   • furosemide (LASIX) 40 mg tablet TAKE 1 TABLET DAILY   • glucose blood test strip Use 1 each daily Use as instructed   • nebivolol (BYSTOLIC) 10 mg tablet Take 1 tablet (10 mg total) by mouth daily   • nebivolol (BYSTOLIC) 20 MG tablet Take 1 tablet (20 mg total) by mouth daily   • NEBULIZER SYSTEM ALL-IN-ONE MISC 1 Device by Does not apply route every 4 (four) hours while awake   • Xarelto 20 MG tablet TAKE ONE TABLET BY MOUTH DAILY WITH BREAKFAST     No Known Allergies  Immunization History   Administered Date(s) Administered   • Influenza Quadrivalent 3 years and older 10/14/2018   • Influenza, recombinant, quadrivalent,injectable, preservative free 11/08/2022   • Influenza, seasonal, injectable 10/11/2013, 09/29/2015   • Pneumococcal Conjugate 13-Valent 09/29/2015   • Pneumococcal Polysaccharide PPV23 10/11/2013   • Tdap 11/02/2018, 11/24/2020   • Zoster Vaccine Recombinant 08/22/2019, 12/10/2019       Objective     /82 (BP Location: Left arm, Patient Position: Sitting)   Pulse 60   Temp (!) 97 3 °F (36 3 °C)   Ht 5' 11\" (1 803 m)   Wt 132 kg (291 lb)   SpO2 98%   BMI 40 59 kg/m²     Physical Exam  Vitals reviewed  Constitutional:       Appearance: He is well-developed  HENT:      Head: Normocephalic and atraumatic  Right Ear: Tympanic membrane, ear canal and external ear normal       Left Ear: Tympanic membrane, ear canal and external ear normal       Nose: Nose normal       Mouth/Throat:      Mouth: Mucous membranes are moist    Eyes:      General: No scleral icterus  Extraocular Movements: Extraocular movements intact  Conjunctiva/sclera: Conjunctivae normal       Pupils: Pupils are equal, round, and reactive to light  Neck:      Thyroid: No thyromegaly     Cardiovascular:      Rate and Rhythm: Normal rate and regular " rhythm  Pulses: Pulses are weak  Posterior tibial pulses are 1+ on the right side and 1+ on the left side  Heart sounds: Normal heart sounds  No murmur heard  Pulmonary:      Effort: Pulmonary effort is normal       Breath sounds: Normal breath sounds  Abdominal:      General: Bowel sounds are normal  There is no distension  Palpations: Abdomen is soft  There is no mass  Tenderness: There is no abdominal tenderness  Musculoskeletal:         General: No swelling, tenderness or deformity  Normal range of motion  Cervical back: Normal range of motion and neck supple  No tenderness  No muscular tenderness  Right lower leg: No edema  Left lower leg: No edema  Feet:      Right foot:      Skin integrity: No ulcer, skin breakdown, erythema, warmth, callus or dry skin  Left foot:      Skin integrity: No ulcer, skin breakdown, erythema, warmth, callus or dry skin  Lymphadenopathy:      Cervical: No cervical adenopathy  Skin:     General: Skin is warm and dry  Capillary Refill: Capillary refill takes less than 2 seconds  Neurological:      Mental Status: He is alert and oriented to person, place, and time  Cranial Nerves: No cranial nerve deficit  Sensory: No sensory deficit  Motor: No weakness  Gait: Gait normal    Psychiatric:         Mood and Affect: Mood normal          Behavior: Behavior normal          Thought Content: Thought content normal          Judgment: Judgment normal       Comments: PHQ-2/9 Depression Screening    Little interest or pleasure in doing things: 0 - not at all  Feeling down, depressed, or hopeless: 0 - not at all  PHQ-2 Score: 0  PHQ-2 Interpretation: Negative depression screen          Patient's shoes and socks removed  Right Foot/Ankle   Right Foot Inspection  Skin Exam: skin normal and skin intact   No dry skin, no warmth, no callus, no erythema, no maceration, no abnormal color, no pre-ulcer, no ulcer and no callus  Toe Exam: ROM and strength within normal limits  Sensory   Vibration: intact  Monofilament testing: intact    Vascular  Capillary refills: < 3 seconds  The right PT pulse is 1+  Left Foot/Ankle  Left Foot Inspection  Skin Exam: skin normal and skin intact  No dry skin, no warmth, no erythema, no maceration, normal color, no pre-ulcer, no ulcer and no callus  Toe Exam: ROM and strength within normal limits  Sensory   Vibration: intact  Monofilament testing: intact    Vascular  Capillary refills: < 3 seconds  The left PT pulse is 1+       Assign Risk Category  No deformity present  No loss of protective sensation  Weak pulses  Risk: 0      Rosalio Arechiga MD

## 2023-05-12 NOTE — ASSESSMENT & PLAN NOTE
Lab Results   Component Value Date    HGBA1C 6 9 (A) 05/11/2023   At goal  Encouraged weight loss  Continue present care   Recheck 6m

## 2023-05-30 LAB
LEFT EYE DIABETIC RETINOPATHY: NORMAL
RIGHT EYE DIABETIC RETINOPATHY: NORMAL

## 2023-06-12 DIAGNOSIS — I48.91 ATRIAL FIBRILLATION, UNSPECIFIED TYPE (HCC): ICD-10-CM

## 2023-06-12 DIAGNOSIS — I50.9 CONGESTIVE HEART FAILURE, UNSPECIFIED HF CHRONICITY, UNSPECIFIED HEART FAILURE TYPE (HCC): ICD-10-CM

## 2023-06-12 DIAGNOSIS — E78.5 DYSLIPIDEMIA: ICD-10-CM

## 2023-06-13 RX ORDER — ATORVASTATIN CALCIUM 40 MG/1
TABLET, FILM COATED ORAL
Qty: 90 TABLET | Refills: 0 | Status: SHIPPED | OUTPATIENT
Start: 2023-06-13

## 2023-06-13 RX ORDER — RIVAROXABAN 20 MG/1
TABLET, FILM COATED ORAL
Qty: 90 TABLET | Refills: 0 | Status: SHIPPED | OUTPATIENT
Start: 2023-06-13

## 2023-06-13 RX ORDER — FUROSEMIDE 40 MG/1
TABLET ORAL
Qty: 90 TABLET | Refills: 0 | Status: SHIPPED | OUTPATIENT
Start: 2023-06-13

## 2023-07-07 ENCOUNTER — REMOTE DEVICE CLINIC VISIT (OUTPATIENT)
Dept: CARDIOLOGY CLINIC | Facility: CLINIC | Age: 63
End: 2023-07-07
Payer: MEDICARE

## 2023-07-07 DIAGNOSIS — Z95.0 CARDIAC PACEMAKER IN SITU: Primary | ICD-10-CM

## 2023-07-07 PROCEDURE — 93296 REM INTERROG EVL PM/IDS: CPT | Performed by: INTERNAL MEDICINE

## 2023-07-07 PROCEDURE — 93294 REM INTERROG EVL PM/LDLS PM: CPT | Performed by: INTERNAL MEDICINE

## 2023-07-07 NOTE — PROGRESS NOTES
Results for orders placed or performed in visit on 07/07/23   Cardiac EP device report    Narrative    MDT BI-V PPM (VVIR MODE)/ ACTIVE SYSTEM IS MRI CONDITIONAL  CARELINK TRANSMISSION: BATTERY VOLTAGE ADEQUATE (9.1 YRS). BVP-13.8% (AF/VSR [email or web address]). ALL AVAILABLE LEAD PARAMETERS WITHIN NORMAL LIMITS. 1 VHR EPISODE @ 158 BPM- RVR ON EGM. >12K VENT SENSING EPISODES. PT IN CHRONIC AF & ON XARELTO, DIG & NEBIVOLOL. OPTI-VOL WITHIN NORMAL LIMITS. NORMAL DEVICE FUNCTION.  GV

## 2023-08-24 ENCOUNTER — OFFICE VISIT (OUTPATIENT)
Dept: CARDIOLOGY CLINIC | Facility: CLINIC | Age: 63
End: 2023-08-24
Payer: MEDICARE

## 2023-08-24 VITALS
DIASTOLIC BLOOD PRESSURE: 68 MMHG | HEART RATE: 60 BPM | BODY MASS INDEX: 41.92 KG/M2 | WEIGHT: 299.4 LBS | OXYGEN SATURATION: 99 % | HEIGHT: 71 IN | SYSTOLIC BLOOD PRESSURE: 128 MMHG

## 2023-08-24 DIAGNOSIS — I49.3 PREMATURE VENTRICULAR CONTRACTIONS (PVCS) (VPCS): ICD-10-CM

## 2023-08-24 DIAGNOSIS — I48.91 ATRIAL FIBRILLATION, UNSPECIFIED TYPE (HCC): ICD-10-CM

## 2023-08-24 DIAGNOSIS — I50.9 CONGESTIVE HEART FAILURE, UNSPECIFIED HF CHRONICITY, UNSPECIFIED HEART FAILURE TYPE (HCC): ICD-10-CM

## 2023-08-24 DIAGNOSIS — I42.0 DILATED CARDIOMYOPATHY (HCC): ICD-10-CM

## 2023-08-24 DIAGNOSIS — I48.21 PERMANENT ATRIAL FIBRILLATION (HCC): Primary | ICD-10-CM

## 2023-08-24 DIAGNOSIS — E78.5 DYSLIPIDEMIA: ICD-10-CM

## 2023-08-24 PROCEDURE — 99215 OFFICE O/P EST HI 40 MIN: CPT | Performed by: INTERNAL MEDICINE

## 2023-08-24 RX ORDER — FUROSEMIDE 40 MG/1
40 TABLET ORAL DAILY
Qty: 90 TABLET | Refills: 3 | Status: SHIPPED | OUTPATIENT
Start: 2023-08-24

## 2023-08-24 RX ORDER — NEBIVOLOL 10 MG/1
10 TABLET ORAL DAILY
Qty: 90 TABLET | Refills: 3 | Status: SHIPPED | OUTPATIENT
Start: 2023-08-24

## 2023-08-24 RX ORDER — NEBIVOLOL 20 MG/1
20 TABLET ORAL DAILY
Qty: 90 TABLET | Refills: 3 | Status: SHIPPED | OUTPATIENT
Start: 2023-08-24

## 2023-08-24 RX ORDER — ATORVASTATIN CALCIUM 40 MG/1
40 TABLET, FILM COATED ORAL
Qty: 90 TABLET | Refills: 3 | Status: SHIPPED | OUTPATIENT
Start: 2023-08-24

## 2023-08-24 RX ORDER — DIGOXIN 125 MCG
125 TABLET ORAL DAILY
Qty: 90 TABLET | Refills: 3 | Status: SHIPPED | OUTPATIENT
Start: 2023-08-24

## 2023-08-24 NOTE — PATIENT INSTRUCTIONS
Please keep your sodium content on the nutrition label when you are purchasing things at the store to less than 200 mg per serving  Please cut out processed meats, canned soups, and be cautious with soups at restaurants, often loaded with sodium  Fresh chicken breast or turkey breast, baked, roasted or grilled and sliced then is a good replacement for processed meat  Cutting down your sodium content will reduce the amount of water retained by her body, which I think is what we see and is causing some of your shortness of breath. I prefer the above approach over increasing your furosemide, but certainly if this does not make an improvement to your exam or symptoms, we certainly could increase the furosemide in the future. In the context of switching you to nebivolol, increasing the dose, and adding digoxin which are all aimed at treating your A-fib, your heart function has improved from 40% back to normal at 65% as of April of this year.

## 2023-08-24 NOTE — PROGRESS NOTES
[facility]  Follow up note  Frank Polk 70 y.o. male MRN: [id number]        Problems    1. Permanent atrial fibrillation (HCC)  Basic metabolic panel    digoxin (LANOXIN) 0.125 mg tablet    nebivolol (BYSTOLIC) 10 mg tablet      2. Dilated cardiomyopathy (HCC)  B-Type Natriuretic Peptide(BNP)      3. Dyslipidemia  Lipid panel    atorvastatin (LIPITOR) 40 mg tablet      4. Congestive heart failure, unspecified HF chronicity, unspecified heart failure type (HCC)  furosemide (LASIX) 40 mg tablet      5. Premature ventricular contractions (PVCs) (VPCs)  nebivolol (BYSTOLIC) 20 MG tablet      6. Atrial fibrillation, unspecified type (HCC)  nebivolol (BYSTOLIC) 20 MG tablet    rivaroxaban (Xarelto) 20 mg tablet          Impression:    Permanent atrial fibrillation  Rates well controlled on nebivolol and digoxin  He is on Xarelto    Hypertension  Uncontrolled    Dyslipidemia  Well-controlled    Nonischemic cardiomyopathy  Remotely, probably related to A-fib, improved on medical therapy and recovered  Recurrence after pacemaker insertion for sick sinus syndrome, with an EF to 40%, in the presence of high burden PVCs has again recovered earlier this year as of 2023 up to 65%      Sick sinus syndrome   Prior dual-chamber permanent pacemaker  Upgraded to Bi V pacemaker 12/21 at generator change  Continues to have inadequate BiV pacing    Premature ventricular contractions  High burden, likely related to his cardiomyopathy  Diltiazem and metoprolol discontinued in the past, nebivolol started    Obstructive sleep apnea  Does not tolerate CPAP    Type 2 diabetes  A1c controlled    Plan:    Check lipids, check BNP, check BNP, all meds were refilled  Overall improved from a cardiac standpoint with regards to recovered ejection fraction up to 65%.       HPI:   Frank Polk is a 70y.o. 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 There was concern about possible prior pacing induced LV dysfunction. He also has high burden PVCs. He also has atrial fibrillation. Most recent LVEF from earlier this year is up to 65% upon personal review of his echo, in contrast to his prior ejection fraction of 40%. Timing suggests nebivolol and digoxin PVCs/A-fib control probably most likely related to improved ejection fraction. He is still severely overweight, he does not treat sleep apnea, he has not had a lot of success with weight loss. Still complains of shortness of breath at times despite improved ejection fraction. He continues on Xarelto. Review of Systems   Constitutional:  Negative for appetite change, diaphoresis, fatigue and fever. Respiratory:  Positive for shortness of breath. Negative for chest tightness and wheezing. Cardiovascular:  Negative for chest pain, palpitations and leg swelling. Gastrointestinal:  Negative for abdominal pain and blood in stool. Musculoskeletal:  Negative for arthralgias and joint swelling. Skin:  Negative for rash. Neurological:  Negative for dizziness, syncope and light-headedness. All other systems reviewed and are negative.       Past Medical History:   Diagnosis Date   • A-fib [facility])    • Chronic systolic congestive heart failure ([address])     last assessed - [id number]   • Diabetes mellitus ([address])    • History of cardiac catheterization 10/2012    normal coronaries w/o significant obstructive coronary disease   • Pacemaker     last assessed - 19Oct2017   • Sarcoidosis    • Sleep apnea      Social History     Substance and Sexual Activity   Alcohol Use Yes   • Alcohol/week: 4.0 - 5.0 standard drinks of alcohol   • Types: 4 - 5 Cans of beer per week    Comment: rare     Social History     Substance and Sexual Activity   Drug Use No     Social History     Tobacco Use   Smoking Status Former   • Packs/day: 1.00   • Years: 8.00   • Total pack years: 8.00   • Types: Cigarettes   • Start date: 0   • Quit date: 36   • Years since quittin.6   Smokeless Tobacco Never   Tobacco Comments    2 ppd x 5 years       Allergies:  No Known Allergies    Medications:     Current Outpatient Medications:   •  albuterol (2.5 mg/3 mL) 0.083 % nebulizer solution, Take 2.5 mg by nebulization every 6 (six) hours as needed, Disp: , Rfl:   •  albuterol (Ventolin HFA) 90 mcg/act inhaler, Inhale 1 puff 4 (four) times a day, Disp: 3 Inhaler, Rfl: 1  •  atorvastatin (LIPITOR) 40 mg tablet, Take 1 tablet (40 mg total) by mouth daily at bedtime, Disp: 90 tablet, Rfl: 3  •  citalopram (CeleXA) 20 mg tablet, Take 1 tablet (20 mg total) by mouth daily, Disp: 90 tablet, Rfl: 1  •  digoxin (LANOXIN) 0.125 mg tablet, Take 1 tablet (125 mcg total) by mouth daily, Disp: 90 tablet, Rfl: 3  •  furosemide (LASIX) 40 mg tablet, Take 1 tablet (40 mg total) by mouth daily, Disp: 90 tablet, Rfl: 3  •  metFORMIN (GLUCOPHAGE) 1000 MG tablet, Take 1 tablet (1,000 mg total) by mouth 2 (two) times a day with meals, Disp: 180 tablet, Rfl: 3  •  nebivolol (BYSTOLIC) 10 mg tablet, Take 1 tablet (10 mg total) by mouth daily, Disp: 90 tablet, Rfl: 3  •  nebivolol (BYSTOLIC) 20 MG tablet, Take 1 tablet (20 mg total) by mouth daily, Disp: 90 tablet, Rfl: 3  •  rivaroxaban (Xarelto) 20 mg tablet, Take 1 tablet (20 mg total) by mouth daily with breakfast, Disp: 90 tablet, Rfl: 3  •  glucose blood test strip, Use 1 each daily Use as instructed, Disp: 50 each, Rfl: 5  •  NEBULIZER SYSTEM ALL-IN-ONE MISC, 1 Device by Does not apply route every 4 (four) hours while awake, Disp: 1 each, Rfl: 0      Vitals:    23 0828   BP: 128/68   Pulse: 60   SpO2: 99% Weight (last 2 days)     Date/Time Weight    08/24/23 0828 136 (299.4)        Physical Exam  Constitutional:       General: He is not in acute distress. Appearance: Normal appearance. He is well-developed. He is not diaphoretic. HENT:      Head: Normocephalic and atraumatic. Eyes:      General: No scleral icterus. Conjunctiva/sclera: Conjunctivae normal.      Pupils: Pupils are equal, round, and reactive to light. Neck:      Thyroid: No thyromegaly. Vascular: No JVD. Cardiovascular:      Rate and Rhythm: Normal rate and regular rhythm. Heart sounds: Normal heart sounds. No murmur heard. No friction rub. No gallop. Pulmonary:      Effort: Pulmonary effort is normal. No respiratory distress. Breath sounds: Normal breath sounds. No decreased breath sounds, wheezing, rhonchi or rales. Abdominal:      General: Bowel sounds are normal. There is no distension. Palpations: Abdomen is soft. Tenderness: There is no abdominal tenderness. Musculoskeletal:         General: No deformity. Normal range of motion. Cervical back: Normal range of motion and neck supple. Right lower leg: Normal. No edema. Left lower leg: Normal. No edema. Skin:     General: Skin is warm and dry. Findings: No erythema or rash. Neurological:      General: No focal deficit present. Mental Status: He is alert and oriented to person, place, and time. Cranial Nerves: No cranial nerve deficit. Motor: No weakness. Laboratory Studies:    Labs personally reviewed    Cardiac testing:     EKG reviewed personally:   No results found for this visit on 08/24/23.       Echocardiogram:  2019-EF normal  11/21-LVEF 50%, mild LVH, RV mildly dilated, LA mildly dilated, RA mildly dilated, mild-to-moderate MR, moderate TR with normal pulmonary pressure  4/23-personally reviewed-EF up to 65%, no significant valve disease noted    Stress tests:      Catheterization:      Holter:         Ariane Weber MD    Portions of the record may have been created with voice recognition software. Occasional wrong word or "sound a like" substitutions may have occurred due to the inherent limitations of voice recognition software. Read the chart carefully and recognize, using context, where substitutions have occurred.

## 2023-10-09 ENCOUNTER — TELEPHONE (OUTPATIENT)
Dept: CARDIOLOGY CLINIC | Facility: CLINIC | Age: 63
End: 2023-10-09

## 2023-10-09 ENCOUNTER — REMOTE DEVICE CLINIC VISIT (OUTPATIENT)
Dept: CARDIOLOGY CLINIC | Facility: CLINIC | Age: 63
End: 2023-10-09
Payer: MEDICARE

## 2023-10-09 DIAGNOSIS — Z95.0 CARDIAC PACEMAKER IN SITU: Primary | ICD-10-CM

## 2023-10-09 PROCEDURE — 93294 REM INTERROG EVL PM/LDLS PM: CPT | Performed by: INTERNAL MEDICINE

## 2023-10-09 PROCEDURE — 93296 REM INTERROG EVL PM/IDS: CPT | Performed by: INTERNAL MEDICINE

## 2023-10-09 NOTE — TELEPHONE ENCOUNTER
Spoke with wife/pt . Wt tis down to 275 lbs. Denies any CHF symptoms except fatigue. [place] had [place] and he missed some water pills. Advised to take as prescribed. Verbally understood.

## 2023-10-09 NOTE — TELEPHONE ENCOUNTER
Opti-vol crossed 9/27/& ongoing; (+) furosemide; recheck in 31 days. Thank you     CARELINK TRANSMISSION: BATTERY VOLTAGE ADEQUATE (8.9 YRS). BVP 19.6% (VSR PACE 0%/VVI 60PPM/AF). ALL AVAILABLE LEAD PARAMETERS WITHIN NORMAL LIMITS. 2 VHR EPISODE @  BPM WITH BOTH EGMS SHOWING RVR ON EGM. 56,570 V-SENSE EPISODES (20.5 HRS/DAY). CHRONIC AF & PATIENT ON XARELTO, DIGOXIN, BISOPROLOL.  OPTI-VOL FLUID THRESHOLD CROSSED 9/27/23 & ONGOING; EF 65% (4/2023 ECHO); PATIENT ALSO TAKES FUROSEMIDE; TASK TO CHF RN; RECHECK IN 31 DAYS; NORMAL DEVICE FUNCTION.  ES

## 2023-10-09 NOTE — PROGRESS NOTES
Results for orders placed or performed in visit on 10/09/23   Cardiac EP device report    Narrative    MDT BI-V PPM (VVIR MODE)/ ACTIVE SYSTEM IS MRI CONDITIONAL  CARELINK TRANSMISSION: BATTERY VOLTAGE ADEQUATE (8.9 YRS). BVP 19.6% (VSR PACE 0%/VVI 60PPM/AF). ALL AVAILABLE LEAD PARAMETERS WITHIN NORMAL LIMITS. 2 VHR EPISODE @  BPM WITH BOTH EGMS SHOWING RVR ON EGM. 16,344 V-SENSE EPISODES (20.5 HRS/DAY). CHRONIC AF & PATIENT ON XARELTO, DIGOXIN, BISOPROLOL. OPTI-VOL FLUID THRESHOLD CROSSED 9/27/23 & ONGOING; EF 65% (4/2023 ECHO); PATIENT ALSO TAKES FUROSEMIDE; TASK TO CHF RN; RECHECK IN 31 DAYS; NORMAL DEVICE FUNCTION.   ES

## 2023-10-12 ENCOUNTER — OFFICE VISIT (OUTPATIENT)
Dept: PULMONOLOGY | Facility: CLINIC | Age: 63
End: 2023-10-12
Payer: MEDICARE

## 2023-10-12 VITALS
BODY MASS INDEX: 39.83 KG/M2 | SYSTOLIC BLOOD PRESSURE: 112 MMHG | WEIGHT: 284.5 LBS | DIASTOLIC BLOOD PRESSURE: 70 MMHG | TEMPERATURE: 97.2 F | OXYGEN SATURATION: 96 % | HEIGHT: 71 IN | HEART RATE: 55 BPM

## 2023-10-12 DIAGNOSIS — Z23 NEEDS FLU SHOT: ICD-10-CM

## 2023-10-12 DIAGNOSIS — G47.33 OBSTRUCTIVE SLEEP APNEA: ICD-10-CM

## 2023-10-12 DIAGNOSIS — D86.9 SARCOIDOSIS: Primary | ICD-10-CM

## 2023-10-12 DIAGNOSIS — R91.1 LUNG NODULE: ICD-10-CM

## 2023-10-12 PROBLEM — R06.09 DYSPNEA ON EXERTION: Status: RESOLVED | Noted: 2019-09-13 | Resolved: 2023-10-12

## 2023-10-12 PROCEDURE — 90662 IIV NO PRSV INCREASED AG IM: CPT

## 2023-10-12 PROCEDURE — G0008 ADMIN INFLUENZA VIRUS VAC: HCPCS

## 2023-10-12 PROCEDURE — 99214 OFFICE O/P EST MOD 30 MIN: CPT | Performed by: INTERNAL MEDICINE

## 2023-10-12 RX ORDER — ALBUTEROL SULFATE 90 UG/1
1 AEROSOL, METERED RESPIRATORY (INHALATION) 4 TIMES DAILY
Qty: 18 G | Refills: 6 | Status: SHIPPED | OUTPATIENT
Start: 2023-10-12

## 2023-10-12 NOTE — ASSESSMENT & PLAN NOTE
2 newer nodules have improved. 1 resolved. No further routine follow-up required for nodules.   Will consider repeat sarcoid CT in 12 to 18 months

## 2023-10-12 NOTE — ASSESSMENT & PLAN NOTE
Lupe Enrique is doing well on as needed albuterol  Uses fairly sparingly. Recently went to [place] and use it a couple of times a day while walking multiple miles in [employer] in high heat/humidity.   Otherwise tolerated the activities well  Most recent CT scan from March showed regression of 2 newly identified nodules and stable findings of sarcoidosis otherwise  He has not had recent pulmonary function studies

## 2023-10-12 NOTE — PROGRESS NOTES
Progress note - Pulmonary Medicine   Anika Phillips 70 y.o. male MRN: [id number]       Impression & Plan:     Sarcoidosis  Mario Alberto Ferguson is doing well on as needed albuterol  Uses fairly sparingly. Recently went to [place] and use it a couple of times a day while walking multiple miles in [place] in high heat/humidity. Otherwise tolerated the activities well  Most recent CT scan from March showed regression of 2 newly identified nodules and stable findings of sarcoidosis otherwise  He has not had recent pulmonary function studies    Obstructive sleep apnea  Not on CPAP  Continue diet and weight loss encouraged and      Lung nodule  2 newer nodules have improved. 1 resolved. No further routine follow-up required for nodules. Will consider repeat sarcoid CT in 12 to 18 months      Return in about 6 months (around 4/12/2024). Reviewed need for vaccinations. Agreeable to influenza vaccination which was given today. Advised COVID vaccination. Counseled regarding risks and benefits of RSV vaccination  ______________________________________________________________________    HPI:    Anika Phillips presents today for follow-up of sarcoidosis. He currently has been doing well. Some of his prior shortness of breath has improved. No palpitations. No visual changes. No chest pain. He does have chronic atrial fibrillation. He is on anticoagulation for this. He does have exertional shortness of breath which is multifactorial.  Some of this is related to his cardiac status, obesity, and sarcoidosis. He did lose weight recently. He put a few pounds back on while in [place] but overall has had a net loss of weight. He has been exercising slightly more. He did go to [place] with his grandchildren. He was with walking up to 8 miles daily with high heat and humidity. He did tolerate this reasonably well. Also did take his albuterol inhaler with him and use this a few times.     Current Medications:    Current Outpatient Medications:     albuterol (2.5 mg/3 mL) 0.083 % nebulizer solution, Take 2.5 mg by nebulization every 6 (six) hours as needed, Disp: , Rfl:     albuterol (Ventolin HFA) 90 mcg/act inhaler, Inhale 1 puff 4 (four) times a day, Disp: 18 g, Rfl: 6    atorvastatin (LIPITOR) 40 mg tablet, Take 1 tablet (40 mg total) by mouth daily at bedtime, Disp: 90 tablet, Rfl: 3    citalopram (CeleXA) 20 mg tablet, Take 1 tablet (20 mg total) by mouth daily, Disp: 90 tablet, Rfl: 1    digoxin (LANOXIN) 0.125 mg tablet, Take 1 tablet (125 mcg total) by mouth daily, Disp: 90 tablet, Rfl: 3    furosemide (LASIX) 40 mg tablet, Take 1 tablet (40 mg total) by mouth daily, Disp: 90 tablet, Rfl: 3    glucose blood test strip, Use 1 each daily Use as instructed, Disp: 50 each, Rfl: 5    metFORMIN (GLUCOPHAGE) 1000 MG tablet, Take 1 tablet (1,000 mg total) by mouth 2 (two) times a day with meals, Disp: 180 tablet, Rfl: 3    nebivolol (BYSTOLIC) 10 mg tablet, Take 1 tablet (10 mg total) by mouth daily, Disp: 90 tablet, Rfl: 3    nebivolol (BYSTOLIC) 20 MG tablet, Take 1 tablet (20 mg total) by mouth daily, Disp: 90 tablet, Rfl: 3    NEBULIZER SYSTEM ALL-IN-ONE MISC, 1 Device by Does not apply route every 4 (four) hours while awake, Disp: 1 each, Rfl: 0    rivaroxaban (Xarelto) 20 mg tablet, Take 1 tablet (20 mg total) by mouth daily with breakfast, Disp: 90 tablet, Rfl: 3    Review of Systems:    Aside from what is mentioned in the HPI, the review of systems is otherwise negative    Past medical history, surgical history, and family history were reviewed and updated as appropriate    Social history updates:  Social History     Tobacco Use   Smoking Status Former    Packs/day: 1.00    Years: 8.00    Total pack years: 8.00    Types: Cigarettes    Start date: 0    Quit date: 1980    Years since quittin.8   Smokeless Tobacco Never   Tobacco Comments    2 ppd x 5 years       PhysicalExamination:  Vitals: /70 (BP Location: Right arm, Patient Position: Sitting, Cuff Size: Standard)   Pulse 55   Temp (!) 97.2 °F (36.2 °C) (Tympanic)   Ht 5' 11" (1.803 m)   Wt 129 kg (284 lb 8 oz)   SpO2 96%   BMI 39.68 kg/m²   Gen: Obese. Comfortable on room air. No conversational dyspnea  HEENT:  Conjugate gaze. sclerae anicteric. Oropharynx moist  Neck: Trachea is midline. No JVD. No adenopathy  Chest: Symmetric chest wall excursion. Lung fields are clear today. No wheezes or crackles  Cardiac: Slightly distant, irregular. no murmur  Abdomen:  benign  Extremities: No edema  Neuro:  Normal speech and mentation    Diagnostic Data:  Labs: I personally reviewed the most recent laboratory data pertinent to today's visit    Lab Results   Component Value Date    WBC 6.66 11/10/2022    HGB 13.9 11/10/2022    HCT 42.8 11/10/2022    MCV 94 11/10/2022     11/10/2022     Lab Results   Component Value Date    SODIUM 138 11/10/2022    K 4.0 11/10/2022    CO2 32 11/10/2022     11/10/2022    BUN 10 11/10/2022    CREATININE 0.97 11/10/2022    GLUCOSE 200 (H) 09/30/2015    CALCIUM 9.2 11/10/2022       Imaging:  I personally reviewed the images on the [facility] system pertinent to today's visit  CT from March was reviewed. There are findings consistent with sarcoidosis.   2 previously noted newer nodules regressed/resolved        Matt Ralph MD

## 2023-10-13 ENCOUNTER — TELEPHONE (OUTPATIENT)
Age: 63
End: 2023-10-13

## 2023-10-13 NOTE — TELEPHONE ENCOUNTER
Dusty's wife called to let you know Roxy Better got his flu-shot on 10/12 @ Dr. Jojo Lopez office - his Pulmonologist    Says this gets documented in his chart

## 2023-10-18 ENCOUNTER — TELEPHONE (OUTPATIENT)
Age: 63
End: 2023-10-18

## 2023-10-18 DIAGNOSIS — J06.9 UPPER RESPIRATORY TRACT INFECTION, UNSPECIFIED TYPE: Primary | ICD-10-CM

## 2023-10-18 RX ORDER — DEXTROMETHORPHAN HYDROBROMIDE AND PROMETHAZINE HYDROCHLORIDE 15; 6.25 MG/5ML; MG/5ML
5 SYRUP ORAL 4 TIMES DAILY PRN
Qty: 180 ML | Refills: 0 | Status: SHIPPED | OUTPATIENT
Start: 2023-10-18

## 2023-10-18 RX ORDER — AZITHROMYCIN 250 MG/1
TABLET, FILM COATED ORAL
Qty: 6 TABLET | Refills: 0 | Status: SHIPPED | OUTPATIENT
Start: 2023-10-18 | End: 2023-10-22

## 2023-10-18 NOTE — TELEPHONE ENCOUNTER
Wife called stating patient is feeling unwell with headaches, cough, body chills and sweats, diarrhea, and is coughing up clear phlegm. Patient prefers not to come in for an appointment. Patient wants to know what Dr Vish Mcgee suggests for him. Please call wife back to discuss.

## 2023-10-18 NOTE — TELEPHONE ENCOUNTER
Patient has feverish feelings but does not have an accurate thermometer. Bob Carver His wife did do home test for COVID today and it was negative. He denies SOB. He does have lung issues and his wife is monitoring his O2 levels. Currently 97%. Symptoms started yesterday.

## 2023-11-10 ENCOUNTER — REMOTE DEVICE CLINIC VISIT (OUTPATIENT)
Dept: CARDIOLOGY CLINIC | Facility: CLINIC | Age: 63
End: 2023-11-10
Payer: MEDICARE

## 2023-11-10 DIAGNOSIS — Z95.0 CARDIAC PACEMAKER IN SITU: Primary | ICD-10-CM

## 2023-11-10 PROCEDURE — G2066 INTER DEVC REMOTE 30D: HCPCS | Performed by: INTERNAL MEDICINE

## 2023-11-10 PROCEDURE — 93297 REM INTERROG DEV EVAL ICPMS: CPT | Performed by: INTERNAL MEDICINE

## 2023-11-10 NOTE — PROGRESS NOTES
Results for orders placed or performed in visit on 11/10/23   Cardiac EP device report    Narrative    MDT BI-V PPM (VVIR MODE)/ ACTIVE SYSTEM IS MRI CONDITIONAL  CARELINK TRANSMISSION - OPTI-VOL ONLY: OPTI-VOL FLUID THRESHOLD CROSSED ON 9/27/23 & TRENDING TO WNL. PT ON FUROSEMIDE. WILL RECHECK IN 1 MONTH. BATTERY VOLTAGE ADEQUATE (9.2 YRS). BVP-20.2% (AF/VSR [email or web address]). ALL AVAILABLE LEAD PARAMETERS WITHIN NORMAL LIMITS. NO SIGNIFICANT HIGH RATE EPISODE. >7K VENT SENSING EPISODES. PT IN CHRONIC AF & ON XARELTO, DIG & NEBIVOLOL. NORMAL DEVICE FUNCTION.  GV

## 2023-11-13 ENCOUNTER — TELEPHONE (OUTPATIENT)
Age: 63
End: 2023-11-13

## 2023-11-13 NOTE — TELEPHONE ENCOUNTER
Patient needs lab work prior to next visit, 12/7/23. He goes to [facility] [employer] [facility] [employer].

## 2023-11-20 ENCOUNTER — HOSPITAL ENCOUNTER (EMERGENCY)
Facility: HOSPITAL | Age: 63
Discharge: HOME | End: 2023-11-20
Attending: EMERGENCY MEDICINE | Admitting: EMERGENCY MEDICINE
Payer: MEDICARE

## 2023-11-20 ENCOUNTER — APPOINTMENT (EMERGENCY)
Dept: RADIOLOGY | Facility: HOSPITAL | Age: 63
End: 2023-11-20
Payer: MEDICARE

## 2023-11-20 VITALS
RESPIRATION RATE: 18 BRPM | WEIGHT: 294.31 LBS | DIASTOLIC BLOOD PRESSURE: 74 MMHG | TEMPERATURE: 97.7 F | SYSTOLIC BLOOD PRESSURE: 119 MMHG | HEART RATE: 59 BPM | OXYGEN SATURATION: 95 % | BODY MASS INDEX: 41.2 KG/M2 | HEIGHT: 71 IN

## 2023-11-20 DIAGNOSIS — M25.512 ACUTE PAIN OF LEFT SHOULDER: ICD-10-CM

## 2023-11-20 DIAGNOSIS — M89.8X1 SCAPULALGIA: Primary | ICD-10-CM

## 2023-11-20 PROCEDURE — 99283 EMERGENCY DEPT VISIT LOW MDM: CPT

## 2023-11-20 PROCEDURE — 73030 X-RAY EXAM OF SHOULDER: CPT

## 2023-11-20 PROCEDURE — 99284 EMERGENCY DEPT VISIT MOD MDM: CPT | Performed by: EMERGENCY MEDICINE

## 2023-11-20 RX ORDER — ACETAMINOPHEN 325 MG/1
975 TABLET ORAL ONCE
Status: COMPLETED | OUTPATIENT
Start: 2023-11-20 | End: 2023-11-20

## 2023-11-20 RX ORDER — LIDOCAINE 50 MG/G
1 PATCH TOPICAL ONCE
Status: DISCONTINUED | OUTPATIENT
Start: 2023-11-20 | End: 2023-11-20 | Stop reason: HOSPADM

## 2023-11-20 RX ORDER — METHOCARBAMOL 500 MG/1
1000 TABLET, FILM COATED ORAL 3 TIMES DAILY
Qty: 30 TABLET | Refills: 0 | Status: SHIPPED | OUTPATIENT
Start: 2023-11-20

## 2023-11-20 RX ORDER — METHOCARBAMOL 500 MG/1
1000 TABLET, FILM COATED ORAL ONCE
Status: COMPLETED | OUTPATIENT
Start: 2023-11-20 | End: 2023-11-20

## 2023-11-20 RX ADMIN — ACETAMINOPHEN 975 MG: 325 TABLET, FILM COATED ORAL at 11:46

## 2023-11-20 RX ADMIN — METHOCARBAMOL TABLETS 1000 MG: 500 TABLET, COATED ORAL at 11:46

## 2023-11-20 RX ADMIN — LIDOCAINE 1 PATCH: 700 PATCH TOPICAL at 11:45

## 2023-11-20 NOTE — ED PROVIDER NOTES
History  Chief Complaint   Patient presents with    Shoulder Injury     Pt states he was wearing a backpack leaf blower that requires a pull to start. Pt states since using this equipment, pt has been experiencing L shoulder pain that radiates down his back and into L hand with tingling in L fifth digit x 2 weeks. Denies chest pain, "lots of cardiac hx"     Back Pain     The patient is a 70year old male who presents to ED for evaluation of left shoulder/back pain. Pt states he has had increasing pain in the posterior left shoulder in the area between the spine and shoulder blade for about 2 weeks. He does not recall any injury but has been doing frequent yard work with a large gas back pack leaf blower and rake. He states he has used hydrocodone and cyclobenzaprine and ice and heat at home for pain without relief. He states pain is greatest with movement and is slightly relieved when he holds his arm up over his head. He notes some mild tingling going down the arm and into his 4th and 5th digits, but states he has some tingling of the extremities at baseline and it is not substantially changed from baseline. Denies chest pain, SOB, headache, neck pain, rash,         Prior to Admission Medications   Prescriptions Last Dose Informant Patient Reported? Taking?    NEBULIZER SYSTEM ALL-IN-ONE MISC  Self No No   Si Device by Does not apply route every 4 (four) hours while awake   albuterol (2.5 mg/3 mL) 0.083 % nebulizer solution  Self Yes No   Sig: Take 2.5 mg by nebulization every 6 (six) hours as needed   albuterol (Ventolin HFA) 90 mcg/act inhaler   No No   Sig: Inhale 1 puff 4 (four) times a day   atorvastatin (LIPITOR) 40 mg tablet  Self No No   Sig: Take 1 tablet (40 mg total) by mouth daily at bedtime   citalopram (CeleXA) 20 mg tablet  Self No No   Sig: Take 1 tablet (20 mg total) by mouth daily   digoxin (LANOXIN) 0.125 mg tablet  Self No No   Sig: Take 1 tablet (125 mcg total) by mouth daily   furosemide (LASIX) 40 mg tablet  Self No No   Sig: Take 1 tablet (40 mg total) by mouth daily   glucose blood test strip  Self No No   Sig: Use 1 each daily Use as instructed   metFORMIN (GLUCOPHAGE) 1000 MG tablet  Self No No   Sig: Take 1 tablet (1,000 mg total) by mouth 2 (two) times a day with meals   nebivolol (BYSTOLIC) 10 mg tablet  Self No No   Sig: Take 1 tablet (10 mg total) by mouth daily   nebivolol (BYSTOLIC) 20 MG tablet  Self No No   Sig: Take 1 tablet (20 mg total) by mouth daily   promethazine-dextromethorphan (PHENERGAN-DM) 6.25-15 mg/5 mL oral syrup   No No   Sig: Take 5 mL by mouth 4 (four) times a day as needed for cough   rivaroxaban (Xarelto) 20 mg tablet  Self No No   Sig: Take 1 tablet (20 mg total) by mouth daily with breakfast      Facility-Administered Medications: None       Past Medical History:   Diagnosis Date    A-fib ([address] )     Chronic systolic congestive heart failure (HCC)     last assessed - [id number]    Diabetes mellitus (HCC)     History of cardiac catheterization 10/2012    normal coronaries w/o significant obstructive coronary disease    Pacemaker     last assessed - 19Oct2017    Sarcoidosis     Sleep apnea        Past Surgical History:   Procedure Laterality Date    CARDIAC ELECTROPHYSIOLOGY PROCEDURE N/A 12/3/2021    Procedure: PPM generator change - dual;  Surgeon: José Miguel Erazo DO;  Location: [facility]; Service: Cardiology    CARDIAC ELECTROPHYSIOLOGY PROCEDURE N/A 12/3/2021    Procedure: CARDIAC UPGRADE PACER TO BIV PACER;  Surgeon: José Miguel Erazo DO;  Location: [facility];   Service: Cardiology    CARDIOVERSION      Elective    FISTULA REPAIR      perirectal    INSERT / Jenny Kale / REMOVE PACEMAKER      TONSILLECTOMY      1960's       Family History   Problem Relation Age of Onset    Atrial fibrillation Mother     Cancer Mother     Heart disease Father     Hypertension Father     Skin cancer Father     Cancer Family     Transient ischemic attack Family without residual deficits     I have reviewed and agree with the history as documented. E-Cigarette/Vaping    E-Cigarette Use Never User      E-Cigarette/Vaping Substances     Social History     Tobacco Use    Smoking status: Former     Packs/day: 1.00     Years: 8.00     Total pack years: 8.00     Types: Cigarettes     Start date: 0     Quit date:      Years since quittin.9    Smokeless tobacco: Never    Tobacco comments:     2 ppd x 5 years   Vaping Use    Vaping Use: Never used   Substance Use Topics    Alcohol use: Yes     Alcohol/week: 4.0 - 5.0 standard drinks of alcohol     Types: 4 - 5 Cans of beer per week     Comment: rare    Drug use: No        Review of Systems   Constitutional:  Positive for activity change. Negative for fever. HENT:  Negative for congestion, rhinorrhea and sore throat. Respiratory:  Negative for cough. Cardiovascular:  Negative for chest pain, palpitations and leg swelling. Gastrointestinal:  Negative for abdominal pain, diarrhea, nausea and vomiting. Genitourinary:  Negative for decreased urine volume, difficulty urinating and flank pain. Musculoskeletal:  Positive for arthralgias and myalgias. Negative for gait problem, joint swelling, neck pain and neck stiffness. Skin:  Negative for rash and wound. Neurological:  Negative for dizziness, tremors, weakness, numbness and headaches. Psychiatric/Behavioral:  Positive for sleep disturbance.         Physical Exam  ED Triage Vitals [23 1014]   Temperature Pulse Respirations Blood Pressure SpO2   97.7 °F (36.5 °C) 74 18 119/74 97 %      Temp Source Heart Rate Source Patient Position - Orthostatic VS BP Location FiO2 (%)   Oral Monitor Sitting Left arm --      Pain Score       10 - Worst Possible Pain             Orthostatic Vital Signs  Vitals:    23 1014 23 1215   BP: 119/74    Pulse: 74 59   Patient Position - Orthostatic VS: Sitting        Physical Exam  Vitals and nursing note reviewed. Constitutional:       General: He is not in acute distress. Appearance: Normal appearance. He is obese. He is not ill-appearing. HENT:      Head: Normocephalic and atraumatic. Cardiovascular:      Rate and Rhythm: Normal rate. Rhythm irregular. Pulses: Normal pulses. Heart sounds: Normal heart sounds. Pulmonary:      Effort: Pulmonary effort is normal. No respiratory distress. Breath sounds: Normal breath sounds. Chest:      Chest wall: No tenderness. Abdominal:      General: Bowel sounds are normal. There is no distension. Palpations: Abdomen is soft. Tenderness: There is no abdominal tenderness. There is no right CVA tenderness, left CVA tenderness, guarding or rebound. Musculoskeletal:         General: Tenderness present. No swelling, deformity or signs of injury. Normal range of motion. Cervical back: Normal range of motion and neck supple. No rigidity or tenderness. Right lower leg: No edema. Left lower leg: No edema. Comments: Tenderness to the musculature medial to the scapula with spasm, likely trapezius. No scapula tenderness. No bony vertebral tenderness. Full ROM of shoulder. No crepitus. No weakness. Lymphadenopathy:      Cervical: No cervical adenopathy. Skin:     General: Skin is warm and dry. Capillary Refill: Capillary refill takes less than 2 seconds. Neurological:      General: No focal deficit present. Mental Status: He is alert and oriented to person, place, and time. Sensory: No sensory deficit. Motor: No weakness or abnormal muscle tone. Psychiatric:         Mood and Affect: Mood normal.         Behavior: Behavior normal.         Thought Content:  Thought content normal.         Judgment: Judgment normal.         ED Medications  Medications   methocarbamol (ROBAXIN) tablet 1,000 mg (1,000 mg Oral Given 11/20/23 1146)   acetaminophen (TYLENOL) tablet 975 mg (975 mg Oral Given 11/20/23 1146) Diagnostic Studies  Results Reviewed       None                   XR shoulder 2+ views LEFT    (Results Pending)         Procedures  Procedures      ED Course  ED Course as of 11/20/23 1638   Mon Nov 20, 2023   1228 XR shoulder 2+ views LEFT  No fracture identified. Arthritis present                              SBIRT 20yo+      Flowsheet Row Most Recent Value   Initial Alcohol Screen: US AUDIT-C     1. How often do you have a drink containing alcohol? 0 Filed at: 11/20/2023 1016   2. How many drinks containing alcohol do you have on a typical day you are drinking? 0 Filed at: 11/20/2023 1016   3a. Male UNDER 65: How often do you have five or more drinks on one occasion? 0 Filed at: 11/20/2023 1016   Audit-C Score 0 Filed at: 11/20/2023 1016   ESTELA: How many times in the past year have you. .. Used an illegal drug or used a prescription medication for non-medical reasons? Never Filed at: 11/20/2023 1016                  Medical Decision Making  Ddx: Likely muscular strain/spasm    No weakness, decreased sensation. Full Rom. Discussed stretching exercises. Pain improved after robaxin, tylenol, and lidocaine patch in ED. No fracture or dislocation on xray, but degenerative joint disease/arthritis identified. Encouraged to follow up with orthopedics if pain does not resolve. Amount and/or Complexity of Data Reviewed  Independent Historian: spouse  Radiology: ordered. Decision-making details documented in ED Course. Risk  OTC drugs. Prescription drug management.           Disposition  Final diagnoses:   Acute pain of left shoulder   Scapulalgia     Time reflects when diagnosis was documented in both MDM as applicable and the Disposition within this note       Time User Action Codes Description Comment    11/20/2023 12:34 PM Tatiana Garcia Add [M25.512] Acute pain of left shoulder     11/20/2023  2:36 PM Antonio Slider Rosy Omer     11/20/2023  2:36 PM Shivani Left [M25.512] Acute pain of left shoulder     11/20/2023  2:36 PM Goran, [address] [facility]           ED Disposition       ED Disposition   Discharge    Condition   Stable    Date/Time   Mon Nov 20, 2023 [facility] discharge to home/self care.                    Follow-up Information       Follow up With Specialties Details Why Contact Info Additional [address] [facility]  Call for appointment to follow up with orthopedics if pain is not improved in 1 week [address] [place] [phone]  [facility], [facility] [address] [place], [place], [address]            Discharge Medication List as of 11/20/2023 12:51 PM        START taking these medications    Details   methocarbamol (ROBAXIN) 500 mg tablet Take 2 tablets (1,000 mg total) by mouth 3 (three) times a day, Starting Mon 11/20/2023, Normal           CONTINUE these medications which have NOT CHANGED    Details   albuterol (2.5 mg/3 mL) 0.083 % nebulizer solution Take 2.5 mg by nebulization every 6 (six) hours as needed, Historical Med      albuterol (Ventolin HFA) 90 mcg/act inhaler Inhale 1 puff 4 (four) times a day, Starting Thu 10/12/2023, Normal      atorvastatin (LIPITOR) 40 mg tablet Take 1 tablet (40 mg total) by mouth daily at bedtime, Starting Thu 8/24/2023, Normal      citalopram (CeleXA) 20 mg tablet Take 1 tablet (20 mg total) by mouth daily, Starting Thu 5/11/2023, Normal      digoxin (LANOXIN) 0.125 mg tablet Take 1 tablet (125 mcg total) by mouth daily, Starting Thu 8/24/2023, Normal      furosemide (LASIX) 40 mg tablet Take 1 tablet (40 mg total) by mouth daily, Starting Thu 8/24/2023, Normal      glucose blood test strip Use 1 each daily Use as instructed, Starting Tue 7/13/2021, Normal      metFORMIN (GLUCOPHAGE) 1000 MG tablet Take 1 tablet (1,000 mg total) by mouth 2 (two) times a day with meals, Starting Thu 5/11/2023, Until Sun 5/5/2024, Normal      !! nebivolol (BYSTOLIC) 10 mg tablet Take 1 tablet (10 mg total) by mouth daily, Starting Thu 8/24/2023, Normal      !! nebivolol (BYSTOLIC) 20 MG tablet Take 1 tablet (20 mg total) by mouth daily, Starting Thu 8/24/2023, Normal      NEBULIZER SYSTEM ALL-IN-ONE MISC 1 Device by Does not apply route every 4 (four) hours while awake, Starting Fri 4/12/2019, Normal      promethazine-dextromethorphan (PHENERGAN-DM) 6.25-15 mg/5 mL oral syrup Take 5 mL by mouth 4 (four) times a day as needed for cough, Starting Wed 10/18/2023, Normal      rivaroxaban (Xarelto) 20 mg tablet Take 1 tablet (20 mg total) by mouth daily with breakfast, Starting Thu 8/24/2023, Normal       !! - Potential duplicate medications found. Please discuss with provider. No discharge procedures on file. PDMP Review       None             ED Provider  Attending physically available and evaluated Khadar Velasquez. I managed the patient along with the ED Attending.     Electronically Signed by           Holden Ascencio MD  11/20/23 [id number]

## 2023-11-20 NOTE — ED ATTENDING ATTESTATION
11/20/2023  Nohemi Davenport DO, saw and evaluated the patient. I have discussed the patient with the resident/non-physician practitioner and agree with the resident's/non-physician practitioner's findings, Plan of Care, and MDM as documented in the resident's/non-physician practitioner's note, except where noted. All available labs and Radiology studies were reviewed. I was present for key portions of any procedure(s) performed by the resident/non-physician practitioner and I was immediately available to provide assistance. At this point I agree with the current assessment done in the Emergency Department. I have conducted an independent evaluation of this patient a history and physical is as follows:    76-year-old male, past medical history as stated per resident and reviewed in chart, presenting with a few days of left scapular pain. Patient is position of comfort with arm above head and with scapula laterally rotated. Notes that when he goes from sitting to standing and scapula is in a retracted position pain is at its maximum. Denies any change with exertion. Denies any shortness of breath or chest pain. ROS negative otherwise. Notes has been particularly active with yard work as of recent and additionally uses his left arm to stabilize tools while wood working. Vital signs stable. Patient resting comfortably. Lungs clear to auscultation. No increased work of breathing. Heart regular rhythm without murmur, gallop, rub. Left upper extremity full range of motion of the shoulder with 5 out of 5 strength. Point tenderness medial to the scapula with increased tension to the muscle belly in that area. No overlying skin changes. 76-year-old male presenting with scapulalgia of left upper extremity. Pain was elicited upon forward extension of the left upper extremity stretching the musculature medial to the scapula. No other signs of cardiopulmonary involvement.   Patient provided lidocaine patch and other modalities of analgesia in the emergency department as well as plan for the same. Recommended to do stretching multiple times a day as demonstrated to him in the emergency department as a pertains to the musculature of the scapula. Should the pain not begin to improve over the next week time, requested that the patient present to physical therapy for further evaluation and treatment. Wife present at bedside is independent historian who also agrees with plan. Reviewed all findings both relevant and incidental with the patient at bedside. Pt verbalized understanding of findings, neccesary follow up, return to ED precautions. Pt agreed to review today's findings with their primary care provider. Pt non-toxic appearing upon discharge.        ED Course         Critical Care Time  Procedures

## 2023-11-20 NOTE — DISCHARGE INSTRUCTIONS
Get lidocaine patches over the counter and apply to the painful area for up to 12 hour period in 24 hours and 12 hours off. Do shoulder stretching exercise through out the day. May apply heat or ice as needed for comfort.

## 2023-11-21 ENCOUNTER — TELEPHONE (OUTPATIENT)
Age: 63
End: 2023-11-21

## 2023-11-21 ENCOUNTER — OFFICE VISIT (OUTPATIENT)
Dept: FAMILY MEDICINE CLINIC | Facility: CLINIC | Age: 63
End: 2023-11-21
Payer: MEDICARE

## 2023-11-21 VITALS
HEART RATE: 78 BPM | OXYGEN SATURATION: 98 % | WEIGHT: 283.4 LBS | DIASTOLIC BLOOD PRESSURE: 74 MMHG | BODY MASS INDEX: 39.68 KG/M2 | TEMPERATURE: 98.2 F | SYSTOLIC BLOOD PRESSURE: 118 MMHG | HEIGHT: 71 IN

## 2023-11-21 DIAGNOSIS — M25.512 ACUTE PAIN OF LEFT SHOULDER: Primary | ICD-10-CM

## 2023-11-21 DIAGNOSIS — M25.522 LEFT ELBOW PAIN: ICD-10-CM

## 2023-11-21 PROCEDURE — 99214 OFFICE O/P EST MOD 30 MIN: CPT | Performed by: FAMILY MEDICINE

## 2023-11-21 RX ORDER — OXYCODONE HYDROCHLORIDE 5 MG/1
5 TABLET ORAL EVERY 6 HOURS PRN
Qty: 40 TABLET | Refills: 0 | Status: SHIPPED | OUTPATIENT
Start: 2023-11-21

## 2023-11-21 RX ORDER — PREDNISONE 20 MG/1
TABLET ORAL
Qty: 18 TABLET | Refills: 0 | Status: SHIPPED | OUTPATIENT
Start: 2023-11-21

## 2023-11-21 NOTE — PROGRESS NOTES
Name: Ruddy Thompson      : 1960      MRN: [id number]  Encounter Provider: Riya Chun MD  Encounter Date: 2023   Encounter department: [address] [place] [address]     1. Acute pain of left shoulder  -     predniSONE 20 mg tablet; 3 tab po qd x 3d then 2 tab po qd x 3d then 1 tab po qd x 3d then stop  -     oxyCODONE (Roxicodone) 5 immediate release tablet; Take 1 tablet (5 mg total) by mouth every 6 (six) hours as needed for moderate pain Max Daily Amount: 20 mg  -     Ambulatory Referral to Physical Therapy; Future    2. Left elbow pain  -     predniSONE 20 mg tablet; 3 tab po qd x 3d then 2 tab po qd x 3d then 1 tab po qd x 3d then stop  -     oxyCODONE (Roxicodone) 5 immediate release tablet; Take 1 tablet (5 mg total) by mouth every 6 (six) hours as needed for moderate pain Max Daily Amount: 20 mg  -     Ambulatory Referral to Physical Therapy; Future      Depression Screening and Follow-up Plan: Patient was screened for depression during today's encounter. They screened negative with a PHQ-2 score of 0. Discussion: I reviewed all with patient and wife  -In regards to his left shoulder pain, it appears that patient has irritation of the rhomboid muscles but also this infraspinatus muscle. Initially it appeared that patient had winging of the scapula but on recheck it returned to normal?  No cervical spine irritation appreciated. -In regards to his left elbow, patient has discomfort around the olecranon which appears to be irritating the ulnar nerve at the cubital tunnel. Patient does have a mildly positive Tinel's sign. Reproduces the tingling and numbness he gets in his left hand. Again no sign of cervical irritation on exam.    Plan: Start prednisone taper as directed. Refer to physical therapy. Oxycodone 5 mg 1 every 6 hours as needed for pain. Ice, rest and other conservative measures for now until he is seen by physical therapy.   He will follow-up with me in 2 weeks if not improving-earlier if worse. Patient to call for any problems or concerns in the interim    Subjective     53-year-old male who does not listen to his wife presents with a 3-week history of left shoulder pain. Patient states that started with intermittent pain after using a . It was getting good then would worsen. Approximately 1 week ago, while using a backpack leaf blower, left shoulder became worse and has stayed bad since then. Pain is primarily around the scapula and is now spreading across his upper back. Pain is better with his arm anterior flexed and abducted at least 90 degrees (patient puts his hand on his head). Pain gets worse when he brings his arm back to a neutral position against his body.  -Patient also presents with a month plus history of intermittent numbness in the left hand in an ulnar nerve distribution. Patient notes that he has been having pain in his elbow during this period of time. Patient denies any trauma to the area. Review of Systems   Constitutional:  Positive for activity change. Negative for appetite change, chills, fatigue and fever. HENT: Negative. Eyes: Negative. Respiratory: Negative. Cardiovascular: Negative. Negative for chest pain and palpitations. Musculoskeletal:  Positive for arthralgias, back pain, myalgias and neck pain. Negative for joint swelling. Skin: Negative. Neurological:  Negative for dizziness, weakness, light-headedness, numbness and headaches.        Past Medical History:   Diagnosis Date   • A-fib [facility])    • Chronic systolic congestive heart failure ([address] )     last assessed - [id number]   • Diabetes mellitus ([address])    • History of cardiac catheterization 10/2012    normal coronaries w/o significant obstructive coronary disease   • Pacemaker     last assessed - 19Oct2017   • Sarcoidosis    • Sleep apnea      Past Surgical History:   Procedure Laterality Date   • CARDIAC ELECTROPHYSIOLOGY PROCEDURE N/A 12/3/2021    Procedure: PPM generator change - dual;  Surgeon: Nahum Encarnacion DO;  Location: [facility]; Service: Cardiology   • CARDIAC ELECTROPHYSIOLOGY PROCEDURE N/A 12/3/2021    Procedure: CARDIAC UPGRADE PACER TO BIV PACER;  Surgeon: Nahum Encarnacion DO;  Location: [facility]; Service: Cardiology   • CARDIOVERSION      Elective   • FISTULA REPAIR      perirectal   • INSERT / REPLACE / REMOVE PACEMAKER     • TONSILLECTOMY      26's     Family History   Problem Relation Age of Onset   • Atrial fibrillation Mother    • Cancer Mother    • Heart disease Father    • Hypertension Father    • Skin cancer Father    • Cancer Family    • Transient ischemic attack Family         without residual deficits     Social History     Socioeconomic History   • Marital status: /Civil Union     Spouse name: None   • Number of children: None   • Years of education: None   • Highest education level: None   Occupational History   • Occupation: Retired   Tobacco Use   • Smoking status: Former     Packs/day: 1.00     Years: 8.00     Total pack years: 8.00     Types: Cigarettes     Start date: 0     Quit date:      Years since quittin.9   • Smokeless tobacco: Never   • Tobacco comments:     2 ppd x 5 years   Vaping Use   • Vaping Use: Never used   Substance and Sexual Activity   • Alcohol use:  Yes     Alcohol/week: 4.0 - 5.0 standard drinks of alcohol     Types: 4 - 5 Cans of beer per week     Comment: rare   • Drug use: No   • Sexual activity: None   Other Topics Concern   • None   Social History Narrative    Always uses seat belt    Daily coffee consumption (___Cups/Day)    Dental care regularly    Pets/Animals    Uses safety Equipment - seatbelts     Social Determinants of Health     Financial Resource Strain: Low Risk  (2022)    Overall Financial Resource Strain (CARDIA)    • Difficulty of Paying Living Expenses: Not hard at all   Food Insecurity: Not on file Transportation Needs: No Transportation Needs (11/8/2022)    PRAPARE - Transportation    • Lack of Transportation (Medical): No    • Lack of Transportation (Non-Medical):  No   Physical Activity: Not on file   Stress: Not on file   Social Connections: Not on file   Intimate Partner Violence: Not on file   Housing Stability: Not on file     Current Outpatient Medications on File Prior to Visit   Medication Sig   • albuterol (2.5 mg/3 mL) 0.083 % nebulizer solution Take 2.5 mg by nebulization every 6 (six) hours as needed   • albuterol (Ventolin HFA) 90 mcg/act inhaler Inhale 1 puff 4 (four) times a day   • atorvastatin (LIPITOR) 40 mg tablet Take 1 tablet (40 mg total) by mouth daily at bedtime   • citalopram (CeleXA) 20 mg tablet Take 1 tablet (20 mg total) by mouth daily   • digoxin (LANOXIN) 0.125 mg tablet Take 1 tablet (125 mcg total) by mouth daily   • furosemide (LASIX) 40 mg tablet Take 1 tablet (40 mg total) by mouth daily   • glucose blood test strip Use 1 each daily Use as instructed   • metFORMIN (GLUCOPHAGE) 1000 MG tablet Take 1 tablet (1,000 mg total) by mouth 2 (two) times a day with meals   • methocarbamol (ROBAXIN) 500 mg tablet Take 2 tablets (1,000 mg total) by mouth 3 (three) times a day   • nebivolol (BYSTOLIC) 10 mg tablet Take 1 tablet (10 mg total) by mouth daily   • nebivolol (BYSTOLIC) 20 MG tablet Take 1 tablet (20 mg total) by mouth daily   • NEBULIZER SYSTEM ALL-IN-ONE MISC 1 Device by Does not apply route every 4 (four) hours while awake   • rivaroxaban (Xarelto) 20 mg tablet Take 1 tablet (20 mg total) by mouth daily with breakfast     No Known Allergies  Immunization History   Administered Date(s) Administered   • Influenza Quadrivalent 3 years and older 10/14/2018   • Influenza, high dose seasonal 0.7 mL 10/12/2023   • Influenza, recombinant, quadrivalent,injectable, preservative free 11/08/2022   • Influenza, seasonal, injectable 10/11/2013, 09/29/2015   • Pneumococcal Conjugate 13-Valent 09/29/2015   • Pneumococcal Polysaccharide PPV23 10/11/2013   • Tdap 11/02/2018, 11/24/2020   • Zoster Vaccine Recombinant 08/22/2019, 12/10/2019       Objective     /74 (BP Location: Left arm, Patient Position: Sitting, Cuff Size: Large)   Pulse 78   Temp 98.2 °F (36.8 °C)   Ht 5' 11" (1.803 m)   Wt 129 kg (283 lb 6.4 oz)   SpO2 98%   BMI 39.53 kg/m²     Physical Exam  Vitals reviewed. Constitutional:       Comments: Uncomfortable appearing male in NAD   HENT:      Head: Normocephalic. Mouth/Throat:      Mouth: Mucous membranes are moist.   Cardiovascular:      Rate and Rhythm: Normal rate and regular rhythm. Pulses: Normal pulses. Pulmonary:      Effort: Pulmonary effort is normal.      Breath sounds: No wheezing or rales. Musculoskeletal:         General: Tenderness present. No deformity. Cervical back: Normal range of motion. No rigidity or tenderness. Right lower leg: No edema. Left lower leg: No edema. Comments: TTP over the L rhomboids, and infraspinatous muscle which has some spasm. Good ROM of shoulder with increased pain with arm at his side. Some discomfort with anterior flexion. Neg sulcus sign. Bicep tendon NT. L elbow TTP around the olecranon. Mildly positive Tinnels sign at the cubital tunnel. Lymphadenopathy:      Cervical: No cervical adenopathy. Skin:     General: Skin is warm. Capillary Refill: Capillary refill takes less than 2 seconds. Neurological:      Sensory: No sensory deficit. Motor: No weakness.        Arabella Moore MD

## 2023-11-21 NOTE — TELEPHONE ENCOUNTER
PT is in immense pain and they went ot the emergency room and they suggested tylenol but it is not helping. Wants to know if we can refer him to an orthopedic for his left shoulder and if they are able to give him some pain meds to hold him over. Please call pt to advise

## 2023-11-22 ENCOUNTER — TELEPHONE (OUTPATIENT)
Age: 63
End: 2023-11-22

## 2023-11-22 NOTE — TELEPHONE ENCOUNTER
Oxycodone (Roxicodone) 5 immediate release tablet PA submitted with clinical documentation via surescripts, waiting on determination.

## 2023-12-13 DIAGNOSIS — F41.9 ANXIETY: ICD-10-CM

## 2023-12-13 RX ORDER — CITALOPRAM 20 MG/1
20 TABLET ORAL DAILY
Qty: 90 TABLET | Refills: 1 | Status: SHIPPED | OUTPATIENT
Start: 2023-12-13

## 2023-12-13 NOTE — TELEPHONE ENCOUNTER
Reason for call:   [x] Refill   [] Prior Auth  [] Other:     Office:   [x] PCP/Provider -   [] Specialty/Provider -     Medication: citalopram 20mg 1 tablet daily    Quantity: 90    Pharmacy: [address] [place] [address] [phone]     Does the patient have enough for 3 days?    [x] Yes   [] No - Send as HP to POD

## 2024-01-08 ENCOUNTER — REMOTE DEVICE CLINIC VISIT (OUTPATIENT)
Dept: CARDIOLOGY CLINIC | Facility: CLINIC | Age: 64
End: 2024-01-08
Payer: MEDICARE

## 2024-01-08 DIAGNOSIS — Z95.0 CARDIAC PACEMAKER IN SITU: Primary | ICD-10-CM

## 2024-01-08 PROCEDURE — 93294 REM INTERROG EVL PM/LDLS PM: CPT | Performed by: STUDENT IN AN ORGANIZED HEALTH CARE EDUCATION/TRAINING PROGRAM

## 2024-01-08 PROCEDURE — 93296 REM INTERROG EVL PM/IDS: CPT | Performed by: STUDENT IN AN ORGANIZED HEALTH CARE EDUCATION/TRAINING PROGRAM

## 2024-01-08 NOTE — PROGRESS NOTES
Results for orders placed or performed in visit on 01/08/24   Cardiac EP device report    Narrative    MDT BI-V PPM (VVIR MODE)/ ACTIVE SYSTEM IS MRI CONDITIONAL  CARELINK TRANSMISSION: BATTERY VOLTAGE ADEQUATE (9 YRS). BVP-13.5% (<90% AF/VSR PACE-0%/VVI@60PPM). ALL AVAILABLE LEAD PARAMETERS WITHIN NORMAL LIMITS. 1 VHR EPISODE WITH SIMILAR MORPHOLOGY TO INTRINSIC- RVR ON EGM. >7K VENT SENSING EPISODES. PT IN CHRONIC AF & ON XARELTO, DIG & NEBIVOLOL. OPTI-VOL WITHIN NORMAL LIMITS. NORMAL DEVICE FUNCTION. GV

## 2024-01-09 ENCOUNTER — APPOINTMENT (OUTPATIENT)
Dept: LAB | Age: 64
End: 2024-01-09
Payer: MEDICARE

## 2024-01-09 ENCOUNTER — RA CDI HCC (OUTPATIENT)
Dept: OTHER | Facility: HOSPITAL | Age: 64
End: 2024-01-09

## 2024-01-09 DIAGNOSIS — I10 ESSENTIAL HYPERTENSION: ICD-10-CM

## 2024-01-09 DIAGNOSIS — I48.21 PERMANENT ATRIAL FIBRILLATION (HCC): ICD-10-CM

## 2024-01-09 DIAGNOSIS — E11.9 TYPE 2 DIABETES MELLITUS WITHOUT COMPLICATION, WITHOUT LONG-TERM CURRENT USE OF INSULIN (HCC): ICD-10-CM

## 2024-01-09 DIAGNOSIS — I42.0 DILATED CARDIOMYOPATHY (HCC): ICD-10-CM

## 2024-01-09 DIAGNOSIS — Z12.5 SCREENING FOR MALIGNANT NEOPLASM OF PROSTATE: ICD-10-CM

## 2024-01-09 DIAGNOSIS — E78.5 DYSLIPIDEMIA: ICD-10-CM

## 2024-01-09 LAB
ALBUMIN SERPL BCP-MCNC: 4.2 G/DL (ref 3.5–5)
ALP SERPL-CCNC: 63 U/L (ref 34–104)
ALT SERPL W P-5'-P-CCNC: 21 U/L (ref 7–52)
ANION GAP SERPL CALCULATED.3IONS-SCNC: 9 MMOL/L
AST SERPL W P-5'-P-CCNC: 18 U/L (ref 13–39)
BASOPHILS # BLD AUTO: 0.04 THOUSANDS/ÂΜL (ref 0–0.1)
BASOPHILS NFR BLD AUTO: 1 % (ref 0–1)
BILIRUB SERPL-MCNC: 0.82 MG/DL (ref 0.2–1)
BUN SERPL-MCNC: 13 MG/DL (ref 5–25)
CALCIUM SERPL-MCNC: 9.3 MG/DL (ref 8.4–10.2)
CHLORIDE SERPL-SCNC: 104 MMOL/L (ref 96–108)
CHOLEST SERPL-MCNC: 133 MG/DL
CO2 SERPL-SCNC: 30 MMOL/L (ref 21–32)
CREAT SERPL-MCNC: 1.04 MG/DL (ref 0.6–1.3)
EOSINOPHIL # BLD AUTO: 0.08 THOUSAND/ÂΜL (ref 0–0.61)
EOSINOPHIL NFR BLD AUTO: 2 % (ref 0–6)
ERYTHROCYTE [DISTWIDTH] IN BLOOD BY AUTOMATED COUNT: 13.2 % (ref 11.6–15.1)
EST. AVERAGE GLUCOSE BLD GHB EST-MCNC: 200 MG/DL
GFR SERPL CREATININE-BSD FRML MDRD: 74 ML/MIN/1.73SQ M
GLUCOSE P FAST SERPL-MCNC: 147 MG/DL (ref 65–99)
HBA1C MFR BLD: 8.6 %
HCT VFR BLD AUTO: 43.4 % (ref 36.5–49.3)
HDLC SERPL-MCNC: 47 MG/DL
HGB BLD-MCNC: 14.6 G/DL (ref 12–17)
IMM GRANULOCYTES # BLD AUTO: 0.01 THOUSAND/UL (ref 0–0.2)
IMM GRANULOCYTES NFR BLD AUTO: 0 % (ref 0–2)
LDLC SERPL CALC-MCNC: 57 MG/DL (ref 0–100)
LYMPHOCYTES # BLD AUTO: 0.73 THOUSANDS/ÂΜL (ref 0.6–4.47)
LYMPHOCYTES NFR BLD AUTO: 16 % (ref 14–44)
MCH RBC QN AUTO: 30.6 PG (ref 26.8–34.3)
MCHC RBC AUTO-ENTMCNC: 33.6 G/DL (ref 31.4–37.4)
MCV RBC AUTO: 91 FL (ref 82–98)
MONOCYTES # BLD AUTO: 0.42 THOUSAND/ÂΜL (ref 0.17–1.22)
MONOCYTES NFR BLD AUTO: 9 % (ref 4–12)
NEUTROPHILS # BLD AUTO: 3.28 THOUSANDS/ÂΜL (ref 1.85–7.62)
NEUTS SEG NFR BLD AUTO: 72 % (ref 43–75)
NRBC BLD AUTO-RTO: 0 /100 WBCS
PLATELET # BLD AUTO: 173 THOUSANDS/UL (ref 149–390)
PMV BLD AUTO: 9.7 FL (ref 8.9–12.7)
POTASSIUM SERPL-SCNC: 4.8 MMOL/L (ref 3.5–5.3)
PROT SERPL-MCNC: 6.1 G/DL (ref 6.4–8.4)
PSA SERPL-MCNC: 0.28 NG/ML (ref 0–4)
RBC # BLD AUTO: 4.77 MILLION/UL (ref 3.88–5.62)
SODIUM SERPL-SCNC: 143 MMOL/L (ref 135–147)
TRIGL SERPL-MCNC: 147 MG/DL
WBC # BLD AUTO: 4.56 THOUSAND/UL (ref 4.31–10.16)

## 2024-01-09 PROCEDURE — 80053 COMPREHEN METABOLIC PANEL: CPT

## 2024-01-09 PROCEDURE — 85025 COMPLETE CBC W/AUTO DIFF WBC: CPT

## 2024-01-09 PROCEDURE — G0103 PSA SCREENING: HCPCS

## 2024-01-09 PROCEDURE — 36415 COLL VENOUS BLD VENIPUNCTURE: CPT

## 2024-01-09 PROCEDURE — 83036 HEMOGLOBIN GLYCOSYLATED A1C: CPT

## 2024-01-09 PROCEDURE — 80061 LIPID PANEL: CPT

## 2024-01-11 ENCOUNTER — OFFICE VISIT (OUTPATIENT)
Dept: FAMILY MEDICINE CLINIC | Facility: CLINIC | Age: 64
End: 2024-01-11
Payer: MEDICARE

## 2024-01-11 VITALS
SYSTOLIC BLOOD PRESSURE: 110 MMHG | HEIGHT: 70 IN | HEART RATE: 77 BPM | OXYGEN SATURATION: 98 % | BODY MASS INDEX: 40.86 KG/M2 | TEMPERATURE: 97.3 F | WEIGHT: 285.4 LBS | DIASTOLIC BLOOD PRESSURE: 70 MMHG

## 2024-01-11 DIAGNOSIS — I10 ESSENTIAL HYPERTENSION: ICD-10-CM

## 2024-01-11 DIAGNOSIS — I48.91 ATRIAL FIBRILLATION, UNSPECIFIED TYPE (HCC): ICD-10-CM

## 2024-01-11 DIAGNOSIS — E11.9 TYPE 2 DIABETES MELLITUS WITHOUT COMPLICATION, WITHOUT LONG-TERM CURRENT USE OF INSULIN (HCC): ICD-10-CM

## 2024-01-11 DIAGNOSIS — Z13.6 SCREENING FOR AAA (ABDOMINAL AORTIC ANEURYSM): ICD-10-CM

## 2024-01-11 DIAGNOSIS — Z00.00 MEDICARE ANNUAL WELLNESS VISIT, SUBSEQUENT: Primary | ICD-10-CM

## 2024-01-11 DIAGNOSIS — I42.0 DILATED CARDIOMYOPATHY (HCC): ICD-10-CM

## 2024-01-11 LAB
CREAT UR-MCNC: 16.1 MG/DL
MICROALBUMIN UR-MCNC: <7 MG/L
MICROALBUMIN/CREAT 24H UR: <43 MG/G CREATININE (ref 0–30)

## 2024-01-11 PROCEDURE — 82570 ASSAY OF URINE CREATININE: CPT | Performed by: FAMILY MEDICINE

## 2024-01-11 PROCEDURE — G0439 PPPS, SUBSEQ VISIT: HCPCS | Performed by: FAMILY MEDICINE

## 2024-01-11 PROCEDURE — 82043 UR ALBUMIN QUANTITATIVE: CPT | Performed by: FAMILY MEDICINE

## 2024-01-11 PROCEDURE — G0442 ANNUAL ALCOHOL SCREEN 15 MIN: HCPCS | Performed by: FAMILY MEDICINE

## 2024-01-11 PROCEDURE — G0444 DEPRESSION SCREEN ANNUAL: HCPCS | Performed by: FAMILY MEDICINE

## 2024-01-11 PROCEDURE — 99214 OFFICE O/P EST MOD 30 MIN: CPT | Performed by: FAMILY MEDICINE

## 2024-01-11 NOTE — PATIENT INSTRUCTIONS
Medicare Preventive Visit Patient Instructions  Thank you for completing your Welcome to Medicare Visit or Medicare Annual Wellness Visit today. Your next wellness visit will be due in one year (1/11/2025).  The screening/preventive services that you may require over the next 5-10 years are detailed below. Some tests may not apply to you based off risk factors and/or age. Screening tests ordered at today's visit but not completed yet may show as past due. Also, please note that scanned in results may not display below.  Preventive Screenings:  Service Recommendations Previous Testing/Comments   Colorectal Cancer Screening  Colonoscopy    Fecal Occult Blood Test (FOBT)/Fecal Immunochemical Test (FIT)  Fecal DNA/Cologuard Test  Flexible Sigmoidoscopy Age: 45-75 years old   Colonoscopy: every 10 years (May be performed more frequently if at higher risk)  OR  FOBT/FIT: every 1 year  OR  Cologuard: every 3 years  OR  Sigmoidoscopy: every 5 years  Screening may be recommended earlier than age 45 if at higher risk for colorectal cancer. Also, an individualized decision between you and your healthcare provider will decide whether screening between the ages of 76-85 would be appropriate. Colonoscopy: 06/11/2021  FOBT/FIT: Not on file  Cologuard: Not on file  Sigmoidoscopy: Not on file    Screening Current     Prostate Cancer Screening Individualized decision between patient and health care provider in men between ages of 55-69   Medicare will cover every 12 months beginning on the day after your 50th birthday PSA: 0.28 ng/mL     Screening Current     Hepatitis C Screening Once for adults born between 1945 and 1965  More frequently in patients at high risk for Hepatitis C Hep C Antibody: 01/19/2019    Screening Current   Diabetes Screening 1-2 times per year if you're at risk for diabetes or have pre-diabetes Fasting glucose: 147 mg/dL (1/9/2024)  A1C: 8.6 % (1/9/2024)  Screening Not Indicated  History Diabetes   Cholesterol  Screening Once every 5 years if you don't have a lipid disorder. May order more often based on risk factors. Lipid panel: 01/09/2024  Screening Current      Other Preventive Screenings Covered by Medicare:  Abdominal Aortic Aneurysm (AAA) Screening: covered once if your at risk. You're considered to be at risk if you have a family history of AAA or a male between the age of 65-75 who smoking at least 100 cigarettes in your lifetime.  Lung Cancer Screening: covers low dose CT scan once per year if you meet all of the following conditions: (1) Age 55-77; (2) No signs or symptoms of lung cancer; (3) Current smoker or have quit smoking within the last 15 years; (4) You have a tobacco smoking history of at least 20 pack years (packs per day x number of years you smoked); (5) You get a written order from a healthcare provider.  Glaucoma Screening: covered annually if you're considered high risk: (1) You have diabetes OR (2) Family history of glaucoma OR (3)  aged 50 and older OR (4)  American aged 65 and older  Osteoporosis Screening: covered every 2 years if you meet one of the following conditions: (1) Have a vertebral abnormality; (2) On glucocorticoid therapy for more than 3 months; (3) Have primary hyperparathyroidism; (4) On osteoporosis medications and need to assess response to drug therapy.  HIV Screening: covered annually if you're between the age of 15-65. Also covered annually if you are younger than 15 and older than 65 with risk factors for HIV infection. For pregnant patients, it is covered up to 3 times per pregnancy.    Immunizations:  Immunization Recommendations   Influenza Vaccine Annual influenza vaccination during flu season is recommended for all persons aged >= 6 months who do not have contraindications   Pneumococcal Vaccine   * Pneumococcal conjugate vaccine = PCV13 (Prevnar 13), PCV15 (Vaxneuvance), PCV20 (Prevnar 20)  * Pneumococcal polysaccharide vaccine = PPSV23  (Pneumovax) Adults 19-63 yo with certain risk factors or if 65+ yo  If never received any pneumonia vaccine: recommend Prevnar 20 (PCV20)  Give PCV20 if previously received 1 dose of PCV13 or PPSV23   Hepatitis B Vaccine 3 dose series if at intermediate or high risk (ex: diabetes, end stage renal disease, liver disease)   Respiratory syncytial virus (RSV) Vaccine - COVERED BY MEDICARE PART D  * RSVPreF3 (Arexvy) CDC recommends that adults 60 years of age and older may receive a single dose of RSV vaccine using shared clinical decision-making (SCDM)   Tetanus (Td) Vaccine - COST NOT COVERED BY MEDICARE PART B Following completion of primary series, a booster dose should be given every 10 years to maintain immunity against tetanus. Td may also be given as tetanus wound prophylaxis.   Tdap Vaccine - COST NOT COVERED BY MEDICARE PART B Recommended at least once for all adults. For pregnant patients, recommended with each pregnancy.   Shingles Vaccine (Shingrix) - COST NOT COVERED BY MEDICARE PART B  2 shot series recommended in those 19 years and older who have or will have weakened immune systems or those 50 years and older     Health Maintenance Due:      Topic Date Due   • HIV Screening  Never done   • Colorectal Cancer Screening  06/11/2024   • Hepatitis C Screening  Completed     Immunizations Due:      Topic Date Due   • COVID-19 Vaccine (1) Never done   • Pneumococcal Vaccine: 65+ Years (3 - PPSV23 or PCV20) 01/21/2023     Advance Directives   What are advance directives?  Advance directives are legal documents that state your wishes and plans for medical care. These plans are made ahead of time in case you lose your ability to make decisions for yourself. Advance directives can apply to any medical decision, such as the treatments you want, and if you want to donate organs.   What are the types of advance directives?  There are many types of advance directives, and each state has rules about how to use them. You  may choose a combination of any of the following:  Living will:  This is a written record of the treatment you want. You can also choose which treatments you do not want, which to limit, and which to stop at a certain time. This includes surgery, medicine, IV fluid, and tube feedings.   Durable power of  for healthcare (DPAHC):  This is a written record that states who you want to make healthcare choices for you when you are unable to make them for yourself. This person, called a proxy, is usually a family member or a friend. You may choose more than 1 proxy.  Do not resuscitate (DNR) order:  A DNR order is used in case your heart stops beating or you stop breathing. It is a request not to have certain forms of treatment, such as CPR. A DNR order may be included in other types of advance directives.  Medical directive:  This covers the care that you want if you are in a coma, near death, or unable to make decisions for yourself. You can list the treatments you want for each condition. Treatment may include pain medicine, surgery, blood transfusions, dialysis, IV or tube feedings, and a ventilator (breathing machine).  Values history:  This document has questions about your views, beliefs, and how you feel and think about life. This information can help others choose the care that you would choose.  Why are advance directives important?  An advance directive helps you control your care. Although spoken wishes may be used, it is better to have your wishes written down. Spoken wishes can be misunderstood, or not followed. Treatments may be given even if you do not want them. An advance directive may make it easier for your family to make difficult choices about your care.   Weight Management   Why it is important to manage your weight:  Being overweight increases your risk of health conditions such as heart disease, high blood pressure, type 2 diabetes, and certain types of cancer. It can also increase your  risk for osteoarthritis, sleep apnea, and other respiratory problems. Aim for a slow, steady weight loss. Even a small amount of weight loss can lower your risk of health problems.  How to lose weight safely:  A safe and healthy way to lose weight is to eat fewer calories and get regular exercise. You can lose up about 1 pound a week by decreasing the number of calories you eat by 500 calories each day.   Healthy meal plan for weight management:  A healthy meal plan includes a variety of foods, contains fewer calories, and helps you stay healthy. A healthy meal plan includes the following:  Eat whole-grain foods more often.  A healthy meal plan should contain fiber. Fiber is the part of grains, fruits, and vegetables that is not broken down by your body. Whole-grain foods are healthy and provide extra fiber in your diet. Some examples of whole-grain foods are whole-wheat breads and pastas, oatmeal, brown rice, and bulgur.  Eat a variety of vegetables every day.  Include dark, leafy greens such as spinach, kale, capo greens, and mustard greens. Eat yellow and orange vegetables such as carrots, sweet potatoes, and winter squash.   Eat a variety of fruits every day.  Choose fresh or canned fruit (canned in its own juice or light syrup) instead of juice. Fruit juice has very little or no fiber.  Eat low-fat dairy foods.  Drink fat-free (skim) milk or 1% milk. Eat fat-free yogurt and low-fat cottage cheese. Try low-fat cheeses such as mozzarella and other reduced-fat cheeses.  Choose meat and other protein foods that are low in fat.  Choose beans or other legumes such as split peas or lentils. Choose fish, skinless poultry (chicken or turkey), or lean cuts of red meat (beef or pork). Before you cook meat or poultry, cut off any visible fat.   Use less fat and oil.  Try baking foods instead of frying them. Add less fat, such as margarine, sour cream, regular salad dressing and mayonnaise to foods. Eat fewer high-fat  foods. Some examples of high-fat foods include french fries, doughnuts, ice cream, and cakes.  Eat fewer sweets.  Limit foods and drinks that are high in sugar. This includes candy, cookies, regular soda, and sweetened drinks.  Exercise:  Exercise at least 30 minutes per day on most days of the week. Some examples of exercise include walking, biking, dancing, and swimming. You can also fit in more physical activity by taking the stairs instead of the elevator or parking farther away from stores. Ask your healthcare provider about the best exercise plan for you.      © Copyright [employer] 2018 Information is for End User's use only and may not be sold, redistributed or otherwise used for commercial purposes. All illustrations and images included in CareNotes® are the copyrighted property of [employer] or [employer]

## 2024-01-11 NOTE — PROGRESS NOTES
Assessment and Plan:     Problem List Items Addressed This Visit        Endocrine    Type 2 diabetes mellitus without complication, without long-term current use of insulin (HCC)       Lab Results   Component Value Date    HGBA1C 8.6 (H) 01/09/2024   A1C increased over the Holidays due to diet noncompliance and recent prednisone use. Urged diet compliance. Continue present meds. Recheck 3m - adjust meds if not at goal         Relevant Orders    Albumin / creatinine urine ratio (Completed)       Cardiovascular and Mediastinum    Atrial fibrillation (HCC)     Rate controlled. Continue present care. F/u with Cardio. Recheck 3m         Dilated cardiomyopathy (HCC)     Appears euvolemic. Pt up to date with Cardio. Monitor weight and labs. Recheck 3m         Essential hypertension     Well controlled. Cont present treatment. Monitor labs. Recheck 6m          Other Visit Diagnoses     Medicare annual wellness visit, subsequent    -  Primary    Screening for AAA (abdominal aortic aneurysm)        Relevant Orders     abdominal aorta screening aaa           Preventive health issues were discussed with patient, and age appropriate screening tests were ordered as noted in patient's After Visit Summary.  Personalized health advice and appropriate referrals for health education or preventive services given if needed, as noted in patient's After Visit Summary.     History of Present Illness:     Patient presents for a Medicare Wellness Visit    f/u multiple med issues and AWV  - pt states that he is doing well  - pt states that his shoulder pain improved with prednisone.  Patient denies any problems right now.  Patient did not go to physical therapy  - pt admits that he fell off of his diet over the holidays.  That combined with the 9-day course of prednisone for his shoulder resulted in an increase in his blood sugars with a recent A1c of 8.6 (up from 6.9).  Patient states that he has been doing better with his diet since the  holidays ended.  - pt does not have a formal exercise program but [place].  He is up-to-date with cardiology.  Patient denies any chest pain, palpitations, lightheadedness or other cardiovascular symptoms with or without exertion.  PVCs seem to have improved since starting nebivolol.  - patient up to date with Pulm. No worsening breathing issues  - no new GI or  issues  - AWV done         Patient Care Team:  Víctor Palacios MD as PCP - General  MD Víctor Tomlin MD Darren Traub,      Review of Systems:     Review of Systems   Constitutional: Negative.    HENT: Negative.     Eyes: Negative.    Respiratory: Negative.     Cardiovascular: Negative.    Gastrointestinal: Negative.    Endocrine: Negative.    Genitourinary: Negative.    Musculoskeletal:  Positive for arthralgias (shoulder pain resolved). Negative for back pain, joint swelling and myalgias.   Skin: Negative.    Allergic/Immunologic: Negative.    Neurological: Negative.    Hematological: Negative.    Psychiatric/Behavioral: Negative.          Problem List:     Patient Active Problem List   Diagnosis   • Anxiety   • Dilated cardiomyopathy (HCC)   • Dry eye syndrome   • Dyslipidemia   • Essential hypertension   • Obstructive sleep apnea   • BMI 39.0-39.9,adult   • Type 2 diabetes mellitus without complication, without long-term current use of insulin ([facility])   • Health care maintenance   • Medtronic dual chamber PM   • Sarcoidosis   • Morbid obesity with BMI of 40.0-44.9, adult ([facility])   • Premature ventricular contractions (PVCs) (VPCs)   • Presence of permanent cardiac pacemaker   • Atrial fibrillation ([facility])   • Lung nodule      Past Medical and Surgical History:     Past Medical History:   Diagnosis Date   • A-fib ([facility])    • Chronic systolic congestive heart failure (HCC)     last assessed - 26Feb2014   • Diabetes mellitus ([facility])    • History of cardiac catheterization 10/2012    normal coronaries w/o  significant obstructive coronary disease   • Pacemaker     last assessed - 2017   • Sarcoidosis    • Sleep apnea      Past Surgical History:   Procedure Laterality Date   • CARDIAC ELECTROPHYSIOLOGY PROCEDURE N/A 12/3/2021    Procedure: PPM generator change - dual;  Surgeon: Raf Nelson DO;  Location: [facility];  Service: Cardiology   • CARDIAC ELECTROPHYSIOLOGY PROCEDURE N/A 12/3/2021    Procedure: CARDIAC UPGRADE PACER TO BIV PACER;  Surgeon: Raf Nelson DO;  Location: [facility];  Service: Cardiology   • CARDIOVERSION      Elective   • FISTULA REPAIR      perirectal   • INSERT / REPLACE / REMOVE PACEMAKER     • TONSILLECTOMY            Family History:     Family History   Problem Relation Age of Onset   • Atrial fibrillation Mother    • Cancer Mother    • Heart disease Father    • Hypertension Father    • Skin cancer Father    • Cancer Family    • Transient ischemic attack Family         without residual deficits      Social History:     Social History     Socioeconomic History   • Marital status: /Civil Union     Spouse name: None   • Number of children: None   • Years of education: None   • Highest education level: None   Occupational History   • Occupation: Retired   Tobacco Use   • Smoking status: Former     Current packs/day: 0.00     Average packs/day: 1 pack/day for 8.0 years (8.0 ttl pk-yrs)     Types: Cigarettes     Start date:      Quit date:      Years since quittin.0   • Smokeless tobacco: Never   • Tobacco comments:     2 ppd x 5 years   Vaping Use   • Vaping status: Never Used   Substance and Sexual Activity   • Alcohol use: Yes     Alcohol/week: 4.0 - 5.0 standard drinks of alcohol     Types: 4 - 5 Cans of beer per week     Comment: rare   • Drug use: No   • Sexual activity: None   Other Topics Concern   • None   Social History Narrative    Always uses seat belt    Daily coffee consumption (___Cups/Day)    Dental care regularly    Pets/Animals     Uses safety Equipment - seatbelts     Social Determinants of Health     Financial Resource Strain: Low Risk  (1/11/2024)    Overall Financial Resource Strain (CARDIA)    • Difficulty of Paying Living Expenses: Not hard at all   Food Insecurity: Not on file   Transportation Needs: No Transportation Needs (1/11/2024)    PRAPARE - Transportation    • Lack of Transportation (Medical): No    • Lack of Transportation (Non-Medical): No   Physical Activity: Not on file   Stress: Not on file   Social Connections: Not on file   Intimate Partner Violence: Not on file   Housing Stability: Not on file      Medications and Allergies:     Current Outpatient Medications   Medication Sig Dispense Refill   • albuterol (2.5 mg/3 mL) 0.083 % nebulizer solution Take 2.5 mg by nebulization every 6 (six) hours as needed     • albuterol (Ventolin HFA) 90 mcg/act inhaler Inhale 1 puff 4 (four) times a day 18 g 6   • atorvastatin (LIPITOR) 40 mg tablet Take 1 tablet (40 mg total) by mouth daily at bedtime 90 tablet 3   • citalopram (CeleXA) 20 mg tablet Take 1 tablet (20 mg total) by mouth daily 90 tablet 1   • digoxin (LANOXIN) 0.125 mg tablet Take 1 tablet (125 mcg total) by mouth daily 90 tablet 3   • furosemide (LASIX) 40 mg tablet Take 1 tablet (40 mg total) by mouth daily 90 tablet 3   • glucose blood test strip Use 1 each daily Use as instructed 50 each 5   • metFORMIN (GLUCOPHAGE) 1000 MG tablet Take 1 tablet (1,000 mg total) by mouth 2 (two) times a day with meals 180 tablet 3   • methocarbamol (ROBAXIN) 500 mg tablet Take 2 tablets (1,000 mg total) by mouth 3 (three) times a day 30 tablet 0   • nebivolol (BYSTOLIC) 10 mg tablet Take 1 tablet (10 mg total) by mouth daily 90 tablet 3   • nebivolol (BYSTOLIC) 20 MG tablet Take 1 tablet (20 mg total) by mouth daily 90 tablet 3   • NEBULIZER SYSTEM ALL-IN-ONE MISC 1 Device by Does not apply route every 4 (four) hours while awake 1 each 0   • rivaroxaban (Xarelto) 20 mg tablet Take 1  tablet (20 mg total) by mouth daily with breakfast 90 tablet 3     No current facility-administered medications for this visit.     No Known Allergies   Immunizations:     Immunization History   Administered Date(s) Administered   • Influenza Quadrivalent 3 years and older 10/14/2018   • Influenza, high dose seasonal 0.7 mL 10/12/2023   • Influenza, recombinant, quadrivalent,injectable, preservative free 11/08/2022   • Influenza, seasonal, injectable 10/11/2013, 09/29/2015   • Pneumococcal Conjugate 13-Valent 09/29/2015   • Pneumococcal Polysaccharide PPV23 10/11/2013   • Tdap 11/02/2018, 11/24/2020   • Zoster Vaccine Recombinant 08/22/2019, 12/10/2019      Health Maintenance:         Topic Date Due   • HIV Screening  Never done   • Colorectal Cancer Screening  06/11/2024   • Hepatitis C Screening  Completed         Topic Date Due   • COVID-19 Vaccine (1) Never done   • Pneumococcal Vaccine: 65+ Years (3 - PPSV23 or PCV20) 01/21/2023      Medicare Screening Tests and Risk Assessments:     Dusty is here for his Subsequent Wellness visit. Last Medicare Wellness visit information reviewed, patient interviewed and updates made to the record today.      Health Risk Assessment:   Patient rates overall health as good. Patient feels that their physical health rating is same. Patient is very satisfied with their life. Eyesight was rated as same. Hearing was rated as same. Patient feels that their emotional and mental health rating is same. Patients states they are never, rarely angry. Patient states they are never, rarely unusually tired/fatigued. Pain experienced in the last 7 days has been none. Patient states that he has experienced no weight loss or gain in last 6 months.     Depression Screening:   PHQ-2 Score: 0      Fall Risk Screening:   In the past year, patient has experienced: no history of falling in past year      Home Safety:  Patient does not have trouble with stairs inside or outside of their home. Patient  has working smoke alarms and has no working carbon monoxide detector. Home safety hazards include: none.     Nutrition:   Current diet is Regular.     Medications:   Patient is currently taking over-the-counter supplements. OTC medications include: see medication list. Patient is able to manage medications.     Activities of Daily Living (ADLs)/Instrumental Activities of Daily Living (IADLs):   Walk and transfer into and out of bed and chair?: Yes  Dress and groom yourself?: Yes    Bathe or shower yourself?: Yes    Feed yourself? Yes  Do your laundry/housekeeping?: Yes  Manage your money, pay your bills and track your expenses?: Yes  Make your own meals?: Yes    Do your own shopping?: Yes    Previous Hospitalizations:   Any hospitalizations or ED visits within the last 12 months?: Yes    How many hospitalizations have you had in the last year?: 1-2    Hospitalization Comments: ED for wood shop injuries    Advance Care Planning:   Living will: Yes    Durable POA for healthcare: Yes    Advanced directive: Yes    Advanced directive counseling given: Yes      Cognitive Screening:   Provider or family/friend/caregiver concerned regarding cognition?: No    PREVENTIVE SCREENINGS      Cardiovascular Screening:    General: Screening Current      Diabetes Screening:     General: Screening Not Indicated and History Diabetes      Colorectal Cancer Screening:     General: Screening Current      Prostate Cancer Screening:    General: Screening Current      Osteoporosis Screening:    General: Screening Not Indicated      Abdominal Aortic Aneurysm (AAA) Screening:    Risk factors include: age between 65-74 yo and tobacco use        Lung Cancer Screening:     General: Screening Not Indicated      Hepatitis C Screening:    General: Screening Current    Screening, Brief Intervention, and Referral to Treatment (SBIRT)    Screening      AUDIT-C Screenin) How often did you have a drink containing alcohol in the past year? monthly or  "less  2) How many drinks did you have on a typical day when you were drinking in the past year? 1 to 2  3) How often did you have 6 or more drinks on one occasion in the past year? never    AUDIT-C Score: 1  Interpretation: Score 0-3 (male): Negative screen for alcohol misuse    Single Item Drug Screening:  How often have you used an illegal drug (including marijuana) or a prescription medication for non-medical reasons in the past year? never    Single Item Drug Screen Score: 0  Interpretation: Negative screen for possible drug use disorder    Time Spent  Time spent screening/evaluating the patient for alcohol misuse: 5 minutes.     Annual Depression Screening  Time spent screening and evaluating the patient for depression during today's encounter was 5 minutes.    Other Counseling Topics:   Calcium and vitamin D intake.     No results found.     Physical Exam:     /70 (BP Location: Left arm, Patient Position: Sitting, Cuff Size: Large)   Pulse 77   Temp (!) 97.3 °F (36.3 °C)   Ht 5' 10\" (1.778 m)   Wt 129 kg (285 lb 6.4 oz)   SpO2 98%   BMI 40.95 kg/m²     Physical Exam  Vitals reviewed.   Constitutional:       Appearance: He is well-developed.   HENT:      Head: Normocephalic and atraumatic.      Right Ear: Tympanic membrane, ear canal and external ear normal.      Left Ear: Tympanic membrane, ear canal and external ear normal.      Nose: Nose normal.      Mouth/Throat:      Mouth: Mucous membranes are moist.   Eyes:      Extraocular Movements: Extraocular movements intact.      Conjunctiva/sclera: Conjunctivae normal.      Pupils: Pupils are equal, round, and reactive to light.   Neck:      Thyroid: No thyromegaly.      Vascular: No carotid bruit or JVD.   Cardiovascular:      Rate and Rhythm: Normal rate. Rhythm irregular.      Pulses: Normal pulses.      Heart sounds: Normal heart sounds. No murmur heard.  Pulmonary:      Effort: Pulmonary effort is normal. No respiratory distress.      Breath " sounds: Normal breath sounds. No wheezing or rales.   Abdominal:      General: Bowel sounds are normal. There is no distension.      Palpations: Abdomen is soft. There is no mass.      Tenderness: There is no abdominal tenderness.   Musculoskeletal:         General: No swelling, tenderness or deformity. Normal range of motion.      Cervical back: Normal range of motion and neck supple. No tenderness. No muscular tenderness.      Right lower leg: Edema (trace) present.      Left lower leg: Edema (trace) present.   Lymphadenopathy:      Cervical: No cervical adenopathy.   Skin:     General: Skin is warm.      Capillary Refill: Capillary refill takes less than 2 seconds.   Neurological:      Mental Status: He is alert and oriented to person, place, and time.      Cranial Nerves: No cranial nerve deficit.      Sensory: No sensory deficit.      Motor: No weakness or abnormal muscle tone.      Gait: Gait normal.   Psychiatric:         Mood and Affect: Mood normal.         Behavior: Behavior normal.         Thought Content: Thought content normal.         Judgment: Judgment normal.      Comments: PHQ-2/9 Depression Screening    Little interest or pleasure in doing things: 0 - not at all  Feeling down, depressed, or hopeless: 0 - not at all  PHQ-2 Score: 0  PHQ-2 Interpretation: Negative depression screen            Víctor Palacios MD

## 2024-01-14 NOTE — ASSESSMENT & PLAN NOTE
Lab Results   Component Value Date    HGBA1C 8.6 (H) 01/09/2024   A1C increased over the Holidays due to diet noncompliance and recent prednisone use. Urged diet compliance. Continue present meds. Recheck 3m - adjust meds if not at goal

## 2024-01-22 ENCOUNTER — TELEPHONE (OUTPATIENT)
Age: 64
End: 2024-01-22

## 2024-01-22 ENCOUNTER — NURSE TRIAGE (OUTPATIENT)
Age: 64
End: 2024-01-22
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
## 2024-01-22 RX ORDER — AMOXICILLIN AND CLAVULANATE POTASSIUM 875; 125 MG/1; MG/1
1 TABLET, FILM COATED ORAL EVERY 12 HOURS SCHEDULED
Qty: 20 TABLET | Refills: 0 | Status: SHIPPED | OUTPATIENT
Start: 2024-01-22 | End: 2024-02-01

## 2024-01-22 NOTE — TELEPHONE ENCOUNTER
The patients wife called he was bit by a kitten today  it did break the skin   he would like antibiotics called in thank you

## 2024-01-22 NOTE — TELEPHONE ENCOUNTER
"Pt captured kitten on his property to take to [facility]. When removing kitten from the trap, the kitten was scared and bit pt through the gloves he was wearing. Last Tdap 2020.  Bite is on right index finger. Kitten is going to rescue center tomorrow where it will be isolated and observed for 12 days.  As of right now, kitten is acting normally.  Pt wants to know if he can have abx sent to [facility] pharmacy or does he need to be evaluated and have updated Tdap/rabies series? Please advise and call pt back.     Reason for Disposition   Puncture wound (hole through the skin) from cat (teeth or claws)    Answer Assessment - Initial Assessment Questions  1. ANIMAL: \"What type of animal caused the bite?\" \"Is the injury from a bite or a claw?\" If the animal is a dog or a cat, ask: \"Was it a pet or a stray?\" \"Was it acting ill or behaving strangely?\"      Kitten bite   2. LOCATION: \"Where is the bite located?\"       Right index finger   3. SIZE: \"How big is the bite?\" \"What does it look like?\"       Puncture wound   4. ONSET: \"When did the bite happen?\" (Minutes or hours ago)       today  5. CIRCUMSTANCES: \"Tell me how this happened.\"      Capturing kitten to send to rescue   6. TETANUS: \"When was the last tetanus booster?\"      2020  7. PREGNANCY: \"Is there any chance you are pregnant?\" \"When was your last menstrual period?\"      no    Protocols used: Animal Bite-ADULT-[place]    "

## 2024-01-26 ENCOUNTER — HOSPITAL ENCOUNTER (OUTPATIENT)
Dept: RADIOLOGY | Age: 64
Discharge: HOME | End: 2024-01-26
Payer: MEDICARE

## 2024-01-26 DIAGNOSIS — Z13.6 SCREENING FOR AAA (ABDOMINAL AORTIC ANEURYSM): ICD-10-CM

## 2024-01-26 PROCEDURE — 76706 US ABDL AORTA SCREEN AAA: CPT

## 2024-02-21 PROBLEM — Z00.00 HEALTH CARE MAINTENANCE: Status: RESOLVED | Noted: 2018-04-03 | Resolved: 2024-02-21

## 2024-03-18 ENCOUNTER — APPOINTMENT (OUTPATIENT)
Dept: LAB | Age: 64
End: 2024-03-18
Payer: MEDICARE

## 2024-03-18 ENCOUNTER — TELEPHONE (OUTPATIENT)
Dept: CARDIOLOGY CLINIC | Facility: CLINIC | Age: 64
End: 2024-03-18

## 2024-03-18 ENCOUNTER — APPOINTMENT (OUTPATIENT)
Dept: LAB | Facility: HOSPITAL | Age: 64
End: 2024-03-18
Payer: MEDICARE

## 2024-03-18 LAB
BNP SERPL-MCNC: 149 PG/ML (ref 0–100)
CHOLEST SERPL-MCNC: 150 MG/DL
HDLC SERPL-MCNC: 46 MG/DL
LDLC SERPL CALC-MCNC: 70 MG/DL (ref 0–100)
NONHDLC SERPL-MCNC: 104 MG/DL
TRIGL SERPL-MCNC: 171 MG/DL

## 2024-03-18 PROCEDURE — 83880 ASSAY OF NATRIURETIC PEPTIDE: CPT

## 2024-03-18 PROCEDURE — 80061 LIPID PANEL: CPT

## 2024-03-18 NOTE — TELEPHONE ENCOUNTER
Pt's wife David called stating that pt had bloodwork on 1/9 but she wasn't sure if there was anything else that pt needed to have done before appt. Advised pt should have BNP lab done prior. David understood.

## 2024-03-20 ENCOUNTER — OFFICE VISIT (OUTPATIENT)
Dept: CARDIOLOGY CLINIC | Facility: CLINIC | Age: 64
End: 2024-03-20
Payer: MEDICARE

## 2024-03-20 VITALS
BODY MASS INDEX: 42.36 KG/M2 | HEART RATE: 79 BPM | DIASTOLIC BLOOD PRESSURE: 80 MMHG | HEIGHT: 70 IN | SYSTOLIC BLOOD PRESSURE: 118 MMHG | OXYGEN SATURATION: 95 % | WEIGHT: 295.9 LBS

## 2024-03-20 DIAGNOSIS — Z95.0 PRESENCE OF CARDIAC PACEMAKER: ICD-10-CM

## 2024-03-20 DIAGNOSIS — I49.3 PREMATURE VENTRICULAR CONTRACTIONS (PVCS) (VPCS): ICD-10-CM

## 2024-03-20 DIAGNOSIS — E78.5 DYSLIPIDEMIA: ICD-10-CM

## 2024-03-20 DIAGNOSIS — I48.11 LONGSTANDING PERSISTENT ATRIAL FIBRILLATION (HCC): ICD-10-CM

## 2024-03-20 DIAGNOSIS — E66.01 MORBID OBESITY WITH BMI OF 40.0-44.9, ADULT (HCC): ICD-10-CM

## 2024-03-20 DIAGNOSIS — I50.32 CHRONIC DIASTOLIC (CONGESTIVE) HEART FAILURE (HCC): ICD-10-CM

## 2024-03-20 DIAGNOSIS — I10 ESSENTIAL HYPERTENSION: ICD-10-CM

## 2024-03-20 DIAGNOSIS — R00.0 TACHYCARDIA INDUCED CARDIOMYOPATHY (HCC): Primary | ICD-10-CM

## 2024-03-20 DIAGNOSIS — E11.9 TYPE 2 DIABETES MELLITUS WITHOUT COMPLICATION, WITHOUT LONG-TERM CURRENT USE OF INSULIN (HCC): ICD-10-CM

## 2024-03-20 DIAGNOSIS — I43 TACHYCARDIA INDUCED CARDIOMYOPATHY (HCC): Primary | ICD-10-CM

## 2024-03-20 PROCEDURE — 99214 OFFICE O/P EST MOD 30 MIN: CPT | Performed by: INTERNAL MEDICINE

## 2024-03-20 PROCEDURE — 93000 ELECTROCARDIOGRAM COMPLETE: CPT | Performed by: INTERNAL MEDICINE

## 2024-03-20 NOTE — PROGRESS NOTES
[facility] Cardiology  Follow up note  Dusty Mckeon 64 y.o. male MRN: [id number]        Problems    1. Tachycardia induced cardiomyopathy (HCC)  POCT ECG      2. Longstanding persistent atrial fibrillation (HCC)  Digoxin level    Basic metabolic panel      3. Premature ventricular contractions (PVCs) (VPCs)        4. Dyslipidemia  Lipid panel      5. Essential hypertension        6. Medtronic dual chamber PM        7. Morbid obesity with BMI of 40.0-44.9, adult (HCC)        8. Type 2 diabetes mellitus without complication, without long-term current use of insulin (HCC)        9. Chronic diastolic (congestive) heart failure (HCC)            Impression:    Permanent atrial fibrillation  Excellent rate control on nebivolol and digoxin  On Xarelto, no bleeding, good tolerance    Hypertension  Exceptionally well-controlled on nebivolol    Dyslipidemia  Very well-controlled    Recovered arrhythmia related cardiomyopathy/diastolic CHF  This has been related to A-fib/RVR in the past, as well as high PVC burden  LVEF last year 65% as of 4/23  Recovered cardiomyopathy  However still has a degree of diastolic CHF  Went off his diuretics in the fall for a short bit when traveling to [place], his OptiVol threshold crossed, and a BNP recently assessed was 149  He examines euvolemic today however  On furosemide 40 mg daily    Sick sinus syndrome/BiV pacer  Upgraded 12/21 at the time of generator change  Only 13.5% BiV paced, has a lot of native conduction    Premature ventricular contractions  Suppressed on nebivolol    Obstructive sleep apnea  Does not tolerate CPAP    Type 2 diabetes  A1c controlled    Plan:    No changes today  Check digoxin level, BMP, lipids in 6 months and return at that time      HPI:   Dusty Mckeon is a 64 y.o. year old male with a history of 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, permanent atrial fibrillation on a rate control strategy, chronic anticoagulation with Xarelto, sick sinus syndrome  status post Medtronic dual-chamber pacemaker, hypertension, hyperlipidemia, type 2 diabetes, obesity and obstructive sleep apnea    He has atrial fibrillation, its permanent now  He has PVCs, takes nebivolol and digoxin  A-fib rates are well-controlled  He has a BiV pacer, only pacing 13.5% of the time.  A lot of native conduction is noted with underlying A-fib  Blood pressure is excellent  He has no CHF symptoms  OptiVol crossed in September of last year, he had missed a few doses of furosemide at the time.  Last EF assessment was .  Lipids are well-controlled      Review of Systems   Constitutional:  Negative for appetite change, diaphoresis, fatigue and fever.   Respiratory:  Negative for chest tightness, shortness of breath and wheezing.    Cardiovascular:  Negative for chest pain, palpitations and leg swelling.   Gastrointestinal:  Negative for abdominal pain and blood in stool.   Musculoskeletal:  Negative for arthralgias and joint swelling.   Skin:  Negative for rash.   Neurological:  Negative for dizziness, syncope and light-headedness.       Past Medical History:   Diagnosis Date   • A-fib ([facility])    • Chronic systolic congestive heart failure (HCC)     last assessed - 2014   • Diabetes mellitus (HCC)    • History of cardiac catheterization 10/2012    normal coronaries w/o significant obstructive coronary disease   • Pacemaker     last assessed - 2017   • Sarcoidosis    • Sleep apnea      Social History     Substance and Sexual Activity   Alcohol Use Yes   • Alcohol/week: 4.0 - 5.0 standard drinks of alcohol   • Types: 4 - 5 Cans of beer per week    Comment: rare     Social History     Substance and Sexual Activity   Drug Use No     Social History     Tobacco Use   Smoking Status Former   • Current packs/day: 0.00   • Average packs/day: 1 pack/day for 8.0 years (8.0 ttl pk-yrs)   • Types: Cigarettes   • Start date:    • Quit date:    • Years since quittin.2   Smokeless Tobacco Never    Tobacco Comments    2 ppd x 5 years       Allergies:  No Known Allergies    Medications:     Current Outpatient Medications:   •  albuterol (2.5 mg/3 mL) 0.083 % nebulizer solution, Take 2.5 mg by nebulization every 6 (six) hours as needed, Disp: , Rfl:   •  albuterol (Ventolin HFA) 90 mcg/act inhaler, Inhale 1 puff 4 (four) times a day, Disp: 18 g, Rfl: 6  •  atorvastatin (LIPITOR) 40 mg tablet, Take 1 tablet (40 mg total) by mouth daily at bedtime, Disp: 90 tablet, Rfl: 3  •  citalopram (CeleXA) 20 mg tablet, Take 1 tablet (20 mg total) by mouth daily, Disp: 90 tablet, Rfl: 1  •  digoxin (LANOXIN) 0.125 mg tablet, Take 1 tablet (125 mcg total) by mouth daily, Disp: 90 tablet, Rfl: 3  •  furosemide (LASIX) 40 mg tablet, Take 1 tablet (40 mg total) by mouth daily, Disp: 90 tablet, Rfl: 3  •  glucose blood test strip, Use 1 each daily Use as instructed, Disp: 50 each, Rfl: 5  •  metFORMIN (GLUCOPHAGE) 1000 MG tablet, Take 1 tablet (1,000 mg total) by mouth 2 (two) times a day with meals, Disp: 180 tablet, Rfl: 3  •  nebivolol (BYSTOLIC) 10 mg tablet, Take 1 tablet (10 mg total) by mouth daily, Disp: 90 tablet, Rfl: 3  •  nebivolol (BYSTOLIC) 20 MG tablet, Take 1 tablet (20 mg total) by mouth daily, Disp: 90 tablet, Rfl: 3  •  NEBULIZER SYSTEM ALL-IN-ONE MISC, 1 Device by Does not apply route every 4 (four) hours while awake, Disp: 1 each, Rfl: 0  •  rivaroxaban (Xarelto) 20 mg tablet, Take 1 tablet (20 mg total) by mouth daily with breakfast, Disp: 90 tablet, Rfl: 3  •  methocarbamol (ROBAXIN) 500 mg tablet, Take 2 tablets (1,000 mg total) by mouth 3 (three) times a day (Patient not taking: Reported on 3/20/2024), Disp: 30 tablet, Rfl: 0      Vitals:    03/20/24 0822   BP: 118/80   Pulse: 79   SpO2: 95%     Weight (last 2 days)     Date/Time Weight    03/20/24 0822 134 (295.9)        Physical Exam  Constitutional:       General: He is not in acute distress.     Appearance: He is not diaphoretic.   HENT:      Head:  "Normocephalic and atraumatic.   Eyes:      General: No scleral icterus.     Conjunctiva/sclera: Conjunctivae normal.   Neck:      Vascular: No JVD.   Cardiovascular:      Rate and Rhythm: Normal rate. Rhythm irregular.      Heart sounds: Normal heart sounds. No murmur heard.  Pulmonary:      Effort: Pulmonary effort is normal.      Breath sounds: Normal breath sounds. No wheezing or rales.   Musculoskeletal:      Cervical back: Normal range of motion.   Skin:     General: Skin is warm and dry.   Neurological:      Mental Status: He is alert and oriented to person, place, and time.         Laboratory Studies:    Labs personally reviewed    Cardiac testing:     EKG reviewed personally:   Results for orders placed or performed in visit on 03/20/24   POCT ECG    Impression    Atrial fibrillation, occasional pacing, PVCs         Echocardiogram:  2019-EF normal  11/21-LVEF 50%, mild LVH, RV mildly dilated, LA mildly dilated, RA mildly dilated, mild-to-moderate MR, moderate TR with normal pulmonary pressure  4/23-personally reviewed-EF up to 65%, no significant valve disease noted    Stress tests:      Catheterization:      Holter:         Denis Gayle MD    Portions of the record may have been created with voice recognition software.  Occasional wrong word or \"sound a like\" substitutions may have occurred due to the inherent limitations of voice recognition software.  Read the chart carefully and recognize, using context, where substitutions have occurred.  "

## 2024-04-09 ENCOUNTER — REMOTE DEVICE CLINIC VISIT (OUTPATIENT)
Dept: CARDIOLOGY CLINIC | Facility: CLINIC | Age: 64
End: 2024-04-09
Payer: MEDICARE

## 2024-04-09 DIAGNOSIS — Z95.0 CARDIAC PACEMAKER IN SITU: Primary | ICD-10-CM

## 2024-04-09 PROCEDURE — 93294 REM INTERROG EVL PM/LDLS PM: CPT | Performed by: STUDENT IN AN ORGANIZED HEALTH CARE EDUCATION/TRAINING PROGRAM

## 2024-04-09 PROCEDURE — 93296 REM INTERROG EVL PM/IDS: CPT | Performed by: STUDENT IN AN ORGANIZED HEALTH CARE EDUCATION/TRAINING PROGRAM

## 2024-04-09 NOTE — PROGRESS NOTES
"Results for orders placed or performed in visit on 04/09/24   Cardiac EP device report    Narrative    MDT BI-V PPM (VVIR MODE)/ ACTIVE SYSTEM IS MRI CONDITIONAL  CARELINK TRANSMISSION: BATTERY STATUS \"9 YRS.\"  15% VSRP 0%. ALL AVAILABLE LEAD PARAMETERS WITHIN NORMAL LIMITS. 2 VHRS & VENT SENSING NOTED; EGRAMS PRESENT AS RVR. PT ON NEBIVOLOL & XARELTO. EF 65% (ECHO 2023). OPTI-VOL WITHIN NORMAL LIMITS. NORMAL DEVICE FUNCTION. NC         "

## 2024-04-10 ENCOUNTER — TELEPHONE (OUTPATIENT)
Dept: ADMINISTRATIVE | Facility: OTHER | Age: 64
End: 2024-04-10

## 2024-04-10 NOTE — TELEPHONE ENCOUNTER
04/10/24 3:39 PM    Patient contacted (spoke with patient) to bring Advance Directive, POLST, or Living Will document to next scheduled pcp visit.    Thank you.  Eddie Kothari  [facility]

## 2024-04-12 ENCOUNTER — OFFICE VISIT (OUTPATIENT)
Dept: FAMILY MEDICINE CLINIC | Facility: CLINIC | Age: 64
End: 2024-04-12

## 2024-04-12 VITALS
OXYGEN SATURATION: 96 % | DIASTOLIC BLOOD PRESSURE: 70 MMHG | WEIGHT: 290.8 LBS | HEIGHT: 70 IN | HEART RATE: 79 BPM | TEMPERATURE: 97.5 F | SYSTOLIC BLOOD PRESSURE: 120 MMHG | BODY MASS INDEX: 41.63 KG/M2

## 2024-04-12 DIAGNOSIS — I10 ESSENTIAL HYPERTENSION: ICD-10-CM

## 2024-04-12 DIAGNOSIS — I50.9 CONGESTIVE HEART FAILURE, UNSPECIFIED HF CHRONICITY, UNSPECIFIED HEART FAILURE TYPE (HCC): ICD-10-CM

## 2024-04-12 DIAGNOSIS — E11.9 TYPE 2 DIABETES MELLITUS WITHOUT COMPLICATION, WITHOUT LONG-TERM CURRENT USE OF INSULIN (HCC): Primary | ICD-10-CM

## 2024-04-12 DIAGNOSIS — I48.11 LONGSTANDING PERSISTENT ATRIAL FIBRILLATION (HCC): ICD-10-CM

## 2024-04-12 DIAGNOSIS — M25.511 RIGHT SHOULDER PAIN, UNSPECIFIED CHRONICITY: ICD-10-CM

## 2024-04-12 LAB — SL AMB POCT HEMOGLOBIN AIC: 8.1 (ref ?–6.5)

## 2024-04-12 RX ORDER — FUROSEMIDE 40 MG/1
20 TABLET ORAL DAILY
Qty: 90 TABLET | Refills: 3 | Status: SHIPPED | OUTPATIENT
Start: 2024-04-12

## 2024-04-12 NOTE — PROGRESS NOTES
Name: Dusty Mckeon      : 1960      MRN: [id number]  Encounter Provider: Víctor Palacios MD  Encounter Date: 2024   Encounter department: [facility]    Assessment & Plan     1. Type 2 diabetes mellitus without complication, without long-term current use of insulin (HCC)  Assessment & Plan:    Lab Results   Component Value Date    HGBA1C 8.1 (A) 2024   A1C better but still too high. Will start jardiance 10mg qd. I reviewed method of action, side effects and warnings with pt. Decrease lasix to 20mg qd. Recheck BMP in 2w and f/u 3m in office    Orders:  -     POCT hemoglobin A1c  -     Basic metabolic panel; Future  -     Empagliflozin (JARDIANCE) 10 MG TABS tablet; Take 1 tablet (10 mg total) by mouth daily    2. Right shoulder pain, unspecified chronicity  Assessment & Plan:  Appears to have elements of adhesive capsulitis, though injury was recent.  Will refer pt to ortho for second opinion.  Encouraged ROM exercises in the interim    Orders:  -     Ambulatory Referral to Orthopedic Surgery; Future    3. Congestive heart failure, unspecified HF chronicity, unspecified heart failure type (HCC)  Assessment & Plan:  Wt Readings from Last 3 Encounters:   24 132 kg (290 lb 12.8 oz)   24 134 kg (295 lb 14.4 oz)   24 129 kg (285 lb 6.4 oz)   Weight improved compared to last visit. Will decrease lasix to 20mg qd due to starting Jardiance. Recheck BMP in 2w. Monitor weight. Pt to call if weight increases. Otherwise f/u with Cardio            Orders:  -     furosemide (LASIX) 40 mg tablet; Take 0.5 tablets (20 mg total) by mouth daily    4. Longstanding persistent atrial fibrillation (HCC)  Assessment & Plan:  Rate controlled. Continue present care. F/u with Cardio. Recheck 6m      5. Essential hypertension  Assessment & Plan:  Well controlled. Cont present treatment. Monitor labs. Recheck 6m               Subjective     f/u multiple med issues   -  pt continues to do well  - pt has not been watching diet. A1C has decreased slightly from 8.6 to 8.1.  Is active but does not exercise regularly.   - pt is up to date with cardio.  Pt was in a-fib at the time. Denies CP, palpitations, lightheadedness or other CV symptoms with or without exertion  - patient up to date with Pulm. No worsening breathing issues  - no new GI or  issues  - pt tripped a few weeks ago, landing on outstretched hands.  Pt with bilat shoulder pain - L is improving. R still with decreased ROM and pain with movement. No radiation to elbow or below        Review of Systems   Constitutional: Negative.    HENT: Negative.     Eyes: Negative.    Respiratory: Negative.     Cardiovascular: Negative.    Gastrointestinal: Negative.    Genitourinary: Negative.    Musculoskeletal:  Positive for arthralgias and myalgias. Negative for back pain, joint swelling, neck pain and neck stiffness.   Skin: Negative.    Neurological:  Negative for dizziness, weakness, light-headedness, numbness and headaches.       Past Medical History:   Diagnosis Date   • A-fib (HCC)    • Chronic systolic congestive heart failure (HCC)     last assessed - 26Feb2014   • Diabetes mellitus (HCC)    • History of cardiac catheterization 10/2012    normal coronaries w/o significant obstructive coronary disease   • Pacemaker     last assessed - 19Oct2017   • Sarcoidosis    • Sleep apnea      Past Surgical History:   Procedure Laterality Date   • CARDIAC ELECTROPHYSIOLOGY PROCEDURE N/A 12/3/2021    Procedure: PPM generator change - dual;  Surgeon: Raf Nelson DO;  Location: [facility];  Service: Cardiology   • CARDIAC ELECTROPHYSIOLOGY PROCEDURE N/A 12/3/2021    Procedure: CARDIAC UPGRADE PACER TO BIV PACER;  Surgeon: Raf Nelson DO;  Location: [facility];  Service: Cardiology   • CARDIOVERSION      Elective   • FISTULA REPAIR      perirectal   • INSERT / REPLACE / REMOVE PACEMAKER     • TONSILLECTOMY      1960's      Family History   Problem Relation Age of Onset   • Atrial fibrillation Mother    • Cancer Mother    • Heart disease Father    • Hypertension Father    • Skin cancer Father    • Cancer Family    • Transient ischemic attack Family         without residual deficits     Social History     Socioeconomic History   • Marital status: /Civil Union     Spouse name: None   • Number of children: None   • Years of education: None   • Highest education level: None   Occupational History   • Occupation: Retired   Tobacco Use   • Smoking status: Former     Current packs/day: 0.00     Average packs/day: 1 pack/day for 8.0 years (8.0 ttl pk-yrs)     Types: Cigarettes     Start date:      Quit date:      Years since quittin.3   • Smokeless tobacco: Never   • Tobacco comments:     2 ppd x 5 years   Vaping Use   • Vaping status: Never Used   Substance and Sexual Activity   • Alcohol use: Yes     Alcohol/week: 4.0 - 5.0 standard drinks of alcohol     Types: 4 - 5 Cans of beer per week     Comment: rare   • Drug use: No   • Sexual activity: None   Other Topics Concern   • None   Social History Narrative    Always uses seat belt    Daily coffee consumption (___Cups/Day)    Dental care regularly    Pets/Animals    Uses safety Equipment - seatbelts     Social Determinants of Health     Financial Resource Strain: Low Risk  (2024)    Overall Financial Resource Strain (CARDIA)    • Difficulty of Paying Living Expenses: Not hard at all   Food Insecurity: Not on file   Transportation Needs: No Transportation Needs (2024)    PRAPARE - Transportation    • Lack of Transportation (Medical): No    • Lack of Transportation (Non-Medical): No   Physical Activity: Not on file   Stress: Not on file   Social Connections: Not on file   Intimate Partner Violence: Not on file   Housing Stability: Not on file     Current Outpatient Medications on File Prior to Visit   Medication Sig   • albuterol (2.5 mg/3 mL) 0.083 % nebulizer  "solution Take 2.5 mg by nebulization every 6 (six) hours as needed   • albuterol (Ventolin HFA) 90 mcg/act inhaler Inhale 1 puff 4 (four) times a day   • atorvastatin (LIPITOR) 40 mg tablet Take 1 tablet (40 mg total) by mouth daily at bedtime   • citalopram (CeleXA) 20 mg tablet Take 1 tablet (20 mg total) by mouth daily   • digoxin (LANOXIN) 0.125 mg tablet Take 1 tablet (125 mcg total) by mouth daily   • glucose blood test strip Use 1 each daily Use as instructed   • metFORMIN (GLUCOPHAGE) 1000 MG tablet Take 1 tablet (1,000 mg total) by mouth 2 (two) times a day with meals   • nebivolol (BYSTOLIC) 10 mg tablet Take 1 tablet (10 mg total) by mouth daily   • nebivolol (BYSTOLIC) 20 MG tablet Take 1 tablet (20 mg total) by mouth daily   • NEBULIZER SYSTEM ALL-IN-ONE MISC 1 Device by Does not apply route every 4 (four) hours while awake   • rivaroxaban (Xarelto) 20 mg tablet Take 1 tablet (20 mg total) by mouth daily with breakfast   • [DISCONTINUED] furosemide (LASIX) 40 mg tablet Take 1 tablet (40 mg total) by mouth daily   • [DISCONTINUED] methocarbamol (ROBAXIN) 500 mg tablet Take 2 tablets (1,000 mg total) by mouth 3 (three) times a day (Patient not taking: Reported on 3/20/2024)     No Known Allergies  Immunization History   Administered Date(s) Administered   • COVID-19 MODERNA VACC 0.5 ML IM 01/19/2021, 02/18/2021   • Influenza Quadrivalent 3 years and older 10/14/2018   • Influenza, high dose seasonal 0.7 mL 10/12/2023   • Influenza, recombinant, quadrivalent,injectable, preservative free 11/08/2022   • Influenza, seasonal, injectable 10/11/2013, 09/29/2015   • Pneumococcal Conjugate 13-Valent 09/29/2015   • Pneumococcal Polysaccharide PPV23 10/11/2013   • Tdap 11/02/2018, 11/24/2020   • Zoster Vaccine Recombinant 08/22/2019, 12/10/2019       Objective     /70 (BP Location: Left arm, Patient Position: Sitting, Cuff Size: Large)   Pulse 79   Temp 97.5 °F (36.4 °C)   Ht 5' 10\" (1.778 m)   Wt 132 kg " (290 lb 12.8 oz)   SpO2 96%   BMI 41.73 kg/m²     Physical Exam  Vitals reviewed.   HENT:      Head: Normocephalic.      Mouth/Throat:      Mouth: Mucous membranes are moist.   Eyes:      Extraocular Movements: Extraocular movements intact.      Conjunctiva/sclera: Conjunctivae normal.      Pupils: Pupils are equal, round, and reactive to light.   Cardiovascular:      Rate and Rhythm: Normal rate. Rhythm irregular.      Pulses: Pulses are weak.           Dorsalis pedis pulses are 1+ on the right side and 1+ on the left side.   Pulmonary:      Effort: Pulmonary effort is normal.      Breath sounds: No wheezing or rales.   Abdominal:      General: There is no distension.      Palpations: There is no mass.      Tenderness: There is no abdominal tenderness.   Musculoskeletal:         General: Tenderness present. No deformity.      Cervical back: No tenderness.      Right lower leg: Edema (trace) present.      Left lower leg: Edema (trace) present.      Comments: Decreased ROM of R shoulder with abduction, anterior flexion and internal/external rotation. Neg sulcus. Bicep tendon NT   Feet:      Right foot:      Skin integrity: No ulcer, skin breakdown, erythema, warmth, callus or dry skin.      Left foot:      Skin integrity: No ulcer, skin breakdown, erythema, warmth, callus or dry skin.   Lymphadenopathy:      Cervical: No cervical adenopathy.   Skin:     Capillary Refill: Capillary refill takes less than 2 seconds.   Neurological:      Mental Status: He is alert.      Cranial Nerves: No cranial nerve deficit.      Sensory: No sensory deficit.      Motor: No weakness.      Gait: Gait normal.   Psychiatric:         Mood and Affect: Mood normal.     Patient's shoes and socks removed.    Right Foot/Ankle   Right Foot Inspection  Skin Exam: skin normal and skin intact. No dry skin, no warmth, no callus, no erythema, no maceration, no abnormal color, no pre-ulcer, no ulcer and no callus.     Toe Exam: ROM and strength  within normal limits.     Sensory   Vibration: intact  Monofilament testing: intact    Vascular  Capillary refills: < 3 seconds  The right DP pulse is 1+.     Left Foot/Ankle  Left Foot Inspection  Skin Exam: skin normal and skin intact. No dry skin, no warmth, no erythema, no maceration, normal color, no pre-ulcer, no ulcer and no callus.     Toe Exam: ROM and strength within normal limits.     Sensory   Vibration: intact  Monofilament testing: intact    Vascular  Capillary refills: < 3 seconds  The left DP pulse is 1+.     Assign Risk Category  No deformity present  No loss of protective sensation  Weak pulses  Risk: 0     Víctor Palacios MD

## 2024-04-12 NOTE — ASSESSMENT & PLAN NOTE
Lab Results   Component Value Date    HGBA1C 8.1 (A) 04/12/2024   A1C better but still too high. Will start jardiance 10mg qd. I reviewed method of action, side effects and warnings with pt. Decrease lasix to 20mg qd. Recheck BMP in 2w and f/u 3m in office

## 2024-04-12 NOTE — ASSESSMENT & PLAN NOTE
Appears to have elements of adhesive capsulitis, though injury was recent.  Will refer pt to ortho for second opinion.  Encouraged ROM exercises in the interim

## 2024-04-12 NOTE — ASSESSMENT & PLAN NOTE
Wt Readings from Last 3 Encounters:   04/12/24 132 kg (290 lb 12.8 oz)   03/20/24 134 kg (295 lb 14.4 oz)   01/11/24 129 kg (285 lb 6.4 oz)   Weight improved compared to last visit. Will decrease lasix to 20mg qd due to starting Jardiance. Recheck BMP in 2w. Monitor weight. Pt to call if weight increases. Otherwise f/u with Cardio

## 2024-04-16 DIAGNOSIS — E11.9 TYPE 2 DIABETES MELLITUS WITHOUT COMPLICATION, WITHOUT LONG-TERM CURRENT USE OF INSULIN (HCC): ICD-10-CM

## 2024-04-16 NOTE — TELEPHONE ENCOUNTER
Reason for call:   [x] Refill   [] Prior Auth  [] Other:     Office:   [x] PCP/Provider - Philip  [] Specialty/Provider -     Medication: metFORMIN (GLUCOPHAGE) 1000 MG tablet     Dose/Frequency: 1,000 mg, Oral, 2 times daily with meals     Quantity: 180    Pharmacy: [place], [place] - [facility]     Does the patient have enough for 3 days?   [] Yes   [x] No - Send as HP to POD

## 2024-04-17 DIAGNOSIS — E11.9 TYPE 2 DIABETES MELLITUS WITHOUT COMPLICATION, WITHOUT LONG-TERM CURRENT USE OF INSULIN (HCC): ICD-10-CM

## 2024-04-17 DIAGNOSIS — E11.9 TYPE 2 DIABETES MELLITUS WITHOUT COMPLICATION, WITHOUT LONG-TERM CURRENT USE OF INSULIN (HCC): Primary | ICD-10-CM

## 2024-04-17 RX ORDER — BLOOD SUGAR DIAGNOSTIC
STRIP MISCELLANEOUS
Qty: 50 STRIP | Refills: 5 | Status: SHIPPED | OUTPATIENT
Start: 2024-04-17

## 2024-04-17 NOTE — TELEPHONE ENCOUNTER
Telephone call from patient's wife that he is running low on his test strips.     She said they need a refill on the Prodigy No Coding test strips. I wasn't sure if that was a brand name they needed or it's the test strips that are already on his chart from 2021.     Please advise and send in to [place], [place] - [address] [place] [address] [phone]

## 2024-05-01 ENCOUNTER — APPOINTMENT (OUTPATIENT)
Dept: LAB | Age: 64
End: 2024-05-01
Payer: MEDICARE

## 2024-05-01 DIAGNOSIS — E78.5 DYSLIPIDEMIA: ICD-10-CM

## 2024-05-01 DIAGNOSIS — I48.11 LONGSTANDING PERSISTENT ATRIAL FIBRILLATION (HCC): ICD-10-CM

## 2024-05-01 DIAGNOSIS — E11.9 TYPE 2 DIABETES MELLITUS WITHOUT COMPLICATION, WITHOUT LONG-TERM CURRENT USE OF INSULIN (HCC): Primary | ICD-10-CM

## 2024-05-01 LAB
ANION GAP SERPL CALCULATED.3IONS-SCNC: 8 MMOL/L (ref 4–13)
BUN SERPL-MCNC: 13 MG/DL (ref 5–25)
CALCIUM SERPL-MCNC: 9.2 MG/DL (ref 8.4–10.2)
CHLORIDE SERPL-SCNC: 102 MMOL/L (ref 96–108)
CO2 SERPL-SCNC: 30 MMOL/L (ref 21–32)
CREAT SERPL-MCNC: 1.2 MG/DL (ref 0.6–1.3)
GFR SERPL CREATININE-BSD FRML MDRD: 62 ML/MIN/1.73SQ M
GLUCOSE P FAST SERPL-MCNC: 121 MG/DL (ref 65–99)
POTASSIUM SERPL-SCNC: 5 MMOL/L (ref 3.5–5.3)
SODIUM SERPL-SCNC: 140 MMOL/L (ref 135–147)

## 2024-05-01 PROCEDURE — 36415 COLL VENOUS BLD VENIPUNCTURE: CPT

## 2024-05-01 PROCEDURE — 80048 BASIC METABOLIC PNL TOTAL CA: CPT

## 2024-05-23 DIAGNOSIS — F41.9 ANXIETY: ICD-10-CM

## 2024-05-23 NOTE — TELEPHONE ENCOUNTER
Reason for call:   [x] Refill   [] Prior Auth  [] Other:     Office:   [x] PCP/Provider -  [facility]  [] Specialty/Provider -     Medication:   citalopram (CeleXA) 20 mg tablet - Take 1 tablet (20 mg total) by mouth daily     Pharmacy:   [facility] [place] [place] - [address]     Does the patient have enough for 3 days?   [x] Yes   [] No - Send as HP to POD

## 2024-05-24 RX ORDER — CITALOPRAM 20 MG/1
20 TABLET ORAL DAILY
Qty: 90 TABLET | Refills: 1 | Status: SHIPPED | OUTPATIENT
Start: 2024-05-24

## 2024-06-27 ENCOUNTER — TELEPHONE (OUTPATIENT)
Dept: PULMONOLOGY | Facility: CLINIC | Age: 64
End: 2024-06-27

## 2024-06-27 NOTE — TELEPHONE ENCOUNTER
LVM for PT due to changing APPT location to the [facility] since the [place] office is still without power as of 6/27/2024.  has advised for all his APPTS on 6/28/024 to be moved to the [place] office.     Stated in my voicemail PT can keep the same time and day just a different location or they may reschedule the appt for another day and time if necessary.   
[Normal] : no rash

## 2024-06-28 ENCOUNTER — OFFICE VISIT (OUTPATIENT)
Dept: PULMONOLOGY | Facility: CLINIC | Age: 64
End: 2024-06-28
Payer: MEDICARE

## 2024-06-28 VITALS
DIASTOLIC BLOOD PRESSURE: 70 MMHG | OXYGEN SATURATION: 95 % | HEIGHT: 70 IN | SYSTOLIC BLOOD PRESSURE: 108 MMHG | WEIGHT: 275.8 LBS | TEMPERATURE: 97.4 F | HEART RATE: 64 BPM | BODY MASS INDEX: 39.48 KG/M2

## 2024-06-28 DIAGNOSIS — R91.1 LUNG NODULE: Primary | ICD-10-CM

## 2024-06-28 DIAGNOSIS — D86.9 SARCOIDOSIS: ICD-10-CM

## 2024-06-28 PROCEDURE — G2211 COMPLEX E/M VISIT ADD ON: HCPCS | Performed by: INTERNAL MEDICINE

## 2024-06-28 PROCEDURE — 99214 OFFICE O/P EST MOD 30 MIN: CPT | Performed by: INTERNAL MEDICINE

## 2024-06-28 NOTE — PROGRESS NOTES
Progress note - Pulmonary Medicine   Dusty Mckeon 64 y.o. male MRN: [id number]       Impression & Plan:     Sarcoidosis  Symptoms stable  Reports no worsening shortness of breath, cough, wheezing, or other pulmonary symptomatology  Has albuterol nebulizer and albuterol inhaler if needed but has not found occasion to need these recently.  He only seems to require them after mowing the grass which seems to cause some shortness of breath.  Otherwise remains quite stable.  We discussed imaging and pulmonary function.  Given stability in his symptoms, will defer any testing this year unless he develops new or worsening symptoms    Lung nodule  Had multiple lung nodules associated with sarcoidosis  Nothing concerning on the most recent scan and imaging will be deferred for at least another year    Return in about 1 year (around 6/28/2025).      ______________________________________________________________________    HPI:    Dusty Mckeon presents today for follow-up of ecchymosis.  He has previously had lung nodules.  He has previously had worsening respiratory symptoms after upper respiratory illness but currently has been doing quite well.  He reports no new shortness of breath, no need for routine use of the albuterol and has not had cough, phlegm, chest tightness or chest pain.  Denies any change in vision.  No rashes.  No abdominal pain or GERD.  No new arthralgias or myalgias.  He remains overweight.  He does have sleep apnea which is untreated, does not wish to pursue CPAP.    He does get some shortness of breath depending upon environmental factors including heat, humidity, exertion, and exposure to environmental factors such as grass    Current Medications:    Current Outpatient Medications:     albuterol (2.5 mg/3 mL) 0.083 % nebulizer solution, Take 2.5 mg by nebulization every 6 (six) hours as needed, Disp: , Rfl:     albuterol (Ventolin HFA) 90 mcg/act inhaler, Inhale 1 puff 4 (four) times a day,  Disp: 18 g, Rfl: 6    atorvastatin (LIPITOR) 40 mg tablet, Take 1 tablet (40 mg total) by mouth daily at bedtime, Disp: 90 tablet, Rfl: 3    citalopram (CeleXA) 20 mg tablet, Take 1 tablet (20 mg total) by mouth daily, Disp: 90 tablet, Rfl: 1    digoxin (LANOXIN) 0.125 mg tablet, Take 1 tablet (125 mcg total) by mouth daily, Disp: 90 tablet, Rfl: 3    Empagliflozin (JARDIANCE) 10 MG TABS tablet, Take 1 tablet (10 mg total) by mouth daily, Disp: 30 tablet, Rfl: 5    furosemide (LASIX) 40 mg tablet, Take 0.5 tablets (20 mg total) by mouth daily, Disp: 90 tablet, Rfl: 3    glucose blood (Prodigy No Coding Blood Gluc) test strip, Use as instructed qd, Disp: 50 strip, Rfl: 5    glucose blood test strip, Use 1 each daily Use as instructed, Disp: 50 each, Rfl: 5    metFORMIN (GLUCOPHAGE) 1000 MG tablet, Take 1 tablet (1,000 mg total) by mouth 2 (two) times a day with meals, Disp: 180 tablet, Rfl: 0    nebivolol (BYSTOLIC) 10 mg tablet, Take 1 tablet (10 mg total) by mouth daily, Disp: 90 tablet, Rfl: 3    nebivolol (BYSTOLIC) 20 MG tablet, Take 1 tablet (20 mg total) by mouth daily, Disp: 90 tablet, Rfl: 3    NEBULIZER SYSTEM ALL-IN-ONE MISC, 1 Device by Does not apply route every 4 (four) hours while awake, Disp: 1 each, Rfl: 0    rivaroxaban (Xarelto) 20 mg tablet, Take 1 tablet (20 mg total) by mouth daily with breakfast, Disp: 90 tablet, Rfl: 3    Review of Systems:    Aside from what is mentioned in the HPI, the review of systems is otherwise negative    Past medical history, surgical history, and family history were reviewed and updated as appropriate    Social history updates:  Social History     Tobacco Use   Smoking Status Former    Current packs/day: 0.00    Average packs/day: 1 pack/day for 8.0 years (8.0 ttl pk-yrs)    Types: Cigarettes    Start date:     Quit date:     Years since quittin.5   Smokeless Tobacco Never   Tobacco Comments    2 ppd x 5 years       PhysicalExamination:  Vitals:   BP  "108/70 (BP Location: Left arm, Patient Position: Sitting, Cuff Size: Large)   Pulse 64   Temp (!) 97.4 °F (36.3 °C) (Tympanic)   Ht 5' 10\" (1.778 m)   Wt 125 kg (275 lb 12.8 oz)   SpO2 95%   BMI 39.57 kg/m²     Gen:  Comfortable on room air.  No conversational dyspnea  HEENT:  Conjugate gaze.  sclerae anicteric.  Oropharynx moist  Neck: Trachea is midline. No JVD. No adenopathy  Chest: Lungs are clear bilaterally.  No wheezing or crackles.  Cardiac: Slightly distant heart tones, regular. no murmur  Abdomen:  benign  Extremities: No edema  Neuro:  Normal speech and mentation    Diagnostic Data:  Labs:  I personally reviewed the most recent laboratory data pertinent to today's visit    Lab Results   Component Value Date    WBC 4.56 01/09/2024    HGB 14.6 01/09/2024    HCT 43.4 01/09/2024    MCV 91 01/09/2024     01/09/2024     Lab Results   Component Value Date    SODIUM 140 05/01/2024    K 5.0 05/01/2024    CO2 30 05/01/2024     05/01/2024    BUN 13 05/01/2024    CREATININE 1.20 05/01/2024    GLUCOSE 200 (H) 09/30/2015    CALCIUM 9.2 05/01/2024         Imaging:  I personally reviewed the images on the PAC system pertinent to today's visit  Last imaging was from March 2023.  He had some mild groundglass opacity but overall diminished and nodular density that was previously identified resolved.  Chronic remaining nodularity is attributable to sarcoid related changes.      Víctor Mosqueda MD  "

## 2024-06-28 NOTE — ASSESSMENT & PLAN NOTE
Had multiple lung nodules associated with sarcoidosis  Nothing concerning on the most recent scan and imaging will be deferred for at least another year

## 2024-06-28 NOTE — ASSESSMENT & PLAN NOTE
Symptoms stable  Reports no worsening shortness of breath, cough, wheezing, or other pulmonary symptomatology  Has albuterol nebulizer and albuterol inhaler if needed but has not found occasion to need these recently.  He only seems to require them after mowing the grass which seems to cause some shortness of breath.  Otherwise remains quite stable.  We discussed imaging and pulmonary function.  Given stability in his symptoms, will defer any testing this year unless he develops new or worsening symptoms

## 2024-07-09 ENCOUNTER — REMOTE DEVICE CLINIC VISIT (OUTPATIENT)
Dept: CARDIOLOGY CLINIC | Facility: CLINIC | Age: 64
End: 2024-07-09
Payer: MEDICARE

## 2024-07-09 DIAGNOSIS — Z95.0 PRESENCE OF PERMANENT CARDIAC PACEMAKER: Primary | ICD-10-CM

## 2024-07-09 PROCEDURE — 93296 REM INTERROG EVL PM/IDS: CPT | Performed by: STUDENT IN AN ORGANIZED HEALTH CARE EDUCATION/TRAINING PROGRAM

## 2024-07-09 PROCEDURE — 93294 REM INTERROG EVL PM/LDLS PM: CPT | Performed by: STUDENT IN AN ORGANIZED HEALTH CARE EDUCATION/TRAINING PROGRAM

## 2024-07-09 NOTE — PROGRESS NOTES
MDT BI-V PPM (VVIR MODE)/ACTIVE SYSTEM IS MRI CONDITIONAL   CARELINK TRANSMISSION:  BATTERY VOLTAGE ADEQUATE (9.0 YR.).  BP 23.2% (VVI 60 PPM).  ALL AVAILABLE LEAD PARAMETERS WITHIN NORMAL LIMITS.  NO SIGNIFICANT HIGH RATE EPISODES.  OPTI-VOL WITHIN NORMAL LIMITS.  NORMAL DEVICE FUNCTION.  RG

## 2024-07-25 DIAGNOSIS — E11.9 TYPE 2 DIABETES MELLITUS WITHOUT COMPLICATION, WITHOUT LONG-TERM CURRENT USE OF INSULIN (HCC): ICD-10-CM

## 2024-07-25 NOTE — TELEPHONE ENCOUNTER
Reason for call:   [x] Refill   [] Prior Auth  [] Other:     Office:   [x] PCP/Provider - Víctor Palacios MD   [] Specialty/Provider -     Medication: metFORMIN 1000 mg    Dose/Frequency: 1 tab BID / 200 tabs      Pharmacy: [facility] - [place], [place] - [facility] [address]      Does the patient have enough for 3 days?   [] Yes   [x] No - Send as HP to POD

## 2024-07-31 ENCOUNTER — IN-CLINIC DEVICE VISIT (OUTPATIENT)
Dept: CARDIOLOGY CLINIC | Facility: CLINIC | Age: 64
End: 2024-07-31
Payer: MEDICARE

## 2024-07-31 DIAGNOSIS — R00.0 TACHYCARDIA INDUCED CARDIOMYOPATHY (HCC): ICD-10-CM

## 2024-07-31 DIAGNOSIS — I43 TACHYCARDIA INDUCED CARDIOMYOPATHY (HCC): ICD-10-CM

## 2024-07-31 DIAGNOSIS — Z95.0 PRESENCE OF CARDIAC PACEMAKER: Primary | ICD-10-CM

## 2024-07-31 DIAGNOSIS — I49.3 PREMATURE VENTRICULAR CONTRACTIONS (PVCS) (VPCS): ICD-10-CM

## 2024-07-31 PROCEDURE — 93281 PM DEVICE PROGR EVAL MULTI: CPT | Performed by: STUDENT IN AN ORGANIZED HEALTH CARE EDUCATION/TRAINING PROGRAM

## 2024-07-31 NOTE — PROGRESS NOTES
Results for orders placed or performed in visit on 07/31/24   Cardiac EP device report    Narrative    MDT BI-V PPM (VVIR MODE)/ACTIVE SYSTEM IS MRI CONDITIONAL  DEVICE INTERROGATED IN THE [place] OFFICE. BATTERY VOLTAGE ADEQUATE (8.9 YRS).  18% (VSR 0.0%; <90% VVI 60). ALL LEAD PARAMETERS WITHIN NORMAL LIMITS. NO NEW SIGNIFICANT HIGH RATE EPISODES.  >91K V SENSE EPISODES SINCE 4/7/2023, MARKERS SHOW VS, BVP.  TAKING XARELTO, DIG, NEBIVOLOL. EF 65% (ECHO 3/4/2023).  OPTI-VOL WITHIN NORMAL LIMITS. NO PROGRAMMING CHANGES MADE TO DEVICE PARAMETERS.  NORMAL DEVICE FUNCTION. PAS/GV

## 2024-08-14 ENCOUNTER — TELEPHONE (OUTPATIENT)
Dept: ADMINISTRATIVE | Facility: OTHER | Age: 64
End: 2024-08-14

## 2024-08-14 NOTE — TELEPHONE ENCOUNTER
08/14/24 11:19 AM    Patient contacted to bring Advance Directive, POLST, or Living Will document to next scheduled pcp visit.[facility] Department left message.    Thank you.  Ofelia Falcon  [facility]

## 2024-08-15 ENCOUNTER — OFFICE VISIT (OUTPATIENT)
Dept: FAMILY MEDICINE CLINIC | Facility: CLINIC | Age: 64
End: 2024-08-15
Payer: MEDICARE

## 2024-08-15 VITALS
WEIGHT: 272 LBS | HEART RATE: 76 BPM | OXYGEN SATURATION: 97 % | SYSTOLIC BLOOD PRESSURE: 120 MMHG | BODY MASS INDEX: 38.94 KG/M2 | DIASTOLIC BLOOD PRESSURE: 76 MMHG | TEMPERATURE: 97.3 F | HEIGHT: 70 IN

## 2024-08-15 DIAGNOSIS — I48.11 LONGSTANDING PERSISTENT ATRIAL FIBRILLATION (HCC): ICD-10-CM

## 2024-08-15 DIAGNOSIS — E11.9 TYPE 2 DIABETES MELLITUS WITHOUT COMPLICATION, WITHOUT LONG-TERM CURRENT USE OF INSULIN (HCC): Primary | ICD-10-CM

## 2024-08-15 DIAGNOSIS — I10 ESSENTIAL HYPERTENSION: ICD-10-CM

## 2024-08-15 DIAGNOSIS — I50.32 CHRONIC DIASTOLIC (CONGESTIVE) HEART FAILURE (HCC): ICD-10-CM

## 2024-08-15 LAB — SL AMB POCT HEMOGLOBIN AIC: 7 (ref ?–6.5)

## 2024-08-15 PROCEDURE — 83036 HEMOGLOBIN GLYCOSYLATED A1C: CPT | Performed by: FAMILY MEDICINE

## 2024-08-15 PROCEDURE — 99214 OFFICE O/P EST MOD 30 MIN: CPT | Performed by: FAMILY MEDICINE

## 2024-08-15 NOTE — PROGRESS NOTES
Ambulatory Visit  Name: Dusty Mckeon      : 1960      MRN: [id number]  Encounter Provider: Víctor Palacios MD  Encounter Date: 8/15/2024   Encounter department: [facility]    Assessment & Plan   1. Type 2 diabetes mellitus without complication, without long-term current use of insulin (HCC)  Assessment & Plan:    Lab Results   Component Value Date    HGBA1C 7.0 (A) 08/15/2024   Improved with diet compliance and weight loss. Recheck 6m  Orders:  -     POCT hemoglobin A1c  -     CBC and differential; Future  -     Comprehensive metabolic panel; Future  2. Longstanding persistent atrial fibrillation (HCC)  Assessment & Plan:  Rate controlled. Continue present care. F/u with Cardio.  Recheck 6m  3. Essential hypertension  Assessment & Plan:  Well controlled. Cont present treatment. Monitor labs. Recheck 6m    4. Chronic diastolic (congestive) heart failure (HCC)  Assessment & Plan:  Wt Readings from Last 3 Encounters:   08/15/24 123 kg (272 lb)   24 125 kg (275 lb 12.8 oz)   24 132 kg (290 lb 12.8 oz)   Weight improved with change in diet  Trace edema in legs - otherwise looks euvolemic  Continue present care. Monitor labs. F/u with Cardio.  Recheck 6m                 History of Present Illness     f/u multiple med issues   - pt continues to do well  - pt thinks that he has been doing better with his diet. Pt recently lost 3lb.  A1C in the office today = 7.0  - pt up to date with Cardio.  Denies CP, palpitations, lightheadedness or other CV symptoms with or without exertion  - pt up to date with Pulm re: sarcoidosis. No worsening noted. Pt compliant with inhalers  - no new GI or  issues        Review of Systems   Constitutional: Negative.    HENT: Negative.     Eyes: Negative.    Respiratory: Negative.     Cardiovascular: Negative.    Gastrointestinal: Negative.    Genitourinary: Negative.    Musculoskeletal:  Positive for arthralgias and myalgias. Negative  for back pain, joint swelling, neck pain and neck stiffness.   Skin: Negative.    Neurological:  Negative for dizziness, weakness, light-headedness, numbness and headaches.     Past Medical History:   Diagnosis Date   • A-fib (HCC)    • Chronic systolic congestive heart failure (HCC)     last assessed - 2014   • Diabetes mellitus (HCC)    • History of cardiac catheterization 10/2012    normal coronaries w/o significant obstructive coronary disease   • Pacemaker     last assessed - 2017   • Sarcoidosis    • Sleep apnea      Past Surgical History:   Procedure Laterality Date   • CARDIAC ELECTROPHYSIOLOGY PROCEDURE N/A 12/3/2021    Procedure: PPM generator change - dual;  Surgeon: Raf Nelson DO;  Location: [facility];  Service: Cardiology   • CARDIAC ELECTROPHYSIOLOGY PROCEDURE N/A 12/3/2021    Procedure: CARDIAC UPGRADE PACER TO BIV PACER;  Surgeon: Raf Nelson DO;  Location: [facility];  Service: Cardiology   • CARDIOVERSION      Elective   • FISTULA REPAIR      perirectal   • INSERT / REPLACE / REMOVE PACEMAKER     • TONSILLECTOMY      's     Family History   Problem Relation Age of Onset   • Atrial fibrillation Mother    • Cancer Mother    • Heart disease Father    • Hypertension Father    • Skin cancer Father    • Cancer Family    • Transient ischemic attack Family         without residual deficits     Social History     Tobacco Use   • Smoking status: Former     Current packs/day: 0.00     Average packs/day: 1 pack/day for 8.0 years (8.0 ttl pk-yrs)     Types: Cigarettes     Start date:      Quit date:      Years since quittin.6   • Smokeless tobacco: Never   • Tobacco comments:     2 ppd x 5 years   Vaping Use   • Vaping status: Never Used   Substance and Sexual Activity   • Alcohol use: Yes     Alcohol/week: 4.0 - 5.0 standard drinks of alcohol     Types: 4 - 5 Cans of beer per week     Comment: rare   • Drug use: No   • Sexual activity: Not on file     Current  Outpatient Medications on File Prior to Visit   Medication Sig   • albuterol (2.5 mg/3 mL) 0.083 % nebulizer solution Take 2.5 mg by nebulization every 6 (six) hours as needed   • albuterol (Ventolin HFA) 90 mcg/act inhaler Inhale 1 puff 4 (four) times a day   • atorvastatin (LIPITOR) 40 mg tablet Take 1 tablet (40 mg total) by mouth daily at bedtime   • citalopram (CeleXA) 20 mg tablet Take 1 tablet (20 mg total) by mouth daily   • digoxin (LANOXIN) 0.125 mg tablet Take 1 tablet (125 mcg total) by mouth daily   • Empagliflozin (JARDIANCE) 10 MG TABS tablet Take 1 tablet (10 mg total) by mouth daily   • furosemide (LASIX) 40 mg tablet Take 0.5 tablets (20 mg total) by mouth daily   • glucose blood (Prodigy No Coding Blood Gluc) test strip Use as instructed qd   • glucose blood test strip Use 1 each daily Use as instructed   • metFORMIN (GLUCOPHAGE) 1000 MG tablet Take 1 tablet (1,000 mg total) by mouth 2 (two) times a day with meals   • nebivolol (BYSTOLIC) 10 mg tablet Take 1 tablet (10 mg total) by mouth daily   • nebivolol (BYSTOLIC) 20 MG tablet Take 1 tablet (20 mg total) by mouth daily   • NEBULIZER SYSTEM ALL-IN-ONE MISC 1 Device by Does not apply route every 4 (four) hours while awake   • rivaroxaban (Xarelto) 20 mg tablet Take 1 tablet (20 mg total) by mouth daily with breakfast     No Known Allergies  Immunization History   Administered Date(s) Administered   • COVID-19 MODERNA VACC 0.5 ML IM 01/19/2021, 02/18/2021   • INFLUENZA 10/31/2021   • Influenza Quadrivalent 3 years and older 10/14/2018   • Influenza, high dose seasonal 0.7 mL 10/12/2023   • Influenza, recombinant, quadrivalent,injectable, preservative free 11/08/2022   • Influenza, seasonal, injectable 10/11/2013, 09/29/2015   • Pneumococcal Conjugate 13-Valent 09/29/2015   • Pneumococcal Polysaccharide PPV23 10/11/2013   • Tdap 11/02/2018, 11/24/2020   • Zoster Vaccine Recombinant 08/22/2019, 12/10/2019     Objective     /76 (BP Location:  "Left arm, Patient Position: Sitting, Cuff Size: Large)   Pulse 76   Temp (!) 97.3 °F (36.3 °C)   Ht 5' 10\" (1.778 m)   Wt 123 kg (272 lb)   SpO2 97%   BMI 39.03 kg/m²     Physical Exam  Vitals reviewed.   HENT:      Head: Normocephalic.      Mouth/Throat:      Mouth: Mucous membranes are moist.   Eyes:      Extraocular Movements: Extraocular movements intact.      Conjunctiva/sclera: Conjunctivae normal.      Pupils: Pupils are equal, round, and reactive to light.   Cardiovascular:      Rate and Rhythm: Normal rate. Rhythm irregular.      Pulses: Normal pulses.   Pulmonary:      Effort: Pulmonary effort is normal.   Abdominal:      General: There is no distension.      Palpations: There is no mass.      Tenderness: There is no abdominal tenderness.   Musculoskeletal:         General: No swelling or tenderness.      Cervical back: No tenderness.      Right lower leg: Edema (trace) present.      Left lower leg: Edema (trace) present.   Lymphadenopathy:      Cervical: No cervical adenopathy.   Skin:     General: Skin is warm.      Capillary Refill: Capillary refill takes less than 2 seconds.   Neurological:      General: No focal deficit present.      Mental Status: He is alert and oriented to person, place, and time.   Psychiatric:         Mood and Affect: Mood normal.         "

## 2024-08-18 NOTE — ASSESSMENT & PLAN NOTE
Lab Results   Component Value Date    HGBA1C 7.0 (A) 08/15/2024   Improved with diet compliance and weight loss. Recheck 6m

## 2024-08-18 NOTE — ASSESSMENT & PLAN NOTE
Wt Readings from Last 3 Encounters:   08/15/24 123 kg (272 lb)   06/28/24 125 kg (275 lb 12.8 oz)   04/12/24 132 kg (290 lb 12.8 oz)   Weight improved with change in diet  Trace edema in legs - otherwise looks euvolemic  Continue present care. Monitor labs. F/u with Cardio.  Recheck 6m

## 2024-08-29 ENCOUNTER — NURSE TRIAGE (OUTPATIENT)
Age: 64
End: 2024-08-29

## 2024-08-29 DIAGNOSIS — U07.1 COVID-19: Primary | ICD-10-CM

## 2024-08-29 RX ORDER — MOLNUPIRAVIR 200 MG/1
800 CAPSULE ORAL EVERY 12 HOURS
Qty: 40 CAPSULE | Refills: 0 | Status: SHIPPED | OUTPATIENT
Start: 2024-08-29 | End: 2024-09-03

## 2024-08-29 NOTE — TELEPHONE ENCOUNTER
"Received call from patient and spouse to report home test Covid positive today 8/29. Multiple family members have Covid post previous weekend gathering. Worst symptom is diarrhea that started this morning with 4 episodes of loose stools to date. Other symptoms include chest and nasal congestion, fever 100.6 F oral, and headache. Patient is high risk for age>65, diabetic, sarcoidosis, and obesity with BMI>25. Reports patient can't take paxlovid; prescribed molnupiravir (Lagevrio) 200 mg capsule 3/15/23. Checks BS daily; did not check today and does not recall results from 8/28. Advised to check today and monitor changes in results. Advised to hydrate; use Pedialyte or half diluted Gatorade, small easy meals, Tylenol not to exceed 3000 mg in 24 hours, and rest. No OV available for telemedicine visit. Please follow up with patient and spouse for PCP response.     Advised spouse to monitor self for symptoms and test in 5 days if no symptoms and earlier if she starts showing symptoms.    Reason for Disposition   HIGH RISK for severe COVID complications (e.g., weak immune system, age > 64 years, obesity with BMI > 25, pregnant, chronic lung disease or other chronic medical condition) (Exception: Already seen by PCP and no new or worsening symptoms.)    Answer Assessment - Initial Assessment Questions  1. COVID-19 DIAGNOSIS: \"Who made your COVID-19 diagnosis?\" \"Was it confirmed by a positive lab test or self-test?\" If not diagnosed by a doctor (or NP/PA), ask \"Are there lots of cases (community spread) where you live?\" Note: See public health department website, if unsure.      Home test this morning 8/29  2. COVID-19 EXPOSURE: \"Was there any known exposure to COVID before the symptoms began?\" CDC Definition of close contact: within 6 feet (2 meters) for a total of 15 minutes or more over a 24-hour period.       unknown  3. ONSET: \"When did the COVID-19 symptoms start?\"       Wednesday 8/28  4. WORST SYMPTOM: \"What is your " "worst symptom?\" (e.g., cough, fever, shortness of breath, muscle aches)      diarrhea  5. COUGH: \"Do you have a cough?\" If Yes, ask: \"How bad is the cough?\"        denies  6. FEVER: \"Do you have a fever?\" If Yes, ask: \"What is your temperature, how was it measured, and when did it start?\"      100.6 F oral  7. RESPIRATORY STATUS: \"Describe your breathing?\" (e.g., shortness of breath, wheezing, unable to speak)       Denies  8. BETTER-SAME-WORSE: \"Are you getting better, staying the same or getting worse compared to yesterday?\"  If getting worse, ask, \"In what way?\"      Worse than yesterday  9. HIGH RISK DISEASE: \"Do you have any chronic medical problems?\" (e.g., asthma, heart or lung disease, weak immune system, obesity, etc.)      Diabetes type II, Sarcoidosis, Pacemaker for A-fib, obesity  10. VACCINE: \"Have you had the COVID-19 vaccine?\" If Yes, ask: \"Which one, how many shots, when did you get it?\"        Moderna x 2  11. BOOSTER: \"Have you received your COVID-19 booster?\" If Yes, ask: \"Which one and when did you get it?\"        Several boosters  12. PREGNANCY: \"Is there any chance you are pregnant?\" \"When was your last menstrual period?\"        N/a  13. OTHER SYMPTOMS: \"Do you have any other symptoms?\"  (e.g., chills, fatigue, headache, loss of smell or taste, muscle pain, sore throat)        Chest and nasal congestion, diarrhea, headache  14. O2 SATURATION MONITOR:  \"Do you use an oxygen saturation monitor (pulse oximeter) at home?\" If Yes, ask \"What is your reading (oxygen level) today?\" \"What is your usual oxygen saturation reading?\" (e.g., 95%)        91%    Protocols used: Coronavirus (COVID-19) Diagnosed or Suspected-ADULT-[place]    "

## 2024-09-05 DIAGNOSIS — I48.91 ATRIAL FIBRILLATION, UNSPECIFIED TYPE (HCC): ICD-10-CM

## 2024-09-05 RX ORDER — RIVAROXABAN 20 MG/1
20 TABLET, FILM COATED ORAL
Qty: 30 TABLET | Refills: 5 | Status: SHIPPED | OUTPATIENT
Start: 2024-09-05

## 2024-10-03 ENCOUNTER — OFFICE VISIT (OUTPATIENT)
Dept: CARDIOLOGY CLINIC | Facility: CLINIC | Age: 64
End: 2024-10-03
Payer: MEDICARE

## 2024-10-03 VITALS
WEIGHT: 256 LBS | HEIGHT: 70 IN | DIASTOLIC BLOOD PRESSURE: 72 MMHG | SYSTOLIC BLOOD PRESSURE: 100 MMHG | BODY MASS INDEX: 36.65 KG/M2 | OXYGEN SATURATION: 97 % | HEART RATE: 59 BPM

## 2024-10-03 DIAGNOSIS — E11.9 TYPE 2 DIABETES MELLITUS WITHOUT COMPLICATION, WITHOUT LONG-TERM CURRENT USE OF INSULIN (HCC): ICD-10-CM

## 2024-10-03 DIAGNOSIS — I48.91 ATRIAL FIBRILLATION, UNSPECIFIED TYPE (HCC): ICD-10-CM

## 2024-10-03 DIAGNOSIS — I10 ESSENTIAL HYPERTENSION: ICD-10-CM

## 2024-10-03 DIAGNOSIS — I48.11 LONGSTANDING PERSISTENT ATRIAL FIBRILLATION (HCC): ICD-10-CM

## 2024-10-03 DIAGNOSIS — I50.9 CONGESTIVE HEART FAILURE, UNSPECIFIED HF CHRONICITY, UNSPECIFIED HEART FAILURE TYPE (HCC): ICD-10-CM

## 2024-10-03 DIAGNOSIS — E78.5 DYSLIPIDEMIA: ICD-10-CM

## 2024-10-03 DIAGNOSIS — I50.32 CHRONIC DIASTOLIC (CONGESTIVE) HEART FAILURE (HCC): ICD-10-CM

## 2024-10-03 DIAGNOSIS — R00.0 TACHYCARDIA INDUCED CARDIOMYOPATHY (HCC): Primary | ICD-10-CM

## 2024-10-03 DIAGNOSIS — I43 TACHYCARDIA INDUCED CARDIOMYOPATHY (HCC): Primary | ICD-10-CM

## 2024-10-03 DIAGNOSIS — I48.21 PERMANENT ATRIAL FIBRILLATION (HCC): ICD-10-CM

## 2024-10-03 DIAGNOSIS — I49.3 PREMATURE VENTRICULAR CONTRACTIONS (PVCS) (VPCS): ICD-10-CM

## 2024-10-03 PROCEDURE — G2211 COMPLEX E/M VISIT ADD ON: HCPCS | Performed by: INTERNAL MEDICINE

## 2024-10-03 PROCEDURE — 99215 OFFICE O/P EST HI 40 MIN: CPT | Performed by: INTERNAL MEDICINE

## 2024-10-03 RX ORDER — DIGOXIN 125 MCG
125 TABLET ORAL DAILY
Qty: 90 TABLET | Refills: 3 | Status: SHIPPED | OUTPATIENT
Start: 2024-10-03

## 2024-10-03 RX ORDER — FUROSEMIDE 40 MG
20 TABLET ORAL DAILY
Qty: 90 TABLET | Refills: 3 | Status: SHIPPED | OUTPATIENT
Start: 2024-10-03

## 2024-10-03 RX ORDER — ATORVASTATIN CALCIUM 40 MG/1
40 TABLET, FILM COATED ORAL
Qty: 90 TABLET | Refills: 3 | Status: SHIPPED | OUTPATIENT
Start: 2024-10-03

## 2024-10-03 NOTE — PROGRESS NOTES
[facility] Cardiology  Follow up note  Dusty Mckeon 64 y.o. male MRN: [id number]        Impression:    Permanent atrial fibrillation  Continues on Xarelto  Excellent rate control on beta-blocker and digoxin  CBC normal    Hypertension  Fantastically controlled    Dyslipidemia  Very well-controlled    Recovered arrhythmia related cardiomyopathy/diastolic CHF  Due to A-fib/RVR and high PVC burden in the past  Last EF 65% as of 4/23  Only requires low-dose diuretic  Has had an increase in OptiVol threshold in the past when he has withheld his diuretic on vacation.  He examines euvolemic today    Sick sinus syndrome/BiV pacer  BiV upgrade was 12/21  At this point his BiV pacing is less than 25%  He actually has excellent narrow complex conduction the majority of the time  He has a preserved, recovered EF  He does not require high BiV pacing percentage    Premature ventricular contractions  Seem to be fairly well suppressed with his nebivolol and digoxin    Obstructive sleep apnea  He does not tolerate CPAP    Type 2 diabetes  A1c 7 under reasonable control    Plan:    He has had 7 device checks over the last 6 months, all were fully reviewed, he has low pacing percentage in the context of a BiV device, but with a recovered EF, and fairly normal native conduction of his A-fib.  He feels well, has no CHF decompensation, no further testing is felt necessary at this point.  No change to meds today  Needs a digoxin level  6-month nurse practitioner follow-up, 1 year with me      HPI:   Dusty Mckeon is a 64 y.o. 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  He is on nebivolol and digoxin  Heart rate today 59  He offers no cardiac symptoms, denies lightheadedness, dizziness, dyspnea, edema, hospitalizations, CHF exacerbation  Blood pressure is excellent  Cholesterol well-controlled  A1c 7 under reasonable control.    Review of Systems   Constitutional:  Negative for appetite change, diaphoresis, fatigue and fever.   Respiratory:  Negative for chest tightness, shortness of breath and wheezing.    Cardiovascular:  Negative for chest pain, palpitations and leg swelling.   Gastrointestinal:  Negative for abdominal pain and blood in stool.   Musculoskeletal:  Negative for arthralgias and joint swelling.   Skin:  Negative for rash.   Neurological:  Negative for dizziness, syncope and light-headedness.   All other systems reviewed and are negative.      Past Medical History:   Diagnosis Date    A-fib (HCC)     Chronic systolic congestive heart failure (HCC)     last assessed - 2014    Diabetes mellitus (HCC)     History of cardiac catheterization 10/2012    normal coronaries w/o significant obstructive coronary disease    Pacemaker     last assessed - 2017    Sarcoidosis     Sleep apnea      Social History     Substance and Sexual Activity   Alcohol Use Yes    Alcohol/week: 4.0 - 5.0 standard drinks of alcohol    Types: 4 - 5 Cans of beer per week    Comment: rare     Social History     Substance and Sexual Activity   Drug Use No     Social History     Tobacco Use   Smoking Status Former    Current packs/day: 0.00    Average packs/day: 1 pack/day for 8.0 years (8.0 ttl pk-yrs)    Types: Cigarettes    Start date:     Quit date: 1980    Years since quittin.7   Smokeless Tobacco Never   Tobacco Comments    2 ppd x 5 years       Allergies:  No Known Allergies    Medications:     Current Outpatient Medications:     albuterol (2.5 mg/3 mL) 0.083  % nebulizer solution, Take 2.5 mg by nebulization every 6 (six) hours as needed, Disp: , Rfl:     albuterol (Ventolin HFA) 90 mcg/act inhaler, Inhale 1 puff 4 (four) times a day, Disp: 18 g, Rfl: 6    atorvastatin (LIPITOR) 40 mg tablet, Take 1 tablet (40 mg total) by mouth daily at bedtime, Disp: 90 tablet, Rfl: 3    citalopram (CeleXA) 20 mg tablet, Take 1 tablet (20 mg total) by mouth daily, Disp: 90 tablet, Rfl: 1    digoxin (LANOXIN) 0.125 mg tablet, Take 1 tablet (125 mcg total) by mouth daily, Disp: 90 tablet, Rfl: 3    Empagliflozin (JARDIANCE) 10 MG TABS tablet, Take 1 tablet (10 mg total) by mouth daily, Disp: 30 tablet, Rfl: 5    furosemide (LASIX) 40 mg tablet, Take 0.5 tablets (20 mg total) by mouth daily, Disp: 90 tablet, Rfl: 3    glucose blood (Prodigy No Coding Blood Gluc) test strip, Use as instructed qd, Disp: 50 strip, Rfl: 5    glucose blood test strip, Use 1 each daily Use as instructed, Disp: 50 each, Rfl: 5    metFORMIN (GLUCOPHAGE) 1000 MG tablet, Take 1 tablet (1,000 mg total) by mouth 2 (two) times a day with meals, Disp: 200 tablet, Rfl: 1    nebivolol (BYSTOLIC) 10 mg tablet, Take 1 tablet (10 mg total) by mouth daily, Disp: 90 tablet, Rfl: 3    nebivolol (BYSTOLIC) 20 MG tablet, Take 1 tablet (20 mg total) by mouth daily, Disp: 90 tablet, Rfl: 3    NEBULIZER SYSTEM ALL-IN-ONE MISC, 1 Device by Does not apply route every 4 (four) hours while awake, Disp: 1 each, Rfl: 0    rivaroxaban (Xarelto) 20 mg tablet, Take 1 tablet (20 mg total) by mouth daily with breakfast, Disp: 30 tablet, Rfl: 5      Vitals:    10/03/24 0811   BP: 100/72   Pulse: 59   SpO2: 97%     Weight (last 2 days)       Date/Time Weight    10/03/24 0811 116 (256)          Physical Exam  Constitutional:       General: He is not in acute distress.     Appearance: Normal appearance. He is well-developed. He is not diaphoretic.   HENT:      Head: Normocephalic and atraumatic.   Eyes:      General: No scleral icterus.      "Conjunctiva/sclera: Conjunctivae normal.      Pupils: Pupils are equal, round, and reactive to light.   Neck:      Thyroid: No thyromegaly.      Vascular: No JVD.   Cardiovascular:      Rate and Rhythm: Normal rate. Rhythm irregular.      Heart sounds: Normal heart sounds. No murmur heard.     No friction rub. No gallop.   Pulmonary:      Effort: Pulmonary effort is normal. No respiratory distress.      Breath sounds: Normal breath sounds. No wheezing or rales.   Abdominal:      General: Bowel sounds are normal. There is no distension.      Palpations: Abdomen is soft.      Tenderness: There is no abdominal tenderness.   Musculoskeletal:         General: No deformity. Normal range of motion.      Cervical back: Normal range of motion and neck supple.      Right lower leg: No edema.      Left lower leg: No edema.   Skin:     General: Skin is warm and dry.      Findings: No erythema or rash.   Neurological:      General: No focal deficit present.      Mental Status: He is alert and oriented to person, place, and time.      Cranial Nerves: No cranial nerve deficit.      Motor: No weakness.         Laboratory Studies:    Laboratory studies personally reviewed    Cardiac testing:     EKG reviewed personally:   No results found for this visit on 10/03/24.        Echocardiogram:  2019-EF normal  11/21-LVEF 50%, mild LVH, RV mildly dilated, LA mildly dilated, RA mildly dilated, mild-to-moderate MR, moderate TR with normal pulmonary pressure  4/23-EF up to 65%, no significant valve disease noted-personally viewed    Stress tests:      Catheterization:      Holter:         Denis Gayle MD    Portions of the record may have been created with voice recognition software.  Occasional wrong word or \"sound a like\" substitutions may have occurred due to the inherent limitations of voice recognition software.  Read the chart carefully and recognize, using context, where substitutions have occurred.  "

## 2024-10-14 DIAGNOSIS — I48.21 PERMANENT ATRIAL FIBRILLATION (HCC): ICD-10-CM

## 2024-10-16 RX ORDER — NEBIVOLOL 10 MG/1
10 TABLET ORAL DAILY
Qty: 90 TABLET | Refills: 1 | Status: SHIPPED | OUTPATIENT
Start: 2024-10-16

## 2024-10-31 ENCOUNTER — REMOTE DEVICE CLINIC VISIT (OUTPATIENT)
Dept: CARDIOLOGY CLINIC | Facility: CLINIC | Age: 64
End: 2024-10-31
Payer: MEDICARE

## 2024-10-31 DIAGNOSIS — Z95.0 CARDIAC PACEMAKER IN SITU: Primary | ICD-10-CM

## 2024-10-31 PROCEDURE — 93296 REM INTERROG EVL PM/IDS: CPT | Performed by: INTERNAL MEDICINE

## 2024-10-31 PROCEDURE — 93294 REM INTERROG EVL PM/LDLS PM: CPT | Performed by: INTERNAL MEDICINE

## 2024-10-31 NOTE — PROGRESS NOTES
"Results for orders placed or performed in visit on 10/31/24   Cardiac EP device report    Narrative    MDT BI-V PPM (VVIR MODE)/ACTIVE SYSTEM IS MRI CONDITIONAL  CARELINK TRANSMISSION: BATTERY STATUS \"8 YRS.\"  36% VSRP 0%. ALL AVAILABLE LEAD PARAMETERS WITHIN NORMAL LIMITS. NO SIGNIFICANT HIGH RATE EPISODES. OPTI-VOL FLUID THRESHOLD CROSSED. NORMAL DEVICE FUNCTION. NC         "

## 2024-11-11 DIAGNOSIS — E11.9 TYPE 2 DIABETES MELLITUS WITHOUT COMPLICATION, WITHOUT LONG-TERM CURRENT USE OF INSULIN (HCC): ICD-10-CM

## 2024-11-11 NOTE — TELEPHONE ENCOUNTER
Reason for call:   [x] Refill   [] Prior Auth  [] Other:     Office:   [x] PCP/Provider -   [] Specialty/Provider -     Medication: Empagliflozin (JARDIANCE) 10 MG     Dose/Frequency: Take 1 tablet (10 mg total) by mouth daily     Quantity: 30    Pharmacy: [facility] [place] [place] - [facility]     Does the patient have enough for 3 days?   [] Yes   [x] No - Send as HP to POD

## 2024-12-02 DIAGNOSIS — I48.91 ATRIAL FIBRILLATION, UNSPECIFIED TYPE (HCC): ICD-10-CM

## 2024-12-02 DIAGNOSIS — I49.3 PREMATURE VENTRICULAR CONTRACTIONS (PVCS) (VPCS): ICD-10-CM

## 2024-12-04 RX ORDER — NEBIVOLOL 20 MG/1
20 TABLET ORAL DAILY
Qty: 90 TABLET | Refills: 1 | Status: SHIPPED | OUTPATIENT
Start: 2024-12-04

## 2025-01-06 DIAGNOSIS — F41.9 ANXIETY: ICD-10-CM

## 2025-01-06 DIAGNOSIS — I48.91 ATRIAL FIBRILLATION, UNSPECIFIED TYPE (HCC): ICD-10-CM

## 2025-01-06 DIAGNOSIS — E78.5 DYSLIPIDEMIA: ICD-10-CM

## 2025-01-06 DIAGNOSIS — I50.9 CONGESTIVE HEART FAILURE, UNSPECIFIED HF CHRONICITY, UNSPECIFIED HEART FAILURE TYPE (HCC): ICD-10-CM

## 2025-01-06 DIAGNOSIS — E11.9 TYPE 2 DIABETES MELLITUS WITHOUT COMPLICATION, WITHOUT LONG-TERM CURRENT USE OF INSULIN (HCC): ICD-10-CM

## 2025-01-06 DIAGNOSIS — I48.21 PERMANENT ATRIAL FIBRILLATION (HCC): ICD-10-CM

## 2025-01-06 DIAGNOSIS — I49.3 PREMATURE VENTRICULAR CONTRACTIONS (PVCS) (VPCS): ICD-10-CM

## 2025-01-06 NOTE — TELEPHONE ENCOUNTER
This is not a duplicate, pts insurance require patient to use mail order services            Ordering and Authorizing Provider:  Denis Gayle MD- cardiology [place]     digoxin (LANOXIN) 0.125 mg tablet - daily   90 day supply    nebivolol (BYSTOLIC) 10 mg tablet  - daily   90 day supply  nebivolol (BYSTOLIC) 20 MG tablet daily   90 day supply      rivaroxaban (Xarelto) 20 mg tablet-Take 1 tablet (20 mg total) by mouth daily with breakfast  90 day supply     atorvastatin (LIPITOR) 40 mg tablet daily at bedtime   90 day supply     furosemide (LASIX) 40 mg tablet - Take 0.5 tablets (20 mg total) by mouth daily  90 day supply         [facility]

## 2025-01-06 NOTE — TELEPHONE ENCOUNTER
This is not a duplicate- patient has new insurance and needs mail order services       Ordering and Authorizing Provider:  Víctor Palacios MD-[facility]       metFORMIN (GLUCOPHAGE) 1000 MG tablet  -Take 1 tablet (1,000 mg total) by mouth 2 (two) times a day with meals  180 tabs     citalopram (CeleXA) 20 mg tablet daily   90 day supply      Empagliflozin (JARDIANCE) 10 MG TABS tablet-daily   90 day supply     [facility]

## 2025-01-07 RX ORDER — DIGOXIN 125 MCG
125 TABLET ORAL DAILY
Qty: 90 TABLET | Refills: 0 | Status: SHIPPED | OUTPATIENT
Start: 2025-01-07

## 2025-01-07 RX ORDER — FUROSEMIDE 40 MG/1
20 TABLET ORAL DAILY
Qty: 90 TABLET | Refills: 1 | Status: SHIPPED | OUTPATIENT
Start: 2025-01-07

## 2025-01-07 RX ORDER — NEBIVOLOL 10 MG/1
10 TABLET ORAL DAILY
Qty: 90 TABLET | Refills: 1 | Status: SHIPPED | OUTPATIENT
Start: 2025-01-07 | End: 2025-01-13 | Stop reason: SDUPTHER

## 2025-01-07 RX ORDER — CITALOPRAM HYDROBROMIDE 20 MG/1
20 TABLET ORAL DAILY
Qty: 90 TABLET | Refills: 1 | Status: SHIPPED | OUTPATIENT
Start: 2025-01-07

## 2025-01-07 RX ORDER — ATORVASTATIN CALCIUM 40 MG/1
40 TABLET, FILM COATED ORAL
Qty: 90 TABLET | Refills: 1 | Status: SHIPPED | OUTPATIENT
Start: 2025-01-07

## 2025-01-07 RX ORDER — NEBIVOLOL 20 MG/1
20 TABLET ORAL DAILY
Qty: 90 TABLET | Refills: 1 | Status: SHIPPED | OUTPATIENT
Start: 2025-01-07 | End: 2025-01-10 | Stop reason: SDUPTHER

## 2025-01-09 ENCOUNTER — TELEPHONE (OUTPATIENT)
Dept: CARDIOLOGY CLINIC | Facility: CLINIC | Age: 65
End: 2025-01-09

## 2025-01-09 NOTE — TELEPHONE ENCOUNTER
Spoke to patients wife David, let her know 14 days worth of samples are at front and ready for pickup at [address] location. Patient understood

## 2025-01-09 NOTE — TELEPHONE ENCOUNTER
Patient called the RX Refill Line. Message is being forwarded to the office.     Patient spouse called state xarelto was sent to mail order pharmacy but they did not ship the medication and her  only have one pill left for today.    David pt wife asked if Dr Gayle office have any samples of xarelto 20 mg that they can have to hold him until they receive the mail order supply.    Requesting qty 10 if samples are available    Patient advised a 10 day supply can be sent to the pharmacy but she would like to try for the sample first    Please contact David [phone] (pt wife) to advised.

## 2025-01-10 ENCOUNTER — TELEPHONE (OUTPATIENT)
Age: 65
End: 2025-01-10

## 2025-01-10 DIAGNOSIS — I49.3 PREMATURE VENTRICULAR CONTRACTIONS (PVCS) (VPCS): ICD-10-CM

## 2025-01-10 DIAGNOSIS — I48.91 ATRIAL FIBRILLATION, UNSPECIFIED TYPE (HCC): ICD-10-CM

## 2025-01-10 DIAGNOSIS — I48.21 PERMANENT ATRIAL FIBRILLATION (HCC): ICD-10-CM

## 2025-01-10 RX ORDER — NEBIVOLOL 10 MG/1
TABLET ORAL
Qty: 90 TABLET | Refills: 3 | Status: SHIPPED | OUTPATIENT
Start: 2025-01-10 | End: 2025-01-13 | Stop reason: SDUPTHER

## 2025-01-10 NOTE — TELEPHONE ENCOUNTER
Received call from pt's wife stating [facility] will not fill the pt's Nebivolol scripts the way they are written.  Please place new scripts in chart that specify how the pt takes the medication.  Pt takes 20 mg of Nebivolol in the morning and 10 mg in the evening.  Thank you.

## 2025-01-13 ENCOUNTER — APPOINTMENT (OUTPATIENT)
Dept: LAB | Age: 65
End: 2025-01-13
Payer: MEDICARE

## 2025-01-13 ENCOUNTER — TELEPHONE (OUTPATIENT)
Age: 65
End: 2025-01-13

## 2025-01-13 DIAGNOSIS — I49.3 PREMATURE VENTRICULAR CONTRACTIONS (PVCS) (VPCS): ICD-10-CM

## 2025-01-13 DIAGNOSIS — E11.9 TYPE 2 DIABETES MELLITUS WITHOUT COMPLICATION, WITHOUT LONG-TERM CURRENT USE OF INSULIN (HCC): ICD-10-CM

## 2025-01-13 DIAGNOSIS — I50.9 CONGESTIVE HEART FAILURE, UNSPECIFIED HF CHRONICITY, UNSPECIFIED HEART FAILURE TYPE (HCC): ICD-10-CM

## 2025-01-13 DIAGNOSIS — I48.11 LONGSTANDING PERSISTENT ATRIAL FIBRILLATION (HCC): ICD-10-CM

## 2025-01-13 DIAGNOSIS — E78.5 DYSLIPIDEMIA: ICD-10-CM

## 2025-01-13 DIAGNOSIS — I48.91 ATRIAL FIBRILLATION, UNSPECIFIED TYPE (HCC): ICD-10-CM

## 2025-01-13 DIAGNOSIS — I48.21 PERMANENT ATRIAL FIBRILLATION (HCC): ICD-10-CM

## 2025-01-13 LAB
ALBUMIN SERPL BCG-MCNC: 4.6 G/DL (ref 3.5–5)
ALP SERPL-CCNC: 74 U/L (ref 34–104)
ALT SERPL W P-5'-P-CCNC: 17 U/L (ref 7–52)
ANION GAP SERPL CALCULATED.3IONS-SCNC: 6 MMOL/L (ref 4–13)
AST SERPL W P-5'-P-CCNC: 16 U/L (ref 13–39)
BASOPHILS # BLD AUTO: 0.04 THOUSANDS/ΜL (ref 0–0.1)
BASOPHILS NFR BLD AUTO: 1 % (ref 0–1)
BILIRUB SERPL-MCNC: 0.98 MG/DL (ref 0.2–1)
BUN SERPL-MCNC: 19 MG/DL (ref 5–25)
CALCIUM SERPL-MCNC: 9.3 MG/DL (ref 8.4–10.2)
CHLORIDE SERPL-SCNC: 102 MMOL/L (ref 96–108)
CHOLEST SERPL-MCNC: 119 MG/DL (ref ?–200)
CO2 SERPL-SCNC: 30 MMOL/L (ref 21–32)
CREAT SERPL-MCNC: 1.06 MG/DL (ref 0.6–1.3)
DIGOXIN SERPL-MCNC: 1.3 NG/ML (ref 0.8–2)
EOSINOPHIL # BLD AUTO: 0.18 THOUSAND/ΜL (ref 0–0.61)
EOSINOPHIL NFR BLD AUTO: 3 % (ref 0–6)
ERYTHROCYTE [DISTWIDTH] IN BLOOD BY AUTOMATED COUNT: 12.9 % (ref 11.6–15.1)
GFR SERPL CREATININE-BSD FRML MDRD: 72 ML/MIN/1.73SQ M
GLUCOSE P FAST SERPL-MCNC: 83 MG/DL (ref 65–99)
HCT VFR BLD AUTO: 48.4 % (ref 36.5–49.3)
HDLC SERPL-MCNC: 53 MG/DL
HGB BLD-MCNC: 16.7 G/DL (ref 12–17)
IMM GRANULOCYTES # BLD AUTO: 0.01 THOUSAND/UL (ref 0–0.2)
IMM GRANULOCYTES NFR BLD AUTO: 0 % (ref 0–2)
LDLC SERPL CALC-MCNC: 47 MG/DL (ref 0–100)
LYMPHOCYTES # BLD AUTO: 0.71 THOUSANDS/ΜL (ref 0.6–4.47)
LYMPHOCYTES NFR BLD AUTO: 13 % (ref 14–44)
MCH RBC QN AUTO: 31.5 PG (ref 26.8–34.3)
MCHC RBC AUTO-ENTMCNC: 34.5 G/DL (ref 31.4–37.4)
MCV RBC AUTO: 91 FL (ref 82–98)
MONOCYTES # BLD AUTO: 0.44 THOUSAND/ΜL (ref 0.17–1.22)
MONOCYTES NFR BLD AUTO: 8 % (ref 4–12)
NEUTROPHILS # BLD AUTO: 3.93 THOUSANDS/ΜL (ref 1.85–7.62)
NEUTS SEG NFR BLD AUTO: 75 % (ref 43–75)
NONHDLC SERPL-MCNC: 66 MG/DL
NRBC BLD AUTO-RTO: 0 /100 WBCS
PLATELET # BLD AUTO: 158 THOUSANDS/UL (ref 149–390)
PMV BLD AUTO: 10.2 FL (ref 8.9–12.7)
POTASSIUM SERPL-SCNC: 4 MMOL/L (ref 3.5–5.3)
PROT SERPL-MCNC: 6.6 G/DL (ref 6.4–8.4)
RBC # BLD AUTO: 5.31 MILLION/UL (ref 3.88–5.62)
SODIUM SERPL-SCNC: 138 MMOL/L (ref 135–147)
TRIGL SERPL-MCNC: 95 MG/DL (ref ?–150)
WBC # BLD AUTO: 5.31 THOUSAND/UL (ref 4.31–10.16)

## 2025-01-13 PROCEDURE — 80053 COMPREHEN METABOLIC PANEL: CPT

## 2025-01-13 PROCEDURE — 80061 LIPID PANEL: CPT

## 2025-01-13 PROCEDURE — 80162 ASSAY OF DIGOXIN TOTAL: CPT

## 2025-01-13 PROCEDURE — 36415 COLL VENOUS BLD VENIPUNCTURE: CPT

## 2025-01-13 PROCEDURE — 85025 COMPLETE CBC W/AUTO DIFF WBC: CPT

## 2025-01-13 RX ORDER — NEBIVOLOL 10 MG/1
20 TABLET ORAL EVERY MORNING
Qty: 90 TABLET | Refills: 3 | Status: SHIPPED | OUTPATIENT
Start: 2025-01-13 | End: 2025-01-15 | Stop reason: SDUPTHER

## 2025-01-13 RX ORDER — NEBIVOLOL 10 MG/1
10 TABLET ORAL EVERY EVENING
Qty: 90 TABLET | Refills: 3 | Status: SHIPPED | OUTPATIENT
Start: 2025-01-13 | End: 2025-01-15

## 2025-01-13 NOTE — TELEPHONE ENCOUNTER
Pt spouse called in stating that pt ran out of his jardience today. They are [facility] to [facility] his medication as they recently switch over to them due to new drug plan. Spouse is asking if there are any samples in the office. Would also like PCP to advise if would be okay if he had to miss a few doses of it. Please advise.

## 2025-01-14 ENCOUNTER — RA CDI HCC (OUTPATIENT)
Dept: OTHER | Facility: HOSPITAL | Age: 65
End: 2025-01-14

## 2025-01-15 DIAGNOSIS — I48.21 PERMANENT ATRIAL FIBRILLATION (HCC): ICD-10-CM

## 2025-01-15 RX ORDER — NEBIVOLOL 10 MG/1
TABLET ORAL
Qty: 270 TABLET | Refills: 3 | Status: SHIPPED | OUTPATIENT
Start: 2025-01-15

## 2025-01-15 NOTE — TELEPHONE ENCOUNTER
Lauri from [facility] calling regarding Nebivolol prescription.   Dr. Gayle sent 2 separate prescriptions for Nebivolol 10 mg tablets to pharmacy, with separate directions to take 20 mg in morning and 10 mg in evening.   Pharmacy asking if this can be combined into one prescription for Nebivolol 10 mg tablets to take 2 tablets in morning and 1 tablet in evening. This will save patient one prescription copay.   [facility] call back # [phone]  Reference # [id number]

## 2025-01-21 ENCOUNTER — OFFICE VISIT (OUTPATIENT)
Dept: FAMILY MEDICINE CLINIC | Facility: CLINIC | Age: 65
End: 2025-01-21
Payer: MEDICARE

## 2025-01-21 VITALS
HEART RATE: 94 BPM | BODY MASS INDEX: 34.16 KG/M2 | SYSTOLIC BLOOD PRESSURE: 110 MMHG | DIASTOLIC BLOOD PRESSURE: 78 MMHG | OXYGEN SATURATION: 98 % | WEIGHT: 244 LBS | HEIGHT: 71 IN | TEMPERATURE: 96.1 F

## 2025-01-21 DIAGNOSIS — D86.9 SARCOIDOSIS: ICD-10-CM

## 2025-01-21 DIAGNOSIS — I50.32 CHRONIC DIASTOLIC (CONGESTIVE) HEART FAILURE (HCC): ICD-10-CM

## 2025-01-21 DIAGNOSIS — E11.9 TYPE 2 DIABETES MELLITUS WITHOUT COMPLICATION, WITHOUT LONG-TERM CURRENT USE OF INSULIN (HCC): ICD-10-CM

## 2025-01-21 DIAGNOSIS — I10 ESSENTIAL HYPERTENSION: ICD-10-CM

## 2025-01-21 DIAGNOSIS — Z00.00 MEDICARE ANNUAL WELLNESS VISIT, SUBSEQUENT: Primary | ICD-10-CM

## 2025-01-21 LAB
CREAT UR-MCNC: 90.3 MG/DL
MICROALBUMIN UR-MCNC: <7 MG/L
SL AMB POCT HEMOGLOBIN AIC: 5.5 (ref ?–6.5)

## 2025-01-21 PROCEDURE — 83036 HEMOGLOBIN GLYCOSYLATED A1C: CPT | Performed by: FAMILY MEDICINE

## 2025-01-21 PROCEDURE — 82570 ASSAY OF URINE CREATININE: CPT | Performed by: FAMILY MEDICINE

## 2025-01-21 PROCEDURE — 82043 UR ALBUMIN QUANTITATIVE: CPT | Performed by: FAMILY MEDICINE

## 2025-01-21 PROCEDURE — 99214 OFFICE O/P EST MOD 30 MIN: CPT | Performed by: FAMILY MEDICINE

## 2025-01-21 PROCEDURE — G0439 PPPS, SUBSEQ VISIT: HCPCS | Performed by: FAMILY MEDICINE

## 2025-01-21 PROCEDURE — G0444 DEPRESSION SCREEN ANNUAL: HCPCS | Performed by: FAMILY MEDICINE

## 2025-01-21 NOTE — ASSESSMENT & PLAN NOTE
Wt Readings from Last 3 Encounters:   01/21/25 111 kg (244 lb)   10/03/24 116 kg (256 lb)   08/15/24 123 kg (272 lb)   Weight down >30lb since starting diet and appears to be euvolemic on exam. Creat stable. Continue present care. F/u with Cardio

## 2025-01-21 NOTE — PROGRESS NOTES
Name: Dusty Mckeon      : 1960      MRN: [id number]  Encounter Provider: Víctor Palacios MD  Encounter Date: 2025   Encounter department: [facility] & Plan  Medicare annual wellness visit, subsequent         Type 2 diabetes mellitus without complication, without long-term current use of insulin (HCC)  Greatly improved with diet changes, meds and weight loss. A1C now 5.5.  no hypoglycemia. Continue present care. Recheck 6m  Lab Results   Component Value Date    HGBA1C 5.5 2025     Orders:  •  POCT hemoglobin A1c  •  Albumin / creatinine urine ratio; Future    Chronic diastolic (congestive) heart failure (HCC)  Wt Readings from Last 3 Encounters:   25 111 kg (244 lb)   10/03/24 116 kg (256 lb)   08/15/24 123 kg (272 lb)   Weight down >30lb since starting diet and appears to be euvolemic on exam. Creat stable. Continue present care. F/u with Cardio               Essential hypertension  Well controlled. Cont present treatment. Monitor labs. Recheck 6m         Sarcoidosis  Breathing stable. No signs of recurrence/ongoing issues. Recheck 6m          Preventive health issues were discussed with patient, and age appropriate screening tests were ordered as noted in patient's After Visit Summary. Personalized health advice and appropriate referrals for health education or preventive services given if needed, as noted in patient's After Visit Summary.    History of Present Illness     f/u multiple med issues and AWV  - pt continues to do well  - breathing is stable. No evidence of recurrence of sarcoidosis.  Pt is up to date with Pulm  - pt just seen by Cardio.  Pt denies CP, palpitations, lightheadedness or other CV symptoms with or without exertion  - pt has been doing well with diet.  Has lost approx 40lb.  A1C in the office = 5.5.  No side effect noted since starting Jardiance  - no new GI or  issues  - no new joint aches.   - AWV done        Patient Care Team:  Víctor Palacios MD as PCP - General  MD Víctor Tomlin MD Darren Traub, DO    Review of Systems   Constitutional: Negative.    HENT: Negative.     Eyes: Negative.    Respiratory: Negative.     Cardiovascular: Negative.    Gastrointestinal: Negative.    Endocrine: Negative.    Genitourinary: Negative.    Musculoskeletal: Negative.    Skin: Negative.    Allergic/Immunologic: Negative.    Neurological: Negative.    Hematological: Negative.    Psychiatric/Behavioral: Negative.       Medical History Reviewed by provider this encounter:  Tobacco  Allergies  Meds  Problems  Med Hx  Surg Hx  Fam Hx       Annual Wellness Visit Questionnaire   Dusty is here for his Subsequent Wellness visit. Last Medicare Wellness visit information reviewed, patient interviewed and updates made to the record today.      Health Risk Assessment:   Patient rates overall health as good. Patient feels that their physical health rating is same. Patient is satisfied with their life. Eyesight was rated as same. Hearing was rated as same. Patient feels that their emotional and mental health rating is same. Patients states they are never, rarely angry. Patient states they are never, rarely unusually tired/fatigued. Pain experienced in the last 7 days has been none. Patient states that he has experienced no weight loss or gain in last 6 months.     Depression Screening:   PHQ-2 Score: 0      Fall Risk Screening:   In the past year, patient has experienced: no history of falling in past year      Home Safety:  Patient does not have trouble with stairs inside or outside of their home. Patient has working smoke alarms and has working carbon monoxide detector. Home safety hazards include: none.     Nutrition:   Current diet is Regular.     Medications:   Patient is currently taking over-the-counter supplements. OTC medications include: see medication list. Patient is able to manage  medications.     Activities of Daily Living (ADLs)/Instrumental Activities of Daily Living (IADLs):   Walk and transfer into and out of bed and chair?: Yes  Dress and groom yourself?: Yes    Bathe or shower yourself?: Yes    Feed yourself? Yes  Do your laundry/housekeeping?: Yes  Manage your money, pay your bills and track your expenses?: Yes  Make your own meals?: Yes    Do your own shopping?: Yes    Previous Hospitalizations:   Any hospitalizations or ED visits within the last 12 months?: No      Advance Care Planning:   Living will: Yes    Durable POA for healthcare: Yes    Advanced directive: Yes    Advanced directive counseling given: Yes      Cognitive Screening:   Provider or family/friend/caregiver concerned regarding cognition?: No    PREVENTIVE SCREENINGS      Cardiovascular Screening:    General: Screening Current      Diabetes Screening:     General: Screening Not Indicated and History Diabetes      Colorectal Cancer Screening:     General: Screening Current      Prostate Cancer Screening:    General: Risks and Benefits Discussed      Osteoporosis Screening:    General: Screening Not Indicated      Abdominal Aortic Aneurysm (AAA) Screening:    Risk factors include: age between 65-76 yo and tobacco use        Lung Cancer Screening:     General: Screening Not Indicated      Hepatitis C Screening:    General: Screening Current    Screening, Brief Intervention, and Referral to Treatment (SBIRT)    Screening      AUDIT-C Screenin) How often did you have a drink containing alcohol in the past year? monthly or less  2) How many drinks did you have on a typical day when you were drinking in the past year? 1 to 2  3) How often did you have 6 or more drinks on one occasion in the past year? never    AUDIT-C Score: 1  Interpretation: Score 0-3 (male): Negative screen for alcohol misuse    Single Item Drug Screening:  How often have you used an illegal drug (including marijuana) or a prescription medication for  "non-medical reasons in the past year? never    Single Item Drug Screen Score: 0  Interpretation: Negative screen for possible drug use disorder    Time Spent  Time spent screening/evaluating the patient for alcohol misuse: 1 minutes.     Annual Depression Screening  Time spent screening and evaluating the patient for depression during today's encounter was 5 minutes.    Other Counseling Topics:   Regular weightbearing exercise and calcium and vitamin D intake.     Social Drivers of Health     Financial Resource Strain: Low Risk  (1/11/2024)    Overall Financial Resource Strain (CARDIA)    • Difficulty of Paying Living Expenses: Not hard at all   Food Insecurity: No Food Insecurity (1/21/2025)    Hunger Vital Sign    • Worried About Running Out of Food in the Last Year: Never true    • Ran Out of Food in the Last Year: Never true   Transportation Needs: No Transportation Needs (1/21/2025)    PRAPARE - Transportation    • Lack of Transportation (Medical): No    • Lack of Transportation (Non-Medical): No   Housing Stability: Low Risk  (1/21/2025)    Housing Stability Vital Sign    • Unable to Pay for Housing in the Last Year: No    • Number of Times Moved in the Last Year: 0    • Homeless in the Last Year: No   Utilities: Not At Risk (1/21/2025)    [facility] Utilities    • Threatened with loss of utilities: No     No results found.    Objective   /78   Pulse 94   Temp (!) 96.1 °F (35.6 °C)   Ht 5' 10.5\" (1.791 m)   Wt 111 kg (244 lb)   SpO2 98%   BMI 34.52 kg/m²     Physical Exam  Vitals reviewed.   Constitutional:       Appearance: He is well-developed.   HENT:      Head: Normocephalic and atraumatic.      Right Ear: Tympanic membrane, ear canal and external ear normal.      Left Ear: Tympanic membrane, ear canal and external ear normal.      Nose: Nose normal.      Mouth/Throat:      Mouth: Mucous membranes are moist.   Eyes:      Extraocular Movements: Extraocular movements intact.      Conjunctiva/sclera: " Conjunctivae normal.      Pupils: Pupils are equal, round, and reactive to light.   Neck:      Thyroid: No thyromegaly.      Vascular: No JVD.   Cardiovascular:      Rate and Rhythm: Normal rate and regular rhythm.      Pulses: Pulses are weak.           Dorsalis pedis pulses are 1+ on the right side and 1+ on the left side.      Heart sounds: Normal heart sounds. No murmur heard.  Pulmonary:      Effort: Pulmonary effort is normal. No respiratory distress.      Breath sounds: Normal breath sounds. No wheezing or rales.   Abdominal:      General: Bowel sounds are normal. There is no distension.      Palpations: Abdomen is soft. There is no mass.      Tenderness: There is no abdominal tenderness.   Musculoskeletal:         General: No swelling, tenderness or deformity. Normal range of motion.      Cervical back: Normal range of motion and neck supple. No tenderness. No muscular tenderness.      Right lower leg: No edema.      Left lower leg: No edema.   Feet:      Right foot:      Skin integrity: No ulcer, skin breakdown, erythema, warmth, callus or dry skin.      Left foot:      Skin integrity: No ulcer, skin breakdown, erythema, warmth, callus or dry skin.   Lymphadenopathy:      Cervical: No cervical adenopathy.   Skin:     General: Skin is warm.      Capillary Refill: Capillary refill takes less than 2 seconds.   Neurological:      Mental Status: He is alert and oriented to person, place, and time.      Cranial Nerves: No cranial nerve deficit.      Sensory: No sensory deficit.      Motor: No weakness or abnormal muscle tone.      Gait: Gait normal.   Psychiatric:         Mood and Affect: Mood normal.         Behavior: Behavior normal.         Thought Content: Thought content normal.         Judgment: Judgment normal.      Comments: PHQ-2/9 Depression Screening    Little interest or pleasure in doing things: 0 - not at all  Feeling down, depressed, or hopeless: 0 - not at all  PHQ-2 Score: 0  PHQ-2  Interpretation: Negative depression screen     Patient's shoes and socks removed.    Right Foot/Ankle   Right Foot Inspection  Skin Exam: skin normal and skin intact. No dry skin, no warmth, no callus, no erythema, no maceration, no abnormal color, no pre-ulcer, no ulcer and no callus.     Toe Exam: ROM and strength within normal limits.     Sensory   Vibration: intact  Monofilament testing: intact    Vascular  Capillary refills: < 3 seconds  The right DP pulse is 1+.     Left Foot/Ankle  Left Foot Inspection  Skin Exam: skin normal and skin intact. No dry skin, no warmth, no erythema, no maceration, normal color, no pre-ulcer, no ulcer and no callus.     Toe Exam: ROM and strength within normal limits.     Sensory   Vibration: intact  Monofilament testing: intact    Vascular  Capillary refills: < 3 seconds  The left DP pulse is 1+.     Assign Risk Category  No deformity present  No loss of protective sensation  Weak pulses  Risk: 0

## 2025-01-21 NOTE — ASSESSMENT & PLAN NOTE
Greatly improved with diet changes, meds and weight loss. A1C now 5.5.  no hypoglycemia. Continue present care. Recheck 6m  Lab Results   Component Value Date    HGBA1C 5.5 01/21/2025     Orders:  •  POCT hemoglobin A1c  •  Albumin / creatinine urine ratio; Future

## 2025-01-30 ENCOUNTER — RESULTS FOLLOW-UP (OUTPATIENT)
Dept: CARDIOLOGY CLINIC | Facility: CLINIC | Age: 65
End: 2025-01-30

## 2025-01-30 ENCOUNTER — REMOTE DEVICE CLINIC VISIT (OUTPATIENT)
Dept: CARDIOLOGY CLINIC | Facility: CLINIC | Age: 65
End: 2025-01-30
Payer: MEDICARE

## 2025-01-30 DIAGNOSIS — Z95.0 CARDIAC PACEMAKER IN SITU: Primary | ICD-10-CM

## 2025-01-30 PROCEDURE — 93296 REM INTERROG EVL PM/IDS: CPT | Performed by: INTERNAL MEDICINE

## 2025-01-30 PROCEDURE — 93294 REM INTERROG EVL PM/LDLS PM: CPT | Performed by: INTERNAL MEDICINE

## 2025-01-30 NOTE — PROGRESS NOTES
Results for orders placed or performed in visit on 01/30/25   Cardiac EP device report    Narrative    MDT BI-V PPM (VVIR MODE)/ACTIVE SYSTEM IS MRI CONDITIONAL  CARELINK TRANSMISSION: BATTERY VOLTAGE ADEQUATE (8.5 YRS). BVP-39.5% (<90% AF/VSR PACE-0%/VVI@60PPM). ALL AVAILABLE LEAD PARAMETERS WITHIN NORMAL LIMITS. 1 VHR EPISODE WITH SIMILAR MORPHOLOGY TO INTRINSIC- RVR ON EGM. >22K VENT SENSING EPISODES. HX: CHRONIC AF & ON XARELTO, DIG & NEBIVOLOL. OPTI-VOL WITHIN NORMAL LIMITS. NORMAL DEVICE FUNCTION. GV

## 2025-03-06 DIAGNOSIS — I48.21 PERMANENT ATRIAL FIBRILLATION (HCC): ICD-10-CM

## 2025-03-06 RX ORDER — NEBIVOLOL 10 MG/1
10 TABLET ORAL EVERY EVENING
Qty: 90 TABLET | Refills: 3 | Status: SHIPPED | OUTPATIENT
Start: 2025-03-06 | End: 2025-03-06 | Stop reason: SDUPTHER

## 2025-03-06 RX ORDER — NEBIVOLOL 10 MG/1
10 TABLET ORAL EVERY EVENING
Qty: 90 TABLET | Refills: 3 | Status: SHIPPED | OUTPATIENT
Start: 2025-03-06

## 2025-03-06 RX ORDER — NEBIVOLOL 20 MG/1
20 TABLET ORAL EVERY MORNING
Qty: 90 TABLET | Refills: 3 | Status: SHIPPED | OUTPATIENT
Start: 2025-03-06

## 2025-03-06 RX ORDER — NEBIVOLOL 20 MG/1
20 TABLET ORAL EVERY MORNING
Qty: 90 TABLET | Refills: 3 | Status: SHIPPED | OUTPATIENT
Start: 2025-03-06 | End: 2025-03-06 | Stop reason: SDUPTHER

## 2025-03-30 DIAGNOSIS — I48.91 ATRIAL FIBRILLATION, UNSPECIFIED TYPE (HCC): ICD-10-CM

## 2025-03-30 DIAGNOSIS — I48.21 PERMANENT ATRIAL FIBRILLATION (HCC): ICD-10-CM

## 2025-03-31 RX ORDER — RIVAROXABAN 20 MG/1
20 TABLET, FILM COATED ORAL
Qty: 90 TABLET | Refills: 1 | Status: SHIPPED | OUTPATIENT
Start: 2025-03-31 | End: 2025-04-03 | Stop reason: SDUPTHER

## 2025-04-01 DIAGNOSIS — E11.9 TYPE 2 DIABETES MELLITUS WITHOUT COMPLICATION, WITHOUT LONG-TERM CURRENT USE OF INSULIN (HCC): ICD-10-CM

## 2025-04-01 DIAGNOSIS — E78.5 DYSLIPIDEMIA: ICD-10-CM

## 2025-04-01 DIAGNOSIS — F41.9 ANXIETY: ICD-10-CM

## 2025-04-01 RX ORDER — ATORVASTATIN CALCIUM 40 MG/1
40 TABLET, FILM COATED ORAL
Qty: 90 TABLET | Refills: 1 | Status: CANCELLED | OUTPATIENT
Start: 2025-04-01

## 2025-04-01 NOTE — TELEPHONE ENCOUNTER
Reason for call:   [x] Refill   [] Prior Auth  [] Other:     Office:   [x] PCP/Provider - Víctor Palacios MD   [] Specialty/Provider -     Medication:   metFORMIN (GLUCOPHAGE) 1000 MG tablet       Empagliflozin (JARDIANCE) 10 MG TABS tablet       citalopram (CeleXA) 20 mg tablet   atorvastatin (LIPITOR) 40 mg tablet   Dose/Frequency:     1,000 mg, Oral, 2 times daily with meals       10 mg, Oral, Daily       20 mg, Oral, Daily       40 mg, Oral, Daily at bedtime       Quantity:   180  90  90  90    Pharmacy:  [facility] - [address]     Local Pharmacy   Does the patient have enough for 3 days?   [] Yes   [] No - Send as HP to POD    Mail Away Pharmacy   Does the patient have enough for 10 days?   [x] Yes   [] No - Send as HP to POD

## 2025-04-02 DIAGNOSIS — E11.9 TYPE 2 DIABETES MELLITUS WITHOUT COMPLICATION, WITHOUT LONG-TERM CURRENT USE OF INSULIN (HCC): ICD-10-CM

## 2025-04-02 DIAGNOSIS — E78.5 DYSLIPIDEMIA: ICD-10-CM

## 2025-04-02 DIAGNOSIS — F41.9 ANXIETY: ICD-10-CM

## 2025-04-02 RX ORDER — CITALOPRAM HYDROBROMIDE 20 MG/1
20 TABLET ORAL DAILY
Qty: 90 TABLET | Refills: 1 | Status: SHIPPED | OUTPATIENT
Start: 2025-04-02

## 2025-04-02 RX ORDER — DIGOXIN 125 MCG
125 TABLET ORAL DAILY
Qty: 90 TABLET | Refills: 3 | Status: SHIPPED | OUTPATIENT
Start: 2025-04-02 | End: 2025-04-03 | Stop reason: SDUPTHER

## 2025-04-02 RX ORDER — CITALOPRAM HYDROBROMIDE 20 MG/1
20 TABLET ORAL DAILY
Qty: 90 TABLET | Refills: 1 | Status: SHIPPED | OUTPATIENT
Start: 2025-04-02 | End: 2025-04-02 | Stop reason: SDUPTHER

## 2025-04-02 NOTE — PROGRESS NOTES
Cardiology Follow Up    Dusty Mckeon  1960  [id number]  [facility]  [address]  [place] [address] [phone]    Assessment & Plan  Longstanding persistent atrial fibrillation (HCC)  Continue on Xarelto 20 mg daily for stroke prevention.  Continue on Bystolic 20 mg in a.m. and 10 mg in p.m.  PVC (premature ventricular contraction)  EKG in the office showed Freq PVC's  Instructed to decrease caffeine intake  TSH in the near future  Hypertension, unspecified type  Blood pressure RUE sitting 118/60  Well-controlled on Bystolic 20 mg in the morning and 10 mg in the evening  DASH diet  Dyslipidemia  1/13/25 , TG 95,  HDL 53, LDL 47  LDL below goal  Continue on 40 mg daily, heart healthy diet  Tachycardia induced cardiomyopathy (HCC)  LVEF 50% felt to be tachycardia mediated, LV EF improved to 65% by TTE 4/03/23  Chronic diastolic (congestive) heart failure (HCC)  Wt Readings from Last 3 Encounters:   04/03/25 114 kg (250 lb 8 oz)   01/21/25 111 kg (244 lb)   10/03/24 116 kg (256 lb)   NYHA HA class II stage C   On PE euvolemic compensated heart rate and blood pressure well-controlled   Continue leg 620 mg daily and GDMT  Daily weights monitor for signs symptoms of heart failure    Type 2 diabetes mellitus without complication, without long-term current use of insulin ([facility])    Lab Results   Component Value Date    HGBA1C 5.5 01/21/2025   Well-controlled on Jardiance 10 mg daily metformin 1000 mg twice daily follow-up for PCP  TOVA (obstructive sleep apnea)  Not able to tolerate CPAP  Morbid obesity with BMI of 40.0-44.9, adult ([facility])  Reported 60 pound weight loss over the past 6 months intentional.      Interval History:   Mr Dusty Mckeon presents to our office for a 6 month follow up visit.  He is accompanied by his wife.  Dusty dose perform regular physical activity.  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  He denies dyspnea with minimal or moderate exertion chest pain palpitations lightheadedness or dizziness.  He has intentionally lost 60 pounds over the last 6 months.        Medical History   Primary Cardiologist Dr Gayle   Permanent atrial fibrillation on Xarelto  Hypertension  Dyslipidemia  CM LVEF 50% felt to be tachy mediated in etiology  SSS sp BIV PPM upgrade in 2021  PVC  DM2 HgbA1C  TOVA not able to tolerate CPAP    23 TTE LVEF 65%.    Patient Active Problem List   Diagnosis    Anxiety    Tachycardia induced cardiomyopathy (HCC)    Dry eye syndrome    Dyslipidemia    Essential hypertension    Obstructive sleep apnea    BMI 39.0-39.9,adult    Type 2 diabetes mellitus without complication, without long-term current use of insulin (HCC)    Medtronic dual chamber PM    Sarcoidosis    Morbid obesity with BMI of 40.0-44.9, adult (HCC)    Premature ventricular contractions (PVCs) ([facility])    Longstanding persistent atrial fibrillation (HCC)    Lung nodule    Chronic diastolic (congestive) heart failure (HCC)    Right shoulder pain     Past Medical History:   Diagnosis Date    A-fib (HCC)     Chronic systolic congestive heart failure (HCC)     last assessed - 2014    Diabetes mellitus (HCC)     History of cardiac catheterization 10/2012    normal coronaries w/o significant obstructive coronary disease    Pacemaker     last assessed - 2017    Sarcoidosis     Sleep apnea      Social History     Socioeconomic History    Marital status: /Civil Union     Spouse name: Not on file    Number of children: Not on file    Years of education: Not on file    Highest education level: Not on file   Occupational History    Occupation: Retired   Tobacco Use    Smoking status: Former     Current packs/day: 0.00     Average packs/day: 1 pack/day for 8.0 years (8.0 ttl pk-yrs)     Types: Cigarettes     Start date:      Quit date:      Years since quittin.2     Smokeless tobacco: Never    Tobacco comments:     2 ppd x 5 years   Vaping Use    Vaping status: Never Used   Substance and Sexual Activity    Alcohol use: Yes     Alcohol/week: 4.0 - 5.0 standard drinks of alcohol     Types: 4 - 5 Cans of beer per week     Comment: rare    Drug use: No    Sexual activity: Not on file   Other Topics Concern    Not on file   Social History Narrative    Always uses seat belt    Daily coffee consumption (___Cups/Day)    Dental care regularly    Pets/Animals    Uses safety Equipment - seatbelts     Social Drivers of Health     Financial Resource Strain: Low Risk  (1/11/2024)    Overall Financial Resource Strain (CARDIA)     Difficulty of Paying Living Expenses: Not hard at all   Food Insecurity: No Food Insecurity (1/21/2025)    Hunger Vital Sign     Worried About Running Out of Food in the Last Year: Never true     Ran Out of Food in the Last Year: Never true   Transportation Needs: No Transportation Needs (1/21/2025)    PRAPARE - Transportation     Lack of Transportation (Medical): No     Lack of Transportation (Non-Medical): No   Physical Activity: Not on file   Stress: Not on file   Social Connections: Not on file   Intimate Partner Violence: Not on file   Housing Stability: Low Risk  (1/21/2025)    Housing Stability Vital Sign     Unable to Pay for Housing in the Last Year: No     Number of Times Moved in the Last Year: 0     Homeless in the Last Year: No      Family History   Problem Relation Age of Onset    Atrial fibrillation Mother     Cancer Mother     Heart disease Father     Hypertension Father     Skin cancer Father     Cancer Family     Transient ischemic attack Family         without residual deficits     Past Surgical History:   Procedure Laterality Date    CARDIAC ELECTROPHYSIOLOGY PROCEDURE N/A 12/3/2021    Procedure: PPM generator change - dual;  Surgeon: Raf Nelson DO;  Location: [facility];  Service: Cardiology    CARDIAC ELECTROPHYSIOLOGY PROCEDURE  N/A 12/3/2021    Procedure: CARDIAC UPGRADE PACER TO BIV PACER;  Surgeon: Raf Nelson DO;  Location: [facility];  Service: Cardiology    CARDIOVERSION      Elective    FISTULA REPAIR      perirectal    INSERT / REPLACE / REMOVE PACEMAKER      TONSILLECTOMY      1960's       Current Outpatient Medications:     albuterol (2.5 mg/3 mL) 0.083 % nebulizer solution, Take 2.5 mg by nebulization every 6 (six) hours as needed, Disp: , Rfl:     albuterol (Ventolin HFA) 90 mcg/act inhaler, Inhale 1 puff 4 (four) times a day, Disp: 18 g, Rfl: 6    atorvastatin (LIPITOR) 40 mg tablet, Take 1 tablet (40 mg total) by mouth daily at bedtime, Disp: 90 tablet, Rfl: 1    citalopram (CeleXA) 20 mg tablet, Take 1 tablet (20 mg total) by mouth daily, Disp: 90 tablet, Rfl: 1    digoxin (LANOXIN) 0.125 mg tablet, TAKE 1 TABLET DAILY, Disp: 90 tablet, Rfl: 3    Empagliflozin (JARDIANCE) 10 MG TABS tablet, Take 1 tablet (10 mg total) by mouth daily, Disp: 90 tablet, Rfl: 1    furosemide (LASIX) 40 mg tablet, Take 0.5 tablets (20 mg total) by mouth daily, Disp: 90 tablet, Rfl: 1    glucose blood (Prodigy No Coding Blood Gluc) test strip, Use as instructed qd, Disp: 50 strip, Rfl: 5    glucose blood test strip, Use 1 each daily Use as instructed, Disp: 50 each, Rfl: 5    metFORMIN (GLUCOPHAGE) 1000 MG tablet, Take 1 tablet (1,000 mg total) by mouth 2 (two) times a day with meals, Disp: 180 tablet, Rfl: 1    nebivolol (BYSTOLIC) 10 mg tablet, Take 1 tablet (10 mg total) by mouth every evening, Disp: 90 tablet, Rfl: 3    nebivolol (BYSTOLIC) 20 MG tablet, Take 1 tablet (20 mg total) by mouth every morning, Disp: 90 tablet, Rfl: 3    NEBULIZER SYSTEM ALL-IN-ONE MISC, 1 Device by Does not apply route every 4 (four) hours while awake, Disp: 1 each, Rfl: 0    rivaroxaban (Xarelto) 20 mg tablet, TAKE 1 TABLET DAILY WITH BREAKFAST, Disp: 90 tablet, Rfl: 1  No Known Allergies    Labs:  No visits with results within 2 Month(s) from this  visit.   Latest known visit with results is:   Office Visit on 01/21/2025   Component Date Value    Hemoglobin A1C 01/21/2025 5.5     Creatinine, Ur 01/21/2025 90.3     Albumin,U,Random 01/21/2025 <7.0     Albumin Creat Ratio 01/21/2025       Imaging: No results found.    Review of Systems:  Review of Systems   Musculoskeletal:  Positive for arthralgias and myalgias.   All other systems reviewed and are negative.      Physical Exam:  Physical Exam  Vitals reviewed.   Constitutional:       Appearance: Normal appearance.   Cardiovascular:      Rate and Rhythm: Normal rate and regular rhythm.      Pulses: Normal pulses.      Heart sounds: Normal heart sounds.   Pulmonary:      Effort: Pulmonary effort is normal.      Breath sounds: Normal breath sounds.   Musculoskeletal:      Cervical back: Normal range of motion.      Right lower leg: No edema.      Left lower leg: No edema.   Skin:     General: Skin is warm and dry.      Capillary Refill: Capillary refill takes less than 2 seconds.   Neurological:      General: No focal deficit present.      Mental Status: He is alert and oriented to person, place, and time.   Psychiatric:         Mood and Affect: Mood normal.

## 2025-04-03 ENCOUNTER — OFFICE VISIT (OUTPATIENT)
Dept: CARDIOLOGY CLINIC | Facility: CLINIC | Age: 65
End: 2025-04-03
Payer: MEDICARE

## 2025-04-03 VITALS
SYSTOLIC BLOOD PRESSURE: 116 MMHG | HEIGHT: 71 IN | WEIGHT: 250.5 LBS | HEART RATE: 60 BPM | DIASTOLIC BLOOD PRESSURE: 70 MMHG | OXYGEN SATURATION: 95 % | BODY MASS INDEX: 35.07 KG/M2

## 2025-04-03 DIAGNOSIS — E78.5 DYSLIPIDEMIA: ICD-10-CM

## 2025-04-03 DIAGNOSIS — I42.8 TACHYCARDIA INDUCED CARDIOMYOPATHY (HCC): ICD-10-CM

## 2025-04-03 DIAGNOSIS — I49.3 PVC (PREMATURE VENTRICULAR CONTRACTION): ICD-10-CM

## 2025-04-03 DIAGNOSIS — E11.9 TYPE 2 DIABETES MELLITUS WITHOUT COMPLICATION, WITHOUT LONG-TERM CURRENT USE OF INSULIN (HCC): ICD-10-CM

## 2025-04-03 DIAGNOSIS — G47.33 OSA (OBSTRUCTIVE SLEEP APNEA): ICD-10-CM

## 2025-04-03 DIAGNOSIS — I50.32 CHRONIC DIASTOLIC (CONGESTIVE) HEART FAILURE (HCC): ICD-10-CM

## 2025-04-03 DIAGNOSIS — I48.11 LONGSTANDING PERSISTENT ATRIAL FIBRILLATION (HCC): Primary | ICD-10-CM

## 2025-04-03 DIAGNOSIS — I10 HYPERTENSION, UNSPECIFIED TYPE: ICD-10-CM

## 2025-04-03 DIAGNOSIS — E66.01 MORBID OBESITY WITH BMI OF 40.0-44.9, ADULT (HCC): ICD-10-CM

## 2025-04-03 PROCEDURE — 93000 ELECTROCARDIOGRAM COMPLETE: CPT | Performed by: NURSE PRACTITIONER

## 2025-04-03 PROCEDURE — 99214 OFFICE O/P EST MOD 30 MIN: CPT | Performed by: NURSE PRACTITIONER

## 2025-04-03 RX ORDER — ATORVASTATIN CALCIUM 40 MG/1
40 TABLET, FILM COATED ORAL
Qty: 90 TABLET | Refills: 1 | Status: SHIPPED | OUTPATIENT
Start: 2025-04-03

## 2025-04-03 RX ORDER — DIGOXIN 125 MCG
125 TABLET ORAL DAILY
Qty: 90 TABLET | Refills: 3 | Status: SHIPPED | OUTPATIENT
Start: 2025-04-03

## 2025-04-03 NOTE — ASSESSMENT & PLAN NOTE
Lab Results   Component Value Date    HGBA1C 5.5 01/21/2025   Well-controlled on Jardiance 10 mg daily metformin 1000 mg twice daily follow-up for PCP

## 2025-04-03 NOTE — ASSESSMENT & PLAN NOTE
Continue on Xarelto 20 mg daily for stroke prevention.  Continue on Bystolic 20 mg in a.m. and 10 mg in p.m.

## 2025-04-03 NOTE — ASSESSMENT & PLAN NOTE
Wt Readings from Last 3 Encounters:   04/03/25 114 kg (250 lb 8 oz)   01/21/25 111 kg (244 lb)   10/03/24 116 kg (256 lb)   NYHA HA class II stage C   On PE euvolemic compensated heart rate and blood pressure well-controlled   Continue leg 620 mg daily and GDMT  Daily weights monitor for signs symptoms of heart failure

## 2025-04-09 ENCOUNTER — APPOINTMENT (OUTPATIENT)
Dept: LAB | Age: 65
End: 2025-04-09
Payer: MEDICARE

## 2025-04-09 DIAGNOSIS — I49.3 PVC (PREMATURE VENTRICULAR CONTRACTION): ICD-10-CM

## 2025-04-09 LAB — TSH SERPL DL<=0.05 MIU/L-ACNC: 1.51 UIU/ML (ref 0.45–4.5)

## 2025-04-09 PROCEDURE — 36415 COLL VENOUS BLD VENIPUNCTURE: CPT

## 2025-04-09 PROCEDURE — 84443 ASSAY THYROID STIM HORMONE: CPT

## 2025-04-16 ENCOUNTER — TELEPHONE (OUTPATIENT)
Age: 65
End: 2025-04-16

## 2025-04-16 NOTE — TELEPHONE ENCOUNTER
Caller: David/ Wife     Doctor: TERE Campbell     Reason for call: patient's wife called requesting a call in regards to thyroid test results that TERE Worrell ordered. Please advise.    Call back#: [phone]

## 2025-05-01 ENCOUNTER — RESULTS FOLLOW-UP (OUTPATIENT)
Dept: CARDIOLOGY CLINIC | Facility: CLINIC | Age: 65
End: 2025-05-01

## 2025-05-01 ENCOUNTER — REMOTE DEVICE CLINIC VISIT (OUTPATIENT)
Dept: CARDIOLOGY CLINIC | Facility: CLINIC | Age: 65
End: 2025-05-01
Payer: MEDICARE

## 2025-05-01 DIAGNOSIS — I49.5 SSS (SICK SINUS SYNDROME) (HCC): ICD-10-CM

## 2025-05-01 DIAGNOSIS — I42.8 OTHER CARDIOMYOPATHY (HCC): ICD-10-CM

## 2025-05-01 DIAGNOSIS — I48.91 ATRIAL FIBRILLATION, UNSPECIFIED TYPE (HCC): Primary | ICD-10-CM

## 2025-05-01 PROCEDURE — 93296 REM INTERROG EVL PM/IDS: CPT | Performed by: INTERNAL MEDICINE

## 2025-05-01 PROCEDURE — 93294 REM INTERROG EVL PM/LDLS PM: CPT | Performed by: INTERNAL MEDICINE

## 2025-05-01 NOTE — PROGRESS NOTES
Results for orders placed or performed in visit on 05/01/25   Cardiac EP device report    Narrative    MDT BI-V PPM (VVIR MODE)/ACTIVE SYSTEM IS MRI CONDITIONAL  CARELINK TRANSMISSION: BATTERY VOLTAGE ADEQUATE (8 YRS). : 36.6 + VSRP: 0% (<90%~HX OF SAME~VVI/60). ALL AVAILABLE LEAD PARAMETERS WITHIN NORMAL LIMITS. NO SIGNIFICANT HIGH RATE EPISODES. V-SENSE EPISODES DETECTED. OPTI-VOL WITHIN NORMAL LIMITS. NORMAL DEVICE FUNCTION. CH

## 2025-07-09 ENCOUNTER — OFFICE VISIT (OUTPATIENT)
Dept: PULMONOLOGY | Facility: CLINIC | Age: 65
End: 2025-07-09
Payer: MEDICARE

## 2025-07-09 VITALS
HEART RATE: 72 BPM | OXYGEN SATURATION: 99 % | SYSTOLIC BLOOD PRESSURE: 122 MMHG | WEIGHT: 260 LBS | HEIGHT: 71 IN | BODY MASS INDEX: 36.4 KG/M2 | DIASTOLIC BLOOD PRESSURE: 68 MMHG | TEMPERATURE: 96.2 F

## 2025-07-09 DIAGNOSIS — R91.1 LUNG NODULE: ICD-10-CM

## 2025-07-09 DIAGNOSIS — D86.9 SARCOIDOSIS: Primary | ICD-10-CM

## 2025-07-09 PROCEDURE — G2211 COMPLEX E/M VISIT ADD ON: HCPCS | Performed by: INTERNAL MEDICINE

## 2025-07-09 PROCEDURE — 99213 OFFICE O/P EST LOW 20 MIN: CPT | Performed by: INTERNAL MEDICINE

## 2025-07-09 NOTE — PROGRESS NOTES
"For follow-up of sarcoidosis.  He has been doing well.  He reports no new symptoms.  Follow-up  Visit - Pulmonary Medicine   Name: Dusty Mckeon      : 1960      MRN: [id number]  Encounter Provider: Víctor Mosqueda MD  Encounter Date: 2025   [facility]  :  Assessment & Plan  Sarcoidosis  Stable symptoms  Albuterol but has not used it  Does not report any respiratory symptomatology and cardiac symptoms stable  Denies any other symptom complaints  Continue monitoring.  I did recommend follow-up CT prior to next year's visit which has been ordered.    Orders:    CT chest without contrast; Future    Lung nodule  Has lung nodules related to sarcoid and last imaged in , no concerning findings  Follow-up CAT scan prior to next year's visit ordered           Return in about 1 year (around 2026).    History of Present Illness   Dusty Mckeon is a 65 y.o. male who presents for follow-up of sarcoidosis.  He has not had any new sarcoid related symptoms.  He denies any chest pain, worsening shortness of breath, chronic cough, or wheezing.  He has not had any cardiac complaints.  He follows with electrophysiology and cardiology.  He has not had any issues with his cardiac device.  No abdominal pain.  No GERD.  No new arthralgias or myalgias.  No visual changes.  Weight and appetite stable    Review of systems:  Aside from what is mentioned in the HPI, ROS is otherwise negative         Medical History Reviewed by provider this encounter:  Tobacco  Allergies  Meds  Problems  Med Hx  Surg Hx  Fam Hx     .    Objective   /68 (BP Location: Left arm, Patient Position: Sitting, Cuff Size: Large)   Pulse 72   Temp (!) 96.2 °F (35.7 °C) (Tympanic)   Ht 5' 10.5\" (1.791 m)   Wt 118 kg (260 lb)   SpO2 99%   BMI 36.78 kg/m²   Physical Exam:   Gen: He is overweight.  Comfortable on room air.  No conversational dyspnea  HEENT:  Conjugate " gaze.  sclerae anicteric.  Oropharynx moist  Neck: Trachea is midline. No JVD. No adenopathy  Chest: Symmetric chest wall excursion with clear breath sounds  Cardiac: Regular. no murmur  Abdomen:  benign  Extremities: No edema  Neuro:  Normal speech and mentation    Diagnostic Data:    No new pertinent pulmonary diagnostic data    Víctor Mosqueda MD

## 2025-07-09 NOTE — ASSESSMENT & PLAN NOTE
Has lung nodules related to sarcoid and last imaged in 2023, no concerning findings  Follow-up CAT scan prior to next year's visit ordered

## 2025-07-09 NOTE — ASSESSMENT & PLAN NOTE
Stable symptoms  Albuterol but has not used it  Does not report any respiratory symptomatology and cardiac symptoms stable  Denies any other symptom complaints  Continue monitoring.  I did recommend follow-up CT prior to next year's visit which has been ordered.    Orders:    CT chest without contrast; Future

## 2025-07-24 ENCOUNTER — RA CDI HCC (OUTPATIENT)
Dept: OTHER | Facility: HOSPITAL | Age: 65
End: 2025-07-24

## 2025-07-29 ENCOUNTER — OFFICE VISIT (OUTPATIENT)
Dept: FAMILY MEDICINE CLINIC | Facility: CLINIC | Age: 65
End: 2025-07-29
Payer: MEDICARE

## 2025-07-29 VITALS
OXYGEN SATURATION: 96 % | HEART RATE: 60 BPM | BODY MASS INDEX: 36.26 KG/M2 | SYSTOLIC BLOOD PRESSURE: 112 MMHG | DIASTOLIC BLOOD PRESSURE: 78 MMHG | HEIGHT: 71 IN | TEMPERATURE: 97 F | WEIGHT: 259 LBS

## 2025-07-29 DIAGNOSIS — I48.11 LONGSTANDING PERSISTENT ATRIAL FIBRILLATION (HCC): ICD-10-CM

## 2025-07-29 DIAGNOSIS — E11.9 TYPE 2 DIABETES MELLITUS WITHOUT COMPLICATION, WITHOUT LONG-TERM CURRENT USE OF INSULIN (HCC): Primary | ICD-10-CM

## 2025-07-29 DIAGNOSIS — I10 ESSENTIAL HYPERTENSION: ICD-10-CM

## 2025-07-29 DIAGNOSIS — D12.6 ADENOMATOUS POLYP OF COLON, UNSPECIFIED PART OF COLON: ICD-10-CM

## 2025-07-29 DIAGNOSIS — Z12.11 SCREEN FOR COLON CANCER: ICD-10-CM

## 2025-07-29 DIAGNOSIS — Z12.5 SCREENING PSA (PROSTATE SPECIFIC ANTIGEN): ICD-10-CM

## 2025-07-29 LAB — SL AMB POCT HEMOGLOBIN AIC: 6.1 (ref ?–6.5)

## 2025-07-29 PROCEDURE — G2211 COMPLEX E/M VISIT ADD ON: HCPCS | Performed by: FAMILY MEDICINE

## 2025-07-29 PROCEDURE — 83036 HEMOGLOBIN GLYCOSYLATED A1C: CPT | Performed by: FAMILY MEDICINE

## 2025-07-29 PROCEDURE — 99214 OFFICE O/P EST MOD 30 MIN: CPT | Performed by: FAMILY MEDICINE

## 2025-08-05 ENCOUNTER — IN-CLINIC DEVICE VISIT (OUTPATIENT)
Dept: CARDIOLOGY CLINIC | Facility: CLINIC | Age: 65
End: 2025-08-05
Payer: MEDICARE

## 2025-08-05 DIAGNOSIS — Z95.0 PRESENCE OF CARDIAC PACEMAKER: ICD-10-CM

## 2025-08-05 DIAGNOSIS — I50.32 CHRONIC DIASTOLIC (CONGESTIVE) HEART FAILURE (HCC): Primary | ICD-10-CM

## 2025-08-05 DIAGNOSIS — I42.8 TACHYCARDIA INDUCED CARDIOMYOPATHY (HCC): ICD-10-CM

## 2025-08-05 PROCEDURE — 93281 PM DEVICE PROGR EVAL MULTI: CPT | Performed by: INTERNAL MEDICINE

## (undated) DEVICE — INTRO SHEATH PEEL AWAY 9 FR

## (undated) DEVICE — GUIDE SHEATH 7FR 90 D ATTAIN SELECT II SUREVALVE

## (undated) DEVICE — PLASMABLADE PS200-040 4.0: Brand: PLASMABLADE™

## (undated) DEVICE — RADIFOCUS GLIDEWIRE: Brand: GLIDEWIRE

## (undated) DEVICE — GUIDE SHEATH 9FR ATTAIN COMMAND 9FR EH CURVE

## (undated) DEVICE — SLITTER ADJUSTABLE

## (undated) DEVICE — RUNTHROUGH NS EXTRA FLOPPY PTCA GUIDEWIRE: Brand: RUNTHROUGH

## (undated) DEVICE — Device: Brand: WEBSTER